# Patient Record
Sex: MALE | Race: WHITE | Employment: OTHER | URBAN - METROPOLITAN AREA
[De-identification: names, ages, dates, MRNs, and addresses within clinical notes are randomized per-mention and may not be internally consistent; named-entity substitution may affect disease eponyms.]

---

## 2017-09-05 RX ORDER — ALFUZOSIN HYDROCHLORIDE 10 MG/1
TABLET, EXTENDED RELEASE ORAL
Qty: 90 TABLET | Refills: 3 | Status: SHIPPED | OUTPATIENT
Start: 2017-09-05 | End: 2018-08-04 | Stop reason: SDUPTHER

## 2017-10-24 RX ORDER — FINASTERIDE 5 MG/1
5 TABLET, FILM COATED ORAL DAILY
Qty: 90 TABLET | Refills: 3 | Status: SHIPPED | OUTPATIENT
Start: 2017-10-24 | End: 2018-10-13 | Stop reason: SDUPTHER

## 2017-11-27 LAB — PROSTATE SPECIFIC ANTIGEN: 2.9 NG/ML

## 2017-12-01 ENCOUNTER — OFFICE VISIT (OUTPATIENT)
Dept: UROLOGY | Age: 82
End: 2017-12-01

## 2017-12-01 VITALS
HEIGHT: 68 IN | BODY MASS INDEX: 28.79 KG/M2 | WEIGHT: 190 LBS | DIASTOLIC BLOOD PRESSURE: 76 MMHG | SYSTOLIC BLOOD PRESSURE: 138 MMHG | HEART RATE: 48 BPM

## 2017-12-01 DIAGNOSIS — N13.8 BPH WITH OBSTRUCTION/LOWER URINARY TRACT SYMPTOMS: Primary | ICD-10-CM

## 2017-12-01 DIAGNOSIS — N40.1 BPH WITH OBSTRUCTION/LOWER URINARY TRACT SYMPTOMS: Primary | ICD-10-CM

## 2017-12-01 PROCEDURE — 1123F ACP DISCUSS/DSCN MKR DOCD: CPT | Performed by: UROLOGY

## 2017-12-01 PROCEDURE — G8419 CALC BMI OUT NRM PARAM NOF/U: HCPCS | Performed by: UROLOGY

## 2017-12-01 PROCEDURE — 99213 OFFICE O/P EST LOW 20 MIN: CPT | Performed by: UROLOGY

## 2017-12-01 PROCEDURE — 4004F PT TOBACCO SCREEN RCVD TLK: CPT | Performed by: UROLOGY

## 2017-12-01 PROCEDURE — G8484 FLU IMMUNIZE NO ADMIN: HCPCS | Performed by: UROLOGY

## 2017-12-01 PROCEDURE — 4040F PNEUMOC VAC/ADMIN/RCVD: CPT | Performed by: UROLOGY

## 2017-12-01 PROCEDURE — 51798 US URINE CAPACITY MEASURE: CPT | Performed by: UROLOGY

## 2017-12-01 PROCEDURE — 51741 ELECTRO-UROFLOWMETRY FIRST: CPT | Performed by: UROLOGY

## 2017-12-01 PROCEDURE — G8427 DOCREV CUR MEDS BY ELIG CLIN: HCPCS | Performed by: UROLOGY

## 2017-12-01 ASSESSMENT — ENCOUNTER SYMPTOMS: SHORTNESS OF BREATH: 0

## 2017-12-01 NOTE — PROGRESS NOTES
by mouth daily 90 tablet 3    alfuzosin (UROXATRAL) 10 MG extended release tablet TAKE 1 TABLET DAILY 90 tablet 3    hydrocortisone (ANUSOL-HC) 25 MG suppository Place 1 suppository rectally 2 times daily as needed for Hemorrhoids. 12 suppository 2    aspirin EC 81 MG EC tablet Take 81 mg by mouth daily.  magnesium oxide (MAG-OX) 400 MG tablet Take 400 mg by mouth daily.  Multiple Vitamin (MULTIVITAMIN PO) Take 1 tablet by mouth daily.  rosuvastatin (CRESTOR) 10 MG tablet Take 10 mg by mouth daily.  warfarin (COUMADIN) 5 MG tablet Take 5 mg by mouth Daily.  ramipril (ALTACE) 10 MG capsule Take 10 mg by mouth daily.  ezetimibe (ZETIA) 10 MG tablet Take 10 mg by mouth daily.  metoprolol (LOPRESSOR) 50 MG tablet Take 50 mg by mouth 2 times daily.  nitroGLYCERIN (NITROSTAT) 0.4 MG SL tablet Place 0.4 mg under the tongue every 5 minutes as needed. No current facility-administered medications for this visit. Review of patient's allergies indicates no known allergies. reviewed      Review of Systems   Constitutional: Negative for fever and unexpected weight change. Respiratory: Negative for shortness of breath. Cardiovascular: Negative for chest pain. Genitourinary: Negative for difficulty urinating, dysuria, enuresis, flank pain and hematuria. Objective:   Physical Exam   Constitutional: He appears well-developed and well-nourished. Genitourinary: Rectal exam shows external hemorrhoid. Prostate is enlarged. Prostate is not tender. Genitourinary Comments: Prostate is 2 + and without nodules   Neurological: He is alert. Psychiatric: He has a normal mood and affect. His behavior is normal.          PSA 11/27/17: 2.9 ng/ml  PSA   Date Value Ref Range Status   05/03/2016 3.8 ng/mL Final   04/02/2015 4.0 ng/mL Final       Uroflow: 13 ml/sec, vol 239cc  post void residual by U/S: 78cc    Assessment:       This is a [de-identified] yo white male with

## 2018-08-13 RX ORDER — ALFUZOSIN HYDROCHLORIDE 10 MG/1
TABLET, EXTENDED RELEASE ORAL
Qty: 90 TABLET | Refills: 3 | Status: SHIPPED | OUTPATIENT
Start: 2018-08-13 | End: 2019-12-03 | Stop reason: SDUPTHER

## 2018-10-15 RX ORDER — FINASTERIDE 5 MG/1
TABLET, FILM COATED ORAL
Qty: 90 TABLET | Refills: 3 | Status: SHIPPED | OUTPATIENT
Start: 2018-10-15 | End: 2020-10-12

## 2018-12-03 LAB — PROSTATE SPECIFIC ANTIGEN: 3.5 NG/ML (ref 0–4)

## 2018-12-04 ENCOUNTER — OFFICE VISIT (OUTPATIENT)
Dept: UROLOGY | Age: 83
End: 2018-12-04
Payer: MEDICARE

## 2018-12-04 VITALS
WEIGHT: 190 LBS | DIASTOLIC BLOOD PRESSURE: 70 MMHG | HEART RATE: 47 BPM | BODY MASS INDEX: 28.79 KG/M2 | SYSTOLIC BLOOD PRESSURE: 136 MMHG | HEIGHT: 68 IN

## 2018-12-04 DIAGNOSIS — N40.1 BPH WITH OBSTRUCTION/LOWER URINARY TRACT SYMPTOMS: Primary | ICD-10-CM

## 2018-12-04 DIAGNOSIS — N13.8 BPH WITH OBSTRUCTION/LOWER URINARY TRACT SYMPTOMS: Primary | ICD-10-CM

## 2018-12-04 PROCEDURE — 99213 OFFICE O/P EST LOW 20 MIN: CPT | Performed by: UROLOGY

## 2018-12-04 PROCEDURE — 51741 ELECTRO-UROFLOWMETRY FIRST: CPT | Performed by: UROLOGY

## 2018-12-04 PROCEDURE — 51798 US URINE CAPACITY MEASURE: CPT | Performed by: UROLOGY

## 2018-12-04 RX ORDER — INFLUENZA A VIRUS A/MICHIGAN/45/2015 X-275 (H1N1) ANTIGEN (FORMALDEHYDE INACTIVATED), INFLUENZA A VIRUS A/SINGAPORE/INFIMH-16-0019/2016 IVR-186 (H3N2) ANTIGEN (FORMALDEHYDE INACTIVATED), AND INFLUENZA B VIRUS B/MARYLAND/15/2016 BX-69A (A B/COLORADO/6/2017-LIKE VIRUS) ANTIGEN (FORMALDEHYDE INACTIVATED) 60; 60; 60 UG/.5ML; UG/.5ML; UG/.5ML
INJECTION, SUSPENSION INTRAMUSCULAR
Refills: 0 | COMMUNITY
Start: 2018-09-23

## 2018-12-04 ASSESSMENT — ENCOUNTER SYMPTOMS: SHORTNESS OF BREATH: 0

## 2018-12-04 NOTE — PROGRESS NOTES
Subjective:      Patient ID: Andriy Jaffe is a 82 y.o. male. 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 He also had a UTI that was treated with Cipro in 2016 and he re-started Proscar in 2/16. Since last seen on 12/1/17, has has no interval hematuria or UTI's. He has no dysuria, no frequency and urgency. She has no other SE from Proscar. He has no abd or flank pain. He has NF 1-2 and a good fos. He has no new medical or surgical problems. I reviewed the interval PSA.     Cysto on 2/29/16: significant tri-lobar hypertrophy of prostate with varices      Past Medical History:   Diagnosis Date    Anticoagulant long-term use     a fib    Atrial fibrillation (HCC)     CAD (coronary artery disease)     Cancer (HCC)     skin    History of elevated PSA 8.6.12    Most recent PSA 5.6, stable from last PSA of 5.1,prostate was enlarged and nonnodular upon exam.    Hyperlipidemia     Hypertension     Paroxysmal atrial fibrillation ([address]      Past Surgical History:   Procedure Laterality Date    COLONOSCOPY  05/21/14    EDUARDO FULTON MD    SKIN CANCER EXCISION      VEIN SURGERY       Social History     Social History    Marital status:       Spouse name: N/A    Number of children: N/A    Years of education: N/A     Social History Main Topics    Smoking status: Current Every Day Smoker     Packs/day: 0.50     Years: 50.00     Types: Cigarettes    Smokeless tobacco: Never Used    Alcohol use Yes      Comment: rare    Drug use: No    Sexual activity: Not Asked     Other Topics Concern    None     Social History Narrative    None     Family History   Problem Relation Age of Onset    Heart Disease Brother     Arthritis Brother     Stroke Brother      Current Outpatient Prescriptions   Medication Sig Dispense Refill    FLUZONE HIGH-DOSE 0.5 ML DEBBY injection TO BE

## 2019-04-25 LAB
INR BLD: 2.1 (ref 0.9–1.1)
PROTHROMBIN TIME: 23.9 SEC (ref 9.7–12.7)

## 2019-05-23 LAB
INR BLD: 2.2 (ref 0.9–1.1)
PROTHROMBIN TIME: 24.7 SEC (ref 9.7–12.7)

## 2019-06-20 LAB
INR BLD: 2.2 (ref 0.9–1.1)
PROTHROMBIN TIME: 25.2 SEC (ref 9.7–12.7)

## 2019-07-18 LAB
INR BLD: 2.6 (ref 0.9–1.1)
PROTHROMBIN TIME: 29.4 SEC (ref 9.7–12.7)

## 2019-07-19 RX ORDER — FINASTERIDE 5 MG/1
TABLET, FILM COATED ORAL
Qty: 90 TABLET | Refills: 3 | Status: SHIPPED | OUTPATIENT
Start: 2019-07-19 | End: 2019-12-03 | Stop reason: SDUPTHER

## 2019-08-15 LAB
INR BLD: 2.5 (ref 0.9–1.1)
PROTHROMBIN TIME: 29 SEC (ref 9.7–12.7)

## 2019-08-26 RX ORDER — ALFUZOSIN HYDROCHLORIDE 10 MG/1
10 TABLET, EXTENDED RELEASE ORAL DAILY
Qty: 90 TABLET | Refills: 3 | Status: SHIPPED | OUTPATIENT
Start: 2019-08-26 | End: 2020-08-18

## 2019-10-10 LAB
INR BLD: 3 (ref 0.9–1.1)
PROTHROMBIN TIME: 35.1 SEC (ref 9.7–12.7)

## 2019-11-07 LAB
INR BLD: 3.5 (ref 0.9–1.1)
PROTHROMBIN TIME: 40.9 SEC (ref 9.7–12.7)

## 2019-11-20 LAB
INR BLD: 2.8 (ref 0.9–1.1)
PROSTATE SPECIFIC ANTIGEN: 2.7 NG/ML (ref 0–4)
PROTHROMBIN TIME: 31.8 SEC (ref 9.7–12.7)

## 2019-12-03 ENCOUNTER — OFFICE VISIT (OUTPATIENT)
Dept: UROLOGY | Age: 84
End: 2019-12-03
Payer: MEDICARE

## 2019-12-03 VITALS
DIASTOLIC BLOOD PRESSURE: 60 MMHG | SYSTOLIC BLOOD PRESSURE: 130 MMHG | BODY MASS INDEX: 28.79 KG/M2 | WEIGHT: 190 LBS | HEIGHT: 68 IN | HEART RATE: 51 BPM

## 2019-12-03 DIAGNOSIS — N40.1 BPH WITH OBSTRUCTION/LOWER URINARY TRACT SYMPTOMS: Primary | ICD-10-CM

## 2019-12-03 DIAGNOSIS — R97.20 ELEVATED PSA: ICD-10-CM

## 2019-12-03 DIAGNOSIS — N13.8 BPH WITH OBSTRUCTION/LOWER URINARY TRACT SYMPTOMS: Primary | ICD-10-CM

## 2019-12-03 PROCEDURE — 1123F ACP DISCUSS/DSCN MKR DOCD: CPT | Performed by: UROLOGY

## 2019-12-03 PROCEDURE — 4004F PT TOBACCO SCREEN RCVD TLK: CPT | Performed by: UROLOGY

## 2019-12-03 PROCEDURE — 51798 US URINE CAPACITY MEASURE: CPT | Performed by: UROLOGY

## 2019-12-03 PROCEDURE — G8427 DOCREV CUR MEDS BY ELIG CLIN: HCPCS | Performed by: UROLOGY

## 2019-12-03 PROCEDURE — 51741 ELECTRO-UROFLOWMETRY FIRST: CPT | Performed by: UROLOGY

## 2019-12-03 PROCEDURE — G8417 CALC BMI ABV UP PARAM F/U: HCPCS | Performed by: UROLOGY

## 2019-12-03 PROCEDURE — 4040F PNEUMOC VAC/ADMIN/RCVD: CPT | Performed by: UROLOGY

## 2019-12-03 PROCEDURE — G8484 FLU IMMUNIZE NO ADMIN: HCPCS | Performed by: UROLOGY

## 2019-12-03 PROCEDURE — 99213 OFFICE O/P EST LOW 20 MIN: CPT | Performed by: UROLOGY

## 2019-12-03 ASSESSMENT — ENCOUNTER SYMPTOMS
ABDOMINAL PAIN: 0
ABDOMINAL DISTENTION: 0

## 2019-12-11 LAB
INR BLD: 2.7 (ref 0.9–1.1)
PROTHROMBIN TIME: 30.7 SEC (ref 9.7–12.7)

## 2020-01-08 LAB
INR BLD: 2.4 (ref 0.9–1.1)
PROTHROMBIN TIME: 27.6 SEC (ref 9.7–12.7)

## 2020-08-18 RX ORDER — ALFUZOSIN HYDROCHLORIDE 10 MG/1
TABLET, EXTENDED RELEASE ORAL
Qty: 90 TABLET | Refills: 3 | Status: SHIPPED | OUTPATIENT
Start: 2020-08-18 | End: 2021-06-29

## 2020-10-12 RX ORDER — FINASTERIDE 5 MG/1
TABLET, FILM COATED ORAL
Qty: 90 TABLET | Refills: 3 | Status: SHIPPED | OUTPATIENT
Start: 2020-10-12 | End: 2021-08-16

## 2020-11-16 LAB — PROSTATE SPECIFIC ANTIGEN: 2.2 NG/ML (ref 0–4)

## 2020-12-15 ENCOUNTER — OFFICE VISIT (OUTPATIENT)
Dept: UROLOGY | Age: 85
End: 2020-12-15
Payer: MEDICARE

## 2020-12-15 VITALS
HEART RATE: 54 BPM | DIASTOLIC BLOOD PRESSURE: 74 MMHG | WEIGHT: 190 LBS | BODY MASS INDEX: 28.79 KG/M2 | HEIGHT: 68 IN | OXYGEN SATURATION: 97 % | SYSTOLIC BLOOD PRESSURE: 126 MMHG

## 2020-12-15 PROCEDURE — 99213 OFFICE O/P EST LOW 20 MIN: CPT | Performed by: UROLOGY

## 2020-12-15 PROCEDURE — 51741 ELECTRO-UROFLOWMETRY FIRST: CPT | Performed by: UROLOGY

## 2020-12-15 PROCEDURE — G8417 CALC BMI ABV UP PARAM F/U: HCPCS | Performed by: UROLOGY

## 2020-12-15 PROCEDURE — G8484 FLU IMMUNIZE NO ADMIN: HCPCS | Performed by: UROLOGY

## 2020-12-15 PROCEDURE — 4004F PT TOBACCO SCREEN RCVD TLK: CPT | Performed by: UROLOGY

## 2020-12-15 PROCEDURE — 4040F PNEUMOC VAC/ADMIN/RCVD: CPT | Performed by: UROLOGY

## 2020-12-15 PROCEDURE — G8427 DOCREV CUR MEDS BY ELIG CLIN: HCPCS | Performed by: UROLOGY

## 2020-12-15 PROCEDURE — 1123F ACP DISCUSS/DSCN MKR DOCD: CPT | Performed by: UROLOGY

## 2020-12-15 PROCEDURE — 51798 US URINE CAPACITY MEASURE: CPT | Performed by: UROLOGY

## 2020-12-15 ASSESSMENT — ENCOUNTER SYMPTOMS: ABDOMINAL PAIN: 0

## 2020-12-15 NOTE — PROGRESS NOTES
Subjective:      Patient ID: Blanquita Mendez is a 82 y.o. male. 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 He also had a UTI that was treated with Cipro in 2016 and he re-started Proscar in 2/16. Since last seen on 12/3/19, has has no interval hematuria or UTI's. He has no dysuria, no frequency and no urgency/UI. He has no abd or flank pain. He has NF 2-3 and a good fos. He has no new surgical problems and was recently treated for diverticulitis. I reviewed the interval PSA. He is happy with his current voiding.     Cysto on 2/29/16: significant tri-lobar hypertrophy of prostate with varices    Past Medical History:   Diagnosis Date    Anticoagulant long-term use     a fib    Atrial fibrillation ([address]     CAD (coronary artery disease)     Cancer (HCC)     skin    History of elevated PSA 8.6.12    Most recent PSA 5.6, stable from last PSA of 5.1,prostate was enlarged and nonnodular upon exam.    Hyperlipidemia     Hypertension     Paroxysmal atrial fibrillation ([address]      Past Surgical History:   Procedure Laterality Date    COLONOSCOPY  05/21/14    EDUARDO FULTON MD    SKIN CANCER EXCISION      VEIN SURGERY       Social History     Socioeconomic History    Marital status:      Spouse name: None    Number of children: None    Years of education: None    Highest education level: None   Occupational History    None   Social Needs    Financial resource strain: None    Food insecurity     Worry: None     Inability: None    Transportation needs     Medical: None     Non-medical: None   Tobacco Use    Smoking status: Current Every Day Smoker     Packs/day: 0.50     Years: 50.00     Pack years: 25.00     Types: Cigarettes    Smokeless tobacco: Never Used   Substance and Sexual Activity    Alcohol use: Yes     Comment: rare    Drug use:  No  Sexual activity: None   Lifestyle    Physical activity     Days per week: None     Minutes per session: None    Stress: None   Relationships    Social connections     Talks on phone: None     Gets together: None     Attends Jain service: None     Active member of club or organization: None     Attends meetings of clubs or organizations: None     Relationship status: None    Intimate partner violence     Fear of current or ex partner: None     Emotionally abused: None     Physically abused: None     Forced sexual activity: None   Other Topics Concern    None   Social History Narrative    None     Family History   Problem Relation Age of Onset    Heart Disease Brother     Arthritis Brother     Stroke Brother      Current Outpatient Medications   Medication Sig Dispense Refill    finasteride (PROSCAR) 5 MG tablet TAKE 1 TABLET DAILY 90 tablet 3    alfuzosin (UROXATRAL) 10 MG extended release tablet TAKE 1 TABLET DAILY 90 tablet 3    FLUZONE HIGH-DOSE 0.5 ML DEBBY injection TO BE ADMINISTERED BY PHARMACIST FOR IMMUNIZATION  0    hydrocortisone (ANUSOL-HC) 25 MG suppository Place 1 suppository rectally 2 times daily as needed for Hemorrhoids. 12 suppository 2    aspirin EC 81 MG EC tablet Take 81 mg by mouth daily.  magnesium oxide (MAG-OX) 400 MG tablet Take 400 mg by mouth daily.  Multiple Vitamin (MULTIVITAMIN PO) Take 1 tablet by mouth daily.  rosuvastatin (CRESTOR) 10 MG tablet Take 10 mg by mouth daily.  warfarin (COUMADIN) 5 MG tablet Take 5 mg by mouth Daily.  ramipril (ALTACE) 10 MG capsule Take 10 mg by mouth daily.  ezetimibe (ZETIA) 10 MG tablet Take 10 mg by mouth daily.  metoprolol (LOPRESSOR) 50 MG tablet Take 50 mg by mouth 2 times daily.  nitroGLYCERIN (NITROSTAT) 0.4 MG SL tablet Place 0.4 mg under the tongue every 5 minutes as needed. No current facility-administered medications for this visit. Patient has no known allergies. reviewed      Review of Systems   Constitutional: Negative for unexpected weight change. Gastrointestinal: Negative for abdominal pain. Genitourinary: Negative for difficulty urinating, dysuria, flank pain and hematuria. Objective:   Physical Exam  Constitutional:       Appearance: Normal appearance. Genitourinary:     Prostate: Enlarged. Not tender and no nodules present. Rectum: External hemorrhoid present. Comments: Prostate is 3 +  Neurological:      Mental Status: He is alert. Psychiatric:         Mood and Affect: Mood normal.           PSA   Date Value Ref Range Status   11/16/2020 2.20 0.00 - 4.0 ng/mL Final   11/20/2019 2.70 0.00 - 4.0 ng/mL Final   12/03/2018 3.5 0.0 - 4.0 ng/mL Final   11/27/2017 2.9 ng/mL Final   05/03/2016 3.8 ng/mL Final     Uroflow: 11 ml/sec, vol 144 cc  post void residual by U/S: 95 cc    Assessment: This is a 81 yo white male with h/o CAD, AFIB/Coumadin/ASA, HTN, BPH w/LUTs on Alfusozin and Proscar (stable) and stable/imporved PSA that remains elevated when corrected for use of 5ARI. He again, remains not currently interested in a prostate biopsy which is reasonable given his age, co-morbidities and PSA stability. He understands the approx 25 % risk for prostate cancer based on the current PSA. Will continue with the present management for the BPH.        Plan:      1.  F/U 1 yr for PSA        Hugo Gama MD

## 2021-06-29 RX ORDER — ALFUZOSIN HYDROCHLORIDE 10 MG/1
TABLET, EXTENDED RELEASE ORAL
Qty: 90 TABLET | Refills: 3 | Status: SHIPPED | OUTPATIENT
Start: 2021-06-29 | End: 2022-05-31

## 2021-08-14 DIAGNOSIS — N40.1 BPH WITH OBSTRUCTION/LOWER URINARY TRACT SYMPTOMS: Primary | ICD-10-CM

## 2021-08-14 DIAGNOSIS — N13.8 BPH WITH OBSTRUCTION/LOWER URINARY TRACT SYMPTOMS: Primary | ICD-10-CM

## 2021-08-16 RX ORDER — FINASTERIDE 5 MG/1
TABLET, FILM COATED ORAL
Qty: 90 TABLET | Refills: 3 | Status: SHIPPED | OUTPATIENT
Start: 2021-08-16 | End: 2022-09-19

## 2021-12-08 LAB — PROSTATE SPECIFIC ANTIGEN: 2.6 NG/ML (ref 0–4)

## 2021-12-30 ENCOUNTER — OFFICE VISIT (OUTPATIENT)
Dept: UROLOGY | Age: 86
End: 2021-12-30
Payer: MEDICARE

## 2021-12-30 VITALS
BODY MASS INDEX: 27.28 KG/M2 | SYSTOLIC BLOOD PRESSURE: 138 MMHG | HEART RATE: 70 BPM | HEIGHT: 68 IN | WEIGHT: 180 LBS | DIASTOLIC BLOOD PRESSURE: 78 MMHG

## 2021-12-30 DIAGNOSIS — N13.8 BPH WITH OBSTRUCTION/LOWER URINARY TRACT SYMPTOMS: Primary | ICD-10-CM

## 2021-12-30 DIAGNOSIS — N40.1 BPH WITH OBSTRUCTION/LOWER URINARY TRACT SYMPTOMS: Primary | ICD-10-CM

## 2021-12-30 DIAGNOSIS — R97.20 ELEVATED PSA: ICD-10-CM

## 2021-12-30 PROCEDURE — 99213 OFFICE O/P EST LOW 20 MIN: CPT | Performed by: UROLOGY

## 2021-12-30 PROCEDURE — G8417 CALC BMI ABV UP PARAM F/U: HCPCS | Performed by: UROLOGY

## 2021-12-30 PROCEDURE — 4004F PT TOBACCO SCREEN RCVD TLK: CPT | Performed by: UROLOGY

## 2021-12-30 PROCEDURE — 4040F PNEUMOC VAC/ADMIN/RCVD: CPT | Performed by: UROLOGY

## 2021-12-30 PROCEDURE — 1123F ACP DISCUSS/DSCN MKR DOCD: CPT | Performed by: UROLOGY

## 2021-12-30 PROCEDURE — G8427 DOCREV CUR MEDS BY ELIG CLIN: HCPCS | Performed by: UROLOGY

## 2021-12-30 PROCEDURE — G8484 FLU IMMUNIZE NO ADMIN: HCPCS | Performed by: UROLOGY

## 2021-12-30 ASSESSMENT — ENCOUNTER SYMPTOMS
ABDOMINAL PAIN: 0
SHORTNESS OF BREATH: 0
ABDOMINAL DISTENTION: 0

## 2021-12-30 NOTE — PROGRESS NOTES
Social Determinants of Health     Financial Resource Strain:     Difficulty of Paying Living Expenses: Not on file   Food Insecurity:     Worried About Running Out of Food in the Last Year: Not on file    Jina of Food in the Last Year: Not on file   Transportation Needs:     Lack of Transportation (Medical): Not on file    Lack of Transportation (Non-Medical): Not on file   Physical Activity:     Days of Exercise per Week: Not on file    Minutes of Exercise per Session: Not on file   Stress:     Feeling of Stress : Not on file   Social Connections:     Frequency of Communication with Friends and Family: Not on file    Frequency of Social Gatherings with Friends and Family: Not on file    Attends Quaker Services: Not on file    Active Member of Clubs or Organizations: Not on file    Attends Club or Organization Meetings: Not on file    Marital Status: Not on file   Intimate Partner Violence:     Fear of Current or Ex-Partner: Not on file    Emotionally Abused: Not on file    Physically Abused: Not on file    Sexually Abused: Not on file   Housing Stability:     Unable to Pay for Housing in the Last Year: Not on file    Number of Places Lived in the Last Year: Not on file    Unstable Housing in the Last Year: Not on file     Family History   Problem Relation Age of Onset    Heart Disease Brother     Arthritis Brother     Stroke Brother      Current Outpatient Medications   Medication Sig Dispense Refill    finasteride (PROSCAR) 5 MG tablet TAKE 1 TABLET DAILY 90 tablet 3    alfuzosin (UROXATRAL) 10 MG extended release tablet TAKE 1 TABLET DAILY 90 tablet 3    FLUZONE HIGH-DOSE 0.5 ML DEBBY injection TO BE ADMINISTERED BY PHARMACIST FOR IMMUNIZATION  0    hydrocortisone (ANUSOL-HC) 25 MG suppository Place 1 suppository rectally 2 times daily as needed for Hemorrhoids. 12 suppository 2    aspirin EC 81 MG EC tablet Take 81 mg by mouth daily.         magnesium oxide (MAG-OX) 400 MG

## 2022-03-28 ENCOUNTER — TELEPHONE (OUTPATIENT)
Dept: UROLOGY | Age: 87
End: 2022-03-28

## 2022-03-28 NOTE — TELEPHONE ENCOUNTER
Should discuss with the other doctor who prescribed the cardiac med to see if there is an alternative to use of amioderone?

## 2022-05-31 RX ORDER — ALFUZOSIN HYDROCHLORIDE 10 MG/1
TABLET, EXTENDED RELEASE ORAL
Qty: 90 TABLET | Refills: 3 | Status: SHIPPED | OUTPATIENT
Start: 2022-05-31

## 2022-08-09 ENCOUNTER — HOSPITAL ENCOUNTER (OUTPATIENT)
Dept: PULMONOLOGY | Age: 87
Discharge: HOME | End: 2022-08-09
Payer: MEDICARE

## 2022-08-09 DIAGNOSIS — Z79.899 HIGH RISK MEDICATION USE: ICD-10-CM

## 2022-08-09 PROCEDURE — 94729 DIFFUSING CAPACITY: CPT

## 2022-08-09 PROCEDURE — 94060 EVALUATION OF WHEEZING: CPT

## 2022-08-09 PROCEDURE — 94726 PLETHYSMOGRAPHY LUNG VOLUMES: CPT

## 2022-08-09 PROCEDURE — 6360000002 HC RX W HCPCS: Performed by: INTERNAL MEDICINE

## 2022-08-09 RX ORDER — ALBUTEROL SULFATE 2.5 MG/3ML
2.5 SOLUTION RESPIRATORY (INHALATION) ONCE
Status: COMPLETED | OUTPATIENT
Start: 2022-08-09 | End: 2022-08-09

## 2022-08-09 RX ADMIN — ALBUTEROL SULFATE 2.5 MG: 2.5 SOLUTION RESPIRATORY (INHALATION) at 09:58

## 2022-08-10 PROCEDURE — 94729 DIFFUSING CAPACITY: CPT | Performed by: INTERNAL MEDICINE

## 2022-08-10 PROCEDURE — 94726 PLETHYSMOGRAPHY LUNG VOLUMES: CPT | Performed by: INTERNAL MEDICINE

## 2022-08-10 PROCEDURE — 94060 EVALUATION OF WHEEZING: CPT | Performed by: INTERNAL MEDICINE

## 2022-08-10 NOTE — PROCEDURES
[address]                      [address], [address] [facility]                    PULMONARY FUNCTION  Nolon Shanks Overy   82 y.o.   male  Height 68 in  Weight 180 lb      Referring provider   Darryle Goo, MD    Reading provider   Adithya Mckeon MD    Test meets ATS criteria for acceptability and reproducibility Yes    Diagnosis: ARREDONDO: Yes  Cough   Yes, wheezing No  Smoking   yes    Spirometry   FVC            2.19 L   62%  Post bronchodilator 2.24 L  63% Change 2 %  FEV1          1.86 L  75%   Post bronchodilator  1.90 L  77%   Change 1%  FEV1/FVC  84  %             Post bronchodilator  84 %  RAV88-34% 1.93 L  121%  Post bronchodilator  1.95 L  122%   Change 1%    Lung volume   SVC           2.06 L  58%   RV              2.67 L 101%   TLC            4.73  L 70%   RV/TLC      56 %    DLCO           92 %     Test interpretation     Spirometry suggest restriction, with no significant response to bronchodilator  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        Clinical correlation is recommended     Adithya Mckeon MD [facility], 8/10/2022 11:44 AM

## 2022-09-17 DIAGNOSIS — N40.1 BPH WITH OBSTRUCTION/LOWER URINARY TRACT SYMPTOMS: ICD-10-CM

## 2022-09-17 DIAGNOSIS — N13.8 BPH WITH OBSTRUCTION/LOWER URINARY TRACT SYMPTOMS: ICD-10-CM

## 2022-09-19 RX ORDER — FINASTERIDE 5 MG/1
TABLET, FILM COATED ORAL
Qty: 90 TABLET | Refills: 3 | Status: SHIPPED | OUTPATIENT
Start: 2022-09-19 | End: 2022-10-27 | Stop reason: SDUPTHER

## 2022-10-24 DIAGNOSIS — N13.8 BPH WITH OBSTRUCTION/LOWER URINARY TRACT SYMPTOMS: ICD-10-CM

## 2022-10-24 DIAGNOSIS — N40.1 BPH WITH OBSTRUCTION/LOWER URINARY TRACT SYMPTOMS: ICD-10-CM

## 2022-10-24 NOTE — TELEPHONE ENCOUNTER
[facility], [address] [place] [address]. Pt states that this can wait until Dr. Roverto Ordoñez returns next week.

## 2022-10-27 RX ORDER — FINASTERIDE 5 MG/1
TABLET, FILM COATED ORAL
Qty: 90 TABLET | Refills: 3 | Status: SHIPPED | OUTPATIENT
Start: 2022-10-27

## 2022-12-29 ENCOUNTER — OFFICE VISIT (OUTPATIENT)
Dept: UROLOGY | Age: 87
End: 2022-12-29
Payer: MEDICARE

## 2022-12-29 VITALS
DIASTOLIC BLOOD PRESSURE: 72 MMHG | WEIGHT: 190 LBS | HEIGHT: 68 IN | HEART RATE: 68 BPM | SYSTOLIC BLOOD PRESSURE: 134 MMHG | BODY MASS INDEX: 28.79 KG/M2

## 2022-12-29 DIAGNOSIS — N40.1 BPH WITH OBSTRUCTION/LOWER URINARY TRACT SYMPTOMS: Primary | ICD-10-CM

## 2022-12-29 DIAGNOSIS — N13.8 BPH WITH OBSTRUCTION/LOWER URINARY TRACT SYMPTOMS: Primary | ICD-10-CM

## 2022-12-29 PROCEDURE — 1123F ACP DISCUSS/DSCN MKR DOCD: CPT | Performed by: UROLOGY

## 2022-12-29 PROCEDURE — 99213 OFFICE O/P EST LOW 20 MIN: CPT | Performed by: UROLOGY

## 2022-12-29 PROCEDURE — G8417 CALC BMI ABV UP PARAM F/U: HCPCS | Performed by: UROLOGY

## 2022-12-29 PROCEDURE — G8484 FLU IMMUNIZE NO ADMIN: HCPCS | Performed by: UROLOGY

## 2022-12-29 PROCEDURE — G8427 DOCREV CUR MEDS BY ELIG CLIN: HCPCS | Performed by: UROLOGY

## 2022-12-29 PROCEDURE — 4004F PT TOBACCO SCREEN RCVD TLK: CPT | Performed by: UROLOGY

## 2022-12-29 RX ORDER — AMIODARONE HYDROCHLORIDE 100 MG/1
100 TABLET ORAL DAILY
COMMUNITY

## 2022-12-29 ASSESSMENT — ENCOUNTER SYMPTOMS
ABDOMINAL DISTENTION: 0
ABDOMINAL PAIN: 0

## 2022-12-29 NOTE — PROGRESS NOTES
Subjective:      Patient ID: Lili Choudhary is a 82 y.o. 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 He also had a UTI that was treated with Cipro in 2016 and he re-started Proscar in 2/16. Since last seen on 12/30/21 has has no interval hematuria or UTI's. He has no dysuria, no frequency and no UI. He has NF 2-3 and a good fos. He has no new medical or surgical problems. I reviewed the interval PSA. Cysto on 2/29/16: significant tri-lobar hypertrophy of prostate with varices    Past Medical History:   Diagnosis Date    Anticoagulant long-term use     a fib    Atrial fibrillation (HCC)     CAD (coronary artery disease)     Cancer (HCC)     skin    History of elevated PSA 8.6.12    Most recent PSA 5.6, stable from last PSA of 5.1,prostate was enlarged and nonnodular upon exam.    Hyperlipidemia     Hypertension     Paroxysmal atrial fibrillation [facility])      Past Surgical History:   Procedure Laterality Date    COLONOSCOPY  05/21/14    Negrito Reyna MD    SKIN CANCER EXCISION      VEIN SURGERY       Social History     Socioeconomic History    Marital status:       Spouse name: None    Number of children: None    Years of education: None    Highest education level: None   Tobacco Use    Smoking status: Every Day     Packs/day: 0.50     Years: 50.00     Pack years: 25.00     Types: Cigarettes    Smokeless tobacco: Never   Substance and Sexual Activity    Alcohol use: Yes     Comment: rare    Drug use: No     Family History   Problem Relation Age of Onset    Heart Disease Brother     Arthritis Brother     Stroke Brother      Current Outpatient Medications   Medication Sig Dispense Refill    amiodarone (PACERONE) 100 MG tablet Take 100 mg by mouth daily      finasteride (PROSCAR) 5 MG tablet TAKE 1 TABLET DAILY 90 tablet 3    alfuzosin (UROXATRAL) 10 MG extended release tablet TAKE 1 TABLET DAILY 90 tablet 3    FLUZONE HIGH-DOSE 0.5 ML DEBBY injection TO BE ADMINISTERED BY PHARMACIST FOR IMMUNIZATION  0    aspirin EC 81 MG EC tablet Take 81 mg by mouth daily. magnesium oxide (MAG-OX) 400 MG tablet Take 400 mg by mouth daily. Multiple Vitamin (MULTIVITAMIN PO) Take 1 tablet by mouth daily. rosuvastatin (CRESTOR) 10 MG tablet Take 10 mg by mouth daily. warfarin (COUMADIN) 5 MG tablet Take 5 mg by mouth Daily. ramipril (ALTACE) 10 MG capsule Take 10 mg by mouth daily. ezetimibe (ZETIA) 10 MG tablet Take 10 mg by mouth daily. metoprolol (LOPRESSOR) 50 MG tablet Take 50 mg by mouth 2 times daily. nitroGLYCERIN (NITROSTAT) 0.4 MG SL tablet Place 0.4 mg under the tongue every 5 minutes as needed. hydrocortisone (ANUSOL-HC) 25 MG suppository Place 1 suppository rectally 2 times daily as needed for Hemorrhoids. (Patient not taking: Reported on 12/29/2022) 12 suppository 2     No current facility-administered medications for this visit. Patient has no known allergies. reviewed      Review of Systems   Constitutional:  Negative for unexpected weight change. Gastrointestinal:  Negative for abdominal distention and abdominal pain. Genitourinary:  Negative for dysuria, flank pain and hematuria. Objective:   Physical Exam  Constitutional:       Appearance: Normal appearance. Genitourinary:     Prostate: Enlarged. Not tender and no nodules present. Rectum: External hemorrhoid present. Neurological:      Mental Status: He is alert. PSA 12/20/22: 2.5 ng/ml  PSA   Date Value Ref Range Status   12/08/2021 2.60 0.00 - 4.0 ng/mL Final   11/16/2020 2.20 0.00 - 4.0 ng/mL Final   11/20/2019 2.70 0.00 - 4.0 ng/mL Final   12/03/2018 3.5 0.0 - 4.0 ng/mL Final   11/27/2017 2.9 ng/mL Final       Assessment:       This is a 83 yo white male with h/o CAD, AFIB/Coumadin/ASA, HTN, BPH w/LUTs on Alfusozin and Proscar (stable) and relatively stable PSA that remains elevated when corrected for use of 5ARI. He again, remains not currently interested in a prostate biopsy which is reasonable given his age, co-morbidities and PSA stability. He understands the approx 25 % risk for prostate cancer based on the current PSA. Will continue with the present management for the BPH.        Plan:      F/U 1 yr for PSA        Ange Harris MD

## 2022-12-30 DIAGNOSIS — N13.8 BPH WITH OBSTRUCTION/LOWER URINARY TRACT SYMPTOMS: Primary | ICD-10-CM

## 2022-12-30 DIAGNOSIS — N40.1 BPH WITH OBSTRUCTION/LOWER URINARY TRACT SYMPTOMS: Primary | ICD-10-CM

## 2023-01-24 LAB
INR BLD: 1.9 (ref 0.9–1.1)
PROTHROMBIN TIME: 21.9 SEC (ref 9.8–13.4)

## 2023-02-21 LAB
INR IN PPP BY COAGULATION ASSAY: 1.8 (ref 0.9–1.1)
PROTHROMBIN TIME (PT) IN PPP BY COAGULATION ASSAY: 20.9 SEC (ref 9.8–13.4)

## 2023-03-14 LAB
INR IN PPP BY COAGULATION ASSAY: 2.7 (ref 0.9–1.1)
PROTHROMBIN TIME (PT) IN PPP BY COAGULATION ASSAY: 31.1 SEC (ref 9.8–13.4)

## 2023-03-28 LAB
ALANINE AMINOTRANSFERASE (SGPT) (U/L) IN SER/PLAS: 14 U/L (ref 10–52)
ALBUMIN (G/DL) IN SER/PLAS: 3.9 G/DL (ref 3.4–5)
ALBUMIN: 3.9 G/DL (ref 3.4–5)
ALKALINE PHOSPHATASE (U/L) IN SER/PLAS: 88 U/L (ref 33–136)
ALP BLD-CCNC: 88 U/L (ref 33–136)
ALT SERPL-CCNC: 14 U/L (ref 10–52)
ANION GAP IN SER/PLAS: 12 MMOL/L (ref 10–20)
ANION GAP SERPL CALCULATED.3IONS-SCNC: 12 MMOL/L (ref 10–20)
ASPARTATE AMINOTRANSFERASE (SGOT) (U/L) IN SER/PLAS: 24 U/L (ref 9–39)
AST SERPL-CCNC: 24 U/L (ref 9–39)
BICARBONATE: 31 MMOL/L (ref 21–32)
BILIRUB SERPL-MCNC: 0.9 MG/DL (ref 0–1.2)
BILIRUBIN DIRECT (MG/DL) IN SER/PLAS: 0.1 MG/DL (ref 0–0.3)
BILIRUBIN DIRECT: 0.1 MG/DL (ref 0–0.3)
BILIRUBIN TOTAL (MG/DL) IN SER/PLAS: 0.9 MG/DL (ref 0–1.2)
CALCIUM (MG/DL) IN SER/PLAS: 9.6 MG/DL (ref 8.6–10.3)
CALCIUM SERPL-MCNC: 9.6 MG/DL (ref 8.6–10.3)
CARBON DIOXIDE, TOTAL (MMOL/L) IN SER/PLAS: 31 MMOL/L (ref 21–32)
CHLORIDE (MMOL/L) IN SER/PLAS: 101 MMOL/L (ref 98–107)
CHLORIDE BLD-SCNC: 101 MMOL/L (ref 98–107)
CREAT SERPL-MCNC: 1.09 MG/DL (ref 0.5–1.3)
CREATININE (MG/DL) IN SER/PLAS: 1.09 MG/DL (ref 0.5–1.3)
EGFR MALE: 66 ML/MIN/1.73M2
ERYTHROCYTE DISTRIBUTION WIDTH (RATIO) BY AUTOMATED COUNT: 14.6 % (ref 11.5–14.5)
ERYTHROCYTE MEAN CORPUSCULAR HEMOGLOBIN CONCENTRATION (G/DL) BY AUTOMATED: 32 G/DL (ref 32–36)
ERYTHROCYTE MEAN CORPUSCULAR VOLUME (FL) BY AUTOMATED COUNT: 103 FL (ref 80–100)
ERYTHROCYTE [DISTWIDTH] IN BLOOD BY AUTOMATED COUNT: 14.6 % (ref 11.5–14)
ERYTHROCYTES (10*6/UL) IN BLOOD BY AUTOMATED COUNT: 3.99 X10E12/L (ref 4.5–5.9)
GFR MALE: 66 ML/MIN/1.73M2
GLUCOSE (MG/DL) IN SER/PLAS: 95 MG/DL (ref 74–99)
GLUCOSE: 95 MG/DL (ref 74–99)
HCT VFR BLD CALC: 41.2 % (ref 41–52)
HEMATOCRIT (%) IN BLOOD BY AUTOMATED COUNT: 41.2 % (ref 41–52)
HEMOGLOBIN (G/DL) IN BLOOD: 13.2 G/DL (ref 13.5–17.5)
HEMOGLOBIN: 13.2 G/DL (ref 13.5–17)
INR BLD: 2.8 (ref 0.9–1.1)
INR IN PPP BY COAGULATION ASSAY: 2.8 (ref 0.9–1.1)
LEUKOCYTES (10*3/UL) IN BLOOD BY AUTOMATED COUNT: 7.3 X10E9/L (ref 4.4–11.3)
MCHC RBC AUTO-ENTMCNC: 32 G/DL (ref 32–36)
MCV RBC AUTO: 103 FL (ref 80–100)
PLATELET # BLD: 261 X10E9/L (ref 150–450)
PLATELETS (10*3/UL) IN BLOOD AUTOMATED COUNT: 261 X10E9/L (ref 150–450)
POTASSIUM (MMOL/L) IN SER/PLAS: 4.3 MMOL/L (ref 3.5–5.3)
POTASSIUM SERPL-SCNC: 4.3 MMOL/L (ref 3.5–5.3)
PROTEIN TOTAL: 7.5 G/DL (ref 6.4–8.2)
PROTHROMBIN TIME (PT) IN PPP BY COAGULATION ASSAY: 33.2 SEC (ref 9.8–13.4)
PROTHROMBIN TIME: 33.2 SEC (ref 9.8–13.4)
RBC # BLD: 3.99 X10E12/L (ref 4.5–5.9)
SODIUM (MMOL/L) IN SER/PLAS: 140 MMOL/L (ref 136–145)
SODIUM BLD-SCNC: 140 MMOL/L (ref 136–145)
T4 FREE: 1.24 NG/DL (ref 0.61–1.1)
THYROTROPIN (MIU/L) IN SER/PLAS BY DETECTION LIMIT <= 0.05 MIU/L: 2.61 MIU/L (ref 0.44–3.98)
THYROXINE (T4) FREE (NG/DL) IN SER/PLAS: 1.24 NG/DL (ref 0.61–1.12)
TOTAL PROTEIN: 7.5 G/DL (ref 6.4–8.2)
TSH SERPL DL<=0.05 MIU/L-ACNC: 2.61 MIU/L (ref 0.44–3.9)
UREA NITROGEN (MG/DL) IN SER/PLAS: 13 MG/DL (ref 6–23)
UREA NITROGEN: 13 MG/DL (ref 6–23)
WBC: 7.3 X10E9/L (ref 4.4–11.3)

## 2023-04-25 LAB
INR BLD: 3 (ref 0.9–1.1)
INR IN PPP BY COAGULATION ASSAY: 3 (ref 0.9–1.1)
PROTHROMBIN TIME (PT) IN PPP BY COAGULATION ASSAY: 35.1 SEC (ref 9.8–13.4)
PROTHROMBIN TIME: 35.1 SEC (ref 9.8–13.4)

## 2023-05-01 LAB
INR BLD: 2.5 (ref 0.9–1.1)
INR IN PPP BY COAGULATION ASSAY: 2.5 (ref 0.9–1.1)
PROTHROMBIN TIME (PT) IN PPP BY COAGULATION ASSAY: 28.7 SEC (ref 9.8–13.4)
PROTHROMBIN TIME: 28.7 SEC (ref 9.8–13.4)

## 2023-05-10 LAB
THYROPEROXIDASE AB (IU/ML) IN SER/PLAS: <28 IU/ML
THYROTROPIN (MIU/L) IN SER/PLAS BY DETECTION LIMIT <= 0.05 MIU/L: 2.15 MIU/L (ref 0.44–3.98)
THYROXINE (T4) FREE (NG/DL) IN SER/PLAS: 1.47 NG/DL (ref 0.61–1.12)
TRIIODOTHYRONINE (T3) FREE (PG/ML) IN SER/PLAS: 2.8 PG/ML (ref 2.3–4.2)

## 2023-05-12 LAB — TSH RECEPTOR ANTIBODY: <0.8 IU/L

## 2023-05-17 LAB — THYROID STIMULATING IMMUNOGLOBULIN: <1 TSI INDEX

## 2023-05-18 LAB
THYROGLOBULIN AB (IU/ML) IN SER/PLAS: 54.2 IU/ML (ref 0–4)
THYROGLOBULIN LC-MS/MS: <0.5 NG/ML (ref 1.3–31.8)
THYROGLOBULIN: ABNORMAL NG/ML (ref 1.3–31.8)

## 2023-05-23 LAB
INR BLD: 2.9 (ref 0.9–1.1)
INR IN PPP BY COAGULATION ASSAY: 2.9 (ref 0.9–1.1)
PROTHROMBIN TIME (PT) IN PPP BY COAGULATION ASSAY: 34.4 SEC (ref 9.8–13.4)
PROTHROMBIN TIME: 34.4 SEC (ref 9.8–13.4)

## 2023-06-20 LAB
INR BLD: 2.7 (ref 0.9–1.1)
INR IN PPP BY COAGULATION ASSAY: 2.7 (ref 0.9–1.1)
PROTHROMBIN TIME (PT) IN PPP BY COAGULATION ASSAY: 30.7 SEC (ref 9.8–12.8)
PROTHROMBIN TIME: 30.7 SEC (ref 9.8–12.8)

## 2023-06-29 LAB
ALANINE AMINOTRANSFERASE (SGPT) (U/L) IN SER/PLAS: 13 U/L (ref 10–52)
ALBUMIN (G/DL) IN SER/PLAS: 3.8 G/DL (ref 3.4–5)
ALKALINE PHOSPHATASE (U/L) IN SER/PLAS: 86 U/L (ref 33–136)
ANION GAP IN SER/PLAS: 13 MMOL/L (ref 10–20)
ASPARTATE AMINOTRANSFERASE (SGOT) (U/L) IN SER/PLAS: 23 U/L (ref 9–39)
BILIRUBIN TOTAL (MG/DL) IN SER/PLAS: 1 MG/DL (ref 0–1.2)
CALCIUM (MG/DL) IN SER/PLAS: 9.4 MG/DL (ref 8.6–10.3)
CARBON DIOXIDE, TOTAL (MMOL/L) IN SER/PLAS: 30 MMOL/L (ref 21–32)
CHLORIDE (MMOL/L) IN SER/PLAS: 101 MMOL/L (ref 98–107)
CREATININE (MG/DL) IN SER/PLAS: 1.08 MG/DL (ref 0.5–1.3)
GFR MALE: 67 ML/MIN/1.73M2
GLUCOSE (MG/DL) IN SER/PLAS: 96 MG/DL (ref 74–99)
POTASSIUM (MMOL/L) IN SER/PLAS: 4.8 MMOL/L (ref 3.5–5.3)
PROTEIN TOTAL: 7.5 G/DL (ref 6.4–8.2)
SODIUM (MMOL/L) IN SER/PLAS: 139 MMOL/L (ref 136–145)
UREA NITROGEN (MG/DL) IN SER/PLAS: 13 MG/DL (ref 6–23)

## 2023-07-14 RX ORDER — ALFUZOSIN HYDROCHLORIDE 10 MG/1
TABLET, EXTENDED RELEASE ORAL
Qty: 90 TABLET | Refills: 3 | Status: SHIPPED | OUTPATIENT
Start: 2023-07-14

## 2023-07-18 LAB
INR BLD: 3.3 (ref 0.9–1.1)
INR IN PPP BY COAGULATION ASSAY: 3.3 (ref 0.9–1.1)
PROTHROMBIN TIME (PT) IN PPP BY COAGULATION ASSAY: 37.6 SEC (ref 9.8–12.8)
PROTHROMBIN TIME: 37.6 SEC (ref 9.8–12.8)

## 2023-08-01 LAB
INR BLD: 3.1 (ref 0.9–1.1)
INR IN PPP BY COAGULATION ASSAY: 3.1 (ref 0.9–1.1)
PROTHROMBIN TIME (PT) IN PPP BY COAGULATION ASSAY: 35.4 SEC (ref 9.8–12.8)
PROTHROMBIN TIME: 35.4 SEC (ref 9.8–12.8)

## 2023-08-03 LAB
ANION GAP IN SER/PLAS: 13 MMOL/L (ref 10–20)
ANION GAP SERPL CALCULATED.3IONS-SCNC: 13 MMOL/L (ref 10–20)
BICARBONATE: 30 MMOL/L (ref 21–32)
CALCIUM (MG/DL) IN SER/PLAS: 9.1 MG/DL (ref 8.6–10.3)
CALCIUM SERPL-MCNC: 9.1 MG/DL (ref 8.6–10.3)
CARBON DIOXIDE, TOTAL (MMOL/L) IN SER/PLAS: 30 MMOL/L (ref 21–32)
CHLORIDE (MMOL/L) IN SER/PLAS: 103 MMOL/L (ref 98–107)
CHLORIDE BLD-SCNC: 103 MMOL/L (ref 98–107)
CHOLESTEROL (MG/DL) IN SER/PLAS: 104 MG/DL (ref 0–199)
CHOLESTEROL IN HDL (MG/DL) IN SER/PLAS: 43.3 MG/DL
CHOLESTEROL/HDL RATIO: 2.4
CHOLESTEROL/HDL RATIO: 2.4
CHOLESTEROL: 104 MG/DL (ref 0–199)
CREAT SERPL-MCNC: 1.27 MG/DL (ref 0.5–1.3)
CREATININE (MG/DL) IN SER/PLAS: 1.27 MG/DL (ref 0.5–1.3)
EGFR MALE: 55 ML/MIN/1.73M2
GFR MALE: 55 ML/MIN/1.73M2
GLUCOSE (MG/DL) IN SER/PLAS: 107 MG/DL (ref 74–99)
GLUCOSE: 107 MG/DL (ref 74–99)
HDLC SERPL-MCNC: 43.3 MG/DL
LDL CHOLESTEROL: 48 MG/DL (ref 0–99)
LDL: 48 MG/DL (ref 0–99)
POTASSIUM (MMOL/L) IN SER/PLAS: 4.6 MMOL/L (ref 3.5–5.3)
POTASSIUM SERPL-SCNC: 4.6 MMOL/L (ref 3.5–5.3)
SODIUM (MMOL/L) IN SER/PLAS: 141 MMOL/L (ref 136–145)
SODIUM BLD-SCNC: 141 MMOL/L (ref 136–145)
TRIGL SERPL-MCNC: 65 MG/DL (ref 0–149)
TRIGLYCERIDE (MG/DL) IN SER/PLAS: 65 MG/DL (ref 0–149)
UREA NITROGEN (MG/DL) IN SER/PLAS: 14 MG/DL (ref 6–23)
UREA NITROGEN: 14 MG/DL (ref 6–23)
VLDL: 13 MG/DL (ref 0–40)
VLDLC SERPL CALC-MCNC: 13 MG/DL (ref 0–40)

## 2023-08-22 LAB
ALANINE AMINOTRANSFERASE (SGPT) (U/L) IN SER/PLAS: 12 U/L (ref 10–52)
ALBUMIN (G/DL) IN SER/PLAS: 3.7 G/DL (ref 3.4–5)
ALKALINE PHOSPHATASE (U/L) IN SER/PLAS: 82 U/L (ref 33–136)
ANION GAP IN SER/PLAS: 9 MMOL/L (ref 10–20)
ASPARTATE AMINOTRANSFERASE (SGOT) (U/L) IN SER/PLAS: 20 U/L (ref 9–39)
BILIRUBIN TOTAL (MG/DL) IN SER/PLAS: 0.8 MG/DL (ref 0–1.2)
CALCIUM (MG/DL) IN SER/PLAS: 9.4 MG/DL (ref 8.6–10.3)
CARBON DIOXIDE, TOTAL (MMOL/L) IN SER/PLAS: 32 MMOL/L (ref 21–32)
CHLORIDE (MMOL/L) IN SER/PLAS: 101 MMOL/L (ref 98–107)
CREATININE (MG/DL) IN SER/PLAS: 1.15 MG/DL (ref 0.5–1.3)
GFR MALE: 62 ML/MIN/1.73M2
GLUCOSE (MG/DL) IN SER/PLAS: 118 MG/DL (ref 74–99)
INR BLD: 3.2 (ref 0.9–1.1)
INR IN PPP BY COAGULATION ASSAY: 3.2 (ref 0.9–1.1)
POTASSIUM (MMOL/L) IN SER/PLAS: 4.4 MMOL/L (ref 3.5–5.3)
PROTEIN TOTAL: 7.3 G/DL (ref 6.4–8.2)
PROTHROMBIN TIME (PT) IN PPP BY COAGULATION ASSAY: 36.6 SEC (ref 9.8–12.8)
PROTHROMBIN TIME: 36.6 SEC (ref 9.8–12.8)
SODIUM (MMOL/L) IN SER/PLAS: 138 MMOL/L (ref 136–145)
THYROTROPIN (MIU/L) IN SER/PLAS BY DETECTION LIMIT <= 0.05 MIU/L: 2.6 MIU/L (ref 0.44–3.98)
THYROXINE (T4) FREE (NG/DL) IN SER/PLAS: 1.33 NG/DL (ref 0.61–1.12)
TRIIODOTHYRONINE (T3) FREE (PG/ML) IN SER/PLAS: 2.8 PG/ML (ref 2.3–4.2)
UREA NITROGEN (MG/DL) IN SER/PLAS: 14 MG/DL (ref 6–23)

## 2023-09-05 LAB
INR BLD: 2 (ref 0.9–1.1)
INR IN PPP BY COAGULATION ASSAY: 2 (ref 0.9–1.1)
PROTHROMBIN TIME (PT) IN PPP BY COAGULATION ASSAY: 22.4 SEC (ref 9.8–12.8)
PROTHROMBIN TIME: 22.4 SEC (ref 9.8–12.8)

## 2023-09-06 LAB
AFB CULTURE: NORMAL
AFB STAIN: NORMAL

## 2023-09-12 PROBLEM — F17.200 CURRENT SMOKER: Status: ACTIVE | Noted: 2023-09-12

## 2023-09-12 PROBLEM — R04.2 COUGH WITH HEMOPTYSIS: Status: ACTIVE | Noted: 2023-09-12

## 2023-09-12 PROBLEM — I10 HYPERTENSION: Status: ACTIVE | Noted: 2023-09-12

## 2023-09-12 PROBLEM — R10.2 PELVIC PAIN IN MALE: Status: ACTIVE | Noted: 2023-09-12

## 2023-09-12 PROBLEM — E78.5 HYPERLIPIDEMIA: Status: ACTIVE | Noted: 2023-09-12

## 2023-09-12 PROBLEM — I25.10 ARTERIOSCLEROSIS OF CORONARY ARTERY: Status: ACTIVE | Noted: 2023-09-12

## 2023-09-12 PROBLEM — I49.3 PVC'S (PREMATURE VENTRICULAR CONTRACTIONS): Status: ACTIVE | Noted: 2023-09-12

## 2023-09-12 PROBLEM — E66.3 OVERWEIGHT: Status: ACTIVE | Noted: 2023-09-12

## 2023-09-12 PROBLEM — N40.0 BENIGN PROSTATIC HYPERPLASIA: Status: ACTIVE | Noted: 2023-09-12

## 2023-09-12 PROBLEM — I48.0 PAROXYSMAL ATRIAL FIBRILLATION (MULTI): Status: ACTIVE | Noted: 2023-09-12

## 2023-09-12 PROBLEM — F51.01 INSOMNIA, IDIOPATHIC: Status: ACTIVE | Noted: 2023-09-12

## 2023-09-12 PROBLEM — R79.89 ELEVATED SERUM FREE T4 LEVEL: Status: ACTIVE | Noted: 2023-09-12

## 2023-09-12 PROBLEM — K42.9 UMBILICAL HERNIA: Status: ACTIVE | Noted: 2023-09-12

## 2023-09-12 PROBLEM — M54.50 LOW BACK PAIN: Status: ACTIVE | Noted: 2023-09-12

## 2023-09-12 PROBLEM — R79.89 ABNORMAL THYROID BLOOD TEST: Status: ACTIVE | Noted: 2023-09-12

## 2023-09-12 PROBLEM — K40.90 RIGHT INGUINAL HERNIA: Status: ACTIVE | Noted: 2023-09-12

## 2023-09-12 PROBLEM — Z79.899 HIGH RISK MEDICATION USE: Status: ACTIVE | Noted: 2023-09-12

## 2023-09-12 PROBLEM — M25.529 ELBOW PAIN: Status: ACTIVE | Noted: 2023-09-12

## 2023-09-12 PROBLEM — E78.1 HYPERTRIGLYCERIDEMIA: Status: ACTIVE | Noted: 2023-09-12

## 2023-09-12 PROBLEM — J44.9 COPD (CHRONIC OBSTRUCTIVE PULMONARY DISEASE) (MULTI): Status: ACTIVE | Noted: 2023-09-12

## 2023-09-12 PROBLEM — R00.1 BRADYCARDIA: Status: ACTIVE | Noted: 2023-09-12

## 2023-09-12 PROBLEM — M70.20 OLECRANON BURSITIS: Status: ACTIVE | Noted: 2023-09-12

## 2023-09-12 PROBLEM — S83.8X9A MENISCAL INJURY: Status: ACTIVE | Noted: 2023-09-12

## 2023-09-12 PROBLEM — M15.9 POLYOSTEOARTHRITIS: Status: ACTIVE | Noted: 2023-09-12

## 2023-09-12 PROBLEM — M46.1 SACROILIITIS (CMS-HCC): Status: ACTIVE | Noted: 2023-09-12

## 2023-09-12 PROBLEM — N18.31 STAGE 3A CHRONIC KIDNEY DISEASE (CKD) (MULTI): Status: ACTIVE | Noted: 2023-09-12

## 2023-09-12 PROBLEM — J43.9 EMPHYSEMA/COPD (MULTI): Status: ACTIVE | Noted: 2023-09-12

## 2023-09-12 PROBLEM — K64.4 EXTERNAL HEMORRHOID: Status: ACTIVE | Noted: 2023-09-12

## 2023-09-12 PROBLEM — K62.5 RECTAL BLEEDING: Status: ACTIVE | Noted: 2023-09-12

## 2023-09-12 RX ORDER — AMIODARONE HYDROCHLORIDE 200 MG/1
TABLET ORAL
COMMUNITY
Start: 2022-03-13 | End: 2023-10-16 | Stop reason: ALTCHOICE

## 2023-09-12 RX ORDER — FLUTICASONE PROPIONATE AND SALMETEROL 250; 50 UG/1; UG/1
1 POWDER RESPIRATORY (INHALATION)
COMMUNITY
Start: 2022-07-20 | End: 2023-10-16 | Stop reason: ALTCHOICE

## 2023-09-12 RX ORDER — METOPROLOL SUCCINATE 25 MG/1
1 TABLET, EXTENDED RELEASE ORAL DAILY
COMMUNITY
End: 2023-11-14 | Stop reason: SDUPTHER

## 2023-09-12 RX ORDER — VALSARTAN AND HYDROCHLOROTHIAZIDE 160; 12.5 MG/1; MG/1
1 TABLET, FILM COATED ORAL DAILY
COMMUNITY
End: 2023-11-14 | Stop reason: SDUPTHER

## 2023-09-12 RX ORDER — NITROGLYCERIN 0.4 MG/1
TABLET SUBLINGUAL
COMMUNITY
End: 2023-11-14 | Stop reason: SDUPTHER

## 2023-09-12 RX ORDER — ROSUVASTATIN CALCIUM 10 MG/1
1 TABLET, COATED ORAL NIGHTLY
COMMUNITY
Start: 2021-10-01 | End: 2023-11-14 | Stop reason: SDUPTHER

## 2023-09-12 RX ORDER — CHOLECALCIFEROL (VITAMIN D3) 25 MCG
1 TABLET ORAL DAILY
COMMUNITY
End: 2023-11-14

## 2023-09-12 RX ORDER — AMIODARONE HYDROCHLORIDE 100 MG/1
1 TABLET ORAL DAILY
COMMUNITY
Start: 2022-07-13 | End: 2023-11-14 | Stop reason: SDUPTHER

## 2023-09-12 RX ORDER — ASPIRIN 81 MG/1
1 TABLET ORAL DAILY
COMMUNITY

## 2023-09-12 RX ORDER — FINASTERIDE 5 MG/1
1 TABLET, FILM COATED ORAL DAILY
COMMUNITY

## 2023-09-12 RX ORDER — RAMIPRIL 10 MG/1
1 CAPSULE ORAL DAILY
COMMUNITY
Start: 2022-03-28 | End: 2023-11-14

## 2023-09-12 RX ORDER — ASPIRIN 325 MG
1 TABLET ORAL DAILY
COMMUNITY

## 2023-09-12 RX ORDER — LANOLIN ALCOHOL/MO/W.PET/CERES
1 CREAM (GRAM) TOPICAL 2 TIMES DAILY
COMMUNITY
Start: 2021-11-10 | End: 2023-11-14 | Stop reason: SDUPTHER

## 2023-09-12 RX ORDER — EZETIMIBE 10 MG/1
1 TABLET ORAL DAILY
COMMUNITY
Start: 2021-10-01 | End: 2023-11-14

## 2023-09-12 RX ORDER — ALFUZOSIN HYDROCHLORIDE 10 MG/1
1 TABLET, EXTENDED RELEASE ORAL NIGHTLY
COMMUNITY

## 2023-09-12 RX ORDER — WARFARIN SODIUM 5 MG/1
TABLET ORAL
COMMUNITY
Start: 2021-04-21 | End: 2023-11-14 | Stop reason: SDUPTHER

## 2023-09-16 DIAGNOSIS — N40.1 BPH WITH OBSTRUCTION/LOWER URINARY TRACT SYMPTOMS: ICD-10-CM

## 2023-09-16 DIAGNOSIS — N13.8 BPH WITH OBSTRUCTION/LOWER URINARY TRACT SYMPTOMS: ICD-10-CM

## 2023-09-18 RX ORDER — FINASTERIDE 5 MG/1
TABLET, FILM COATED ORAL
Qty: 90 TABLET | Refills: 3 | Status: SHIPPED | OUTPATIENT
Start: 2023-09-18

## 2023-09-19 LAB
INR BLD: 2.2 (ref 0.9–1.1)
INR IN PPP BY COAGULATION ASSAY: 2.2 (ref 0.9–1.1)
PROTHROMBIN TIME (PT) IN PPP BY COAGULATION ASSAY: 25.1 SEC (ref 9.8–12.8)
PROTHROMBIN TIME: 25.1 SEC (ref 9.8–12.8)

## 2023-10-10 ENCOUNTER — LAB (OUTPATIENT)
Dept: LAB | Facility: LAB | Age: 88
End: 2023-10-10
Payer: MEDICARE

## 2023-10-10 DIAGNOSIS — Z79.01 LONG TERM (CURRENT) USE OF ANTICOAGULANTS: ICD-10-CM

## 2023-10-10 DIAGNOSIS — Z79.01 LONG TERM (CURRENT) USE OF ANTICOAGULANTS: Primary | ICD-10-CM

## 2023-10-10 LAB
INR IN PPP BY COAGULATION ASSAY EXTERNAL: 2.4 (ref 2–3)
INR PPP: 2.4 (ref 0.9–1.1)
PROTHROMBIN TIME (PT) IN PPP BY COAGULATION ASSAY EXTERNAL: NORMAL SECONDS
PROTHROMBIN TIME: 27.6 SECONDS (ref 9.8–12.8)

## 2023-10-10 PROCEDURE — 36415 COLL VENOUS BLD VENIPUNCTURE: CPT

## 2023-10-10 PROCEDURE — 85610 PROTHROMBIN TIME: CPT

## 2023-10-12 ENCOUNTER — TELEPHONE (OUTPATIENT)
Dept: CARDIOLOGY | Facility: CLINIC | Age: 88
End: 2023-10-12
Payer: MEDICARE

## 2023-10-12 NOTE — TELEPHONE ENCOUNTER
Patient left  stating he had INR drawn 10/10/23 and is requesting results. Please advise any new orders/ directions.

## 2023-10-13 ENCOUNTER — ANTICOAGULATION - WARFARIN VISIT (OUTPATIENT)
Dept: CARDIOLOGY | Facility: CLINIC | Age: 88
End: 2023-10-13
Payer: MEDICARE

## 2023-10-13 NOTE — TELEPHONE ENCOUNTER
Detailed vmm left for patient advising ok to hold Coumadin 3 days prior as requested. Advised to call office if any further questions or concerns.

## 2023-10-13 NOTE — TELEPHONE ENCOUNTER
Pt called office stating he is going to have 2 tooth removed on 10/26/23 and asking if ok to hold Coumadin 3 days prior. Routed to Dr. Montejo.

## 2023-10-13 NOTE — TELEPHONE ENCOUNTER
Called to patient and advised to continue same and INR recheck 3 weeks (10/31/23).  Pt currently taking 5 mg 3 days a week and 2.5 mg the other 4 days.

## 2023-10-16 ENCOUNTER — OFFICE VISIT (OUTPATIENT)
Dept: PRIMARY CARE | Facility: CLINIC | Age: 88
End: 2023-10-16
Payer: MEDICARE

## 2023-10-16 VITALS
BODY MASS INDEX: 27.89 KG/M2 | HEART RATE: 50 BPM | WEIGHT: 184 LBS | HEIGHT: 68 IN | TEMPERATURE: 97.6 F | DIASTOLIC BLOOD PRESSURE: 74 MMHG | SYSTOLIC BLOOD PRESSURE: 118 MMHG

## 2023-10-16 DIAGNOSIS — Z00.00 ENCOUNTER FOR SUBSEQUENT ANNUAL WELLNESS VISIT (AWV) IN MEDICARE PATIENT: Primary | ICD-10-CM

## 2023-10-16 DIAGNOSIS — Z23 ENCOUNTER FOR IMMUNIZATION: ICD-10-CM

## 2023-10-16 PROCEDURE — G0439 PPPS, SUBSEQ VISIT: HCPCS | Performed by: INTERNAL MEDICINE

## 2023-10-16 PROCEDURE — 1170F FXNL STATUS ASSESSED: CPT | Performed by: INTERNAL MEDICINE

## 2023-10-16 PROCEDURE — 1159F MED LIST DOCD IN RCRD: CPT | Performed by: INTERNAL MEDICINE

## 2023-10-16 PROCEDURE — 3074F SYST BP LT 130 MM HG: CPT | Performed by: INTERNAL MEDICINE

## 2023-10-16 PROCEDURE — 3078F DIAST BP <80 MM HG: CPT | Performed by: INTERNAL MEDICINE

## 2023-10-16 ASSESSMENT — ACTIVITIES OF DAILY LIVING (ADL)
GROCERY_SHOPPING: INDEPENDENT
MANAGING_FINANCES: INDEPENDENT
BATHING: INDEPENDENT
DRESSING: INDEPENDENT
TAKING_MEDICATION: INDEPENDENT

## 2023-10-16 ASSESSMENT — ENCOUNTER SYMPTOMS
LOSS OF SENSATION IN FEET: 0
OCCASIONAL FEELINGS OF UNSTEADINESS: 0

## 2023-10-16 ASSESSMENT — PATIENT HEALTH QUESTIONNAIRE - PHQ9
SUM OF ALL RESPONSES TO PHQ9 QUESTIONS 1 AND 2: 0
2. FEELING DOWN, DEPRESSED OR HOPELESS: NOT AT ALL
1. LITTLE INTEREST OR PLEASURE IN DOING THINGS: NOT AT ALL

## 2023-10-19 PROBLEM — Z00.00 ENCOUNTER FOR SUBSEQUENT ANNUAL WELLNESS VISIT (AWV) IN MEDICARE PATIENT: Status: ACTIVE | Noted: 2023-10-19

## 2023-10-19 PROBLEM — Z23 ENCOUNTER FOR IMMUNIZATION: Status: ACTIVE | Noted: 2023-10-19

## 2023-10-19 NOTE — PROGRESS NOTES
Patient is otherwise doing well. Chronic medical conditions are stable with current medical regimen. Cognitive functions are intact and stable. Immunizations are age appropriately up-to-date. Today patient does not have any acute medical complaint. We reconciled home medications. . Discussed about the preventative nisha like cancer screening, routine blood work, immunizations. The healthy lifestyle has been reinforced. Encouraged continued avoidance of tobacco alcohol substances of abuse. Functional capacity has been assessed. The depression screening questionnaire has been reviewed. Discussed safety measures and advanced directives, Med refills were sent.  Vital signs reviewed.  The due lab orders, if any were provided.     All the other components of annual wellness has been further narrated below. Patient will return for follow up as per schedule.     REVIEW OF SYSTEMS:  Denies fever or chills.  No dizziness, syncope, or seizures.  No headaches. No chest pain. Denies excessive sweating. No nausea, vomiting, or diarrhea.  No abnormal bleeding. Denies muscle aches. No memory loss or sleep disturbance. No hematemesis or melena. Remainder of review of systems is as per HPI.     PHYSICAL EXAM:   Vital signs: Stable   GEN: Alert Awake and Oriented X 3.    HEENT: PERRL. TMs normal.  Moist mucous membranes. Oropharynx non erythematous.   Lungs:  Clear to auscultation without rales, rhonchi, or rub.  Heart:  RRR, Normal S1, S2 without murmur. No raised JVP  Abdomen:  Soft, non-tender, no organomegaly, Bowel sounds present. No CVA tenderness.  Extremities:  No calf swelling or tenderness.  FROM X 4   Skin:  No bruise, hematoma or purpura.       ASSESSMENT & PLAN:   1. Encounter for subsequent annual wellness visit (AWV) in Medicare patient        2. Encounter for immunization          MEDICAL DECISION MAKING:   Recent lab work and relevant imaging studies have been reviewed.  Relevant correspondence/notes from specialty  consultants were reviewed and discussed with patient.  The current active medical co morbidities have been considered.  Patient's cancer screening tests are up-to-date.  Medication have been sent for refill.  Patient will continue with current medical therapy and plan.  Immunizations are up-to-date.  I will see patient back in 3  months.        PS: This note was completed using Dragon voice recognition technology and may include unintended errors with respect to translation of words, typographical errors or grammar errors which may not have been identified while finalizing the chart.

## 2023-10-31 ENCOUNTER — ANTICOAGULATION - WARFARIN VISIT (OUTPATIENT)
Dept: CARDIOLOGY | Facility: CLINIC | Age: 88
End: 2023-10-31

## 2023-10-31 ENCOUNTER — LAB (OUTPATIENT)
Dept: LAB | Facility: LAB | Age: 88
End: 2023-10-31
Payer: MEDICARE

## 2023-10-31 DIAGNOSIS — Z79.01 LONG TERM (CURRENT) USE OF ANTICOAGULANTS: ICD-10-CM

## 2023-10-31 LAB
INR PPP: 2.1 (ref 0.9–1.1)
PROTHROMBIN TIME: 23.6 SECONDS (ref 9.8–12.8)

## 2023-10-31 PROCEDURE — 85610 PROTHROMBIN TIME: CPT

## 2023-10-31 PROCEDURE — 36415 COLL VENOUS BLD VENIPUNCTURE: CPT

## 2023-10-31 NOTE — PROGRESS NOTES
I called and spoke with Canelo regarding his INR result. He will continue current coumadin dose as noted and obtain INR in 4 weeks. Standing INR order in place.  He verbalizes understanding.

## 2023-11-07 ENCOUNTER — LAB (OUTPATIENT)
Dept: LAB | Facility: LAB | Age: 88
End: 2023-11-07
Payer: MEDICARE

## 2023-11-07 DIAGNOSIS — Z79.899 OTHER LONG TERM (CURRENT) DRUG THERAPY: Primary | ICD-10-CM

## 2023-11-07 LAB
ANION GAP SERPL CALC-SCNC: 14 MMOL/L (ref 10–20)
BUN SERPL-MCNC: 12 MG/DL (ref 6–23)
CALCIUM SERPL-MCNC: 9.1 MG/DL (ref 8.6–10.3)
CHLORIDE SERPL-SCNC: 98 MMOL/L (ref 98–107)
CO2 SERPL-SCNC: 30 MMOL/L (ref 21–32)
CREAT SERPL-MCNC: 1.13 MG/DL (ref 0.5–1.3)
GFR SERPL CREATININE-BSD FRML MDRD: 63 ML/MIN/1.73M*2
GLUCOSE SERPL-MCNC: 134 MG/DL (ref 74–99)
POTASSIUM SERPL-SCNC: 4.4 MMOL/L (ref 3.5–5.3)
SODIUM SERPL-SCNC: 138 MMOL/L (ref 136–145)

## 2023-11-07 PROCEDURE — 36415 COLL VENOUS BLD VENIPUNCTURE: CPT

## 2023-11-07 PROCEDURE — 80048 BASIC METABOLIC PNL TOTAL CA: CPT

## 2023-11-14 ENCOUNTER — OFFICE VISIT (OUTPATIENT)
Dept: CARDIOLOGY | Facility: CLINIC | Age: 88
End: 2023-11-14
Payer: MEDICARE

## 2023-11-14 VITALS
HEART RATE: 60 BPM | WEIGHT: 185 LBS | BODY MASS INDEX: 28.55 KG/M2 | DIASTOLIC BLOOD PRESSURE: 62 MMHG | SYSTOLIC BLOOD PRESSURE: 136 MMHG

## 2023-11-14 DIAGNOSIS — E78.2 MIXED HYPERLIPIDEMIA: Primary | ICD-10-CM

## 2023-11-14 DIAGNOSIS — T46.4X5A COUGH SECONDARY TO ANGIOTENSIN CONVERTING ENZYME INHIBITOR (ACE-I): ICD-10-CM

## 2023-11-14 DIAGNOSIS — R05.8 COUGH SECONDARY TO ANGIOTENSIN CONVERTING ENZYME INHIBITOR (ACE-I): ICD-10-CM

## 2023-11-14 DIAGNOSIS — I25.10 CORONARY ARTERY DISEASE INVOLVING NATIVE CORONARY ARTERY OF NATIVE HEART WITHOUT ANGINA PECTORIS: ICD-10-CM

## 2023-11-14 DIAGNOSIS — Z79.899 HIGH RISK MEDICATION USE: ICD-10-CM

## 2023-11-14 DIAGNOSIS — Z79.899 MEDICATION COURSE CHANGED: ICD-10-CM

## 2023-11-14 PROCEDURE — 3078F DIAST BP <80 MM HG: CPT | Performed by: INTERNAL MEDICINE

## 2023-11-14 PROCEDURE — 99214 OFFICE O/P EST MOD 30 MIN: CPT | Performed by: INTERNAL MEDICINE

## 2023-11-14 PROCEDURE — 3075F SYST BP GE 130 - 139MM HG: CPT | Performed by: INTERNAL MEDICINE

## 2023-11-14 PROCEDURE — 1159F MED LIST DOCD IN RCRD: CPT | Performed by: INTERNAL MEDICINE

## 2023-11-14 PROCEDURE — 1160F RVW MEDS BY RX/DR IN RCRD: CPT | Performed by: INTERNAL MEDICINE

## 2023-11-14 RX ORDER — METOPROLOL SUCCINATE 25 MG/1
25 TABLET, EXTENDED RELEASE ORAL DAILY
Qty: 90 TABLET | Refills: 3 | Status: SHIPPED | OUTPATIENT
Start: 2023-11-14 | End: 2024-04-25 | Stop reason: SDUPTHER

## 2023-11-14 RX ORDER — NITROGLYCERIN 0.4 MG/1
0.4 TABLET SUBLINGUAL EVERY 5 MIN PRN
Qty: 25 TABLET | Refills: 5 | Status: SHIPPED | OUTPATIENT
Start: 2023-11-14

## 2023-11-14 RX ORDER — AMIODARONE HYDROCHLORIDE 100 MG/1
100 TABLET ORAL DAILY
Qty: 90 TABLET | Refills: 3 | Status: SHIPPED | OUTPATIENT
Start: 2023-11-14

## 2023-11-14 RX ORDER — ROSUVASTATIN CALCIUM 10 MG/1
10 TABLET, COATED ORAL NIGHTLY
Qty: 90 TABLET | Refills: 3 | Status: SHIPPED | OUTPATIENT
Start: 2023-11-14

## 2023-11-14 RX ORDER — WARFARIN SODIUM 5 MG/1
TABLET ORAL
Qty: 90 TABLET | Refills: 3 | Status: SHIPPED | OUTPATIENT
Start: 2023-11-14

## 2023-11-14 RX ORDER — LANOLIN ALCOHOL/MO/W.PET/CERES
1 CREAM (GRAM) TOPICAL 2 TIMES DAILY
Qty: 180 TABLET | Refills: 3 | Status: SHIPPED | OUTPATIENT
Start: 2023-11-14

## 2023-11-14 RX ORDER — VALSARTAN AND HYDROCHLOROTHIAZIDE 160; 12.5 MG/1; MG/1
1 TABLET, FILM COATED ORAL DAILY
Qty: 90 TABLET | Refills: 3 | Status: SHIPPED | OUTPATIENT
Start: 2023-11-14

## 2023-11-14 NOTE — PROGRESS NOTES
Most recently seen September 2023 and presents for follow-up.  At that time because of persistent cough we had switched him from ACE inhibitor to angiotensin receptor blocker.    Subjective :   Patient's cough has resolved completely.  He is also breathing better.  Has maintained meticulous list of blood pressure readings, most of them are at target, on a couple of occasions he did drop into the 80s and 90s, and felt funny, somewhat dizzy.  Both of those occurred in the late afternoon early evening.  On 1 occasion he was doing yard work.  Both occasions he rested for a little bit and the symptoms abated.  Has not had any palpitations.          History so Far :  1. Paroxysmal atrial fibrillation-functional class I.  2. High-risk medication-warfarin, PT/INR levels therapeutic.  3. Hypertension-controlled.  4. [id number] score of 4.  5. Atherosclerotic native vessel coronary artery disease, most  recent perfusion study in September of 2016, small infarct, basal  portion in inferior wall, left ventricular ejection fraction is 51%,  normal TID.  6. Left atrial size 4.1 cm based on echo of 2015.  7. Chronic total right coronary artery occlusion, distal right  coronary artery filled by left-to-right collaterals.  8. Increased body mass index. The patient is following a very  active lifestyle.  9. Hyperlipidemia-at target  10. Nicotine dependence-not motivated to quit patient says this is the only thing he has left to enjoy, he does not smoke much a few cigarettes a day. He understands that cigarette smoking is harmful from a vascular disease and pulmonary standpoint  11. High risk medication warfarin no bleeding diathesis PT/INR reviewed therapeutic  12. 48-hour Holter December 7, 2020-basic rhythm sinus minimum heart rate 43 maximum heart rate 97 average heart rate 60 PACs comprised 1.6% of the total heartbeats  There were 41 short bursts of supraventricular rhythm longest of these was an ectopic atrial rhythm of 18  beats no documented sustained atrial fibrillation frequent ventricular premature beats comprising 4.5% of the total heartbeats 1 ventricular triplet and over 150 ventricular couplets occasional bigeminal and trigeminal episodes noted no complaints were elicited  13. Echocardiogram 12/7/2020-left atrium 4 cm LVEF 50% inferior basal moderate hypokinesis impaired relaxation pattern of LV diastolic filling mild mitral regurgitation trace to mild tricuspid regurgitation RV systolic pressure 40 mmHg no change compared to prior study of 2015  14. Recent hospitalization to [facility] with class IV left heart failure precipitated by atrial fibrillation L beat controlled ventricular rate, this has resolved with restoration of sinus rhythm  15. Remains on high risk medication amiodarone.  16. Wide-complex tachycardia, cannot exclude nonsustained ventricular tachycardia versus atrial fibrillation with aberrancy  17. Echocardiogram March 2022-LVEF 45 to 50% left atrial diameter 4.7 cm LV end-systolic dimension 3.58 cm left atrial volume index 34 mL/mÂ² RV systolic pressure 37 mmHg IVC diameter 1.85 cm, there was mild mitral and tricuspid regurgitation reported.  18. CTA negative for pulmonary embolism March 2022 CTA revealed borderline enlarged right hilar lymph node measuring up to 2.9 x 1.8 cm in size and subcentimeter left hilar lymph nodes probably nonspecific possibly reactive, defer to primary/pulmonary  19. Anemia hemoglobin and hematocrit 11.4 and 35.7 with MCV of 102 March 2022  20. Treadmill stress Myoview April 2022-3.8 METS, 90% age-predicted maximum heart rate, see AEP protocol, inferior wall hypokinesis mild, basal portion, LVEF 48% transient ischemic dilatation 0.9 which is normal, there was evidence of mild diaphragm attenuation artifact. No evidence of ischemia. Compared to 2016, LVEF and functional capacity not significantly different.  21. 48-hour Holter monitor April 2022-frequent PACs and PVCs, comprising 1.6% of  total heartbeats no atrial fibrillation average heart rate 56 bpm 8 short bursts of ectopic atrial tachycardia longest being 10 beats no evidence of high degree AV block longest pause 1.9 seconds          Objective      Wt Readings from Last 3 Encounters:   11/14/23 83.9 kg (185 lb)   10/16/23 83.5 kg (184 lb)   07/13/23 84.4 kg (186 lb)            Physical Exam:    GENERAL APPEARANCE: in no acute distress.  CHEST: Symmetric and non-tender.  INTEGUMENT: Skin warm and dry  HEENT: No gross abnormalities identified.No pallor or scleral icterus.  NECK: Supple, no JVD, no bruit.   NEURO/PSHCY: Alert and oriented x3; appropriate behavior and responses and responses  LUNGS: Clear to auscultation bilaterally; normal respiratory effort.  HEART: Rate and rhythm regular with no evident murmur; no gallop appreciated.   ABDOMEN: Soft, non tender.  MUSCULOSKELETAL: No gross deformities.  EXTREMITIES: Warm  There is no edema noted.    Meds:  Current Outpatient Medications   Medication Instructions    alfuzosin (UroxatraL) 10 mg 24 hr tablet 1 tablet, oral, Nightly    amiodarone (Pacerone) 100 mg tablet 1 tablet, oral, Daily    aspirin 81 mg EC tablet 1 tablet, oral, Daily    finasteride (Proscar) 5 mg tablet 1 tablet, oral, Daily    magnesium oxide (Mag-Ox) 400 mg (241.3 mg magnesium) tablet 1 tablet, oral, 2 times daily    metoprolol succinate XL (Toprol-XL) 25 mg 24 hr tablet 1 tablet, oral, Daily    multivitamin (One Daily Multivitamin) tablet 1 tablet, oral, Daily    nitroglycerin (Nitrostat) 0.4 mg SL tablet PLACE 1 TABLET UNDER THE TONGUE EVERY 5 MINUTES FOR UP TO 3 DOSES AS NEEDED FOR CHEST PAIN.CALL 911 IF PAIN PERSISTS.    rosuvastatin (Crestor) 10 mg tablet 1 tablet, oral, Nightly    valsartan-hydrochlorothiazide (Diovan-HCT) 160-12.5 mg tablet 1 tablet, oral, Daily, At lunch    warfarin (Coumadin) 5 mg tablet TAKE 1 TO 2 TABLETS DAILY OR AS DIRECTED BY [facility]          No Known Allergies          LABS:    Lab Results    Component Value Date    WBC 7.3 03/28/2023    HGB 13.2 (L) 03/28/2023    HCT 41.2 03/28/2023     03/28/2023    CHOL 104 08/03/2023    TRIG 65 08/03/2023    HDL 43.3 08/03/2023    LDLDIRECT 58 01/19/2023    ALT 12 08/22/2023    AST 20 08/22/2023     11/07/2023    K 4.4 11/07/2023    CL 98 11/07/2023    CREATININE 1.13 11/07/2023    BUN 12 11/07/2023    CO2 30 11/07/2023    TSH 2.60 08/22/2023    PSA 2.50 12/20/2022    INR 2.1 (H) 10/31/2023                   Problem List:    Patient Active Problem List    Diagnosis Date Noted    Encounter for immunization 10/19/2023    Abnormal thyroid blood test 09/12/2023    CAD (coronary artery disease) 09/12/2023    Benign prostatic hyperplasia 09/12/2023    Bradycardia 09/12/2023    Current smoker 09/12/2023    Elbow pain 09/12/2023    Elevated serum free T4 level 09/12/2023    COPD (chronic obstructive pulmonary disease) (CMS/[facility]) 09/12/2023    Emphysema/COPD (CMS/[facility]) 09/12/2023    External hemorrhoid 09/12/2023    Hyperlipidemia 09/12/2023    Hypertriglyceridemia 09/12/2023    Hypertension 09/12/2023    Insomnia, idiopathic 09/12/2023    Low back pain 09/12/2023    Meniscal injury 09/12/2023    Olecranon bursitis 09/12/2023    Paroxysmal atrial fibrillation (CMS/[facility]) 09/12/2023    Pelvic pain in male 09/12/2023    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    PVC's (premature ventricular contractions) 09/12/2023    Rectal bleeding 09/12/2023    Right inguinal hernia 09/12/2023    Sacroiliitis (CMS/[facility]) 09/12/2023    Umbilical hernia 09/12/2023    Cough with hemoptysis 09/12/2023    High risk medication use 09/12/2023    Overweight 09/12/2023    Stage 3a chronic kidney disease (CKD) (CMS/[facility]) 09/12/2023                 Assessment:       Patient with atherosclerotic native vessel coronary artery disease, chronic occlusion of the RCA which is collateral dependent, atrial fibrillation currently in sinus rhythm, chronic systolic left heart failure functional class II, COPD, PACs  PVCs, with elevated blood pressure, which patient reports could be whitecoat hypertension. Ramipril dose recently increased by Dr. Spencer     Blood pressure is at target.  From a coronary artery disease perspective patient is functional class I  No recent symptomatic atrial fibrillation  No clinical evidence of amiodarone toxicity     Recommendations:    1.  I reviewed all his medications and home blood pressure log, and be talked about staggering medications to avoid low blood pressures.  The valsartan hydrochlorothiazide should be taken in the morning.  Metoprolol succinate at night.  He should also stagger the finasteride and alfuzosin, as this can also lower blood pressure.    2.  Patient told me that Dr. Spencer suggested down titrating his statin therapy and lipid management, I agree.  His lipid profile is excellent and his triglycerides are around 80, we will discontinue the Zetia.  3.  No clinical evidence of amiodarone toxicity, will check comprehensive profile lipid profile chest x-ray PA lateral T4 and TSH prior to next visit  4.  Follow-up in October 2024 sooner if interval problems arise.  5.  Orthostatics were checked today, patient was not orthostatic in office.      Follow up : 1 year      Yi Montejo MD   Scribe Attestation  By signing my name below, I, Mi Clark CMA   , Scribe   attest that this documentation has been prepared under the direction and in the presence of Yi Montejo MD

## 2023-11-22 DIAGNOSIS — R94.6 ABNORMAL RESULTS OF THYROID FUNCTION STUDIES: Primary | ICD-10-CM

## 2023-11-29 ENCOUNTER — LAB (OUTPATIENT)
Dept: LAB | Facility: LAB | Age: 88
End: 2023-11-29
Payer: MEDICARE

## 2023-11-29 ENCOUNTER — TELEPHONE (OUTPATIENT)
Dept: CARDIOLOGY | Facility: CLINIC | Age: 88
End: 2023-11-29

## 2023-11-29 DIAGNOSIS — Z79.01 LONG TERM (CURRENT) USE OF ANTICOAGULANTS: ICD-10-CM

## 2023-11-29 LAB
INR PPP: 2 (ref 0.9–1.1)
PROTHROMBIN TIME: 23.1 SECONDS (ref 9.8–12.8)

## 2023-11-29 PROCEDURE — 85610 PROTHROMBIN TIME: CPT

## 2023-11-29 PROCEDURE — 36415 COLL VENOUS BLD VENIPUNCTURE: CPT

## 2023-11-29 NOTE — TELEPHONE ENCOUNTER
Called to patient with INR orders.    INR checked today and 2.0.     Reviewed by Dr. Montejo in office with written orders to continue same dose except increase to 5 mg every Friday and recheck in 4 weeks.     (Take 5 mg on Tuesday, Thursday, Friday and Sunday's and 2.5 mg on Monday, Wednesday and Saturday's and recheck 12/27/23)  (Written order scanned to chart)      Pt verbalized good understanding.

## 2023-12-04 ENCOUNTER — ANTICOAGULATION - WARFARIN VISIT (OUTPATIENT)
Dept: CARDIOLOGY | Facility: CLINIC | Age: 88
End: 2023-12-04
Payer: MEDICARE

## 2023-12-20 ENCOUNTER — LAB (OUTPATIENT)
Dept: LAB | Facility: LAB | Age: 88
End: 2023-12-20
Payer: MEDICARE

## 2023-12-20 DIAGNOSIS — N40.1 BENIGN PROSTATIC HYPERPLASIA WITH LOWER URINARY TRACT SYMPTOMS: Primary | ICD-10-CM

## 2023-12-20 DIAGNOSIS — N13.8 OTHER OBSTRUCTIVE AND REFLUX UROPATHY: ICD-10-CM

## 2023-12-20 DIAGNOSIS — R94.6 ABNORMAL RESULTS OF THYROID FUNCTION STUDIES: ICD-10-CM

## 2023-12-20 DIAGNOSIS — N40.1 BENIGN PROSTATIC HYPERPLASIA WITH LOWER URINARY TRACT SYMPTOMS: ICD-10-CM

## 2023-12-20 LAB
ALBUMIN SERPL BCP-MCNC: 3.8 G/DL (ref 3.4–5)
ALP SERPL-CCNC: 85 U/L (ref 33–136)
ALT SERPL W P-5'-P-CCNC: 13 U/L (ref 10–52)
ANION GAP SERPL CALC-SCNC: 13 MMOL/L (ref 10–20)
AST SERPL W P-5'-P-CCNC: 24 U/L (ref 9–39)
BILIRUB SERPL-MCNC: 0.8 MG/DL (ref 0–1.2)
BUN SERPL-MCNC: 17 MG/DL (ref 6–23)
CALCIUM SERPL-MCNC: 9.3 MG/DL (ref 8.6–10.3)
CHLORIDE SERPL-SCNC: 97 MMOL/L (ref 98–107)
CO2 SERPL-SCNC: 30 MMOL/L (ref 21–32)
CREAT SERPL-MCNC: 1.18 MG/DL (ref 0.5–1.3)
GFR SERPL CREATININE-BSD FRML MDRD: 60 ML/MIN/1.73M*2
GLUCOSE SERPL-MCNC: 96 MG/DL (ref 74–99)
POTASSIUM SERPL-SCNC: 4.3 MMOL/L (ref 3.5–5.3)
PROT SERPL-MCNC: 7.1 G/DL (ref 6.4–8.2)
PSA SERPL-MCNC: 3.48 NG/ML
SODIUM SERPL-SCNC: 136 MMOL/L (ref 136–145)
T3FREE SERPL-MCNC: 2.9 PG/ML (ref 2.3–4.2)
T4 FREE SERPL-MCNC: 1.34 NG/DL (ref 0.61–1.12)
TSH SERPL-ACNC: 2.93 MIU/L (ref 0.44–3.98)

## 2023-12-20 PROCEDURE — 84443 ASSAY THYROID STIM HORMONE: CPT

## 2023-12-20 PROCEDURE — 84445 ASSAY OF TSI GLOBULIN: CPT

## 2023-12-20 PROCEDURE — 84481 FREE ASSAY (FT-3): CPT

## 2023-12-20 PROCEDURE — 84439 ASSAY OF FREE THYROXINE: CPT

## 2023-12-20 PROCEDURE — 84153 ASSAY OF PSA TOTAL: CPT

## 2023-12-20 PROCEDURE — 36415 COLL VENOUS BLD VENIPUNCTURE: CPT

## 2023-12-20 PROCEDURE — 80053 COMPREHEN METABOLIC PANEL: CPT

## 2023-12-26 LAB — TSI SER-ACNC: <1 TSI INDEX

## 2023-12-27 ENCOUNTER — LAB (OUTPATIENT)
Dept: LAB | Facility: LAB | Age: 88
End: 2023-12-27
Payer: MEDICARE

## 2023-12-27 ENCOUNTER — ANTICOAGULATION - WARFARIN VISIT (OUTPATIENT)
Dept: CARDIOLOGY | Facility: CLINIC | Age: 88
End: 2023-12-27

## 2023-12-27 DIAGNOSIS — Z79.01 LONG TERM (CURRENT) USE OF ANTICOAGULANTS: ICD-10-CM

## 2023-12-27 LAB
INR PPP: 1.9 (ref 0.9–1.1)
PROTHROMBIN TIME: 21.7 SECONDS (ref 9.8–12.8)

## 2023-12-27 PROCEDURE — 85610 PROTHROMBIN TIME: CPT

## 2023-12-27 PROCEDURE — 36415 COLL VENOUS BLD VENIPUNCTURE: CPT

## 2023-12-27 NOTE — PROGRESS NOTES
Called and spoke with Mr. Moore regarding INR 1.9.   Coumadin dosing as follows: Coumadin 5 mg tablet today then resume previous coumadin dosing of Coumadin 5 mg Sun-Tue-Thur and coumadin 2.5 mg all other days.    INR in weeks approximately 1/10/2024.    Mr. Moore verbalizes understanding.

## 2023-12-28 DIAGNOSIS — I10 ESSENTIAL (PRIMARY) HYPERTENSION: ICD-10-CM

## 2023-12-28 DIAGNOSIS — I48.91 UNSPECIFIED ATRIAL FIBRILLATION (MULTI): ICD-10-CM

## 2023-12-28 DIAGNOSIS — E78.2 MIXED HYPERLIPIDEMIA: ICD-10-CM

## 2023-12-28 DIAGNOSIS — E05.90 THYROTOXICOSIS, UNSPECIFIED WITHOUT THYROTOXIC CRISIS OR STORM: Primary | ICD-10-CM

## 2024-01-10 ENCOUNTER — ANTICOAGULATION - WARFARIN VISIT (OUTPATIENT)
Dept: CARDIOLOGY | Facility: CLINIC | Age: 89
End: 2024-01-10

## 2024-01-10 ENCOUNTER — LAB (OUTPATIENT)
Dept: LAB | Facility: LAB | Age: 89
End: 2024-01-10
Payer: MEDICARE

## 2024-01-10 DIAGNOSIS — Z79.01 LONG TERM (CURRENT) USE OF ANTICOAGULANTS: ICD-10-CM

## 2024-01-10 LAB
INR PPP: 2.3 (ref 0.9–1.1)
PROTHROMBIN TIME: 26 SECONDS (ref 9.8–12.8)

## 2024-01-10 PROCEDURE — 36415 COLL VENOUS BLD VENIPUNCTURE: CPT

## 2024-01-10 PROCEDURE — 85610 PROTHROMBIN TIME: CPT

## 2024-01-10 NOTE — PROGRESS NOTES
Called and spoke with Mr. Moore regarding INR 2.3.  Will continue current coumadin dosing with INR in 4 weeks approximately 2/7/2024.     Oscar verbalizes understanding.

## 2024-02-05 ENCOUNTER — OFFICE VISIT (OUTPATIENT)
Dept: UROLOGY | Age: 89
End: 2024-02-05
Payer: MEDICARE

## 2024-02-05 VITALS
BODY MASS INDEX: 29.03 KG/M2 | SYSTOLIC BLOOD PRESSURE: 132 MMHG | WEIGHT: 185 LBS | HEIGHT: 67 IN | DIASTOLIC BLOOD PRESSURE: 76 MMHG | HEART RATE: 73 BPM

## 2024-02-05 DIAGNOSIS — R97.20 ELEVATED PSA: Primary | ICD-10-CM

## 2024-02-05 DIAGNOSIS — N13.8 BPH WITH OBSTRUCTION/LOWER URINARY TRACT SYMPTOMS: ICD-10-CM

## 2024-02-05 DIAGNOSIS — N40.1 BPH WITH OBSTRUCTION/LOWER URINARY TRACT SYMPTOMS: ICD-10-CM

## 2024-02-05 PROCEDURE — G8484 FLU IMMUNIZE NO ADMIN: HCPCS | Performed by: UROLOGY

## 2024-02-05 PROCEDURE — G8427 DOCREV CUR MEDS BY ELIG CLIN: HCPCS | Performed by: UROLOGY

## 2024-02-05 PROCEDURE — G8419 CALC BMI OUT NRM PARAM NOF/U: HCPCS | Performed by: UROLOGY

## 2024-02-05 PROCEDURE — 1123F ACP DISCUSS/DSCN MKR DOCD: CPT | Performed by: UROLOGY

## 2024-02-05 PROCEDURE — 99214 OFFICE O/P EST MOD 30 MIN: CPT | Performed by: UROLOGY

## 2024-02-05 PROCEDURE — 4004F PT TOBACCO SCREEN RCVD TLK: CPT | Performed by: UROLOGY

## 2024-02-05 ASSESSMENT — ENCOUNTER SYMPTOMS
SHORTNESS OF BREATH: 0
ABDOMINAL PAIN: 0
ABDOMINAL DISTENTION: 0

## 2024-02-05 NOTE — PROGRESS NOTES
Chaperone for Intimate Exam    1. Was chaperone offered as part of the rooming process? offered, declined   2. If Chaperone is declined by patient, NA: chaperone was available and exam completed  3. Chaperone is N/A    
AFIB/Coumadin/ASA, HTN, BPH w/LUTs on Alfusozin and Proscar (stable) and elevated PSA that remains elevated when corrected for use of 5ARI. He again, remains not currently interested in a prostate biopsy which is reasonable given his age, co-morbidities and relative PSA stability. He understands the approx 25 % risk for prostate cancer based on the current PSA. Will continue with the present management for the BPH.        Plan:      F/U 1 yr for PSA        Monster Cantu MD

## 2024-02-07 ENCOUNTER — ANTICOAGULATION - WARFARIN VISIT (OUTPATIENT)
Dept: CARDIOLOGY | Facility: CLINIC | Age: 89
End: 2024-02-07

## 2024-02-07 ENCOUNTER — LAB (OUTPATIENT)
Dept: LAB | Facility: LAB | Age: 89
End: 2024-02-07
Payer: MEDICARE

## 2024-02-07 DIAGNOSIS — Z79.01 LONG TERM (CURRENT) USE OF ANTICOAGULANTS: ICD-10-CM

## 2024-02-07 LAB
INR PPP: 1.8 (ref 0.9–1.1)
PROTHROMBIN TIME: 20.1 SECONDS (ref 9.8–12.8)

## 2024-02-07 PROCEDURE — 85610 PROTHROMBIN TIME: CPT

## 2024-02-07 PROCEDURE — 36415 COLL VENOUS BLD VENIPUNCTURE: CPT

## 2024-02-07 NOTE — PROGRESS NOTES
Called and spoke with andree Shanehilary regarding INR 1.8.  He states that he has not missed any doses and has not changed his diet.      For today 2/7/24 only take Coumadin 5 mg then resume current Coumadin dosing.  INR 3 weeks, approximately 2/28/24.    Mr. Moore verbalizes understanding.

## 2024-02-14 ENCOUNTER — APPOINTMENT (OUTPATIENT)
Dept: PRIMARY CARE | Facility: CLINIC | Age: 89
End: 2024-02-14
Payer: MEDICARE

## 2024-02-28 ENCOUNTER — LAB (OUTPATIENT)
Dept: LAB | Facility: LAB | Age: 89
End: 2024-02-28
Payer: MEDICARE

## 2024-02-28 DIAGNOSIS — Z79.01 LONG TERM (CURRENT) USE OF ANTICOAGULANTS: ICD-10-CM

## 2024-02-28 LAB
INR PPP: 2 (ref 0.9–1.1)
PROTHROMBIN TIME: 22.7 SECONDS (ref 9.8–12.8)

## 2024-02-28 PROCEDURE — 36415 COLL VENOUS BLD VENIPUNCTURE: CPT

## 2024-02-28 PROCEDURE — 85610 PROTHROMBIN TIME: CPT

## 2024-02-29 ENCOUNTER — ANTICOAGULATION - WARFARIN VISIT (OUTPATIENT)
Dept: CARDIOLOGY | Facility: CLINIC | Age: 89
End: 2024-02-29
Payer: MEDICARE

## 2024-02-29 NOTE — PROGRESS NOTES
Called to inform Canelo Moore regarding INR 2.0.   Will increase Coumadin dose to; coumadin 5 mg Sun, Tue, Thur and Fri,  Coumadin 2.5 mg all other days.      No answer, left message to call office for coumadin directions.     INR in 3 weeks approximately 03/21/2024.

## 2024-03-13 ENCOUNTER — OFFICE VISIT (OUTPATIENT)
Dept: PRIMARY CARE | Facility: CLINIC | Age: 89
End: 2024-03-13
Payer: MEDICARE

## 2024-03-13 VITALS
WEIGHT: 190 LBS | BODY MASS INDEX: 28.79 KG/M2 | HEIGHT: 68 IN | HEART RATE: 62 BPM | DIASTOLIC BLOOD PRESSURE: 60 MMHG | TEMPERATURE: 97.6 F | SYSTOLIC BLOOD PRESSURE: 124 MMHG

## 2024-03-13 DIAGNOSIS — E78.2 MIXED DYSLIPIDEMIA: ICD-10-CM

## 2024-03-13 DIAGNOSIS — Z00.00 ENCOUNTER FOR WELLNESS EXAMINATION: ICD-10-CM

## 2024-03-13 DIAGNOSIS — R39.9 URINARY TRACT INFECTION SYMPTOMS: ICD-10-CM

## 2024-03-13 DIAGNOSIS — I10 ESSENTIAL (PRIMARY) HYPERTENSION: ICD-10-CM

## 2024-03-13 DIAGNOSIS — N18.31 STAGE 3A CHRONIC KIDNEY DISEASE (CKD) (MULTI): ICD-10-CM

## 2024-03-13 DIAGNOSIS — I48.0 PAROXYSMAL ATRIAL FIBRILLATION (MULTI): Primary | ICD-10-CM

## 2024-03-13 DIAGNOSIS — I11.0 HYPERTENSIVE HEART DISEASE WITH HEART FAILURE (MULTI): ICD-10-CM

## 2024-03-13 DIAGNOSIS — J43.9 PULMONARY EMPHYSEMA, UNSPECIFIED EMPHYSEMA TYPE (MULTI): ICD-10-CM

## 2024-03-13 DIAGNOSIS — I48.91 HYPERCOAGULABLE STATE DUE TO ATRIAL FIBRILLATION, UNSPECIFIED TYPE (MULTI): ICD-10-CM

## 2024-03-13 DIAGNOSIS — D68.69 HYPERCOAGULABLE STATE DUE TO ATRIAL FIBRILLATION, UNSPECIFIED TYPE (MULTI): ICD-10-CM

## 2024-03-13 DIAGNOSIS — E55.9 VITAMIN D INSUFFICIENCY: ICD-10-CM

## 2024-03-13 DIAGNOSIS — M46.1 SACROILIITIS (CMS-HCC): ICD-10-CM

## 2024-03-13 PROCEDURE — 99213 OFFICE O/P EST LOW 20 MIN: CPT | Performed by: INTERNAL MEDICINE

## 2024-03-13 PROCEDURE — 3074F SYST BP LT 130 MM HG: CPT | Performed by: INTERNAL MEDICINE

## 2024-03-13 PROCEDURE — 3078F DIAST BP <80 MM HG: CPT | Performed by: INTERNAL MEDICINE

## 2024-03-13 PROCEDURE — 1157F ADVNC CARE PLAN IN RCRD: CPT | Performed by: INTERNAL MEDICINE

## 2024-03-13 PROCEDURE — 1159F MED LIST DOCD IN RCRD: CPT | Performed by: INTERNAL MEDICINE

## 2024-03-13 ASSESSMENT — ENCOUNTER SYMPTOMS: DEPRESSION: 0

## 2024-03-14 ASSESSMENT — ENCOUNTER SYMPTOMS
MYALGIAS: 0
SORE THROAT: 0
ABDOMINAL PAIN: 0
ACTIVITY CHANGE: 0
LIGHT-HEADEDNESS: 0
COUGH: 0
DYSURIA: 0

## 2024-03-14 NOTE — PROGRESS NOTES
"CHIEF COMPLAINT:    Canelo Moore is a 88 y.o. male who presents for Follow-up (4 month).    HISTORY OF PRESENT ILLNESS:  Canelo Moore  is a pleasant 88-year-old gentleman comes here for routine health checkup.  He has multiple comorbidities including atrial fibrillation, hypertension hyperlipidemia.  Fortunately he is doing very well and patient himself is very happy that he does not have any acute medical complaint today.  He does not need any medications refilled.  He would need to annual physical exam and blood work and next visit.  Overall his medical conditions are stable.  Patient does smoke.  We discussed about smoking cessation.  However he is not interested at this time.          Review of Systems   Constitutional:  Negative for activity change.   HENT:  Negative for congestion and sore throat.    Respiratory:  Negative for cough.    Cardiovascular:  Negative for chest pain.   Gastrointestinal:  Negative for abdominal pain.   Endocrine: Negative for polyuria.   Genitourinary:  Negative for dysuria.   Musculoskeletal:  Negative for myalgias.   Skin:  Negative for rash.   Neurological:  Negative for light-headedness.   Psychiatric/Behavioral:  Negative for behavioral problems.      Visit Vitals  /60 (BP Location: Left arm, Patient Position: Sitting, BP Cuff Size: Small adult)   Pulse 62   Temp 36.4 °C (97.6 °F) (Temporal)   Ht 1.727 m (5' 8\")   Wt 86.2 kg (190 lb)   BMI 28.89 kg/m²   Smoking Status Every Day   BSA 2.03 m²         Wt Readings from Last 10 Encounters:   03/13/24 86.2 kg (190 lb)   11/14/23 83.9 kg (185 lb)   10/16/23 83.5 kg (184 lb)   08/09/23 84 kg (185 lb 4 oz)   07/13/23 84.4 kg (186 lb)   06/29/23 84.6 kg (186 lb 8 oz)   05/31/23 83.9 kg (185 lb)   05/17/23 85.3 kg (188 lb)   05/03/23 84.8 kg (187 lb)   03/22/23 85.3 kg (188 lb)       Physical Exam  Vitals and nursing note reviewed.   Constitutional:       Appearance: Normal appearance.   HENT:      Head: Normocephalic.      " Right Ear: Tympanic membrane normal.      Left Ear: Tympanic membrane normal.      Nose: Nose normal.      Mouth/Throat:      Mouth: Mucous membranes are moist.   Cardiovascular:      Rate and Rhythm: Normal rate and regular rhythm.      Pulses: Normal pulses.      Heart sounds: No murmur heard.  Pulmonary:      Effort: No respiratory distress.      Breath sounds: Normal breath sounds.   Abdominal:      Palpations: Abdomen is soft.   Musculoskeletal:      Cervical back: Neck supple.      Right lower leg: No edema.      Left lower leg: No edema.   Skin:     General: Skin is warm.      Findings: No rash.   Neurological:      General: No focal deficit present.      Mental Status: He is alert and oriented to person, place, and time.   Psychiatric:         Mood and Affect: Mood normal.        RECENT LABS:  Lab Results   Component Value Date    WBC 7.3 03/28/2023    HGB 13.2 (L) 03/28/2023    HCT 41.2 03/28/2023     03/28/2023    CHOL 104 08/03/2023    TRIG 65 08/03/2023    HDL 43.3 08/03/2023    ALT 13 12/20/2023    AST 24 12/20/2023     12/20/2023    K 4.3 12/20/2023    CL 97 (L) 12/20/2023    CREATININE 1.18 12/20/2023    BUN 17 12/20/2023    CO2 30 12/20/2023    TSH 2.93 12/20/2023    PSA 3.48 12/20/2023    INR 2.0 (H) 02/28/2024     IMAGING:  === 03/28/23 ===    CHEST 2 VIEW    - Impression -  1. Small bilateral pleural effusions, similar to the previous  radiograph from 07/06/2022   === 07/05/23 ===    CT CHEST W IV CONTRAST    - Impression -  1. Small right pleural effusion with atelectasis, interlobular septal  thickening and few scattered ground-glass opacities, concerning for  pulmonary edema.  2. Stable 12 x 14 mm left lower lobe subpleural nodule since 2022  likely secondary to round atelectasis.  3. Upper lobe predominant centrilobular and paraseptal emphysema.  4. Interval decrease in size of multiple mediastinal and hilar nodes,    MEDICATIONS:   Current Outpatient Medications   Medication  Instructions    alfuzosin (UroxatraL) 10 mg 24 hr tablet 1 tablet, oral, Nightly    amiodarone (PACERONE) 100 mg, oral, Daily    aspirin 81 mg EC tablet 1 tablet, oral, Daily    finasteride (Proscar) 5 mg tablet 1 tablet, oral, Daily    magnesium oxide (MAG-OX) 400 mg, oral, 2 times daily    metoprolol succinate XL (TOPROL-XL) 25 mg, oral, Daily    multivitamin (One Daily Multivitamin) tablet 1 tablet, oral, Daily    nitroglycerin (NITROSTAT) 0.4 mg, sublingual, Every 5 min PRN, place 1 tablet under tongue every 5 minutes for up to 3 doses as needed for chest pain. Call 911 if pain persists    rosuvastatin (CRESTOR) 10 mg, oral, Nightly    valsartan-hydrochlorothiazide (Diovan-HCT) 160-12.5 mg tablet 1 tablet, oral, Daily, At lunch    warfarin (Coumadin) 5 mg tablet As directed to maintain therapeutic INR, number of tablets reflect a 90 day supply      TODAY'S VISIT  DX:   1. Paroxysmal atrial fibrillation (CMS/HCC)        2. Hypertensive heart disease with heart failure (CMS/HCC)  CBC and Auto Differential      3. Hypercoagulable state due to atrial fibrillation, unspecified type (CMS/HCC)        4. Sacroiliitis (CMS/HCC)        5. Pulmonary emphysema, unspecified emphysema type (CMS/HCC)        6. Stage 3a chronic kidney disease (CKD) (CMS/HCC)        7. Essential (primary) hypertension  CBC and Auto Differential      8. Encounter for wellness examination  Comprehensive Metabolic Panel    Lipid Panel    TSH with reflex to Free T4 if abnormal    Prostate Specific Antigen    Urinalysis with Reflex Microscopic      9. Mixed dyslipidemia  Lipid Panel      10. Vitamin D insufficiency  Vitamin D 25-Hydroxy,Total (for eval of Vitamin D levels)      11. Urinary tract infection symptoms  Urinalysis with Reflex Microscopic         MEDICAL DECISION MAKING:  - Recent lab work and relevant imaging studies have been reviewed.    - The current active medical co morbidities have been considered.   - Relevant correspondence/notes  from specialty consultants were reviewed and discussed with patient.    - Patient was given treatment as per above plan.   - Patient will continue with current medical therapy and plan.   - Medication have been sent for refill.    - Next Follow up in August 2024.

## 2024-03-20 ENCOUNTER — LAB (OUTPATIENT)
Dept: LAB | Facility: LAB | Age: 89
End: 2024-03-20
Payer: MEDICARE

## 2024-03-20 ENCOUNTER — ANTICOAGULATION - WARFARIN VISIT (OUTPATIENT)
Dept: CARDIOLOGY | Facility: CLINIC | Age: 89
End: 2024-03-20

## 2024-03-20 DIAGNOSIS — Z79.01 LONG TERM (CURRENT) USE OF ANTICOAGULANTS: ICD-10-CM

## 2024-03-20 LAB
INR PPP: 2.2 (ref 0.9–1.1)
PROTHROMBIN TIME: 25.5 SECONDS (ref 9.8–12.8)

## 2024-03-20 PROCEDURE — 85610 PROTHROMBIN TIME: CPT

## 2024-03-20 PROCEDURE — 36415 COLL VENOUS BLD VENIPUNCTURE: CPT

## 2024-03-20 NOTE — PROGRESS NOTES
Called and spoke with Canelo regarding INR 2.2 with goal 2-3.  Will continue current coumadin dosing with repeat INR 4 weeks a[proximately 4/17/2024.  Canelo verbalizes understanding.

## 2024-04-03 ENCOUNTER — LAB (OUTPATIENT)
Dept: LAB | Facility: LAB | Age: 89
End: 2024-04-03
Payer: MEDICARE

## 2024-04-03 DIAGNOSIS — E78.2 MIXED HYPERLIPIDEMIA: ICD-10-CM

## 2024-04-03 DIAGNOSIS — E05.90 THYROTOXICOSIS, UNSPECIFIED WITHOUT THYROTOXIC CRISIS OR STORM: Primary | ICD-10-CM

## 2024-04-03 DIAGNOSIS — I10 ESSENTIAL (PRIMARY) HYPERTENSION: ICD-10-CM

## 2024-04-03 DIAGNOSIS — I48.91 UNSPECIFIED ATRIAL FIBRILLATION (MULTI): ICD-10-CM

## 2024-04-03 LAB
ALBUMIN SERPL BCP-MCNC: 3.7 G/DL (ref 3.4–5)
ALP SERPL-CCNC: 83 U/L (ref 33–136)
ALT SERPL W P-5'-P-CCNC: 11 U/L (ref 10–52)
ANION GAP SERPL CALC-SCNC: 9 MMOL/L (ref 10–20)
AST SERPL W P-5'-P-CCNC: 19 U/L (ref 9–39)
BILIRUB SERPL-MCNC: 0.9 MG/DL (ref 0–1.2)
BUN SERPL-MCNC: 15 MG/DL (ref 6–23)
CALCIUM SERPL-MCNC: 9.2 MG/DL (ref 8.6–10.3)
CHLORIDE SERPL-SCNC: 101 MMOL/L (ref 98–107)
CHOLEST SERPL-MCNC: 135 MG/DL (ref 0–199)
CHOLESTEROL/HDL RATIO: 3
CO2 SERPL-SCNC: 32 MMOL/L (ref 21–32)
CREAT SERPL-MCNC: 1.26 MG/DL (ref 0.5–1.3)
EGFRCR SERPLBLD CKD-EPI 2021: 56 ML/MIN/1.73M*2
GLUCOSE SERPL-MCNC: 102 MG/DL (ref 74–99)
HDLC SERPL-MCNC: 45.1 MG/DL
LDLC SERPL CALC-MCNC: 67 MG/DL
NON HDL CHOLESTEROL: 90 MG/DL (ref 0–149)
POTASSIUM SERPL-SCNC: 4.4 MMOL/L (ref 3.5–5.3)
PROT SERPL-MCNC: 7 G/DL (ref 6.4–8.2)
SODIUM SERPL-SCNC: 138 MMOL/L (ref 136–145)
T3FREE SERPL-MCNC: 2.9 PG/ML (ref 2.3–4.2)
T4 FREE SERPL-MCNC: 1.26 NG/DL (ref 0.61–1.12)
TRIGL SERPL-MCNC: 117 MG/DL (ref 0–149)
TSH SERPL-ACNC: 3.74 MIU/L (ref 0.44–3.98)
VLDL: 23 MG/DL (ref 0–40)

## 2024-04-03 PROCEDURE — 36415 COLL VENOUS BLD VENIPUNCTURE: CPT

## 2024-04-03 PROCEDURE — 86800 THYROGLOBULIN ANTIBODY: CPT

## 2024-04-03 PROCEDURE — 83519 RIA NONANTIBODY: CPT

## 2024-04-03 PROCEDURE — 84439 ASSAY OF FREE THYROXINE: CPT

## 2024-04-03 PROCEDURE — 80053 COMPREHEN METABOLIC PANEL: CPT

## 2024-04-03 PROCEDURE — 80061 LIPID PANEL: CPT

## 2024-04-03 PROCEDURE — 84481 FREE ASSAY (FT-3): CPT

## 2024-04-03 PROCEDURE — 84443 ASSAY THYROID STIM HORMONE: CPT

## 2024-04-03 PROCEDURE — 84432 ASSAY OF THYROGLOBULIN: CPT

## 2024-04-05 LAB — TSH RECEP AB SER-ACNC: <1.1 IU/L

## 2024-04-10 LAB
BILL ONLY-THYROGLOBULIN LC-MS/MS: NORMAL
THYROGLOB AB SERPL-ACNC: 58.2 IU/ML (ref 0–4)
THYROGLOB SERPL-MCNC: 1.1 NG/ML (ref 1.3–31.8)
THYROGLOB SERPL-MCNC: ABNORMAL NG/ML (ref 1.3–31.8)

## 2024-04-15 ENCOUNTER — TELEPHONE (OUTPATIENT)
Dept: CARDIOLOGY | Facility: CLINIC | Age: 89
End: 2024-04-15
Payer: MEDICARE

## 2024-04-15 NOTE — TELEPHONE ENCOUNTER
Patient called the office stating when he is at rest, his BP is normally 130/60. Patient states when he is doing physical activities his BP runs 98/55. Patient states he does get SOB & dizzy. Patient denies CP. Please advise.   Dilia Holloway MA

## 2024-04-17 ENCOUNTER — LAB (OUTPATIENT)
Dept: LAB | Facility: LAB | Age: 89
End: 2024-04-17
Payer: MEDICARE

## 2024-04-17 ENCOUNTER — ANTICOAGULATION - WARFARIN VISIT (OUTPATIENT)
Dept: CARDIOLOGY | Facility: CLINIC | Age: 89
End: 2024-04-17

## 2024-04-17 DIAGNOSIS — Z79.01 LONG TERM (CURRENT) USE OF ANTICOAGULANTS: ICD-10-CM

## 2024-04-17 LAB
INR PPP: 1.9 (ref 0.9–1.1)
PROTHROMBIN TIME: 21.8 SECONDS (ref 9.8–12.8)

## 2024-04-17 PROCEDURE — 85610 PROTHROMBIN TIME: CPT

## 2024-04-17 PROCEDURE — 36415 COLL VENOUS BLD VENIPUNCTURE: CPT

## 2024-04-17 NOTE — TELEPHONE ENCOUNTER
Advised patient of message and verbalized understanding. Patient transferred to  sec for appointment.   Dilia Holloway MA

## 2024-04-17 NOTE — PROGRESS NOTES
Called and spoke with Canelo Moore regarding INR 1.9 with goal 2-3. He has been taking his coumadin different that our records indicate, he has been taking 5 mg 4 days per week and 2.5 mg 3 days per week rather than Coumadin 5 mg Sun, Tue, Thur and Fri along with coumadin 2.5 mg Mon and Wed. He has been advised to change his coumadin dosing as noted above and will repeat INR in 2 weeks approximally 5/1/2024.  Canelo verbalizes understanding.

## 2024-04-25 ENCOUNTER — TELEPHONE (OUTPATIENT)
Dept: CARDIOLOGY | Facility: CLINIC | Age: 89
End: 2024-04-25

## 2024-04-25 ENCOUNTER — OFFICE VISIT (OUTPATIENT)
Dept: CARDIOLOGY | Facility: CLINIC | Age: 89
End: 2024-04-25
Payer: MEDICARE

## 2024-04-25 VITALS
SYSTOLIC BLOOD PRESSURE: 142 MMHG | HEIGHT: 68 IN | HEART RATE: 68 BPM | DIASTOLIC BLOOD PRESSURE: 56 MMHG | WEIGHT: 189.4 LBS | BODY MASS INDEX: 28.7 KG/M2

## 2024-04-25 DIAGNOSIS — R06.02 SHORTNESS OF BREATH: ICD-10-CM

## 2024-04-25 DIAGNOSIS — R93.89 ABNORMAL CT OF THE CHEST: ICD-10-CM

## 2024-04-25 DIAGNOSIS — F17.200 CURRENT SMOKER: ICD-10-CM

## 2024-04-25 DIAGNOSIS — E78.2 MIXED HYPERLIPIDEMIA: ICD-10-CM

## 2024-04-25 DIAGNOSIS — Z79.899 MEDICATION COURSE CHANGED: ICD-10-CM

## 2024-04-25 DIAGNOSIS — Z79.899 HIGH RISK MEDICATION USE: ICD-10-CM

## 2024-04-25 DIAGNOSIS — I25.10 CORONARY ARTERY DISEASE INVOLVING NATIVE CORONARY ARTERY OF NATIVE HEART WITHOUT ANGINA PECTORIS: ICD-10-CM

## 2024-04-25 PROCEDURE — 3077F SYST BP >= 140 MM HG: CPT | Performed by: INTERNAL MEDICINE

## 2024-04-25 PROCEDURE — 1160F RVW MEDS BY RX/DR IN RCRD: CPT | Performed by: INTERNAL MEDICINE

## 2024-04-25 PROCEDURE — 93000 ELECTROCARDIOGRAM COMPLETE: CPT | Performed by: INTERNAL MEDICINE

## 2024-04-25 PROCEDURE — 99214 OFFICE O/P EST MOD 30 MIN: CPT | Performed by: INTERNAL MEDICINE

## 2024-04-25 PROCEDURE — 1159F MED LIST DOCD IN RCRD: CPT | Performed by: INTERNAL MEDICINE

## 2024-04-25 PROCEDURE — 3078F DIAST BP <80 MM HG: CPT | Performed by: INTERNAL MEDICINE

## 2024-04-25 PROCEDURE — 1157F ADVNC CARE PLAN IN RCRD: CPT | Performed by: INTERNAL MEDICINE

## 2024-04-25 RX ORDER — METOPROLOL SUCCINATE 25 MG/1
12.5 TABLET, EXTENDED RELEASE ORAL DAILY
Qty: 45 TABLET | Refills: 1 | Status: SHIPPED | OUTPATIENT
Start: 2024-04-25 | End: 2024-10-22

## 2024-04-25 NOTE — PROGRESS NOTES
Patient presents for follow-up most recently seen November 2023, past medical history is as noted below.  At last office visit we discontinued Zetia.  Dr. Spencer was down titrated  statins.    Subjective :     Over the past 2 months has noticed exertional shortness of breath.  Denies chest pressure tightness or heaviness denies falls or bleeding diathesis unfortunately continues to smoke close to half pack per day.  Compliant with medications.  Remains on high risk medication amiodarone.  EKG was reviewed.  Has not had palpitations.  Previous complaint of cough has resolved.    History so Far :  1. Paroxysmal atrial fibrillation-functional class I.  2. High-risk medication-warfarin, PT/INR levels therapeutic.  3. Hypertension-controlled.  4. MTO4YS0-MIVz score of 4.  5. Atherosclerotic native vessel coronary artery disease, most  recent perfusion study in September of 2016, small infarct, basal  portion in inferior wall, left ventricular ejection fraction is 51%,  normal TID.  6. Left atrial size 4.1 cm based on echo of 2015.  7. Chronic total right coronary artery occlusion, distal right  coronary artery filled by left-to-right collaterals.  8. Increased body mass index. The patient is following a very  active lifestyle.  9. Hyperlipidemia-at target  10. Nicotine dependence-not motivated to quit patient says this is the only thing he has left to enjoy, he does not smoke much a few cigarettes a day. He understands that cigarette smoking is harmful from a vascular disease and pulmonary standpoint  11. High risk medication warfarin no bleeding diathesis PT/INR reviewed therapeutic  12. 48-hour Holter December 7, 2020-basic rhythm sinus minimum heart rate 43 maximum heart rate 97 average heart rate 60 PACs comprised 1.6% of the total heartbeats  There were 41 short bursts of supraventricular rhythm longest of these was an ectopic atrial rhythm of 18 beats no documented sustained atrial fibrillation frequent  ventricular premature beats comprising 4.5% of the total heartbeats 1 ventricular triplet and over 150 ventricular couplets occasional bigeminal and trigeminal episodes noted no complaints were elicited  13. Echocardiogram 12/7/2020-left atrium 4 cm LVEF 50% inferior basal moderate hypokinesis impaired relaxation pattern of LV diastolic filling mild mitral regurgitation trace to mild tricuspid regurgitation RV systolic pressure 40 mmHg no change compared to prior study of 2015  14. Recent hospitalization to [facility] with class IV left heart failure precipitated by atrial fibrillation L beat controlled ventricular rate, this has resolved with restoration of sinus rhythm  15. Remains on high risk medication amiodarone.  16. Wide-complex tachycardia, cannot exclude nonsustained ventricular tachycardia versus atrial fibrillation with aberrancy  17. Echocardiogram March 2022-LVEF 45 to 50% left atrial diameter 4.7 cm LV end-systolic dimension 3.58 cm left atrial volume index 34 mL/mÂ² RV systolic pressure 37 mmHg IVC diameter 1.85 cm, there was mild mitral and tricuspid regurgitation reported.  18. CTA negative for pulmonary embolism March 2022 CTA revealed borderline enlarged right hilar lymph node measuring up to 2.9 x 1.8 cm in size and subcentimeter left hilar lymph nodes probably nonspecific possibly reactive, defer to primary/pulmonary  19. Anemia hemoglobin and hematocrit 11.4 and 35.7 with MCV of 102 March 2022  20. Treadmill stress Myoview April 2022-3.8 METS, 90% age-predicted maximum heart rate, see AEP protocol, inferior wall hypokinesis mild, basal portion, LVEF 48% transient ischemic dilatation 0.9 which is normal, there was evidence of mild diaphragm attenuation artifact. No evidence of ischemia. Compared to 2016, LVEF and functional capacity not significantly different.  21. 48-hour Holter monitor April 2022-frequent PACs and PVCs, comprising 1.6% of total heartbeats no atrial fibrillation average heart rate  "56 bpm 8 short bursts of ectopic atrial tachycardia longest being 10 beats no evidence of high degree AV block longest pause 1.9 seconds    Objective   Wt Readings from Last 3 Encounters:   04/25/24 85.9 kg (189 lb 6.4 oz)   03/13/24 86.2 kg (190 lb)   11/14/23 83.9 kg (185 lb)            Vitals:    04/25/24 1332   BP: 142/56   BP Location: Left arm   Patient Position: Sitting   Pulse: 68   Weight: 85.9 kg (189 lb 6.4 oz)   Height: 1.727 m (5' 8\")            Physical Exam:    GENERAL APPEARANCE: in no acute distress.  CHEST: Symmetric and non-tender.  INTEGUMENT: Skin warm and dry  HEENT: No gross abnormalities identified.No pallor or scleral icterus.  NECK: Supple, no JVD, no bruit.   NEURO/PSHCY: Alert and oriented x3; appropriate behavior and responses and responses  LUNGS:   HEART: Rate and rhythm regular with no evident murmur; no gallop appreciated.   ABDOMEN: Soft, non tender.  MUSCULOSKELETAL: No gross deformities.  EXTREMITIES: Warm  There is no edema noted.    Meds:  Current Outpatient Medications   Medication Instructions    alfuzosin (UroxatraL) 10 mg 24 hr tablet 1 tablet, oral, Nightly    amiodarone (PACERONE) 100 mg, oral, Daily    aspirin 81 mg EC tablet 1 tablet, oral, Daily    finasteride (Proscar) 5 mg tablet 1 tablet, oral, Daily    magnesium oxide (MAG-OX) 400 mg, oral, 2 times daily    metoprolol succinate XL (TOPROL-XL) 12.5 mg, oral, Daily    multivitamin (One Daily Multivitamin) tablet 1 tablet, oral, Daily    nitroglycerin (NITROSTAT) 0.4 mg, sublingual, Every 5 min PRN, place 1 tablet under tongue every 5 minutes for up to 3 doses as needed for chest pain. Call 911 if pain persists    rosuvastatin (CRESTOR) 10 mg, oral, Nightly    valsartan-hydrochlorothiazide (Diovan-HCT) 160-12.5 mg tablet 1 tablet, oral, Daily, At lunch    warfarin (Coumadin) 5 mg tablet As directed to maintain therapeutic INR, number of tablets reflect a 90 day supply          Allergies   Allergen Reactions    Ace " Inhibitors Cough       Reviewed laboratory data from April 2024 to include free T4 TSH lipid profile basic metabolic profile.  Sodium was 138 potassium 4.4 creatinine 1.26 GFR 56, down from low 60s previously.  Total cholesterol 130 HDL 45, LDL 67 triglycerides 170      LABS:    Lab Results   Component Value Date    WBC 7.3 03/28/2023    HGB 13.2 (L) 03/28/2023    HCT 41.2 03/28/2023     03/28/2023    CHOL 135 04/03/2024    TRIG 117 04/03/2024    HDL 45.1 04/03/2024    LDLDIRECT 58 01/19/2023    ALT 11 04/03/2024    AST 19 04/03/2024     04/03/2024    K 4.4 04/03/2024     04/03/2024    CREATININE 1.26 04/03/2024    BUN 15 04/03/2024    CO2 32 04/03/2024    TSH 3.74 04/03/2024    PSA 3.48 12/20/2023    INR 2.7 (H) 05/01/2024                       Patient Active Problem List    Diagnosis Date Noted    Shortness of breath 04/25/2024    Abnormal CT of the chest 04/25/2024    Hypertensive heart disease with heart failure (Multi) 03/13/2024    Hypercoagulable state due to atrial fibrillation, unspecified type (Multi) 03/13/2024    Medication course changed 11/14/2023    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) 11/14/2023    Encounter for immunization 10/19/2023    Abnormal thyroid blood test 09/12/2023    CAD (coronary artery disease) 09/12/2023    Benign prostatic hyperplasia 09/12/2023    Bradycardia 09/12/2023    Current smoker 09/12/2023    Elbow pain 09/12/2023    Elevated serum free T4 level 09/12/2023    COPD (chronic obstructive pulmonary disease) (Multi) 09/12/2023    Emphysema/COPD (Multi) 09/12/2023    External hemorrhoid 09/12/2023    Hyperlipidemia 09/12/2023    Hypertriglyceridemia 09/12/2023    Hypertension 09/12/2023    Insomnia, idiopathic 09/12/2023    Low back pain 09/12/2023    Meniscal injury 09/12/2023    Olecranon bursitis 09/12/2023    Paroxysmal atrial fibrillation (Multi) 09/12/2023    Pelvic pain in male 09/12/2023    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     PVC's (premature ventricular contractions) 09/12/2023    Rectal bleeding 09/12/2023    Right inguinal hernia 09/12/2023    Sacroiliitis (CMS-HCC) 09/12/2023    Umbilical hernia 09/12/2023    Cough with hemoptysis 09/12/2023    High risk medication use 09/12/2023    Overweight 09/12/2023    Stage 3a chronic kidney disease (CKD) (Multi) 09/12/2023                 Assessment:    1. Medication course changed  Follow Up In Cardiology    metoprolol succinate XL (Toprol-XL) 25 mg 24 hr tablet    Stress Test    Transthoracic Echo (TTE) Complete      2. High risk medication use  Follow Up In Cardiology    metoprolol succinate XL (Toprol-XL) 25 mg 24 hr tablet    Stress Test    Referral to Pulmonology    Transthoracic Echo (TTE) Complete    ECG 12 lead (Clinic Performed)      3. Mixed hyperlipidemia  Follow Up In Cardiology    metoprolol succinate XL (Toprol-XL) 25 mg 24 hr tablet    Stress Test    Transthoracic Echo (TTE) Complete      4. Coronary artery disease involving native coronary artery of native heart without angina pectoris  Follow Up In Cardiology    metoprolol succinate XL (Toprol-XL) 25 mg 24 hr tablet    Stress Test    Referral to Pulmonology    Transthoracic Echo (TTE) Complete      5. Shortness of breath  Follow Up In Cardiology    Stress Test    Referral to Pulmonology    Transthoracic Echo (TTE) Complete      6. Abnormal CT of the chest  Follow Up In Cardiology    Stress Test    Referral to Pulmonology    Transthoracic Echo (TTE) Complete      7. Current smoker  Stress Test    Transthoracic Echo (TTE) Complete      Clinical decision making:  Patient's shortness of breath differential diagnosis includes interstitial lung disease, chronotropic blunting, amiodarone toxicity, worsening LV function, and lastly anticoagulant.  Patient has been complaining of fatigue occasional dizzy spells, we will down titrate metoprolol and wean him off.  Patient's chest CT was reported to show bronchial wall thickening in the  lower lung zones, centrilobular emphysema and biapical scarring, few scattered groundglass opacities of unclear etiology, small hiatal hernia, small right pleural effusion with atelectasis, and decrease in size of lymph nodes that were noted in the mediastinum previously.  Pulmonary consultation is recommended.  Amiodarone dose is quite low, unlikely to be amiodarone toxicity.  We are using it for PVC suppression.  EKG was reviewed, treadmill stress test without perfusion imaging was ordered.  Follow-up after testing sooner if interval problems arise  Nicotine abstinence is encouraged at every visit.  Of note, stress Myoview in April 2022 did not show chronotropic blunting, workload attained was quite low at 3.8 METS, and it was terminated due to fatigue.  LVEF 48% inferior basal infarct, overall findings unchanged from a prior study from 2016    Follow up : after testing        Provider Attestation - Scribe documentation    All medical record entries made by the Scribe were at my direction and personally dictated by me. I have reviewed the chart and agree that the record accurately reflects my personal performance of the history, physical exam, discussion and plan.

## 2024-04-25 NOTE — PATIENT INSTRUCTIONS
DECREASE METOPROLOL TO 12.5MG TAKE ONE TAB DAILY. PT TO USE 25MG AND CUT IN HALF TO EQUAL 12.5MG DOSE.

## 2024-05-01 ENCOUNTER — LAB (OUTPATIENT)
Dept: LAB | Facility: LAB | Age: 89
End: 2024-05-01
Payer: MEDICARE

## 2024-05-01 ENCOUNTER — ANTICOAGULATION - WARFARIN VISIT (OUTPATIENT)
Dept: CARDIOLOGY | Facility: CLINIC | Age: 89
End: 2024-05-01

## 2024-05-01 DIAGNOSIS — Z79.01 LONG TERM (CURRENT) USE OF ANTICOAGULANTS: ICD-10-CM

## 2024-05-01 LAB
INR PPP: 2.7 (ref 0.9–1.1)
PROTHROMBIN TIME: 30.4 SECONDS (ref 9.8–12.8)

## 2024-05-01 PROCEDURE — 36415 COLL VENOUS BLD VENIPUNCTURE: CPT

## 2024-05-01 PROCEDURE — 85610 PROTHROMBIN TIME: CPT

## 2024-05-01 NOTE — PROGRESS NOTES
Called Canelo Moore regarding INR 2.7 with goal 2-3.  Will continue current coumadin dosing and repeat INR 2 weeks.    No answer, I left a message to call our office for recommendations.

## 2024-05-03 NOTE — PROGRESS NOTES
Pt returned call and advised of Coumadin orders.   Pt verbalized good understanding and will re-check INR 5/15/24

## 2024-05-15 ENCOUNTER — LAB (OUTPATIENT)
Dept: LAB | Facility: LAB | Age: 89
End: 2024-05-15
Payer: MEDICARE

## 2024-05-15 ENCOUNTER — ANTICOAGULATION - WARFARIN VISIT (OUTPATIENT)
Dept: CARDIOLOGY | Facility: CLINIC | Age: 89
End: 2024-05-15

## 2024-05-15 DIAGNOSIS — Z79.01 LONG TERM (CURRENT) USE OF ANTICOAGULANTS: ICD-10-CM

## 2024-05-15 LAB
INR PPP: 2.3 (ref 0.9–1.1)
PROTHROMBIN TIME: 26.3 SECONDS (ref 9.8–12.8)

## 2024-05-15 PROCEDURE — 36415 COLL VENOUS BLD VENIPUNCTURE: CPT

## 2024-05-15 PROCEDURE — 85610 PROTHROMBIN TIME: CPT

## 2024-05-15 NOTE — PROGRESS NOTES
Called Canelo Moore regarding INR 2.3 with goal 2-3.  Will continue current coumadin dosing with repeat INR in 4 weeks approximately 6/12/2024.  No answer, left message to call office for instructions.

## 2024-05-16 NOTE — PROGRESS NOTES
Patient called office back in regards to message. Patient was given instructions. Patient verbalized understanding. Sridevi NORTH

## 2024-05-24 NOTE — PROGRESS NOTES
Patient: Canelo Moore    [id number]  : 1935 -- AGE 88 y.o.    Provider: Dasia BALDWIN- [facility]     Location [facility]   Service Date: 24              [facility]  New Visit Note        HISTORY OF PRESENT ILLNESS     The patient's referring provider is: Yi Montejo MD    HISTORY OF PRESENT ILLNESS   Canelo Moore is a 88 y.o. male with a history of *** who is a never/current/ former smoker (***pack years), who presents to a [facility] for a/an *** initial/follow up evaluation for No chief complaint on file..     I have independently interviewed and examined the patient in the office and reviewed available records.    Current History    On today's visit, the patient reports ***    HPI:  On amiodarone 100 mg daily  OLDCART  cough  wheeze  Fevers/chills  BERKOWITZ  SOB at rest  chest pain  allergies  GERD  ED visits  Up to date on vaccines  Night time    Previous pulmonary history: He has no history of recurrent infections, or lung disease as a child.  He had no previous lung hx, never on oxygen or inhaler therapy.   He was previously told he has . He currently is on no supplemental oxygen.  He has never been to pulmonary rehab. Does not recall having AECOPD requiring antibiotics or prednisone.    Inhalers/nebulized medications:     Hospitalization History: He has not been hospitalized over the last year for breathing related problem.    Sleep history:  Denies snoring, apnea, feeling tired during the day or taking naps during the day.   Complains of snoring, apnea, feeling tired during the day, and taking naps during the day.       ALLERGIES AND MEDICATIONS     ALLERGIES  Allergies   Allergen Reactions    Ace Inhibitors Cough       MEDICATIONS  Current Outpatient Medications   Medication Sig Dispense Refill    alfuzosin (UroxatraL) 10 mg 24 hr tablet Take 1 tablet (10 mg) by mouth once daily at bedtime.      amiodarone  (Pacerone) 100 mg tablet Take 1 tablet (100 mg) by mouth once daily. 90 tablet 3    aspirin 81 mg EC tablet Take 1 tablet (81 mg) by mouth once daily.      finasteride (Proscar) 5 mg tablet Take 1 tablet (5 mg) by mouth once daily.      magnesium oxide (Mag-Ox) 400 mg (241.3 mg magnesium) tablet Take 1 tablet (400 mg) by mouth 2 times a day. 180 tablet 3    metoprolol succinate XL (Toprol-XL) 25 mg 24 hr tablet Take 0.5 tablets (12.5 mg) by mouth once daily. 45 tablet 1    multivitamin (One Daily Multivitamin) tablet Take 1 tablet by mouth once daily.      nitroglycerin (Nitrostat) 0.4 mg SL tablet Place 1 tablet (0.4 mg) under the tongue every 5 minutes if needed for chest pain. place 1 tablet under tongue every 5 minutes for up to 3 doses as needed for chest pain. Call 911 if pain persists 25 tablet 5    rosuvastatin (Crestor) 10 mg tablet Take 1 tablet (10 mg) by mouth once daily at bedtime. 90 tablet 3    valsartan-hydrochlorothiazide (Diovan-HCT) 160-12.5 mg tablet Take 1 tablet by mouth once daily. At lunch 90 tablet 3    warfarin (Coumadin) 5 mg tablet As directed to maintain therapeutic INR, number of tablets reflect a 90 day supply 90 tablet 3     No current facility-administered medications for this visit.         PAST HISTORY     PAST MEDICAL HISTORY  Hypertension  A-fib  CAD  BPH  CKD stage III  COPD/emphysema  Nicotine dependence    PAST SURGICAL HISTORY  Past Surgical History:   Procedure Laterality Date    OTHER SURGICAL HISTORY  11/04/2020    Varicose vein ligation    OTHER SURGICAL HISTORY  11/04/2020    Surprise tooth extraction    OTHER SURGICAL HISTORY  11/04/2020    Tonsillectomy    OTHER SURGICAL HISTORY  11/04/2020    Colonoscopy complete for polypectomy    OTHER SURGICAL HISTORY  11/06/2021    Varicose vein sclerotherapy    OTHER SURGICAL HISTORY  11/06/2021    Pilonidal cyst removal    OTHER SURGICAL HISTORY  11/06/2021    Hernia repair    OTHER SURGICAL HISTORY  12/14/2021    Umbilical hernia  repair    OTHER SURGICAL HISTORY  12/14/2021    Inguinal hernia repair    US ASPIRATION INJECTION INTERMEDIATE JOINT  2/8/2021    US ASPIRATION INJECTION INTERMEDIATE JOINT 2/8/2021 [facility]       IMMUNIZATION HISTORY  Immunization History   Administered Date(s) Administered    AS03 Adjuvant 10/03/2017, 10/08/2019    Flu vaccine (IIV4), preservative free *Check age/dose* 10/08/2019, 09/27/2021    Flu vaccine, quadrivalent, high-dose, preservative free, age 65y+ (FLUZONE) 09/26/2020, 09/28/2021, 09/19/2022    Influenza Nasal, Unspecified 10/22/2011, 10/01/2013, 10/01/2014, 10/01/2018    Influenza, High Dose Seasonal, Preservative Free 09/03/2015, 10/17/2016, 09/23/2018    Influenza, Seasonal, Quadrivalent, Adjuvanted 10/20/2023    Influenza, [place] 09/30/2014    Influenza, Unspecified 10/22/2011, 10/01/2013, 10/01/2014, 09/03/2015, 09/03/2015, 10/01/2018, 10/01/2020    Influenza, seasonal, injectable 10/01/2020    Influenza, trivalent, adjuvanted 10/03/2017, 10/08/2019    Pfizer COVID-19 vaccine, Fall 2023, 12 years and older, (30mcg/0.3mL) 10/20/2023    Pfizer COVID-19 vaccine, bivalent, age 12 years and older (30 mcg/0.3 mL) 09/19/2022    Pfizer Gray Cap SARS-CoV-2 05/20/2022    Pfizer Purple Cap SARS-CoV-2 01/23/2021, 02/13/2021, 09/28/2021    Pneumococcal conjugate vaccine, 13-valent (PREVNAR 13) 10/17/2016, 10/08/2019    Tdap vaccine, age 7 year and older (BOOSTRIX, ADACEL) 08/29/2022    Zoster vaccine, recombinant, adult (SHINGRIX) 09/26/2020, 12/09/2020    Zoster, live 01/01/2015, 05/29/2015       SOCIAL HISTORY  Tobacco Smoking:   Illicit drugs:  Alcohol consumption:   Pets:     OCCUPATIONAL/ENVIRONMENTAL HISTORY  Occupation: ***  *** known exposure to asbestos, silica, beryllium or inhaled metals.  *** exposure to birds or exotic animals.    FAMILY HISTORY  Family History   Problem Relation Name Age of Onset    Breast cancer Mother      Atrial fibrillation Mother      Colon cancer  Brother      Prostate cancer Brother      Breast cancer Daughter      Prostate cancer Son      Colon cancer Son       *** family history of pulmonary disease.  *** family history of cancer.  *** family history of autoimmune disorders.    REVIEW OF SYSTEMS     REVIEW OF SYSTEMS  Review of Systems    Constitutional: No fever, no chills, no night sweats.    Eyes: No double vision, no floaters, no dry eyes.   ENT: See HPI.   Neck: No neck stiffness.  Cardiovascular: No sharp chest pain, no heart racing, no leg swelling.  Respiratory: as noted in HPI.   Gastrointestinal: No nausea, no vomiting, no diarrhea.   Musculoskeletal: No joint pain, no back pain.   Integumentary: No rashes or sores.  Neurological: No dizziness, no headaches. Sleeping well.  Psychiatric: No mood changes.   Endocrine: No hot flashes, no cold intolerance, weight is stable.  Hematologic: No easy bruising or bleeding.    PHYSICAL EXAM     VITAL SIGNS: There were no vitals taken for this visit.     CURRENT WEIGHT: There is no height or weight on file to calculate BMI.  PREVIOUS WEIGHTS:  Wt Readings from Last 3 Encounters:   04/25/24 85.9 kg (189 lb 6.4 oz)   03/13/24 86.2 kg (190 lb)   11/14/23 83.9 kg (185 lb)       Physical Exam    Constitutional: General appearance: Alert and oriented.  No acute distress. Well developed, well nourished.  Head and face: Symmetric  ENT: external inspection of ear and nose normal. No intranasal polyps. No oropharyngeal exudates.    Oropharynx: normal   Neck: supple, no lymphadenopathy  Pulmonary: Chest is normal. No increased work of breathing or signs of respiratory distress. Clear to auscultation bilaterally - no crackles, wheezing, or rhonchi.   Cardiovascular: Heart rate and rhythm normal. Normal S1, S2 - no murmurs, gallops, or pericardial rub.   Abdomen: Soft, non tender, +BS  Extremities: No edema. No clubbing or cyanosis of the fingernails.    Neurologic: Moves all four extremities   MSK: Normal movements of  extremities. Gait normal   Psychiatric: Intact judgement and insight.    RESULTS/DATA     Pulmonary Function Test Results     No testing done     Chest Radiograph       TH CHEST 2 VIEW PA AND LAT;  3/29/2023     COMPARISON:  07/06/2022.     ACCESSION NUMBER(S):  [id number]     ORDERING CLINICIAN:  LEBRON AGRRISON     FINDINGS:  The cardiac silhouette size is within normal limits.  There is no focal consolidation, edema or pneumothorax.  Small bilateral pleural effusions with blunting of the costophrenic  angles noted.      Impression   1. Small bilateral pleural effusions, similar to the previous  radiograph from 07/06/2022           Chest CT Scan       CT CHEST W CONTRAST;  7/5/2023       COMPARISON:  CT abdomen and pelvis 03/09/2022     ACCESSION NUMBER(S):  [id number]     ORDERING CLINICIAN:  POLO HANDY     TECHNIQUE:  Helical data acquisition of the chest was obtained with IV contrast  material. 75 cc IV Omnipaque 350 was administered. Images were  reformatted in axial, coronal, and sagittal planes.     FINDINGS:  LUNGS AND AIRWAYS:  Central airways are patent without endobronchial lesions bilateral  bronchial wall thickening, predominantly in the lower lung zones.  Upper lobe predominant centrilobular emphysema. Biapical scarring.     Lower lobe predominant interlobular septal thickening. Small right  pleural effusion with atelectasis. Stable 12 x 14 mm left lower lobe  subpleural nodule (series 203, image 203), not significantly changed  since size since 2022. Few scattered ground-glass opacities likely  infectious/inflammatory in etiology. No suspicious pulmonary nodule.  No focal consolidation. No pneumothorax stable loculated fluid within  the left main fissure..     MEDIASTINUM AND STEPHANIE, LOWER NECK AND AXILLA:  The visualized thyroid gland is within normal limits.     Compared with the CT from 03/09/2022, there has been interval  decrease in the size of multiple hilar and mediastinal nodes for  example a 19  "x 11 mm right hilar node (series 202, image 131),  previously 29 x 18 mm; a 9 mm left para-aortic node, previously 12  mm. Multiple other prominent but nonenlarged by size criteria  mediastinal nodes are also noted and also decreased in size.     Small hiatal hernia.     HEART AND VESSELS:  The thoracic aorta is of normal course and caliber. Mild  atherosclerotic calcification thoracic aorta.     Main pulmonary artery and its branches are normal in caliber.     Severe coronary artery calcifications are seen. The study is not  optimized for evaluation of coronary arteries.     Mild cardiomegaly.     No evidence of pericardial effusion.     UPPER ABDOMEN:  Multiple bilateral simple renal cysts.     CHEST WALL AND OSSEOUS STRUCTURES:  There are no suspicious osseous lesions. Degenerative changes of the  thoracic spine. No acute osseous abnormality.      Impression   1. Small right pleural effusion with atelectasis, interlobular septal  thickening and few scattered ground-glass opacities, concerning for  pulmonary edema.  2. Stable 12 x 14 mm left lower lobe subpleural nodule since 2022  likely secondary to round atelectasis.  3. Upper lobe predominant centrilobular and paraseptal emphysema.  4. Interval decrease in size of multiple mediastinal and hilar nodes,  since 2022.         Echocardiogram     Scheduled 5/28/2024 at [place]        Labwork   Complete Blood Count  Lab Results   Component Value Date    WBC 7.3 03/28/2023    HGB 13.2 (L) 03/28/2023    HCT 41.2 03/28/2023     (H) 03/28/2023     03/28/2023       Peripheral Eosinophil Count/Percentage:   Eosinophils Absolute (x10E9/L)   Date Value   03/09/2022 0.03     Eosinophils % (%)   Date Value   03/09/2022 0.4       Serum Immunoglobulin E:    No results found for: \"IGE\"     ASSESSMENT/PLAN   Mr. Moore is a 88 y.o. male,     Problem List and Orders  {Assess/PlanSmartLinks:31534}    Assessment and Plan / Recommendations:  Problem List Items Addressed " This Visit    None     Emphysema on CT scan     Follow up in *** or sooner if needed.    If you have any questions please call the office [phone]    Thank you for visiting the Pulmonary clinic today!   Dasia Bustillo CNP  [phone]

## 2024-05-28 ENCOUNTER — CLINICAL SUPPORT (OUTPATIENT)
Dept: CARDIOLOGY | Facility: HOSPITAL | Age: 89
End: 2024-05-28
Payer: MEDICARE

## 2024-05-28 ENCOUNTER — ANCILLARY PROCEDURE (OUTPATIENT)
Dept: CARDIOLOGY | Facility: HOSPITAL | Age: 89
End: 2024-05-28
Payer: MEDICARE

## 2024-05-28 DIAGNOSIS — I25.10 CORONARY ARTERY DISEASE INVOLVING NATIVE CORONARY ARTERY OF NATIVE HEART WITHOUT ANGINA PECTORIS: ICD-10-CM

## 2024-05-28 DIAGNOSIS — Z79.899 MEDICATION COURSE CHANGED: ICD-10-CM

## 2024-05-28 DIAGNOSIS — R06.02 SHORTNESS OF BREATH: ICD-10-CM

## 2024-05-28 DIAGNOSIS — F17.200 CURRENT SMOKER: ICD-10-CM

## 2024-05-28 DIAGNOSIS — E78.2 MIXED HYPERLIPIDEMIA: ICD-10-CM

## 2024-05-28 DIAGNOSIS — R93.89 ABNORMAL CT OF THE CHEST: ICD-10-CM

## 2024-05-28 DIAGNOSIS — Z79.899 HIGH RISK MEDICATION USE: ICD-10-CM

## 2024-05-28 DIAGNOSIS — E78.5 HYPERLIPIDEMIA, UNSPECIFIED: ICD-10-CM

## 2024-05-28 LAB
AORTIC VALVE MEAN GRADIENT: 3 MMHG
AORTIC VALVE PEAK VELOCITY: 1.18 M/S
AV PEAK GRADIENT: 5.6 MMHG
AVA (PEAK VEL): 3.18 CM2
AVA (VTI): 3.27 CM2
EJECTION FRACTION APICAL 4 CHAMBER: 59
LEFT VENTRICLE INTERNAL DIMENSION DIASTOLE: 4.61 CM (ref 3.5–6)
LEFT VENTRICULAR OUTFLOW TRACT DIAMETER: 2.3 CM
LV EJECTION FRACTION BIPLANE: 59 %
MITRAL VALVE E/A RATIO: 0.61
MITRAL VALVE E/E' RATIO: 10.62
RIGHT VENTRICLE PEAK SYSTOLIC PRESSURE: 31.2 MMHG

## 2024-05-28 PROCEDURE — 93016 CV STRESS TEST SUPVJ ONLY: CPT | Performed by: INTERNAL MEDICINE

## 2024-05-28 PROCEDURE — 93018 CV STRESS TEST I&R ONLY: CPT | Performed by: INTERNAL MEDICINE

## 2024-05-28 PROCEDURE — 93306 TTE W/DOPPLER COMPLETE: CPT | Performed by: INTERNAL MEDICINE

## 2024-05-28 PROCEDURE — 93306 TTE W/DOPPLER COMPLETE: CPT

## 2024-05-28 PROCEDURE — 93017 CV STRESS TEST TRACING ONLY: CPT

## 2024-05-30 ENCOUNTER — APPOINTMENT (OUTPATIENT)
Dept: PULMONOLOGY | Facility: CLINIC | Age: 89
End: 2024-05-30
Payer: MEDICARE

## 2024-06-03 RX ORDER — ALFUZOSIN HYDROCHLORIDE 10 MG/1
TABLET, EXTENDED RELEASE ORAL
Qty: 90 TABLET | Refills: 3 | Status: SHIPPED | OUTPATIENT
Start: 2024-06-03

## 2024-06-06 ENCOUNTER — APPOINTMENT (OUTPATIENT)
Dept: CARDIOLOGY | Facility: CLINIC | Age: 89
End: 2024-06-06
Payer: MEDICARE

## 2024-06-13 ENCOUNTER — LAB (OUTPATIENT)
Dept: LAB | Facility: LAB | Age: 89
End: 2024-06-13
Payer: MEDICARE

## 2024-06-13 DIAGNOSIS — Z79.01 LONG TERM (CURRENT) USE OF ANTICOAGULANTS: ICD-10-CM

## 2024-06-13 LAB
INR PPP: 2.6 (ref 0.9–1.1)
PROTHROMBIN TIME: 30 SECONDS (ref 9.8–12.8)

## 2024-06-13 PROCEDURE — 36415 COLL VENOUS BLD VENIPUNCTURE: CPT

## 2024-06-13 PROCEDURE — 85610 PROTHROMBIN TIME: CPT

## 2024-06-17 ENCOUNTER — ANTICOAGULATION - WARFARIN VISIT (OUTPATIENT)
Dept: CARDIOLOGY | Facility: CLINIC | Age: 89
End: 2024-06-17
Payer: MEDICARE

## 2024-06-17 NOTE — PROGRESS NOTES
Called Mr Moore regarding INR 2.6 with goal 2-3.  No answer left message to call office in regarding to following recommendations.    Continue current coumadin dosing.  Recheck INR in 4 weeks approximately 7/15/2024.

## 2024-06-18 ENCOUNTER — TELEPHONE (OUTPATIENT)
Dept: CARDIOLOGY | Facility: CLINIC | Age: 89
End: 2024-06-18
Payer: MEDICARE

## 2024-06-18 NOTE — TELEPHONE ENCOUNTER
Patient returned call back to office regarding INR. Pt notified INR of 2.6 with goal 2-3. Pt advised to continue current coumadin dosing with a recheck INR in 4 weeks approximately 7/15/2024.

## 2024-07-03 NOTE — PROGRESS NOTES
Patient: Canelo Moore    [id number]  : 1935 -- AGE 88 y.o.    Provider: Dasia BALDWIN- [facility]     Location [facility]   Service Date: 7/10/24              [facility]  New Visit Note        HISTORY OF PRESENT ILLNESS     The patient's referring provider is: Yi Montejo MD    HISTORY OF PRESENT ILLNESS   Canelo Moore is a 88 y.o. male with a history of Emphysema, HTN, CAD, and hyperlipidemia,  who is a current smoker (34 pack years), who presents to a [facility] for an initial evaluation for shortness of breath.     I have independently interviewed and examined the patient in the office and reviewed available records.    Current History    On today's visit, the patient reports having dyspnea on exertion has been going on for the past 2 to 3 months. States while he is out doing activities his blood pressure drops.  Has occasional cough with clear mucus mainly in the morning.  He is on amiodarone 100 mg daily.  He denies wheezing, shortness of breath at rest, chest tightness, GERD,and ER visits for breathing issues.    Previous pulmonary history: Emphysema, COPD?    Inhalers/nebulized medications: none    Hospitalization History: He has not been hospitalized over the last year for breathing related problem.    Sleep history: Denies snoring, apnea, feeling tired during the day or taking naps during the day.     ALLERGIES AND MEDICATIONS     ALLERGIES  Allergies   Allergen Reactions    Ace Inhibitors Cough       MEDICATIONS  Current Outpatient Medications   Medication Sig Dispense Refill    alfuzosin (UroxatraL) 10 mg 24 hr tablet Take 1 tablet (10 mg) by mouth once daily at bedtime.      amiodarone (Pacerone) 100 mg tablet Take 1 tablet (100 mg) by mouth once daily. 90 tablet 3    aspirin 81 mg EC tablet Take 1 tablet (81 mg) by mouth once daily.      finasteride (Proscar) 5 mg tablet Take 1 tablet (5 mg) by mouth once  daily.      magnesium oxide (Mag-Ox) 400 mg (241.3 mg magnesium) tablet Take 1 tablet (400 mg) by mouth 2 times a day. 180 tablet 3    metoprolol succinate XL (Toprol-XL) 25 mg 24 hr tablet Take 0.5 tablets (12.5 mg) by mouth once daily. 45 tablet 1    multivitamin (One Daily Multivitamin) tablet Take 1 tablet by mouth once daily.      nitroglycerin (Nitrostat) 0.4 mg SL tablet Place 1 tablet (0.4 mg) under the tongue every 5 minutes if needed for chest pain. place 1 tablet under tongue every 5 minutes for up to 3 doses as needed for chest pain. Call 911 if pain persists 25 tablet 5    rosuvastatin (Crestor) 10 mg tablet Take 1 tablet (10 mg) by mouth once daily at bedtime. 90 tablet 3    valsartan-hydrochlorothiazide (Diovan-HCT) 160-12.5 mg tablet Take 1 tablet by mouth once daily. At lunch 90 tablet 3    warfarin (Coumadin) 5 mg tablet As directed to maintain therapeutic INR, number of tablets reflect a 90 day supply 90 tablet 3     No current facility-administered medications for this visit.         PAST HISTORY     PAST MEDICAL HISTORY  CAD  COPD  Emphysema  HTN  Hyperlipidemia    PAST SURGICAL HISTORY  Past Surgical History:   Procedure Laterality Date    OTHER SURGICAL HISTORY  11/04/2020    Varicose vein ligation    OTHER SURGICAL HISTORY  11/04/2020    [place] tooth extraction    OTHER SURGICAL HISTORY  11/04/2020    Tonsillectomy    OTHER SURGICAL HISTORY  11/04/2020    Colonoscopy complete for polypectomy    OTHER SURGICAL HISTORY  11/06/2021    Varicose vein sclerotherapy    OTHER SURGICAL HISTORY  11/06/2021    Pilonidal cyst removal    OTHER SURGICAL HISTORY  11/06/2021    Hernia repair    OTHER SURGICAL HISTORY  12/14/2021    Umbilical hernia repair    OTHER SURGICAL HISTORY  12/14/2021    Inguinal hernia repair    US ASPIRATION INJECTION INTERMEDIATE JOINT  2/8/2021    US ASPIRATION INJECTION INTERMEDIATE JOINT 2/8/2021 [facility]       IMMUNIZATION HISTORY  Immunization History    Administered Date(s) Administered    AS03 Adjuvant 10/03/2017, 10/08/2019    Flu vaccine (IIV4), preservative free *Check age/dose* 10/08/2019, 2021    Flu vaccine, quadrivalent, high-dose, preservative free, age 65y+ (FLUZONE) 2020, 2021, 2022    Influenza Nasal, Unspecified 10/22/2011, 10/01/2013, 10/01/2014, 10/01/2018    Influenza, High Dose Seasonal, Preservative Free 2015, 10/17/2016, 2018    Influenza, Seasonal, Quadrivalent, Adjuvanted 10/20/2023    Influenza, [place] 2014    Influenza, Unspecified 10/22/2011, 10/01/2013, 10/01/2014, 2015, 2015, 10/01/2018, 10/01/2020    Influenza, seasonal, injectable 10/01/2020    Influenza, trivalent, adjuvanted 10/03/2017, 10/08/2019    Pfizer COVID-19 vaccine, Fall , 12 years and older, (30mcg/0.3mL) 10/20/2023    Pfizer COVID-19 vaccine, bivalent, age 12 years and older (30 mcg/0.3 mL) 2022    Pfizer Gray Cap SARS-CoV-2 2022    Pfizer Purple Cap SARS-CoV-2 2021, 2021, 2021    Pneumococcal conjugate vaccine, 13-valent (PREVNAR 13) 10/17/2016, 10/08/2019    Tdap vaccine, age 7 year and older (BOOSTRIX, ADACEL) 2022    Zoster vaccine, recombinant, adult (SHINGRIX) 2020, 2020    Zoster, live 2015, 2015       SOCIAL HISTORY  Tobacco Smokin- current - 1/2 ppd -34 pack years  Illicit drugs: none  Alcohol consumption: none  Pets: no    OCCUPATIONAL/ENVIRONMENTAL HISTORY  Occupation: Retired /   No known exposure to asbestos, silica, beryllium or inhaled metals.  No exposure to birds or exotic animals.    FAMILY HISTORY  Family History   Problem Relation Name Age of Onset    Breast cancer Mother      Atrial fibrillation Mother      Colon cancer Brother      Prostate cancer Brother      Breast cancer Daughter      Prostate cancer Son      Colon cancer Son       No family history of pulmonary disease.  No family history of  autoimmune disorders.    REVIEW OF SYSTEMS     REVIEW OF SYSTEMS  Review of Systems    Constitutional: No fever, no chills, no night sweats.    Eyes: No double vision, no floaters, no dry eyes.   ENT: See HPI.   Neck: No neck stiffness.  Cardiovascular: No sharp chest pain, no heart racing, no leg swelling.  Respiratory: as noted in HPI.   Gastrointestinal: No nausea, no vomiting, no diarrhea.   Musculoskeletal: No joint pain, no back pain.   Integumentary: No rashes or sores.  Neurological: No dizziness, no headaches. Sleeping well.  Psychiatric: No mood changes.   Endocrine: No hot flashes, no cold intolerance, weight is stable.  Hematologic: No easy bruising or bleeding.    PHYSICAL EXAM     VITAL SIGNS:   Vitals:    07/10/24 1526   BP: 153/73   Pulse: 61   Temp: 36.8 °C (98.2 °F)   SpO2: 98%          CURRENT WEIGHT: Body mass index is 27.98 kg/m².    PREVIOUS WEIGHTS:  Wt Readings from Last 3 Encounters:   04/25/24 85.9 kg (189 lb 6.4 oz)   03/13/24 86.2 kg (190 lb)   11/14/23 83.9 kg (185 lb)       Physical Exam    Constitutional: General appearance: Alert and oriented.  No acute distress. Well developed, well nourished.  Head and face: Symmetric  ENT: external inspection of ear and nose normal. No intranasal polyps. No oropharyngeal exudates.    Oropharynx: normal   Neck: supple, no lymphadenopathy  Pulmonary: Chest is normal. No increased work of breathing or signs of respiratory distress. Clear to auscultation bilaterally - no crackles, wheezing, or rhonchi.   Cardiovascular: Heart rate and rhythm normal. Normal S1, S2 - no murmurs, gallops, or pericardial rub.   Abdomen: Soft, non tender, +BS  Extremities: No edema. No clubbing or cyanosis of the fingernails.    Neurologic: Moves all four extremities   MSK: Normal movements of extremities. Gait normal   Psychiatric: Intact judgement and insight.    RESULTS/DATA     Pulmonary Function Test Results         Chest Radiograph     XR chest 2 view      Narrative  Interpreted By:  ALYSSA ALEXANDER MD  MRN: [id number]  Patient Name: SRINIVASAN CLEMENTE    STUDY:  TH CHEST 2 VIEW PA AND LAT;    INDICATION:  N/A  Z79.01: Anticoagulant long-term use Z79.899: High risk  medication use.    COMPARISON:  07/06/2022.    ACCESSION NUMBER(S):  [id number]    ORDERING CLINICIAN:  LEBRON GARRISON    FINDINGS:  The cardiac silhouette size is within normal limits.  There is no focal consolidation, edema or pneumothorax.  Small bilateral pleural effusions with blunting of the costophrenic  angles noted.    Impression  1. Small bilateral pleural effusions, similar to the previous  radiograph from 07/06/2022      Chest CT Scan     STUDY:  CT CHEST W CONTRAST;  7/5/2023 2:07 pm     INDICATION:  cough, hemoptysis  Z79.899: High risk medication use F17.200: Current  smoker R04.2: Cough with hemoptysis.     COMPARISON:  CT abdomen and pelvis 03/09/2022     ACCESSION NUMBER(S):  [id number]     ORDERING CLINICIAN:  POLO HANDY     TECHNIQUE:  Helical data acquisition of the chest was obtained with IV contrast  material. 75 cc IV Omnipaque 350 was administered. Images were  reformatted in axial, coronal, and sagittal planes.     FINDINGS:  LUNGS AND AIRWAYS:  Central airways are patent without endobronchial lesions bilateral  bronchial wall thickening, predominantly in the lower lung zones.  Upper lobe predominant centrilobular emphysema. Biapical scarring.     Lower lobe predominant interlobular septal thickening. Small right  pleural effusion with atelectasis. Stable 12 x 14 mm left lower lobe  subpleural nodule (series 203, image 203), not significantly changed  since size since 2022. Few scattered ground-glass opacities likely  infectious/inflammatory in etiology. No suspicious pulmonary nodule.  No focal consolidation. No pneumothorax stable loculated fluid within  the left main fissure..     MEDIASTINUM AND STEPHANIE, LOWER NECK AND AXILLA:  The visualized thyroid gland is within normal  limits.     Compared with the CT from 03/09/2022, there has been interval  decrease in the size of multiple hilar and mediastinal nodes for  example a 19 x 11 mm right hilar node (series 202, image 131),  previously 29 x 18 mm; a 9 mm left para-aortic node, previously 12  mm. Multiple other prominent but nonenlarged by size criteria  mediastinal nodes are also noted and also decreased in size.     Small hiatal hernia.     HEART AND VESSELS:  The thoracic aorta is of normal course and caliber. Mild  atherosclerotic calcification thoracic aorta.     Main pulmonary artery and its branches are normal in caliber.     Severe coronary artery calcifications are seen. The study is not  optimized for evaluation of coronary arteries.     Mild cardiomegaly.     No evidence of pericardial effusion.     UPPER ABDOMEN:  Multiple bilateral simple renal cysts.     CHEST WALL AND OSSEOUS STRUCTURES:  There are no suspicious osseous lesions. Degenerative changes of the  thoracic spine. No acute osseous abnormality.      Impression   1. Small right pleural effusion with atelectasis, interlobular septal  thickening and few scattered ground-glass opacities, concerning for  pulmonary edema.  2. Stable 12 x 14 mm left lower lobe subpleural nodule since 2022  likely secondary to round atelectasis.  3. Upper lobe predominant centrilobular and paraseptal emphysema.  4. Interval decrease in size of multiple mediastinal and hilar nodes,  since 2022.         Echocardiogram     Study Date:        5/28/2024            Ordering Provider:    [id number] LEBRON GARRISON  MRN/PID:           [id number]             Fellow:  Accession#:        [id number]         Nurse:  Date of Birth/Age: 1935 / 88 years Sonographer:          Iris Mcneil RDMS,                                                                RDMU, RVT  Gender:            M                    Additional Staff:  Height:            172.72 cm            Admit Date:  Weight:            85.73 kg              Admission Status:     Outpatient  BSA / BMI:         2.00 m2 / 28.74      Department Location:  [facility]                     kg/m2                                      [place]  Blood Pressure: 136 /62 mmHg     Study Type:    TRANSTHORACIC ECHO (TTE) COMPLETE  Diagnosis/ICD: Atherosclerotic heart disease of native coronary artery without                 angina pectoris-I25.10; Shortness of breath-R06.02; Abnormal                 findings on diagnostic imaging of other specified body                 structures-R93.89  Indication:    Afib, Abn CT Chest, CAD, CHF, Dyspnea, HTN, HLD and Smoker  CPT Codes:     Echo Complete w Full Doppler-[id number]   Study Detail: The following Echo studies were performed: 2D, M-Mode, Doppler and                color flow.        PHYSICIAN INTERPRETATION:  Left Ventricle: Left ventricular systolic function is normal, with an estimated ejection fraction of 60%. There are no regional wall motion abnormalities. The left ventricular cavity size is normal. Spectral Doppler shows an abnormal pattern of left ventricular diastolic filling. Borderline LVH at 11-12mm with very mild DUST but no LVOT obstruction.  Left Atrium: The left atrium is mildly dilated. Mild LAE.  Right Ventricle: The right ventricle is normal in size. There is normal right ventricular global systolic function. Normal RV size and function  Borderline RVSP elevation at 31 mm HG.  Right Atrium: The right atrium is normal in size.  Aortic Valve: The aortic valve appears structurally normal. There is no evidence of aortic valve regurgitation. The peak instantaneous gradient of the aortic valve is 5.6 mmHg. The mean gradient of the aortic valve is 3.0 mmHg.  Mitral Valve: The mitral valve is normal in structure. There is trace mitral valve regurgitation.  Tricuspid Valve: The tricuspid valve is structurally normal. There is trace to mild tricuspid regurgitation.  Pulmonic Valve: The pulmonic valve is structurally normal.  There is trace pulmonic valve regurgitation.  Pericardium: There is no pericardial effusion noted.  Aorta: The aortic root is normal.        CONCLUSIONS:   1. Left ventricular systolic function is normal with a 60% estimated ejection fraction.   2. Borderline LVH at 11-12mm with very mild DUST but no LVOT obstruction.   3. Spectral Doppler shows an abnormal pattern of left ventricular diastolic filling.   4. Normal RV size and function      Borderline RVSP elevation at 31 mm HG.   5. Mild LAE.   6. Normal valve structure and function.     QUANTITATIVE DATA SUMMARY:  2D MEASUREMENTS:                           Normal Ranges:  Ao Root d:     3.30 cm   (2.0-3.7cm)  LAs:           4.10 cm   (2.7-4.0cm)  RVIDd:         3.33 cm   (0.9-3.6cm)  IVSd:          1.21 cm   (0.6-1.1cm)  LVPWd:         0.96 cm   (0.6-1.1cm)  LVIDd:         4.61 cm   (3.9-5.9cm)  LVIDs:         3.04 cm  LV Mass Index: 89.2 g/m2  LV % FS        34.1 %     LA VOLUME:                              Normal Ranges:  LA Volume Index: 30.7 ml/m2     AORTA MEASUREMENTS:                     Normal Ranges:  Asc Ao, d: 2.90 cm (2.1-3.4cm)     LV SYSTOLIC FUNCTION BY 2D PLANIMETRY (MOD):                      Normal Ranges:  EF-A4C View: 59.0 % (>=55%)  EF-A2C View: 59.6 %  EF-Biplane:  59.1 %     LV DIASTOLIC FUNCTION:                         Normal Ranges:  MV Peak E:    0.50 m/s (0.7-1.2 m/s)  MV Peak A:    0.81 m/s (0.42-0.7 m/s)  E/A Ratio:    0.61     (1.0-2.2)  MV e'         0.05 m/s (>8.0)  MV lateral e' 0.07 m/s  MV medial e'  0.05 m/s  E/e' Ratio:   10.62    (<8.0)     MITRAL VALVE:                       Normal Ranges:  MV Vmax:    0.86 m/s (<=1.3m/s)  MV peak P.9 mmHg (<5mmHg)  MV mean P.0 mmHg (<48mmHg)  MV DT:      317 msec (150-240msec)  MV PHT:     92 msec  (30-60msec)  MVA by PHT: 2.39 cm2 (4-6cm2)     MITRAL INSUFFICIENCY:                       Normal Ranges:  MR Vmax: 418.00 cm/s     AORTIC VALVE:                                     "Normal Ranges:  AoV Vmax:                1.18 m/s (<=1.7m/s)  AoV Peak P.6 mmHg (<20mmHg)  AoV Mean PG:             3.0 mmHg (1.7-11.5mmHg)  LVOT Max Amadou:            0.90 m/s (<=1.1m/s)  AoV VTI:                 29.50 cm (18-25cm)  LVOT VTI:                23.20 cm  LVOT Diameter:           2.30 cm  (1.8-2.4cm)  AoV Area, VTI:           3.27 cm2 (2.5-5.5cm2)  AoV Area,Vmax:           3.18 cm2 (2.5-4.5cm2)  AoV Dimensionless Index: 0.79     TRICUSPID VALVE/RVSP:                              Normal Ranges:  Peak TR Velocity: 2.56 m/s  Est. RA Pressure: 5 mmHg  RV Syst Pressure: 31.2 mmHg (< 30mmHg)     PULMONIC VALVE:                       Normal Ranges:  PV Max Amadou: 0.7 m/s  (0.6-0.9m/s)  PV Max P.8 mmHg        [id number] Roberto Fisher MD, [facility]  Electronically signed on 2024 at 10:37:59 PM       Labwork   Complete Blood Count  Lab Results   Component Value Date    WBC 7.3 2023    HGB 13.2 (L) 2023    HCT 41.2 2023     (H) 2023     2023       Peripheral Eosinophil Count/Percentage:   Eosinophils Absolute (x10E9/L)   Date Value   2022 0.03     Eosinophils % (%)   Date Value   2022 0.4       Serum Immunoglobulin E:    No results found for: \"IGE\"     ASSESSMENT/PLAN   Mr. Moore is a 88 y.o. male, with a history of Emphysema, HTN, CAD, and hyperlipidemia,  who is a current smoker (34 pack years), who presents to a [facility] for an initial evaluation for shortness of breath.     CT Chest: Small right pleural effusion with atelectasis, interlobular septal   thickening and few scattered ground-glass opacities, concerning for   pulmonary edema.   2. Stable 12 x 14 mm left lower lobe subpleural nodule since    likely secondary to round atelectasis.   3. Upper lobe predominant centrilobular and paraseptal emphysema.   4. Interval decrease in size of multiple mediastinal and hilar nodes,   since .     Problem " List and Orders  Problem List Items Addressed This Visit       CAD (coronary artery disease)    COPD (chronic obstructive pulmonary disease) (Multi) - Primary    Relevant Medications    albuterol (Ventolin HFA) 90 mcg/actuation inhaler    Other Relevant Orders    Complete Pulmonary Function Test Pre/Post Bronchodialator (Spirometry Pre/Post/DLCO/Lung Volumes)    Oximetry Desat/O2 Titration (Exercise Desat)    High risk medication use    Shortness of breath    Relevant Medications    albuterol (Ventolin HFA) 90 mcg/actuation inhaler    Other Relevant Orders    Complete Pulmonary Function Test Pre/Post Bronchodialator (Spirometry Pre/Post/DLCO/Lung Volumes)    Oximetry Desat/O2 Titration (Exercise Desat)    Abnormal CT of the chest     Other Visit Diagnoses       Lung nodule        Relevant Orders    CT chest wo IV contrast            Assessment and Plan / Recommendations:  Problem List Items Addressed This Visit    None     Emphysema on CT scan  - will get new PFT/O2 desat/titration  - START albuterol HFA every 4-6 hours as needed for shortness of breath  - START Spiriva 2.5 mcg - SAMPLES given    Lung nodule; 12 x 14 mm left lower lobe subpleural nodule  - Will get CT Chest    Follow up in 4-6 weeks or sooner if needed.    If you have any questions please call the office [phone]    Thank you for visiting the Pulmonary clinic today!   Dasia Bustillo CNP  [phone]

## 2024-07-10 ENCOUNTER — APPOINTMENT (OUTPATIENT)
Dept: PULMONOLOGY | Facility: CLINIC | Age: 89
End: 2024-07-10
Payer: MEDICARE

## 2024-07-10 VITALS
WEIGHT: 184 LBS | BODY MASS INDEX: 27.89 KG/M2 | TEMPERATURE: 98.2 F | OXYGEN SATURATION: 98 % | HEART RATE: 61 BPM | SYSTOLIC BLOOD PRESSURE: 153 MMHG | HEIGHT: 68 IN | DIASTOLIC BLOOD PRESSURE: 73 MMHG

## 2024-07-10 DIAGNOSIS — Z79.899 HIGH RISK MEDICATION USE: ICD-10-CM

## 2024-07-10 DIAGNOSIS — R06.02 SHORTNESS OF BREATH: ICD-10-CM

## 2024-07-10 DIAGNOSIS — J43.2 CENTRILOBULAR EMPHYSEMA (MULTI): Primary | ICD-10-CM

## 2024-07-10 DIAGNOSIS — R91.1 LUNG NODULE: ICD-10-CM

## 2024-07-10 DIAGNOSIS — R93.89 ABNORMAL CT OF THE CHEST: ICD-10-CM

## 2024-07-10 DIAGNOSIS — I25.10 CORONARY ARTERY DISEASE INVOLVING NATIVE CORONARY ARTERY OF NATIVE HEART WITHOUT ANGINA PECTORIS: ICD-10-CM

## 2024-07-10 PROCEDURE — 3077F SYST BP >= 140 MM HG: CPT

## 2024-07-10 PROCEDURE — 1159F MED LIST DOCD IN RCRD: CPT

## 2024-07-10 PROCEDURE — 1157F ADVNC CARE PLAN IN RCRD: CPT

## 2024-07-10 PROCEDURE — 99204 OFFICE O/P NEW MOD 45 MIN: CPT

## 2024-07-10 PROCEDURE — 1126F AMNT PAIN NOTED NONE PRSNT: CPT

## 2024-07-10 PROCEDURE — 3078F DIAST BP <80 MM HG: CPT

## 2024-07-10 RX ORDER — ALBUTEROL SULFATE 90 UG/1
2 AEROSOL, METERED RESPIRATORY (INHALATION) EVERY 6 HOURS PRN
Qty: 18 G | Refills: 3 | Status: SHIPPED | OUTPATIENT
Start: 2024-07-10

## 2024-07-10 ASSESSMENT — ENCOUNTER SYMPTOMS
DEPRESSION: 0
OCCASIONAL FEELINGS OF UNSTEADINESS: 0
LOSS OF SENSATION IN FEET: 0

## 2024-07-10 ASSESSMENT — PAIN SCALES - GENERAL: PAINLEVEL: 0-NO PAIN

## 2024-07-11 ENCOUNTER — PATIENT MESSAGE (OUTPATIENT)
Dept: CARDIOLOGY | Facility: CLINIC | Age: 89
End: 2024-07-11
Payer: MEDICARE

## 2024-07-11 ENCOUNTER — TELEPHONE (OUTPATIENT)
Dept: CARDIOLOGY | Facility: CLINIC | Age: 89
End: 2024-07-11
Payer: MEDICARE

## 2024-07-11 DIAGNOSIS — Z79.01 CURRENT USE OF LONG TERM ANTICOAGULATION: Primary | ICD-10-CM

## 2024-07-11 NOTE — TELEPHONE ENCOUNTER
Patient called the office stating he went to the clinic to have INR drawn and he was told he needs a new standing order placed. Sent to NICOLASA Lozano CNP  in the absence of Dr. Yi Montejo MD, FACC. Please advise.   Dilia Holloway MA

## 2024-07-11 NOTE — TELEPHONE ENCOUNTER
Vmm left for patient advising that new standing order for INR was placed. Also sent [employer] message.

## 2024-07-12 ENCOUNTER — LAB (OUTPATIENT)
Dept: LAB | Facility: LAB | Age: 89
End: 2024-07-12
Payer: MEDICARE

## 2024-07-12 ENCOUNTER — ANTICOAGULATION - WARFARIN VISIT (OUTPATIENT)
Dept: CARDIOLOGY | Facility: CLINIC | Age: 89
End: 2024-07-12

## 2024-07-12 DIAGNOSIS — Z79.01 CURRENT USE OF LONG TERM ANTICOAGULATION: ICD-10-CM

## 2024-07-12 LAB
INR PPP: 2.2 (ref 0.9–1.1)
PROTHROMBIN TIME: 25 SECONDS (ref 9.8–12.8)

## 2024-07-12 PROCEDURE — 36415 COLL VENOUS BLD VENIPUNCTURE: CPT

## 2024-07-12 PROCEDURE — 85610 PROTHROMBIN TIME: CPT

## 2024-07-12 NOTE — PROGRESS NOTES
Called Mr. Canelo Moore regarding INR of 2.2 with goal of 2-3.  Will continue current medication and repeat INR in 4 weeks approximately 8/7/2024.    There was no answer I left a message for Mr. Moore to call our office for instructions.    7/12/2024 1506  Mr. Moore returned call, updated regarding above message, verbalizes understanding.

## 2024-07-17 ENCOUNTER — APPOINTMENT (OUTPATIENT)
Dept: CARDIOLOGY | Facility: CLINIC | Age: 89
End: 2024-07-17
Payer: MEDICARE

## 2024-07-22 ENCOUNTER — HOSPITAL ENCOUNTER (OUTPATIENT)
Dept: RADIOLOGY | Facility: HOSPITAL | Age: 89
Discharge: HOME | End: 2024-07-22
Payer: MEDICARE

## 2024-07-22 DIAGNOSIS — R91.1 LUNG NODULE: ICD-10-CM

## 2024-07-22 PROCEDURE — 71250 CT THORAX DX C-: CPT | Performed by: RADIOLOGY

## 2024-07-22 PROCEDURE — 71250 CT THORAX DX C-: CPT

## 2024-07-24 DIAGNOSIS — C34.31 MALIGNANT NEOPLASM OF LOWER LOBE, RIGHT BRONCHUS OR LUNG (MULTI): ICD-10-CM

## 2024-07-24 DIAGNOSIS — R91.1 LUNG NODULE: Primary | ICD-10-CM

## 2024-07-24 NOTE — PROGRESS NOTES
Reviewed CT Chest results and discussed that there is interval increase in size of a right lower lobe solid pulmonary  nodule measuring up to 1.7 cm which has become more solid when compared prior studies of 2022 and 2023. Recommending to get a PET/CT.

## 2024-07-25 ENCOUNTER — HOSPITAL ENCOUNTER (OUTPATIENT)
Dept: RESPIRATORY THERAPY | Facility: HOSPITAL | Age: 89
Discharge: HOME | End: 2024-07-25
Payer: MEDICARE

## 2024-07-25 DIAGNOSIS — R06.02 SHORTNESS OF BREATH: ICD-10-CM

## 2024-07-25 DIAGNOSIS — J43.2 CENTRILOBULAR EMPHYSEMA (MULTI): ICD-10-CM

## 2024-07-25 LAB
MGC ASCENT PFT - FEV1 - POST: 1.81
MGC ASCENT PFT - FEV1 - PRE: 1.86
MGC ASCENT PFT - FEV1 - PREDICTED: 2.45
MGC ASCENT PFT - FVC - POST: 2.28
MGC ASCENT PFT - FVC - PRE: 2.35
MGC ASCENT PFT - FVC - PREDICTED: 3.37

## 2024-07-25 PROCEDURE — 94726 PLETHYSMOGRAPHY LUNG VOLUMES: CPT

## 2024-07-25 PROCEDURE — 94618 PULMONARY STRESS TESTING: CPT

## 2024-07-26 ENCOUNTER — TELEPHONE (OUTPATIENT)
Dept: CARDIOLOGY | Facility: CLINIC | Age: 89
End: 2024-07-26
Payer: MEDICARE

## 2024-07-26 NOTE — TELEPHONE ENCOUNTER
----- Message from Yi Montejo sent at 7/25/2024  5:24 PM EDT -----  Reviewed results of the CAT scan that was done recently, I would defer recommendations to pulmonary and/or hematology oncology .

## 2024-07-26 NOTE — TELEPHONE ENCOUNTER
Spoke with patient, gave him results he verbalized yesterday. Pt states he has appointment with pulmonologist next month and will discuss.

## 2024-07-30 ENCOUNTER — APPOINTMENT (OUTPATIENT)
Dept: LAB | Facility: LAB | Age: 89
End: 2024-07-30
Payer: MEDICARE

## 2024-07-30 DIAGNOSIS — E05.90 THYROTOXICOSIS, UNSPECIFIED WITHOUT THYROTOXIC CRISIS OR STORM: Primary | ICD-10-CM

## 2024-07-30 DIAGNOSIS — E78.2 MIXED HYPERLIPIDEMIA: ICD-10-CM

## 2024-07-31 ENCOUNTER — LAB (OUTPATIENT)
Dept: LAB | Facility: LAB | Age: 89
End: 2024-07-31
Payer: MEDICARE

## 2024-07-31 DIAGNOSIS — E78.2 MIXED DYSLIPIDEMIA: ICD-10-CM

## 2024-07-31 DIAGNOSIS — R39.9 URINARY TRACT INFECTION SYMPTOMS: ICD-10-CM

## 2024-07-31 DIAGNOSIS — I10 ESSENTIAL (PRIMARY) HYPERTENSION: ICD-10-CM

## 2024-07-31 DIAGNOSIS — I11.0 HYPERTENSIVE HEART DISEASE WITH HEART FAILURE (MULTI): ICD-10-CM

## 2024-07-31 DIAGNOSIS — I25.10 CORONARY ARTERY DISEASE INVOLVING NATIVE CORONARY ARTERY OF NATIVE HEART WITHOUT ANGINA PECTORIS: ICD-10-CM

## 2024-07-31 DIAGNOSIS — E05.90 THYROTOXICOSIS, UNSPECIFIED WITHOUT THYROTOXIC CRISIS OR STORM: ICD-10-CM

## 2024-07-31 DIAGNOSIS — Z00.00 ENCOUNTER FOR WELLNESS EXAMINATION: ICD-10-CM

## 2024-07-31 DIAGNOSIS — Z79.899 HIGH RISK MEDICATION USE: ICD-10-CM

## 2024-07-31 DIAGNOSIS — Z79.899 MEDICATION COURSE CHANGED: ICD-10-CM

## 2024-07-31 DIAGNOSIS — E55.9 VITAMIN D INSUFFICIENCY: ICD-10-CM

## 2024-07-31 DIAGNOSIS — E78.2 MIXED HYPERLIPIDEMIA: ICD-10-CM

## 2024-07-31 LAB
25(OH)D3 SERPL-MCNC: 45 NG/ML (ref 30–100)
ALBUMIN SERPL BCP-MCNC: 3.7 G/DL (ref 3.4–5)
ALP SERPL-CCNC: 87 U/L (ref 33–136)
ALT SERPL W P-5'-P-CCNC: 11 U/L (ref 10–52)
ANION GAP SERPL CALC-SCNC: 13 MMOL/L (ref 10–20)
APPEARANCE UR: CLEAR
AST SERPL W P-5'-P-CCNC: 18 U/L (ref 9–39)
BASOPHILS # BLD AUTO: 0.04 X10*3/UL (ref 0–0.1)
BASOPHILS NFR BLD AUTO: 0.7 %
BILIRUB SERPL-MCNC: 0.9 MG/DL (ref 0–1.2)
BILIRUB UR STRIP.AUTO-MCNC: NEGATIVE MG/DL
BUN SERPL-MCNC: 15 MG/DL (ref 6–23)
CALCIUM SERPL-MCNC: 9 MG/DL (ref 8.6–10.3)
CHLORIDE SERPL-SCNC: 99 MMOL/L (ref 98–107)
CHOLEST SERPL-MCNC: 131 MG/DL (ref 0–199)
CHOLESTEROL/HDL RATIO: 3.1
CO2 SERPL-SCNC: 30 MMOL/L (ref 21–32)
COLOR UR: NORMAL
CREAT SERPL-MCNC: 1.17 MG/DL (ref 0.5–1.3)
EGFRCR SERPLBLD CKD-EPI 2021: 61 ML/MIN/1.73M*2
EOSINOPHIL # BLD AUTO: 0.12 X10*3/UL (ref 0–0.4)
EOSINOPHIL NFR BLD AUTO: 2 %
ERYTHROCYTE [DISTWIDTH] IN BLOOD BY AUTOMATED COUNT: 14.2 % (ref 11.5–14.5)
GLUCOSE SERPL-MCNC: 100 MG/DL (ref 74–99)
GLUCOSE UR STRIP.AUTO-MCNC: NORMAL MG/DL
HCT VFR BLD AUTO: 38.4 % (ref 41–52)
HDLC SERPL-MCNC: 41.6 MG/DL
HGB BLD-MCNC: 12.6 G/DL (ref 13.5–17.5)
IMM GRANULOCYTES # BLD AUTO: 0.03 X10*3/UL (ref 0–0.5)
IMM GRANULOCYTES NFR BLD AUTO: 0.5 % (ref 0–0.9)
KETONES UR STRIP.AUTO-MCNC: NEGATIVE MG/DL
LDLC SERPL CALC-MCNC: 65 MG/DL
LDLC SERPL DIRECT ASSAY-MCNC: 70 MG/DL (ref 0–129)
LEUKOCYTE ESTERASE UR QL STRIP.AUTO: NEGATIVE
LYMPHOCYTES # BLD AUTO: 0.94 X10*3/UL (ref 0.8–3)
LYMPHOCYTES NFR BLD AUTO: 15.5 %
MCH RBC QN AUTO: 33.5 PG (ref 26–34)
MCHC RBC AUTO-ENTMCNC: 32.8 G/DL (ref 32–36)
MCV RBC AUTO: 102 FL (ref 80–100)
MONOCYTES # BLD AUTO: 0.64 X10*3/UL (ref 0.05–0.8)
MONOCYTES NFR BLD AUTO: 10.5 %
NEUTROPHILS # BLD AUTO: 4.3 X10*3/UL (ref 1.6–5.5)
NEUTROPHILS NFR BLD AUTO: 70.8 %
NITRITE UR QL STRIP.AUTO: NEGATIVE
NON HDL CHOLESTEROL: 89 MG/DL (ref 0–149)
NRBC BLD-RTO: 0 /100 WBCS (ref 0–0)
PH UR STRIP.AUTO: 7.5 [PH]
PLATELET # BLD AUTO: 289 X10*3/UL (ref 150–450)
POTASSIUM SERPL-SCNC: 4.5 MMOL/L (ref 3.5–5.3)
PROT SERPL-MCNC: 6.9 G/DL (ref 6.4–8.2)
PROT UR STRIP.AUTO-MCNC: NEGATIVE MG/DL
PSA SERPL-MCNC: 2.86 NG/ML
RBC # BLD AUTO: 3.76 X10*6/UL (ref 4.5–5.9)
RBC # UR STRIP.AUTO: NEGATIVE /UL
SODIUM SERPL-SCNC: 137 MMOL/L (ref 136–145)
SP GR UR STRIP.AUTO: 1.01
T3FREE SERPL-MCNC: 2.9 PG/ML (ref 2.3–4.2)
T4 FREE SERPL-MCNC: 1.53 NG/DL (ref 0.61–1.12)
TRIGL SERPL-MCNC: 120 MG/DL (ref 0–149)
TSH SERPL-ACNC: 1.4 MIU/L (ref 0.44–3.98)
UROBILINOGEN UR STRIP.AUTO-MCNC: NORMAL MG/DL
VLDL: 24 MG/DL (ref 0–40)
WBC # BLD AUTO: 6.1 X10*3/UL (ref 4.4–11.3)

## 2024-07-31 PROCEDURE — 85025 COMPLETE CBC W/AUTO DIFF WBC: CPT

## 2024-07-31 PROCEDURE — 82306 VITAMIN D 25 HYDROXY: CPT

## 2024-07-31 PROCEDURE — 84432 ASSAY OF THYROGLOBULIN: CPT

## 2024-07-31 PROCEDURE — 83721 ASSAY OF BLOOD LIPOPROTEIN: CPT

## 2024-07-31 PROCEDURE — 84481 FREE ASSAY (FT-3): CPT

## 2024-07-31 PROCEDURE — 81003 URINALYSIS AUTO W/O SCOPE: CPT

## 2024-08-01 ENCOUNTER — APPOINTMENT (OUTPATIENT)
Dept: CARDIOLOGY | Facility: CLINIC | Age: 89
End: 2024-08-01
Payer: MEDICARE

## 2024-08-01 VITALS
SYSTOLIC BLOOD PRESSURE: 130 MMHG | BODY MASS INDEX: 27.68 KG/M2 | WEIGHT: 182.6 LBS | HEIGHT: 68 IN | HEART RATE: 65 BPM | DIASTOLIC BLOOD PRESSURE: 58 MMHG

## 2024-08-01 DIAGNOSIS — I10 WHITE COAT SYNDROME WITH HYPERTENSION: ICD-10-CM

## 2024-08-01 DIAGNOSIS — Z79.899 HIGH RISK MEDICATION USE: ICD-10-CM

## 2024-08-01 DIAGNOSIS — E78.2 MIXED HYPERLIPIDEMIA: ICD-10-CM

## 2024-08-01 DIAGNOSIS — R93.89 ABNORMAL CT OF THE CHEST: ICD-10-CM

## 2024-08-01 DIAGNOSIS — I25.10 CORONARY ARTERY DISEASE INVOLVING NATIVE CORONARY ARTERY OF NATIVE HEART WITHOUT ANGINA PECTORIS: ICD-10-CM

## 2024-08-01 DIAGNOSIS — R06.02 SHORTNESS OF BREATH: ICD-10-CM

## 2024-08-01 PROCEDURE — 99214 OFFICE O/P EST MOD 30 MIN: CPT | Performed by: INTERNAL MEDICINE

## 2024-08-01 PROCEDURE — 1157F ADVNC CARE PLAN IN RCRD: CPT | Performed by: INTERNAL MEDICINE

## 2024-08-01 PROCEDURE — 3075F SYST BP GE 130 - 139MM HG: CPT | Performed by: INTERNAL MEDICINE

## 2024-08-01 PROCEDURE — 1159F MED LIST DOCD IN RCRD: CPT | Performed by: INTERNAL MEDICINE

## 2024-08-01 PROCEDURE — 3078F DIAST BP <80 MM HG: CPT | Performed by: INTERNAL MEDICINE

## 2024-08-01 PROCEDURE — 1160F RVW MEDS BY RX/DR IN RCRD: CPT | Performed by: INTERNAL MEDICINE

## 2024-08-01 RX ORDER — EZETIMIBE 10 MG/1
10 TABLET ORAL
Qty: 90 TABLET | Refills: 3 | Status: SHIPPED | OUTPATIENT
Start: 2024-08-01 | End: 2025-08-01

## 2024-08-01 RX ORDER — TIOTROPIUM BROMIDE INHALATION SPRAY 3.12 UG/1
2 SPRAY, METERED RESPIRATORY (INHALATION) DAILY
COMMUNITY

## 2024-08-01 RX ORDER — LANOLIN ALCOHOL/MO/W.PET/CERES
1 CREAM (GRAM) TOPICAL 2 TIMES DAILY
Qty: 180 TABLET | Refills: 3 | Status: SHIPPED | OUTPATIENT
Start: 2024-08-01 | End: 2025-08-01

## 2024-08-01 RX ORDER — METOPROLOL SUCCINATE 25 MG/1
12.5 TABLET, EXTENDED RELEASE ORAL DAILY
Qty: 45 TABLET | Refills: 1 | Status: SHIPPED | OUTPATIENT
Start: 2024-08-01 | End: 2025-01-28

## 2024-08-01 RX ORDER — AMIODARONE HYDROCHLORIDE 100 MG/1
100 TABLET ORAL DAILY
Qty: 90 TABLET | Refills: 3 | Status: SHIPPED | OUTPATIENT
Start: 2024-08-01

## 2024-08-01 RX ORDER — VALSARTAN AND HYDROCHLOROTHIAZIDE 160; 12.5 MG/1; MG/1
1 TABLET, FILM COATED ORAL DAILY
Qty: 90 TABLET | Refills: 3 | Status: SHIPPED | OUTPATIENT
Start: 2024-08-01

## 2024-08-01 RX ORDER — EZETIMIBE 10 MG/1
1 TABLET ORAL
COMMUNITY
Start: 2024-06-01 | End: 2024-08-01 | Stop reason: SDUPTHER

## 2024-08-01 RX ORDER — ROSUVASTATIN CALCIUM 10 MG/1
10 TABLET, COATED ORAL NIGHTLY
Qty: 90 TABLET | Refills: 3 | Status: SHIPPED | OUTPATIENT
Start: 2024-08-01

## 2024-08-01 NOTE — PATIENT INSTRUCTIONS
Continue same medications and treatments.     Please bring any lab results from other providers / physicians to your next appointment.     Please bring all medicines, vitamins, and herbal supplements with you when you come to the office.     Prescriptions will not be filled unless you are compliant with your follow up appointments or have a follow up appointment scheduled as per instruction of your physician. Refills should be requested at the time of your visit.    Scribe Attestation  By signing my name below, Mariel MONROE Scribe   attest that this documentation has been prepared under the direction and in the presence of Yi Montejo MD.

## 2024-08-01 NOTE — PROGRESS NOTES
Last OV 4/2024 .Saw Siri Young NP    Subjective :     From a cardiac perspective reports feeling well.  Interval review of systems is negative for chest discomfort pressure tightness heaviness palpitations lightheadedness orthopnea paroxysmal nocturnal dyspnea dependent edema or claudication TIA or CVA type symptoms or bleeding diathesis    Reviewed recent pulmonary workup.  Patient is scheduled for a PET scan  History so Far :    1. Paroxysmal atrial fibrillation-functional class I.  2. High-risk medication-warfarin, PT/INR levels therapeutic.  3. Hypertension-controlled.  4. [id number] score of 4.  5. Atherosclerotic native vessel coronary artery disease, most  recent perfusion study in September of 2016, small infarct, basal  portion in inferior wall, left ventricular ejection fraction is 51%,  normal TID.  6. Left atrial size 4.1 cm based on echo of 2015.  7. Chronic total right coronary artery occlusion, distal right  coronary artery filled by left-to-right collaterals.  8. Increased body mass index. The patient is following a very  active lifestyle.  9. Hyperlipidemia-at target  10. Nicotine dependence-not motivated to quit patient says this is the only thing he has left to enjoy, he does not smoke much a few cigarettes a day. He understands that cigarette smoking is harmful from a vascular disease and pulmonary standpoint  11. High risk medication warfarin no bleeding diathesis PT/INR reviewed therapeutic  12. 48-hour Holter December 7, 2020-basic rhythm sinus minimum heart rate 43 maximum heart rate 97 average heart rate 60 PACs comprised 1.6% of the total heartbeats  There were 41 short bursts of supraventricular rhythm longest of these was an ectopic atrial rhythm of 18 beats no documented sustained atrial fibrillation frequent ventricular premature beats comprising 4.5% of the total heartbeats 1 ventricular triplet and over 150 ventricular couplets occasional bigeminal and trigeminal episodes noted no  complaints were elicited  13. Echocardiogram 12/7/2020-left atrium 4 cm LVEF 50% inferior basal moderate hypokinesis impaired relaxation pattern of LV diastolic filling mild mitral regurgitation trace to mild tricuspid regurgitation RV systolic pressure 40 mmHg no change compared to prior study of 2015  14. Recent hospitalization to [facility] with class IV left heart failure precipitated by atrial fibrillation L beat controlled ventricular rate, this has resolved with restoration of sinus rhythm  15. Remains on high risk medication amiodarone.  16. Wide-complex tachycardia, cannot exclude nonsustained ventricular tachycardia versus atrial fibrillation with aberrancy  17. Echocardiogram March 2022-LVEF 45 to 50% left atrial diameter 4.7 cm LV end-systolic dimension 3.58 cm left atrial volume index 34 mL/mÂ² RV systolic pressure 37 mmHg IVC diameter 1.85 cm, there was mild mitral and tricuspid regurgitation reported.  18. CTA negative for pulmonary embolism March 2022 CTA revealed borderline enlarged right hilar lymph node measuring up to 2.9 x 1.8 cm in size and subcentimeter left hilar lymph nodes probably nonspecific possibly reactive, defer to primary/pulmonary  19. Anemia hemoglobin and hematocrit 11.4 and 35.7 with MCV of 102 March 2022  20. Treadmill stress Myoview April 2022-3.8 METS, 90% age-predicted maximum heart rate, see AEP protocol, inferior wall hypokinesis mild, basal portion, LVEF 48% transient ischemic dilatation 0.9 which is normal, there was evidence of mild diaphragm attenuation artifact. No evidence of ischemia. Compared to 2016, LVEF and functional capacity not significantly different.  21. 48-hour Holter monitor April 2022-frequent PACs and PVCs, comprising 1.6% of total heartbeats no atrial fibrillation average heart rate 56 bpm 8 short bursts of ectopic atrial tachycardia longest being 10 beats no evidence of high degree AV block longest pause 1.9 seconds  22.  Varicose veins both lower  "extremities, history of bilateral varicose vein stripping 1984  Objective   Wt Readings from Last 3 Encounters:   08/01/24 82.8 kg (182 lb 9.6 oz)   07/10/24 83.5 kg (184 lb)   04/25/24 85.9 kg (189 lb 6.4 oz)            Vitals:    08/01/24 1515 08/01/24 1517   BP: 157/82 130/58   BP Location: Left arm Left arm   Patient Position: Sitting Sitting   Pulse: 65    Weight: 82.8 kg (182 lb 9.6 oz)    Height: 1.727 m (5' 8\")                 Physical Exam:  Home blood pressure 100s over 60s.  Patient's blood pressure cuff today 157/82, however measurement of his device 130/58  GENERAL APPEARANCE: in no acute distress.  CHEST: Symmetric and non-tender.  INTEGUMENT: Skin warm and dry  HEENT: No gross abnormalities identified.No pallor or scleral icterus.  NECK: Supple, no JVD, no bruit.   NEURO/PSHCY: Alert and oriented x3; appropriate behavior and responses and responses  LUNGS: Clear to auscultation bilaterally; normal respiratory effort.  Breath sounds are very distant and may be diminished right posterior lower lung field  HEART: Rate and rhythm regular with no evident murmur; no gallop appreciated.   ABDOMEN: Soft, non tender.  MUSCULOSKELETAL: No gross deformities.  EXTREMITIES: Warm  There is no edema noted.  Varicose veins are prominent in both lower extremities    Meds:  Current Outpatient Medications   Medication Instructions    albuterol (Ventolin HFA) 90 mcg/actuation inhaler 2 puffs, inhalation, Every 6 hours PRN    alfuzosin (UroxatraL) 10 mg 24 hr tablet 1 tablet, oral, Nightly    amiodarone (PACERONE) 100 mg, oral, Daily    aspirin 81 mg EC tablet 1 tablet, oral, Daily    ezetimibe (Zetia) 10 mg tablet 1 tablet, oral, Daily (0630)    finasteride (Proscar) 5 mg tablet 1 tablet, oral, Daily    magnesium oxide (MAG-OX) 400 mg, oral, 2 times daily    metoprolol succinate XL (TOPROL-XL) 12.5 mg, oral, Daily    multivitamin (One Daily Multivitamin) tablet 1 tablet, oral, Daily    nitroglycerin (NITROSTAT) 0.4 mg, " sublingual, Every 5 min PRN, place 1 tablet under tongue every 5 minutes for up to 3 doses as needed for chest pain. Call 911 if pain persists    rosuvastatin (CRESTOR) 10 mg, oral, Nightly    tiotropium (Spiriva Respimat) 2.5 mcg/actuation inhaler 2 puffs, inhalation, Daily    valsartan-hydrochlorothiazide (Diovan-HCT) 160-12.5 mg tablet 1 tablet, oral, Daily, At lunch    warfarin (Coumadin) 5 mg tablet As directed to maintain therapeutic INR, number of tablets reflect a 90 day supply          Allergies   Allergen Reactions    Ace Inhibitors Cough             LABS:    Lab Results   Component Value Date    WBC 6.1 07/31/2024    HGB 12.6 (L) 07/31/2024    HCT 38.4 (L) 07/31/2024     07/31/2024    CHOL 131 07/31/2024    TRIG 120 07/31/2024    HDL 41.6 07/31/2024    LDLDIRECT 70 07/31/2024    ALT 11 07/31/2024    AST 18 07/31/2024     07/31/2024    K 4.5 07/31/2024    CL 99 07/31/2024    CREATININE 1.17 07/31/2024    BUN 15 07/31/2024    CO2 30 07/31/2024    TSH 1.40 07/31/2024    PSA 2.86 07/31/2024    INR 2.2 (H) 07/12/2024                       Patient Active Problem List    Diagnosis Date Noted    Shortness of breath 04/25/2024    Abnormal CT of the chest 04/25/2024    Hypertensive heart disease with heart failure (Multi) 03/13/2024    Hypercoagulable state due to atrial fibrillation, unspecified type (Multi) 03/13/2024    Medication course changed 11/14/2023    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) 11/14/2023    Encounter for immunization 10/19/2023    Abnormal thyroid blood test 09/12/2023    CAD (coronary artery disease) 09/12/2023    Benign prostatic hyperplasia 09/12/2023    Bradycardia 09/12/2023    Current smoker 09/12/2023    Elbow pain 09/12/2023    Elevated serum free T4 level 09/12/2023    COPD (chronic obstructive pulmonary disease) (Multi) 09/12/2023    Emphysema/COPD (Multi) 09/12/2023    External hemorrhoid 09/12/2023    Hyperlipidemia 09/12/2023     Hypertriglyceridemia 09/12/2023    Hypertension 09/12/2023    Insomnia, idiopathic 09/12/2023    Low back pain 09/12/2023    Meniscal injury 09/12/2023    Olecranon bursitis 09/12/2023    Paroxysmal atrial fibrillation (Multi) 09/12/2023    Pelvic pain in male 09/12/2023    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 Medication course changed  Follow Up In Cardiology      2. High risk medication use  Follow Up In Cardiology      3. Mixed hyperlipidemia  Follow Up In Cardiology      4. Coronary artery disease involving native coronary artery of native heart without angina pectoris  Follow Up In Cardiology      5. Shortness of breath  Follow Up In Cardiology      6. Abnormal CT of the chest  Follow Up In Cardiology           Follow up : 6 months        Provider Attestation - Scribe documentation    All medical record entries made by the Scribe were at my direction and personally dictated by me. I have reviewed the chart and agree that the record accurately reflects my personal performance of the history, physical exam, discussion and plan.

## 2024-08-02 NOTE — PROGRESS NOTES
" Patient: Canelo Moore    [id number]  : 1935 -- AGE 89 y.o.    Provider: Dasia FLANAGAN [facility]     Location [facility]   Service Date: 24              [facility]  Follow Up Visit Note        HISTORY OF PRESENT ILLNESS     The patient's referring provider is: No ref. provider found    HISTORY OF PRESENT ILLNESS   Canelo Moore is a 89 y.o. male with a history of Emphysema, HTN, CAD, and hyperlipidemia,  who is a current smoker (34 pack years), who presents to a [facility] for an initial evaluation for shortness of breath.     I have independently interviewed and examined the patient in the office and reviewed available records.    Current History    Since last visit he used Spiriva samples, felt a little improvement with BERKOWITZ. He he tried using albuterol before activities but it made him dizzy and his head felt \"foggy and blurry\".  He denies wheezing, shortness of breath at rest, chest tightness and ER visits for breathing issue    7/10/24: On today's visit, the patient reports having dyspnea on exertion has been going on for the past 2 to 3 months. States while he is out doing activities his blood pressure drops.  Has occasional cough with clear mucus mainly in the morning.  He is on amiodarone 100 mg daily.  He denies wheezing, shortness of breath at rest, chest tightness, GERD,and ER visits for breathing issues.    Previous pulmonary history: Emphysema    Inhalers/nebulized medications: none    Hospitalization History: He has not been hospitalized over the last year for breathing related problem.    Sleep history: Denies snoring, apnea, feeling tired during the day or taking naps during the day.     ALLERGIES AND MEDICATIONS     ALLERGIES  Allergies   Allergen Reactions    Ace Inhibitors Cough       MEDICATIONS  Current Outpatient Medications   Medication Sig Dispense Refill    albuterol (Ventolin HFA) 90 mcg/actuation " inhaler Inhale 2 puffs every 6 hours if needed for shortness of breath. 18 g 3    amiodarone (Pacerone) 100 mg tablet Take 1 tablet (100 mg) by mouth once daily. 90 tablet 3    aspirin 81 mg EC tablet Take 1 tablet (81 mg) by mouth once daily.      ezetimibe (Zetia) 10 mg tablet Take 1 tablet (10 mg) by mouth early in the morning.. 90 tablet 3    finasteride (Proscar) 5 mg tablet Take 1 tablet (5 mg) by mouth once daily.      magnesium oxide (Mag-Ox) 400 mg (241.3 mg magnesium) tablet Take 1 tablet (400 mg) by mouth 2 times a day. 180 tablet 3    metoprolol succinate XL (Toprol-XL) 25 mg 24 hr tablet Take 0.5 tablets (12.5 mg) by mouth once daily. 45 tablet 1    multivitamin (One Daily Multivitamin) tablet Take 1 tablet by mouth once daily.      nitroglycerin (Nitrostat) 0.4 mg SL tablet Place 1 tablet (0.4 mg) under the tongue every 5 minutes if needed for chest pain. place 1 tablet under tongue every 5 minutes for up to 3 doses as needed for chest pain. Call 911 if pain persists 25 tablet 5    rosuvastatin (Crestor) 10 mg tablet Take 1 tablet (10 mg) by mouth once daily at bedtime. 90 tablet 3    tiotropium (Spiriva Respimat) 2.5 mcg/actuation inhaler Inhale 2 puffs once daily.      valsartan-hydrochlorothiazide (Diovan-HCT) 160-12.5 mg tablet Take 1 tablet by mouth once daily. 90 tablet 3    warfarin (Coumadin) 5 mg tablet As directed to maintain therapeutic INR, number of tablets reflect a 90 day supply 90 tablet 3     No current facility-administered medications for this visit.         PAST HISTORY     PAST MEDICAL HISTORY  CAD  COPD  Emphysema  HTN  Hyperlipidemia    PAST SURGICAL HISTORY  Past Surgical History:   Procedure Laterality Date    OTHER SURGICAL HISTORY  11/04/2020    Varicose vein ligation    OTHER SURGICAL HISTORY  11/04/2020    [place] tooth extraction    OTHER SURGICAL HISTORY  11/04/2020    Tonsillectomy    OTHER SURGICAL HISTORY  11/04/2020    Colonoscopy complete for polypectomy    OTHER  SURGICAL HISTORY  2021    Varicose vein sclerotherapy    OTHER SURGICAL HISTORY  2021    Pilonidal cyst removal    OTHER SURGICAL HISTORY  2021    Hernia repair    OTHER SURGICAL HISTORY  2021    Umbilical hernia repair    OTHER SURGICAL HISTORY  2021    Inguinal hernia repair    US ASPIRATION INJECTION INTERMEDIATE JOINT  2021    US ASPIRATION INJECTION INTERMEDIATE JOINT 2021 [facility] [employer]       IMMUNIZATION HISTORY  Immunization History   Administered Date(s) Administered    AS03 Adjuvant 10/03/2017, 10/08/2019    Flu vaccine (IIV4), preservative free *Check age/dose* 10/08/2019, 2021    Flu vaccine, quadrivalent, high-dose, preservative free, age 65y+ (FLUZONE) 2020, 2021, 2022    Flu vaccine, trivalent, preservative free, HIGH-DOSE, age 65y+ (Fluzone) 2015, 10/17/2016, 2018    Influenza Nasal, Unspecified 10/22/2011, 10/01/2013, 10/01/2014, 10/01/2018    Influenza, Seasonal, Quadrivalent, Adjuvanted 10/20/2023    Influenza, [place] 2014    Influenza, Unspecified 10/22/2011, 10/01/2013, 10/01/2014, 2015, 2015, 10/01/2018, 10/01/2020    Influenza, seasonal, injectable 10/01/2020    Influenza, trivalent, adjuvanted 10/03/2017, 10/08/2019    Pfizer COVID-19 vaccine, Fall , 12 years and older, (30mcg/0.3mL) 10/20/2023    Pfizer COVID-19 vaccine, bivalent, age 12 years and older (30 mcg/0.3 mL) 2022    Pfizer Gray Cap SARS-CoV-2 2022    Pfizer Purple Cap SARS-CoV-2 2021, 2021, 2021    Pneumococcal conjugate vaccine, 13-valent (PREVNAR 13) 10/17/2016, 10/08/2019    Tdap vaccine, age 7 year and older (BOOSTRIX, ADACEL) 2022    Zoster vaccine, recombinant, adult (SHINGRIX) 2020, 2020    Zoster, live 2015, 2015       SOCIAL HISTORY  Tobacco Smokin- current - 1/2 ppd -34 pack years  Illicit drugs: none  Alcohol consumption: none  Pets:  no    OCCUPATIONAL/ENVIRONMENTAL HISTORY  Occupation: Retired /   No known exposure to asbestos, silica, beryllium or inhaled metals.  No exposure to birds or exotic animals.    FAMILY HISTORY  Family History   Problem Relation Name Age of Onset    Breast cancer Mother      Atrial fibrillation Mother      Colon cancer Brother      Prostate cancer Brother      Breast cancer Daughter      Prostate cancer Son      Colon cancer Son       No family history of pulmonary disease.  No family history of autoimmune disorders.    REVIEW OF SYSTEMS     REVIEW OF SYSTEMS  Review of Systems    Constitutional: No fever, no chills, no night sweats.    Eyes: No double vision, no floaters, no dry eyes.   ENT: See HPI.   Neck: No neck stiffness.  Cardiovascular: No sharp chest pain, no heart racing, no leg swelling.  Respiratory: as noted in HPI.   Gastrointestinal: No nausea, no vomiting, no diarrhea.   Musculoskeletal: No joint pain, no back pain.   Integumentary: No rashes or sores.  Neurological: No dizziness, no headaches. Sleeping well.  Psychiatric: No mood changes.   Endocrine: No hot flashes, no cold intolerance, weight is stable.  Hematologic: No easy bruising or bleeding.    PHYSICAL EXAM     VITAL SIGNS:   Vitals:    08/27/24 1404   BP: 139/76   Pulse: 82   Temp: 36.9 °C (98.4 °F)   SpO2: 98%              CURRENT WEIGHT: Body mass index is 27.52 kg/m².      PREVIOUS WEIGHTS:  Wt Readings from Last 3 Encounters:   08/21/24 82.1 kg (181 lb)   08/01/24 82.8 kg (182 lb 9.6 oz)   07/10/24 83.5 kg (184 lb)       Physical Exam    Constitutional: General appearance: Alert and oriented.  No acute distress. Well developed, well nourished.  Head and face: Symmetric  ENT: external inspection of ear and nose normal. No intranasal polyps. No oropharyngeal exudates.    Oropharynx: normal   Neck: supple, no lymphadenopathy  Pulmonary: Chest is normal. No increased work of breathing or signs of respiratory distress.  Clear to auscultation bilaterally - no crackles, wheezing, or rhonchi.   Cardiovascular: Heart rate and rhythm normal. Normal S1, S2 - no murmurs, gallops, or pericardial rub.   Abdomen: Soft, non tender, +BS  Extremities: No edema. No clubbing or cyanosis of the fingernails.    Neurologic: Moves all four extremities   MSK: Normal movements of extremities. Gait normal   Psychiatric: Intact judgement and insight.    RESULTS/DATA     Pulmonary Function Test Results                           Chest Radiograph     XR chest 2 view     Narrative  Interpreted By:  ALYSSA ALEXANDER MD  MRN: [id number]  Patient Name: SRINIVASAN CLEMENTE    STUDY:  TH CHEST 2 VIEW PA AND LAT;    INDICATION:  N/A  Z79.01: Anticoagulant long-term use Z79.899: High risk  medication use.    COMPARISON:  07/06/2022.    ACCESSION NUMBER(S):  [id number]    ORDERING CLINICIAN:  LEBRON GARRISON    FINDINGS:  The cardiac silhouette size is within normal limits.  There is no focal consolidation, edema or pneumothorax.  Small bilateral pleural effusions with blunting of the costophrenic  angles noted.    Impression  1. Small bilateral pleural effusions, similar to the previous  radiograph from 07/06/2022      Chest CT Scan     STUDY:  CT CHEST W CONTRAST;  7/5/2023 2:07 pm     INDICATION:  cough, hemoptysis  Z79.899: High risk medication use F17.200: Current  smoker R04.2: Cough with hemoptysis.     COMPARISON:  CT abdomen and pelvis 03/09/2022     ACCESSION NUMBER(S):  [id number]     ORDERING CLINICIAN:  POLO HANDY     TECHNIQUE:  Helical data acquisition of the chest was obtained with IV contrast  material. 75 cc IV Omnipaque 350 was administered. Images were  reformatted in axial, coronal, and sagittal planes.     FINDINGS:  LUNGS AND AIRWAYS:  Central airways are patent without endobronchial lesions bilateral  bronchial wall thickening, predominantly in the lower lung zones.  Upper lobe predominant centrilobular emphysema. Biapical scarring.     Lower lobe  predominant interlobular septal thickening. Small right  pleural effusion with atelectasis. Stable 12 x 14 mm left lower lobe  subpleural nodule (series 203, image 203), not significantly changed  since size since 2022. Few scattered ground-glass opacities likely  infectious/inflammatory in etiology. No suspicious pulmonary nodule.  No focal consolidation. No pneumothorax stable loculated fluid within  the left main fissure..     MEDIASTINUM AND STEPHANIE, LOWER NECK AND AXILLA:  The visualized thyroid gland is within normal limits.     Compared with the CT from 03/09/2022, there has been interval  decrease in the size of multiple hilar and mediastinal nodes for  example a 19 x 11 mm right hilar node (series 202, image 131),  previously 29 x 18 mm; a 9 mm left para-aortic node, previously 12  mm. Multiple other prominent but nonenlarged by size criteria  mediastinal nodes are also noted and also decreased in size.     Small hiatal hernia.     HEART AND VESSELS:  The thoracic aorta is of normal course and caliber. Mild  atherosclerotic calcification thoracic aorta.     Main pulmonary artery and its branches are normal in caliber.     Severe coronary artery calcifications are seen. The study is not  optimized for evaluation of coronary arteries.     Mild cardiomegaly.     No evidence of pericardial effusion.     UPPER ABDOMEN:  Multiple bilateral simple renal cysts.     CHEST WALL AND OSSEOUS STRUCTURES:  There are no suspicious osseous lesions. Degenerative changes of the  thoracic spine. No acute osseous abnormality.      Impression   1. Small right pleural effusion with atelectasis, interlobular septal  thickening and few scattered ground-glass opacities, concerning for  pulmonary edema.  2. Stable 12 x 14 mm left lower lobe subpleural nodule since 2022  likely secondary to round atelectasis.  3. Upper lobe predominant centrilobular and paraseptal emphysema.  4. Interval decrease in size of multiple mediastinal and  hilar nodes,  since 2022.         Echocardiogram     Study Date:        5/28/2024            Ordering Provider:    [id number] LEBRON GARRISON  MRN/PID:           [id number]             Fellow:  Accession#:        [id number]         Nurse:  Date of Birth/Age: 1935 / 88 years Sonographer:          Iris Mcneil RDMS,                                                                RDCS, RVT  Gender:            M                    Additional Staff:  Height:            172.72 cm            Admit Date:  Weight:            85.73 kg             Admission Status:     Outpatient  BSA / BMI:         2.00 m2 / 28.74      Department Location:  [facility]                     kg/m2                                      [place]  Blood Pressure: 136 /62 mmHg     Study Type:    TRANSTHORACIC ECHO (TTE) COMPLETE  Diagnosis/ICD: Atherosclerotic heart disease of native coronary artery without                 angina pectoris-I25.10; Shortness of breath-R06.02; Abnormal                 findings on diagnostic imaging of other specified body                 structures-R93.89  Indication:    Afib, Abn CT Chest, CAD, CHF, Dyspnea, HTN, HLD and Smoker  CPT Codes:     Echo Complete w Full [id number]   Study Detail: The following Echo studies were performed: 2D, M-Mode, Doppler and                color flow.        PHYSICIAN INTERPRETATION:  Left Ventricle: Left ventricular systolic function is normal, with an estimated ejection fraction of 60%. There are no regional wall motion abnormalities. The left ventricular cavity size is normal. Spectral Doppler shows an abnormal pattern of left ventricular diastolic filling. Borderline LVH at 11-12mm with very mild DUST but no LVOT obstruction.  Left Atrium: The left atrium is mildly dilated. Mild LAE.  Right Ventricle: The right ventricle is normal in size. There is normal right ventricular global systolic function. Normal RV size and function  Borderline RVSP elevation at 31 mm HG.  Right Atrium:  The right atrium is normal in size.  Aortic Valve: The aortic valve appears structurally normal. There is no evidence of aortic valve regurgitation. The peak instantaneous gradient of the aortic valve is 5.6 mmHg. The mean gradient of the aortic valve is 3.0 mmHg.  Mitral Valve: The mitral valve is normal in structure. There is trace mitral valve regurgitation.  Tricuspid Valve: The tricuspid valve is structurally normal. There is trace to mild tricuspid regurgitation.  Pulmonic Valve: The pulmonic valve is structurally normal. There is trace pulmonic valve regurgitation.  Pericardium: There is no pericardial effusion noted.  Aorta: The aortic root is normal.        CONCLUSIONS:   1. Left ventricular systolic function is normal with a 60% estimated ejection fraction.   2. Borderline LVH at 11-12mm with very mild DUST but no LVOT obstruction.   3. Spectral Doppler shows an abnormal pattern of left ventricular diastolic filling.   4. Normal RV size and function      Borderline RVSP elevation at 31 mm HG.   5. Mild LAE.   6. Normal valve structure and function.     QUANTITATIVE DATA SUMMARY:  2D MEASUREMENTS:                           Normal Ranges:  Ao Root d:     3.30 cm   (2.0-3.7cm)  LAs:           4.10 cm   (2.7-4.0cm)  RVIDd:         3.33 cm   (0.9-3.6cm)  IVSd:          1.21 cm   (0.6-1.1cm)  LVPWd:         0.96 cm   (0.6-1.1cm)  LVIDd:         4.61 cm   (3.9-5.9cm)  LVIDs:         3.04 cm  LV Mass Index: 89.2 g/m2  LV % FS        34.1 %     LA VOLUME:                              Normal Ranges:  LA Volume Index: 30.7 ml/m2     AORTA MEASUREMENTS:                     Normal Ranges:  Asc Ao, d: 2.90 cm (2.1-3.4cm)     LV SYSTOLIC FUNCTION BY 2D PLANIMETRY (MOD):                      Normal Ranges:  EF-A4C View: 59.0 % (>=55%)  EF-A2C View: 59.6 %  EF-Biplane:  59.1 %     LV DIASTOLIC FUNCTION:                         Normal Ranges:  MV Peak E:    0.50 m/s (0.7-1.2 m/s)  MV Peak A:    0.81 m/s (0.42-0.7 m/s)  E/A  "Ratio:    0.61     (1.0-2.2)  MV e'         0.05 m/s (>8.0)  MV lateral e' 0.07 m/s  MV medial e'  0.05 m/s  E/e' Ratio:   10.62    (<8.0)     MITRAL VALVE:                       Normal Ranges:  MV Vmax:    0.86 m/s (<=1.3m/s)  MV peak P.9 mmHg (<5mmHg)  MV mean P.0 mmHg (<48mmHg)  MV DT:      317 msec (150-240msec)  MV PHT:     92 msec  (30-60msec)  MVA by PHT: 2.39 cm2 (4-6cm2)     MITRAL INSUFFICIENCY:                       Normal Ranges:  MR Vmax: 418.00 cm/s     AORTIC VALVE:                                    Normal Ranges:  AoV Vmax:                1.18 m/s (<=1.7m/s)  AoV Peak P.6 mmHg (<20mmHg)  AoV Mean PG:             3.0 mmHg (1.7-11.5mmHg)  LVOT Max Amadou:            0.90 m/s (<=1.1m/s)  AoV VTI:                 29.50 cm (18-25cm)  LVOT VTI:                23.20 cm  LVOT Diameter:           2.30 cm  (1.8-2.4cm)  AoV Area, VTI:           3.27 cm2 (2.5-5.5cm2)  AoV Area,Vmax:           3.18 cm2 (2.5-4.5cm2)  AoV Dimensionless Index: 0.79     TRICUSPID VALVE/RVSP:                              Normal Ranges:  Peak TR Velocity: 2.56 m/s  Est. RA Pressure: 5 mmHg  RV Syst Pressure: 31.2 mmHg (< 30mmHg)     PULMONIC VALVE:                       Normal Ranges:  PV Max Amadou: 0.7 m/s  (0.6-0.9m/s)  PV Max P.8 mmHg        [id number] Roberto Fisher MD, FACC  Electronically signed on 2024 at 10:37:59 PM       Labwork   Complete Blood Count  Lab Results   Component Value Date    WBC 6.1 2024    HGB 12.6 (L) 2024    HCT 38.4 (L) 2024     (H) 2024     2024       Peripheral Eosinophil Count/Percentage:   Eosinophils Absolute (x10*3/uL)   Date Value   2024 0.12     Eosinophils % (%)   Date Value   2024 2.0       Serum Immunoglobulin E:    No results found for: \"IGE\"     ASSESSMENT/PLAN   Mr. Moore is a 89 y.o. male, with a history of Emphysema, HTN, CAD, and hyperlipidemia,  who is a current smoker (34 pack years), [facility] for an initial evaluation for shortness of breath.     CT Chest: Small right pleural effusion with atelectasis, interlobular septal   thickening and few scattered ground-glass opacities, concerning for   pulmonary edema.   2. Stable 12 x 14 mm left lower lobe subpleural nodule since 2022   likely secondary to round atelectasis.   3. Upper lobe predominant centrilobular and paraseptal emphysema.   4. Interval decrease in size of multiple mediastinal and hilar nodes,   since 2022.     Problem List and Orders  Problem List Items Addressed This Visit    None        Assessment and Plan / Recommendations:  Problem List Items Addressed This Visit    None     Emphysema on CT scan  - STOP albuterol HFA every 4-6 hours as needed for shortness of breath  - Continue Spiriva 2.5 mcg 2 puffs once daily    Lung nodule; 12 x 14 mm left lower lobe subpleural nodule  - PET is showing hypermetabolic activity- sent Tonny Ortega an email  - Will get set up for Bronchoscopy (right nodule) and plan for CT guided (Left nodule) Biopsy after    Follow up in 3 months or sooner if needed.    If you have any questions please call the office [phone]    Thank you for visiting the Pulmonary clinic today!   Dasia Bustillo CNP  [phone]

## 2024-08-04 LAB
BILL ONLY-THYROGLOBULIN LC-MS/MS: NORMAL
THYROGLOB AB SERPL-ACNC: 137.9 IU/ML (ref 0–4)
THYROGLOB SERPL-MCNC: 7.8 NG/ML (ref 1.3–31.8)
THYROGLOB SERPL-MCNC: ABNORMAL NG/ML (ref 1.3–31.8)

## 2024-08-06 ENCOUNTER — LAB (OUTPATIENT)
Dept: LAB | Facility: LAB | Age: 89
End: 2024-08-06
Payer: MEDICARE

## 2024-08-06 DIAGNOSIS — Z79.01 CURRENT USE OF LONG TERM ANTICOAGULATION: ICD-10-CM

## 2024-08-06 LAB
INR PPP: 2.8 (ref 0.9–1.1)
PROTHROMBIN TIME: 31.5 SECONDS (ref 9.8–12.8)

## 2024-08-06 PROCEDURE — 36415 COLL VENOUS BLD VENIPUNCTURE: CPT

## 2024-08-06 PROCEDURE — 85610 PROTHROMBIN TIME: CPT

## 2024-08-07 ENCOUNTER — ANTICOAGULATION - WARFARIN VISIT (OUTPATIENT)
Dept: CARDIOLOGY | Facility: CLINIC | Age: 89
End: 2024-08-07
Payer: MEDICARE

## 2024-08-07 NOTE — PROGRESS NOTES
Call Mr. Moore regarding INR of 2.8 with a goal of 2-3.  Will continue current Coumadin dosing and repeat INR in 4 weeks approximately 9//2024.  There was no answer left message for Mr. Moore to call the office for above recommendation.    Mr. Moore returned call and is aware of above noted recommendations and verbalizes understanding.

## 2024-08-12 DIAGNOSIS — N13.8 BPH WITH OBSTRUCTION/LOWER URINARY TRACT SYMPTOMS: ICD-10-CM

## 2024-08-12 DIAGNOSIS — N40.1 BPH WITH OBSTRUCTION/LOWER URINARY TRACT SYMPTOMS: ICD-10-CM

## 2024-08-12 RX ORDER — FINASTERIDE 5 MG/1
TABLET, FILM COATED ORAL
Qty: 90 TABLET | Refills: 3 | Status: SHIPPED | OUTPATIENT
Start: 2024-08-12

## 2024-08-14 ENCOUNTER — APPOINTMENT (OUTPATIENT)
Dept: PRIMARY CARE | Facility: CLINIC | Age: 89
End: 2024-08-14
Payer: MEDICARE

## 2024-08-14 ENCOUNTER — HOSPITAL ENCOUNTER (OUTPATIENT)
Dept: RADIOLOGY | Facility: CLINIC | Age: 89
Discharge: HOME | End: 2024-08-14
Payer: MEDICARE

## 2024-08-14 DIAGNOSIS — C34.31 MALIGNANT NEOPLASM OF LOWER LOBE, RIGHT BRONCHUS OR LUNG (MULTI): ICD-10-CM

## 2024-08-14 DIAGNOSIS — R91.1 LUNG NODULE: ICD-10-CM

## 2024-08-14 PROCEDURE — 78815 PET IMAGE W/CT SKULL-THIGH: CPT | Mod: PI

## 2024-08-14 PROCEDURE — A9552 F18 FDG: HCPCS | Performed by: INTERNAL MEDICINE

## 2024-08-14 PROCEDURE — 78815 PET IMAGE W/CT SKULL-THIGH: CPT | Mod: PET TUMOR INIT TX STRAT | Performed by: NUCLEAR MEDICINE

## 2024-08-14 PROCEDURE — 3430000001 HC RX 343 DIAGNOSTIC RADIOPHARMACEUTICALS: Performed by: INTERNAL MEDICINE

## 2024-08-14 RX ORDER — FLUDEOXYGLUCOSE F 18 200 MCI/ML
12.2 INJECTION, SOLUTION INTRAVENOUS
Status: COMPLETED | OUTPATIENT
Start: 2024-08-14 | End: 2024-08-14

## 2024-08-21 ENCOUNTER — APPOINTMENT (OUTPATIENT)
Dept: PRIMARY CARE | Facility: CLINIC | Age: 89
End: 2024-08-21
Payer: MEDICARE

## 2024-08-21 VITALS
TEMPERATURE: 97.4 F | DIASTOLIC BLOOD PRESSURE: 60 MMHG | WEIGHT: 181 LBS | BODY MASS INDEX: 27.52 KG/M2 | HEART RATE: 64 BPM | SYSTOLIC BLOOD PRESSURE: 142 MMHG

## 2024-08-21 DIAGNOSIS — R06.02 SHORTNESS OF BREATH: ICD-10-CM

## 2024-08-21 DIAGNOSIS — R93.89 ABNORMAL CT OF THE CHEST: Primary | ICD-10-CM

## 2024-08-21 DIAGNOSIS — F17.200 CURRENT SMOKER: ICD-10-CM

## 2024-08-21 DIAGNOSIS — Z79.899 HIGH RISK MEDICATION USE: ICD-10-CM

## 2024-08-21 DIAGNOSIS — J43.2 CENTRILOBULAR EMPHYSEMA (MULTI): ICD-10-CM

## 2024-08-21 PROBLEM — J44.9 COPD (CHRONIC OBSTRUCTIVE PULMONARY DISEASE) (MULTI): Status: RESOLVED | Noted: 2023-09-12 | Resolved: 2024-08-21

## 2024-08-21 PROBLEM — Z23 ENCOUNTER FOR IMMUNIZATION: Status: RESOLVED | Noted: 2023-10-19 | Resolved: 2024-08-21

## 2024-08-21 PROBLEM — K62.5 RECTAL BLEEDING: Status: RESOLVED | Noted: 2023-09-12 | Resolved: 2024-08-21

## 2024-08-21 PROBLEM — M25.529 ELBOW PAIN: Status: RESOLVED | Noted: 2023-09-12 | Resolved: 2024-08-21

## 2024-08-21 PROBLEM — S83.8X9A MENISCAL INJURY: Status: RESOLVED | Noted: 2023-09-12 | Resolved: 2024-08-21

## 2024-08-21 PROBLEM — R04.2 COUGH WITH HEMOPTYSIS: Status: RESOLVED | Noted: 2023-09-12 | Resolved: 2024-08-21

## 2024-08-21 PROBLEM — R79.89 ABNORMAL THYROID BLOOD TEST: Status: RESOLVED | Noted: 2023-09-12 | Resolved: 2024-08-21

## 2024-08-21 PROBLEM — R10.2 PELVIC PAIN IN MALE: Status: RESOLVED | Noted: 2023-09-12 | Resolved: 2024-08-21

## 2024-08-21 PROBLEM — M15.9 POLYOSTEOARTHRITIS: Status: RESOLVED | Noted: 2023-09-12 | Resolved: 2024-08-21

## 2024-08-21 PROCEDURE — 4004F PT TOBACCO SCREEN RCVD TLK: CPT | Performed by: INTERNAL MEDICINE

## 2024-08-21 PROCEDURE — 3077F SYST BP >= 140 MM HG: CPT | Performed by: INTERNAL MEDICINE

## 2024-08-21 PROCEDURE — 3078F DIAST BP <80 MM HG: CPT | Performed by: INTERNAL MEDICINE

## 2024-08-21 PROCEDURE — 1158F ADVNC CARE PLAN TLK DOCD: CPT | Performed by: INTERNAL MEDICINE

## 2024-08-21 PROCEDURE — 99214 OFFICE O/P EST MOD 30 MIN: CPT | Performed by: INTERNAL MEDICINE

## 2024-08-21 PROCEDURE — 1157F ADVNC CARE PLAN IN RCRD: CPT | Performed by: INTERNAL MEDICINE

## 2024-08-21 PROCEDURE — 1123F ACP DISCUSS/DSCN MKR DOCD: CPT | Performed by: INTERNAL MEDICINE

## 2024-08-21 PROCEDURE — 1159F MED LIST DOCD IN RCRD: CPT | Performed by: INTERNAL MEDICINE

## 2024-08-21 ASSESSMENT — ENCOUNTER SYMPTOMS
LIGHT-HEADEDNESS: 0
DEPRESSION: 0
MYALGIAS: 0
ABDOMINAL PAIN: 0
COUGH: 0
DYSURIA: 0
ACTIVITY CHANGE: 0
SORE THROAT: 0

## 2024-08-21 ASSESSMENT — PATIENT HEALTH QUESTIONNAIRE - PHQ9
1. LITTLE INTEREST OR PLEASURE IN DOING THINGS: NOT AT ALL
SUM OF ALL RESPONSES TO PHQ9 QUESTIONS 1 AND 2: 0
2. FEELING DOWN, DEPRESSED OR HOPELESS: NOT AT ALL

## 2024-08-21 NOTE — PROGRESS NOTES
Canelo Moore, [facility] 89 y.o. male was seen today:   Chief Complaint   Patient presents with    Follow-up     5 months       VISIT BECKY:  Canelo is here for 6-month follow-up.  He uses high risk medications like Coumadin and amiodarone for atrial fibrillation.  He has heavy smoking history.  He does have emphysema.  He complained about cough and also he needed to have pulmonary function test for amiodarone.  His cardiology ordered a CAT scan of the lung.  Which showed a nodule which has been there since 2022.  Thereafter he was seen by a pulmonologist, nurse practitioner who ordered a CAT scan which showed the similar nodule including few more other.  At that point PET scan was done and it says a hyper metabolic activity of those nodules.  Patient will have a follow-up appointment with the pulmonologist.  Possible biopsy will be warranted to know the type of cancer.  He is somewhat little worried about the diagnosis but he is taking it supportingly.  Today he does not have any other acute medical complaint.  He did not have any weight loss.  He did not complain any hemoptysis for me.      TODAY'S VISIT  DX:     1. Abnormal CT of the chest  Lung nodules which are hypermetabolic by PET scan.  Possible biopsy of the lung nodules.      2. Centrilobular emphysema (Multi)  Stable at this time.  He is using Spiriva and rescue inhaler      3. Shortness of breath  SpO2 today in the office is 93%.      4. Current smoker  We talked about smoking cessation.  He has been smoking for long period of time.      5. High risk medication use  Currently on amiodarone and Coumadin.  Before his lung biopsy he needs to be bridged with heparin.           MEDICAL DECISION MAKING:  - The current and active medical co morbidities have been considered.   - Recent lab work and relevant imaging studies have been reviewed.    - Relevant correspondence/notes from other specialty consultants were reviewed.    - Medication have been sent for  refill.    - Therapy and treatment as per above plan   - Next Follow up in 6-month      Review of Systems   Constitutional:  Negative for activity change.   HENT:  Negative for congestion and sore throat.    Respiratory:  Negative for cough.    Cardiovascular:  Negative for chest pain.   Gastrointestinal:  Negative for abdominal pain.   Endocrine: Negative for polyuria.   Genitourinary:  Negative for dysuria.   Musculoskeletal:  Negative for myalgias.   Skin:  Negative for rash.   Neurological:  Negative for light-headedness.   Psychiatric/Behavioral:  Negative for behavioral problems.      Visit Vitals  /60 (BP Location: Left arm, Patient Position: Sitting, BP Cuff Size: Large adult)   Pulse 64   Temp 36.3 °C (97.4 °F) (Tympanic)   Wt 82.1 kg (181 lb)   BMI 27.52 kg/m²   Smoking Status Every Day   BSA 1.98 m²         Wt Readings from Last 10 Encounters:   08/21/24 82.1 kg (181 lb)   08/01/24 82.8 kg (182 lb 9.6 oz)   07/10/24 83.5 kg (184 lb)   04/25/24 85.9 kg (189 lb 6.4 oz)   03/13/24 86.2 kg (190 lb)   11/14/23 83.9 kg (185 lb)   10/16/23 83.5 kg (184 lb)   08/09/23 84 kg (185 lb 4 oz)   07/13/23 84.4 kg (186 lb)   06/29/23 84.6 kg (186 lb 8 oz)     Physical Exam  Vitals and nursing note reviewed.   Constitutional:       Appearance: Normal appearance.   HENT:      Head: Normocephalic.      Right Ear: Tympanic membrane normal.      Left Ear: Tympanic membrane normal.      Nose: Nose normal.      Mouth/Throat:      Mouth: Mucous membranes are moist.   Cardiovascular:      Rate and Rhythm: Normal rate and regular rhythm.      Pulses: Normal pulses.      Heart sounds: No murmur heard.  Pulmonary:      Effort: No respiratory distress.      Breath sounds: Normal breath sounds.   Abdominal:      Palpations: Abdomen is soft.   Musculoskeletal:      Cervical back: Neck supple.      Right lower leg: No edema.      Left lower leg: No edema.   Skin:     General: Skin is warm.      Findings: No rash.   Neurological:  "     General: No focal deficit present.      Mental Status: He is alert and oriented to person, place, and time.   Psychiatric:         Mood and Affect: Mood normal.          RECENT LABS:  Lab Results   Component Value Date    WBC 6.1 07/31/2024    HGB 12.6 (L) 07/31/2024    HCT 38.4 (L) 07/31/2024     07/31/2024    CHOL 131 07/31/2024    TRIG 120 07/31/2024    HDL 41.6 07/31/2024    ALT 11 07/31/2024    AST 18 07/31/2024     07/31/2024    K 4.5 07/31/2024    CL 99 07/31/2024    CREATININE 1.17 07/31/2024    BUN 15 07/31/2024    CO2 30 07/31/2024    TSH 1.40 07/31/2024    PSA 2.86 07/31/2024    INR 2.8 (H) 08/06/2024     Lab Results   Component Value Date    GLUCOSE 100 (H) 07/31/2024    CALCIUM 9.0 07/31/2024     07/31/2024    K 4.5 07/31/2024    CO2 30 07/31/2024    CL 99 07/31/2024    BUN 15 07/31/2024    CREATININE 1.17 07/31/2024      Lab Results   Component Value Date    LDLCALC 65 07/31/2024     No results found for: \"HGBA1C\"      IMAGING:  === 03/28/23 ===    CHEST 2 VIEW  1. Small bilateral pleural effusions, similar to the previous  radiograph from 07/06/2022     === 07/22/24 ===  CT CHEST WO IV CONTRAST  1. When compared to the prior examination dated 07/05/2023, there has  been interval increase in size of a right lower lobe solid pulmonary  nodule measuring up to 1.7 cm which has become more solid when  compared prior studies of 2022 and 2023, further evaluation with a  PET-CT is recommended to rule out slow growing primary lung neoplasm.    2. Persistent small right pleural effusion as well as round  atelectasis of the left lower lobe.    3. Severe coronary artery calcifications.       MEDICATIONS:   Current Outpatient Medications   Medication Instructions    albuterol (Ventolin HFA) 90 mcg/actuation inhaler 2 puffs, inhalation, Every 6 hours PRN    amiodarone (PACERONE) 100 mg, oral, Daily    aspirin 81 mg EC tablet 1 tablet, oral, Daily    ezetimibe (ZETIA) 10 mg, oral, Daily " (0630)    finasteride (Proscar) 5 mg tablet 1 tablet, oral, Daily    magnesium oxide (MAG-OX) 400 mg, oral, 2 times daily    metoprolol succinate XL (TOPROL-XL) 12.5 mg, oral, Daily    multivitamin (One Daily Multivitamin) tablet 1 tablet, oral, Daily    nitroglycerin (NITROSTAT) 0.4 mg, sublingual, Every 5 min PRN, place 1 tablet under tongue every 5 minutes for up to 3 doses as needed for chest pain. Call 911 if pain persists    rosuvastatin (CRESTOR) 10 mg, oral, Nightly    tiotropium (Spiriva Respimat) 2.5 mcg/actuation inhaler 2 puffs, inhalation, Daily    valsartan-hydrochlorothiazide (Diovan-HCT) 160-12.5 mg tablet 1 tablet, oral, Daily    warfarin (Coumadin) 5 mg tablet As directed to maintain therapeutic INR, number of tablets reflect a 90 day supply

## 2024-08-27 ENCOUNTER — APPOINTMENT (OUTPATIENT)
Dept: PULMONOLOGY | Facility: CLINIC | Age: 89
End: 2024-08-27
Payer: MEDICARE

## 2024-08-27 VITALS
SYSTOLIC BLOOD PRESSURE: 139 MMHG | WEIGHT: 181 LBS | HEART RATE: 82 BPM | HEIGHT: 68 IN | TEMPERATURE: 98.4 F | BODY MASS INDEX: 27.43 KG/M2 | OXYGEN SATURATION: 98 % | DIASTOLIC BLOOD PRESSURE: 76 MMHG

## 2024-08-27 DIAGNOSIS — J43.2 CENTRILOBULAR EMPHYSEMA (MULTI): ICD-10-CM

## 2024-08-27 DIAGNOSIS — R91.8 LUNG NODULES: Primary | ICD-10-CM

## 2024-08-27 DIAGNOSIS — Z01.818 PRE-OP TESTING: ICD-10-CM

## 2024-08-27 DIAGNOSIS — R91.1 LUNG NODULE: Primary | ICD-10-CM

## 2024-08-27 PROCEDURE — 3075F SYST BP GE 130 - 139MM HG: CPT

## 2024-08-27 PROCEDURE — 1123F ACP DISCUSS/DSCN MKR DOCD: CPT

## 2024-08-27 PROCEDURE — 1159F MED LIST DOCD IN RCRD: CPT

## 2024-08-27 PROCEDURE — 3078F DIAST BP <80 MM HG: CPT

## 2024-08-27 PROCEDURE — 1126F AMNT PAIN NOTED NONE PRSNT: CPT

## 2024-08-27 PROCEDURE — 4004F PT TOBACCO SCREEN RCVD TLK: CPT

## 2024-08-27 PROCEDURE — 1157F ADVNC CARE PLAN IN RCRD: CPT

## 2024-08-27 PROCEDURE — 99214 OFFICE O/P EST MOD 30 MIN: CPT

## 2024-08-27 ASSESSMENT — ENCOUNTER SYMPTOMS
DEPRESSION: 0
LOSS OF SENSATION IN FEET: 0
OCCASIONAL FEELINGS OF UNSTEADINESS: 0

## 2024-08-27 ASSESSMENT — PAIN SCALES - GENERAL: PAINLEVEL: 0-NO PAIN

## 2024-08-27 NOTE — PROGRESS NOTES
Bronchoscopy Scheduling Request    Pre-bronchoscopy visit: Follow-up visit with Karen Bustillo CNP  Please schedule procedure: Next available    Cytology on-site:  Yes  Location:  [facility]  Performing physician:  Advanced diagnostic bronchoscopist  Referring physician:  Dasia Bustillo CNP, Gordy Cueva MD  Indication:  RLL nodule  Sedation / Anesthesia:  GA  Procedure:  Navigational bronchoscopy, Staging EBUS (include left hilum)  Time:  Tier 3  Fluorscopy:   Yes  Imaging needed:  CT Thee - same day as procedure  Labs:  CBC, BMP  Meds:  Warfarin  Special Considerations:  None  Reviewed by:  Dane Ortega MD

## 2024-08-28 ENCOUNTER — TELEPHONE (OUTPATIENT)
Dept: PULMONOLOGY | Facility: HOSPITAL | Age: 89
End: 2024-08-28
Payer: MEDICARE

## 2024-08-28 ENCOUNTER — TELEPHONE (OUTPATIENT)
Dept: CARDIOLOGY | Facility: CLINIC | Age: 89
End: 2024-08-28
Payer: MEDICARE

## 2024-08-28 NOTE — TELEPHONE ENCOUNTER
We reached out to schedule Mr. Moore for his bronchoscopy and he declined to schedule. He stated he is 89 and after thinking it over he does not want to proceed at his age.

## 2024-08-28 NOTE — TELEPHONE ENCOUNTER
Rec'd fax from  Pulmonary, Critical Care and Sleep Medicine.  Pt is scheduled for Bronchoscopy in 1-2 weeks and will need to hold Warfarin x 5 days.    Form placed on Dr. Gustabo solis.    Last seen 8/1/24  Pending appt 10/16/24

## 2024-09-03 ENCOUNTER — TELEPHONE (OUTPATIENT)
Dept: PULMONOLOGY | Facility: CLINIC | Age: 89
End: 2024-09-03
Payer: MEDICARE

## 2024-09-03 NOTE — TELEPHONE ENCOUNTER
Rx for inhaler from August 27th not in [facility] [employer], needs to know when this will be available. Current inhaler is close to empty.

## 2024-09-04 ENCOUNTER — ANTICOAGULATION - WARFARIN VISIT (OUTPATIENT)
Dept: CARDIOLOGY | Facility: CLINIC | Age: 89
End: 2024-09-04

## 2024-09-04 ENCOUNTER — LAB (OUTPATIENT)
Dept: LAB | Facility: LAB | Age: 89
End: 2024-09-04
Payer: MEDICARE

## 2024-09-04 DIAGNOSIS — J43.2 CENTRILOBULAR EMPHYSEMA (MULTI): Primary | ICD-10-CM

## 2024-09-04 DIAGNOSIS — Z79.01 CURRENT USE OF LONG TERM ANTICOAGULATION: ICD-10-CM

## 2024-09-04 LAB
INR PPP: 4.9 (ref 0.9–1.1)
PROTHROMBIN TIME: 56.5 SECONDS (ref 9.8–12.8)

## 2024-09-04 PROCEDURE — 36415 COLL VENOUS BLD VENIPUNCTURE: CPT

## 2024-09-04 PROCEDURE — 85610 PROTHROMBIN TIME: CPT

## 2024-09-04 RX ORDER — TIOTROPIUM BROMIDE 18 UG/1
1 CAPSULE ORAL; RESPIRATORY (INHALATION)
Qty: 30 CAPSULE | Refills: 3 | Status: SHIPPED | OUTPATIENT
Start: 2024-09-04

## 2024-09-11 ENCOUNTER — LAB (OUTPATIENT)
Dept: LAB | Facility: LAB | Age: 89
End: 2024-09-11
Payer: MEDICARE

## 2024-09-11 ENCOUNTER — ANTICOAGULATION - WARFARIN VISIT (OUTPATIENT)
Dept: CARDIOLOGY | Facility: CLINIC | Age: 89
End: 2024-09-11

## 2024-09-11 DIAGNOSIS — Z79.01 CURRENT USE OF LONG TERM ANTICOAGULATION: ICD-10-CM

## 2024-09-11 LAB
INR PPP: 4.2 (ref 0.9–1.1)
PROTHROMBIN TIME: 48.2 SECONDS (ref 9.8–12.8)

## 2024-09-11 PROCEDURE — 85610 PROTHROMBIN TIME: CPT

## 2024-09-11 PROCEDURE — 36415 COLL VENOUS BLD VENIPUNCTURE: CPT

## 2024-09-11 NOTE — PROGRESS NOTES
Called and spoke with Canelo Moore regarding INR 4.2 with goal 2-3.  Denies any changes to medications or diet.      Hold coumadin today 9/11/2024 and tomorrow 9/12/2024. Change current Coumadin 5 mg Sun, and Thur only along with coumadin 2.5 mg all other days    INR in 1 week  approximately 9/18/2024.    MR. Moore verbalizes understanding.

## 2024-09-18 ENCOUNTER — LAB (OUTPATIENT)
Dept: LAB | Facility: LAB | Age: 89
End: 2024-09-18
Payer: MEDICARE

## 2024-09-18 ENCOUNTER — ANTICOAGULATION - WARFARIN VISIT (OUTPATIENT)
Dept: CARDIOLOGY | Facility: CLINIC | Age: 89
End: 2024-09-18

## 2024-09-18 DIAGNOSIS — Z79.01 CURRENT USE OF LONG TERM ANTICOAGULATION: ICD-10-CM

## 2024-09-18 LAB
INR PPP: 2.6 (ref 0.9–1.1)
PROTHROMBIN TIME: 29.1 SECONDS (ref 9.8–12.8)

## 2024-09-18 PROCEDURE — 36415 COLL VENOUS BLD VENIPUNCTURE: CPT

## 2024-09-18 PROCEDURE — 85610 PROTHROMBIN TIME: CPT

## 2024-09-18 NOTE — PROGRESS NOTES
Called and spoke with Canelo Moore regarding INR 2.6 with goal 2-3.  Will continue with current Coumadin dose 5 mg Sun, and Thur only along with coumadin 2.5 mg all other days    INR in 4 weeks  approximately 10/9/2024.    Mr. Moore verbalizes understanding.

## 2024-09-20 ENCOUNTER — TELEPHONE (OUTPATIENT)
Dept: PULMONOLOGY | Facility: CLINIC | Age: 89
End: 2024-09-20

## 2024-09-20 DIAGNOSIS — J43.2 CENTRILOBULAR EMPHYSEMA (MULTI): Primary | ICD-10-CM

## 2024-09-20 RX ORDER — TIOTROPIUM BROMIDE AND OLODATEROL 3.124; 2.736 UG/1; UG/1
2 SPRAY, METERED RESPIRATORY (INHALATION) DAILY
Qty: 4 G | Refills: 3 | Status: SHIPPED | OUTPATIENT
Start: 2024-09-20

## 2024-09-20 NOTE — TELEPHONE ENCOUNTER
Patient called and said Tiotropium (powder inhaler) irritates and makes throat dry. Wants to know if there is a less irritating option. Please advise.

## 2024-10-08 ENCOUNTER — APPOINTMENT (OUTPATIENT)
Dept: RADIOLOGY | Facility: HOSPITAL | Age: 89
End: 2024-10-08
Payer: MEDICARE

## 2024-10-08 ENCOUNTER — TELEPHONE (OUTPATIENT)
Dept: CARDIOLOGY | Facility: CLINIC | Age: 89
End: 2024-10-08
Payer: MEDICARE

## 2024-10-08 ENCOUNTER — HOSPITAL ENCOUNTER (EMERGENCY)
Facility: HOSPITAL | Age: 89
Discharge: HOME | End: 2024-10-08
Attending: EMERGENCY MEDICINE
Payer: MEDICARE

## 2024-10-08 ENCOUNTER — APPOINTMENT (OUTPATIENT)
Dept: CARDIOLOGY | Facility: HOSPITAL | Age: 89
End: 2024-10-08
Payer: MEDICARE

## 2024-10-08 ENCOUNTER — ANTICOAGULATION - WARFARIN VISIT (OUTPATIENT)
Dept: CARDIOLOGY | Facility: CLINIC | Age: 89
End: 2024-10-08

## 2024-10-08 ENCOUNTER — LAB (OUTPATIENT)
Dept: LAB | Facility: LAB | Age: 89
End: 2024-10-08
Payer: MEDICARE

## 2024-10-08 VITALS
SYSTOLIC BLOOD PRESSURE: 144 MMHG | RESPIRATION RATE: 16 BRPM | HEIGHT: 68 IN | TEMPERATURE: 97.9 F | WEIGHT: 175 LBS | BODY MASS INDEX: 26.52 KG/M2 | OXYGEN SATURATION: 97 % | HEART RATE: 90 BPM | DIASTOLIC BLOOD PRESSURE: 68 MMHG

## 2024-10-08 DIAGNOSIS — I48.91 ATRIAL FIBRILLATION WITH RAPID VENTRICULAR RESPONSE (MULTI): Primary | ICD-10-CM

## 2024-10-08 DIAGNOSIS — R79.1 ELEVATED INR: ICD-10-CM

## 2024-10-08 DIAGNOSIS — Z79.01 CURRENT USE OF LONG TERM ANTICOAGULATION: ICD-10-CM

## 2024-10-08 LAB
ALBUMIN SERPL BCP-MCNC: 3.3 G/DL (ref 3.4–5)
ALP SERPL-CCNC: 80 U/L (ref 33–136)
ALT SERPL W P-5'-P-CCNC: 17 U/L (ref 10–52)
ANION GAP SERPL CALC-SCNC: 11 MMOL/L (ref 10–20)
AST SERPL W P-5'-P-CCNC: 19 U/L (ref 9–39)
BASOPHILS # BLD AUTO: 0.01 X10*3/UL (ref 0–0.1)
BASOPHILS NFR BLD AUTO: 0.1 %
BILIRUB SERPL-MCNC: 0.7 MG/DL (ref 0–1.2)
BUN SERPL-MCNC: 20 MG/DL (ref 6–23)
CALCIUM SERPL-MCNC: 9.1 MG/DL (ref 8.6–10.3)
CARDIAC TROPONIN I PNL SERPL HS: 13 NG/L (ref 0–20)
CARDIAC TROPONIN I PNL SERPL HS: 13 NG/L (ref 0–20)
CHLORIDE SERPL-SCNC: 98 MMOL/L (ref 98–107)
CO2 SERPL-SCNC: 28 MMOL/L (ref 21–32)
CREAT SERPL-MCNC: 1.1 MG/DL (ref 0.5–1.3)
EGFRCR SERPLBLD CKD-EPI 2021: 65 ML/MIN/1.73M*2
EOSINOPHIL # BLD AUTO: 0.02 X10*3/UL (ref 0–0.4)
EOSINOPHIL NFR BLD AUTO: 0.3 %
ERYTHROCYTE [DISTWIDTH] IN BLOOD BY AUTOMATED COUNT: 13.1 % (ref 11.5–14.5)
GLUCOSE SERPL-MCNC: 106 MG/DL (ref 74–99)
HCT VFR BLD AUTO: 31.4 % (ref 41–52)
HGB BLD-MCNC: 10.3 G/DL (ref 13.5–17.5)
IMM GRANULOCYTES # BLD AUTO: 0.02 X10*3/UL (ref 0–0.5)
IMM GRANULOCYTES NFR BLD AUTO: 0.3 % (ref 0–0.9)
INR PPP: 6.9 (ref 0.9–1.1)
INR PPP: 7.1 (ref 0.9–1.1)
LYMPHOCYTES # BLD AUTO: 0.61 X10*3/UL (ref 0.8–3)
LYMPHOCYTES NFR BLD AUTO: 8.2 %
MAGNESIUM SERPL-MCNC: 1.69 MG/DL (ref 1.6–2.4)
MCH RBC QN AUTO: 31.9 PG (ref 26–34)
MCHC RBC AUTO-ENTMCNC: 32.8 G/DL (ref 32–36)
MCV RBC AUTO: 97 FL (ref 80–100)
MONOCYTES # BLD AUTO: 0.63 X10*3/UL (ref 0.05–0.8)
MONOCYTES NFR BLD AUTO: 8.4 %
NEUTROPHILS # BLD AUTO: 6.19 X10*3/UL (ref 1.6–5.5)
NEUTROPHILS NFR BLD AUTO: 82.7 %
NRBC BLD-RTO: 0 /100 WBCS (ref 0–0)
PLATELET # BLD AUTO: 305 X10*3/UL (ref 150–450)
POTASSIUM SERPL-SCNC: 4.1 MMOL/L (ref 3.5–5.3)
PROT SERPL-MCNC: 6.7 G/DL (ref 6.4–8.2)
PROTHROMBIN TIME: 79 SECONDS (ref 9.8–12.8)
PROTHROMBIN TIME: 81.6 SECONDS (ref 9.8–12.8)
RBC # BLD AUTO: 3.23 X10*6/UL (ref 4.5–5.9)
SODIUM SERPL-SCNC: 133 MMOL/L (ref 136–145)
WBC # BLD AUTO: 7.5 X10*3/UL (ref 4.4–11.3)

## 2024-10-08 PROCEDURE — 2500000002 HC RX 250 W HCPCS SELF ADMINISTERED DRUGS (ALT 637 FOR MEDICARE OP, ALT 636 FOR OP/ED): Performed by: EMERGENCY MEDICINE

## 2024-10-08 PROCEDURE — 2500000005 HC RX 250 GENERAL PHARMACY W/O HCPCS: Performed by: EMERGENCY MEDICINE

## 2024-10-08 PROCEDURE — 96374 THER/PROPH/DIAG INJ IV PUSH: CPT | Performed by: EMERGENCY MEDICINE

## 2024-10-08 PROCEDURE — 85610 PROTHROMBIN TIME: CPT | Performed by: EMERGENCY MEDICINE

## 2024-10-08 PROCEDURE — 84484 ASSAY OF TROPONIN QUANT: CPT | Performed by: EMERGENCY MEDICINE

## 2024-10-08 PROCEDURE — 85025 COMPLETE CBC W/AUTO DIFF WBC: CPT | Performed by: EMERGENCY MEDICINE

## 2024-10-08 PROCEDURE — 84075 ASSAY ALKALINE PHOSPHATASE: CPT | Performed by: EMERGENCY MEDICINE

## 2024-10-08 PROCEDURE — 36415 COLL VENOUS BLD VENIPUNCTURE: CPT | Performed by: EMERGENCY MEDICINE

## 2024-10-08 PROCEDURE — 71045 X-RAY EXAM CHEST 1 VIEW: CPT | Performed by: RADIOLOGY

## 2024-10-08 PROCEDURE — 93005 ELECTROCARDIOGRAM TRACING: CPT

## 2024-10-08 PROCEDURE — 85610 PROTHROMBIN TIME: CPT

## 2024-10-08 PROCEDURE — 99284 EMERGENCY DEPT VISIT MOD MDM: CPT | Mod: 25 | Performed by: EMERGENCY MEDICINE

## 2024-10-08 PROCEDURE — 36415 COLL VENOUS BLD VENIPUNCTURE: CPT

## 2024-10-08 PROCEDURE — 83735 ASSAY OF MAGNESIUM: CPT | Performed by: EMERGENCY MEDICINE

## 2024-10-08 PROCEDURE — 71045 X-RAY EXAM CHEST 1 VIEW: CPT

## 2024-10-08 RX ORDER — PHYTONADIONE 5 MG/1
2.5 TABLET ORAL ONCE
Status: COMPLETED | OUTPATIENT
Start: 2024-10-08 | End: 2024-10-08

## 2024-10-08 RX ORDER — METOPROLOL TARTRATE 1 MG/ML
5 INJECTION, SOLUTION INTRAVENOUS ONCE
Status: COMPLETED | OUTPATIENT
Start: 2024-10-08 | End: 2024-10-08

## 2024-10-08 ASSESSMENT — COLUMBIA-SUICIDE SEVERITY RATING SCALE - C-SSRS
1. IN THE PAST MONTH, HAVE YOU WISHED YOU WERE DEAD OR WISHED YOU COULD GO TO SLEEP AND NOT WAKE UP?: NO
2. HAVE YOU ACTUALLY HAD ANY THOUGHTS OF KILLING YOURSELF?: NO
6. HAVE YOU EVER DONE ANYTHING, STARTED TO DO ANYTHING, OR PREPARED TO DO ANYTHING TO END YOUR LIFE?: NO

## 2024-10-08 ASSESSMENT — LIFESTYLE VARIABLES
EVER HAD A DRINK FIRST THING IN THE MORNING TO STEADY YOUR NERVES TO GET RID OF A HANGOVER: NO
HAVE PEOPLE ANNOYED YOU BY CRITICIZING YOUR DRINKING: NO
EVER FELT BAD OR GUILTY ABOUT YOUR DRINKING: NO
HAVE YOU EVER FELT YOU SHOULD CUT DOWN ON YOUR DRINKING: NO
TOTAL SCORE: 0

## 2024-10-08 ASSESSMENT — PAIN - FUNCTIONAL ASSESSMENT: PAIN_FUNCTIONAL_ASSESSMENT: 0-10

## 2024-10-08 ASSESSMENT — PAIN SCALES - GENERAL
PAINLEVEL_OUTOF10: 0 - NO PAIN
PAINLEVEL_OUTOF10: 0 - NO PAIN

## 2024-10-08 NOTE — DISCHARGE INSTRUCTIONS
Take your amiodarone tablets 200 mg 2 times a day for 7 days and then decrease it down to 200 mg once a day  Do not take any more of your warfarin until Friday and get your blood work done on Friday following which contact your doctors office to see what dose you can start taking

## 2024-10-08 NOTE — TELEPHONE ENCOUNTER
Dominga from the lab called stating the patient has Critical PT/INR.  PT- 79  INR- 6.9  Please advise.   Dilia Holloway MA

## 2024-10-08 NOTE — ED PROVIDER NOTES
HPI   Chief Complaint   Patient presents with    Rapid Heart Rate       Patient is a 89-year-old male with history of COPD hypertension hyperlipidemia and atrial fibrillation on Coumadin comes in with complaining of irregular heartbeat off and on for a week, no chest pain no dizziness no nausea no vomiting.  Also had blood work done today which showed his INR was very high              Patient History   Past Medical History:   Diagnosis Date    BPH (benign prostatic hyperplasia)     CAD (coronary artery disease)     Controlled atrial fibrillation (Multi)     COPD (chronic obstructive pulmonary disease) (Multi)     Hyperlipidemia     Hypertension     Stage 3a chronic kidney disease (CKD) (Multi)      Past Surgical History:   Procedure Laterality Date    OTHER SURGICAL HISTORY  11/04/2020    Varicose vein ligation    OTHER SURGICAL HISTORY  11/04/2020    [place] tooth extraction    OTHER SURGICAL HISTORY  11/04/2020    Tonsillectomy    OTHER SURGICAL HISTORY  11/04/2020    Colonoscopy complete for polypectomy    OTHER SURGICAL HISTORY  11/06/2021    Varicose vein sclerotherapy    OTHER SURGICAL HISTORY  11/06/2021    Pilonidal cyst removal    OTHER SURGICAL HISTORY  11/06/2021    Hernia repair    OTHER SURGICAL HISTORY  12/14/2021    Umbilical hernia repair    OTHER SURGICAL HISTORY  12/14/2021    Inguinal hernia repair    US ASPIRATION INJECTION INTERMEDIATE JOINT  2/8/2021    US ASPIRATION INJECTION INTERMEDIATE JOINT 2/8/2021 [facility]     Family History   Problem Relation Name Age of Onset    Breast cancer Mother      Atrial fibrillation Mother      Colon cancer Brother      Prostate cancer Brother      Breast cancer Daughter      Prostate cancer Son      Colon cancer Son       Social History     Tobacco Use    Smoking status: Every Day     Current packs/day: 0.50     Average packs/day: 0.5 packs/day for 60.0 years (30.0 ttl pk-yrs)     Types: Cigarettes    Smokeless tobacco: Current   Substance Use  Topics    Alcohol use: Not Currently    Drug use: Not Currently       Physical Exam   ED Triage Vitals [10/08/24 1522]   Temperature Heart Rate Respirations BP   36.6 °C (97.9 °F) (!) 122 18 122/57      Pulse Ox Temp Source Heart Rate Source Patient Position   96 % Temporal Monitor Sitting      BP Location FiO2 (%)     Left arm --       Physical Exam  Vitals and nursing note reviewed.   Constitutional:       Appearance: Normal appearance.   HENT:      Mouth/Throat:      Mouth: Mucous membranes are moist.   Cardiovascular:      Rate and Rhythm: Tachycardia present. Rhythm irregular.   Pulmonary:      Effort: Pulmonary effort is normal.      Breath sounds: Normal breath sounds.   Musculoskeletal:         General: Normal range of motion.   Skin:     General: Skin is warm.   Neurological:      General: No focal deficit present.      Mental Status: He is alert and oriented to person, place, and time.           ED Course & MDM   Diagnoses as of 10/08/24 1714   Atrial fibrillation with rapid ventricular response (Multi)   Elevated INR                 No data recorded                                 Medical Decision Making  EKG interpreted by me shows atrial fibrillation with rapid ventricular rate of 106, normal QRS nonspecific ST changes  I ordered CBC chemistries cardiac labs PT/INR and order IV Lopressor 5 mg for the patient will reevaluate once everything is obtained  Patient's heart rate came down to 80 and still in atrial fibrillation at this time I reached out to Dr. Morales recommended increasing the patient's amiodarone to 200 mg twice a day for 1 week and then decrease it down to 200 mg once a day, also hold the Coumadin until Friday and get the repeat blood work done.  Comfortable in no acute distress and plan is to discharge the patient based upon Dr. Morales's recommendations  Labs Reviewed  CBC WITH AUTO DIFFERENTIAL - Abnormal     WBC                           7.5                    nRBC                           0.0                    RBC                           3.23 (*)               Hemoglobin                    10.3 (*)               Hematocrit                    31.4 (*)               MCV                           97                     MCH                           31.9                   MCHC                          32.8                   RDW                           13.1                   Platelets                     305                    Neutrophils %                 82.7                   Immature Granulocytes %, Automated   0.3                    Lymphocytes %                 8.2                    Monocytes %                   8.4                    Eosinophils %                 0.3                    Basophils %                   0.1                    Neutrophils Absolute          6.19 (*)               Immature Granulocytes Absolute, Au*   0.02                   Lymphocytes Absolute          0.61 (*)               Monocytes Absolute            0.63                   Eosinophils Absolute          0.02                   Basophils Absolute            0.01                COMPREHENSIVE METABOLIC PANEL - Abnormal     Glucose                       106 (*)                Sodium                        133 (*)                Potassium                     4.1                    Chloride                      98                     Bicarbonate                   28                     Anion Gap                     11                     Urea Nitrogen                 20                     Creatinine                    1.10                   eGFR                          65                     Calcium                       9.1                    Albumin                       3.3 (*)                Alkaline Phosphatase          80                     Total Protein                 6.7                    AST                           19                     Bilirubin, Total              0.7                    ALT                            17                  PROTIME-INR - Abnormal     Protime                       81.6 (*)               INR                           7.1 (*)             MAGNESIUM - Normal     Magnesium                     1.69                SERIAL TROPONIN-INITIAL - Normal     Troponin I, High Sensitivity   13                       Narrative: Less than 99th percentile of normal range cutoff-                Female and children under 18 years old <14 ng/L; Male <21 ng/L: Negative                Repeat testing should be performed if clinically indicated.                                 Female and children under 18 years old 14-50 ng/L; Male 21-50 ng/L:                Consistent with possible cardiac damage and possible increased clinical                 risk. Serial measurements may help to assess extent of myocardial damage.                                 >50 ng/L: Consistent with cardiac damage, increased clinical risk and                myocardial infarction. Serial measurements may help assess extent of                 myocardial damage.                                  NOTE: Children less than 1 year old may have higher baseline troponin                 levels and results should be interpreted in conjunction with the overall                 clinical context.                                 NOTE: Troponin I testing is performed using a different                 testing methodology at [facility] than at other                 [facility]. Direct result comparisons should only                 be made within the same method.  SERIAL TROPONIN, 1 HOUR - Normal     Troponin I, High Sensitivity   13                       Narrative: Less than 99th percentile of normal range cutoff-                Female and children under 18 years old <14 ng/L; Male <21 ng/L: Negative                Repeat testing should be performed if clinically indicated.                                 Female and children under 18  years old 14-50 ng/L; Male 21-50 ng/L:                Consistent with possible cardiac damage and possible increased clinical                 risk. Serial measurements may help to assess extent of myocardial damage.                                 >50 ng/L: Consistent with cardiac damage, increased clinical risk and                myocardial infarction. Serial measurements may help assess extent of                 myocardial damage.                                  NOTE: Children less than 1 year old may have higher baseline troponin                 levels and results should be interpreted in conjunction with the overall                 clinical context.                                 NOTE: Troponin I testing is performed using a different                 testing methodology at [facility] than at other                 [facility]. Direct result comparisons should only                 be made within the same method.  TROPONIN SERIES- (INITIAL, 1 HR)     XR chest 1 view   Final Result    Small bilateral pleural effusions. Interstitial opacities bilateral    mid and lower lung zones may be due to edema, atelectasis, or    atypical infection.          Signed by: Luna Ramirez 10/8/2024 4:11 PM    Dictation workstation:   [id number]              Procedure  Procedures     Valerie Hines MD  10/08/24 3584

## 2024-10-08 NOTE — PROGRESS NOTES
Called and spoke with Canelo Shanehilary regarding INR 6.9 with goal of 2-3.  States no change in medications or diet.  He states that he is currently in atrial fib and is going to have his son take him to the ER today.  I have advised him not to take any Coumadin today, INR can be rechecked/adjusted in ER.  Further Coumadin adjustments to be made upon discharge from ER.    Mr. Moore verbalizes understanding.

## 2024-10-09 LAB
ATRIAL RATE: 129 BPM
Q ONSET: 219 MS
QRS COUNT: 18 BEATS
QRS DURATION: 96 MS
QT INTERVAL: 340 MS
QTC CALCULATION(BAZETT): 451 MS
QTC FREDERICIA: 410 MS
R AXIS: 48 DEGREES
T AXIS: 7 DEGREES
T OFFSET: 389 MS
VENTRICULAR RATE: 106 BPM

## 2024-10-10 ENCOUNTER — APPOINTMENT (OUTPATIENT)
Dept: RADIOLOGY | Facility: HOSPITAL | Age: 89
DRG: 291 | End: 2024-10-10
Payer: MEDICARE

## 2024-10-10 ENCOUNTER — APPOINTMENT (OUTPATIENT)
Dept: CARDIOLOGY | Facility: HOSPITAL | Age: 89
DRG: 291 | End: 2024-10-10
Payer: MEDICARE

## 2024-10-10 ENCOUNTER — APPOINTMENT (OUTPATIENT)
Dept: CARDIOLOGY | Facility: CLINIC | Age: 89
End: 2024-10-10
Payer: MEDICARE

## 2024-10-10 ENCOUNTER — HOSPITAL ENCOUNTER (INPATIENT)
Facility: HOSPITAL | Age: 89
LOS: 3 days | Discharge: HOME | End: 2024-10-13
Attending: EMERGENCY MEDICINE | Admitting: INTERNAL MEDICINE
Payer: MEDICARE

## 2024-10-10 DIAGNOSIS — Z79.899 HIGH RISK MEDICATION USE: ICD-10-CM

## 2024-10-10 DIAGNOSIS — D64.9 ANEMIA, UNSPECIFIED TYPE: ICD-10-CM

## 2024-10-10 DIAGNOSIS — I48.91 ATRIAL FIBRILLATION, UNSPECIFIED TYPE (MULTI): ICD-10-CM

## 2024-10-10 DIAGNOSIS — I48.0 PAROXYSMAL ATRIAL FIBRILLATION (MULTI): ICD-10-CM

## 2024-10-10 DIAGNOSIS — I50.9 ACUTE CONGESTIVE HEART FAILURE, UNSPECIFIED HEART FAILURE TYPE: Primary | ICD-10-CM

## 2024-10-10 DIAGNOSIS — Z79.899 MEDICATION COURSE CHANGED: ICD-10-CM

## 2024-10-10 DIAGNOSIS — R06.02 SHORTNESS OF BREATH: ICD-10-CM

## 2024-10-10 DIAGNOSIS — E05.90 HYPERTHYROIDISM: ICD-10-CM

## 2024-10-10 DIAGNOSIS — E78.2 MIXED HYPERLIPIDEMIA: ICD-10-CM

## 2024-10-10 DIAGNOSIS — I11.0 HYPERTENSIVE HEART DISEASE WITH HEART FAILURE: ICD-10-CM

## 2024-10-10 DIAGNOSIS — I25.10 CORONARY ARTERY DISEASE INVOLVING NATIVE CORONARY ARTERY OF NATIVE HEART WITHOUT ANGINA PECTORIS: ICD-10-CM

## 2024-10-10 LAB
ALBUMIN SERPL BCP-MCNC: 3.3 G/DL (ref 3.4–5)
ALP SERPL-CCNC: 78 U/L (ref 33–136)
ALT SERPL W P-5'-P-CCNC: 16 U/L (ref 10–52)
ANION GAP SERPL CALC-SCNC: 11 MMOL/L (ref 10–20)
APTT PPP: 48 SECONDS (ref 27–38)
AST SERPL W P-5'-P-CCNC: 19 U/L (ref 9–39)
BASOPHILS # BLD AUTO: 0.02 X10*3/UL (ref 0–0.1)
BASOPHILS NFR BLD AUTO: 0.3 %
BILIRUB SERPL-MCNC: 0.8 MG/DL (ref 0–1.2)
BNP SERPL-MCNC: 355 PG/ML (ref 0–99)
BUN SERPL-MCNC: 16 MG/DL (ref 6–23)
CALCIUM SERPL-MCNC: 9.1 MG/DL (ref 8.6–10.3)
CARDIAC TROPONIN I PNL SERPL HS: 10 NG/L (ref 0–20)
CARDIAC TROPONIN I PNL SERPL HS: 11 NG/L (ref 0–20)
CHLORIDE SERPL-SCNC: 99 MMOL/L (ref 98–107)
CO2 SERPL-SCNC: 29 MMOL/L (ref 21–32)
CREAT SERPL-MCNC: 1 MG/DL (ref 0.5–1.3)
EGFRCR SERPLBLD CKD-EPI 2021: 73 ML/MIN/1.73M*2
EOSINOPHIL # BLD AUTO: 0.01 X10*3/UL (ref 0–0.4)
EOSINOPHIL NFR BLD AUTO: 0.1 %
ERYTHROCYTE [DISTWIDTH] IN BLOOD BY AUTOMATED COUNT: 13.1 % (ref 11.5–14.5)
GLUCOSE SERPL-MCNC: 99 MG/DL (ref 74–99)
HCT VFR BLD AUTO: 28 % (ref 41–52)
HGB BLD-MCNC: 9.3 G/DL (ref 13.5–17.5)
IMM GRANULOCYTES # BLD AUTO: 0.02 X10*3/UL (ref 0–0.5)
IMM GRANULOCYTES NFR BLD AUTO: 0.3 % (ref 0–0.9)
INR PPP: 3.9 (ref 0.9–1.1)
LYMPHOCYTES # BLD AUTO: 0.71 X10*3/UL (ref 0.8–3)
LYMPHOCYTES NFR BLD AUTO: 9.5 %
MAGNESIUM SERPL-MCNC: 1.72 MG/DL (ref 1.6–2.4)
MCH RBC QN AUTO: 32.2 PG (ref 26–34)
MCHC RBC AUTO-ENTMCNC: 33.2 G/DL (ref 32–36)
MCV RBC AUTO: 97 FL (ref 80–100)
MONOCYTES # BLD AUTO: 0.63 X10*3/UL (ref 0.05–0.8)
MONOCYTES NFR BLD AUTO: 8.4 %
NEUTROPHILS # BLD AUTO: 6.1 X10*3/UL (ref 1.6–5.5)
NEUTROPHILS NFR BLD AUTO: 81.4 %
NRBC BLD-RTO: 0 /100 WBCS (ref 0–0)
PLATELET # BLD AUTO: 293 X10*3/UL (ref 150–450)
POTASSIUM SERPL-SCNC: 3.9 MMOL/L (ref 3.5–5.3)
PROT SERPL-MCNC: 6.4 G/DL (ref 6.4–8.2)
PROTHROMBIN TIME: 45.1 SECONDS (ref 9.8–12.8)
RBC # BLD AUTO: 2.89 X10*6/UL (ref 4.5–5.9)
SODIUM SERPL-SCNC: 135 MMOL/L (ref 136–145)
WBC # BLD AUTO: 7.5 X10*3/UL (ref 4.4–11.3)

## 2024-10-10 PROCEDURE — 99223 1ST HOSP IP/OBS HIGH 75: CPT | Performed by: INTERNAL MEDICINE

## 2024-10-10 PROCEDURE — 83735 ASSAY OF MAGNESIUM: CPT | Performed by: EMERGENCY MEDICINE

## 2024-10-10 PROCEDURE — 94640 AIRWAY INHALATION TREATMENT: CPT

## 2024-10-10 PROCEDURE — 2500000004 HC RX 250 GENERAL PHARMACY W/ HCPCS (ALT 636 FOR OP/ED): Performed by: EMERGENCY MEDICINE

## 2024-10-10 PROCEDURE — 84484 ASSAY OF TROPONIN QUANT: CPT | Performed by: EMERGENCY MEDICINE

## 2024-10-10 PROCEDURE — 83880 ASSAY OF NATRIURETIC PEPTIDE: CPT | Performed by: EMERGENCY MEDICINE

## 2024-10-10 PROCEDURE — 93005 ELECTROCARDIOGRAM TRACING: CPT

## 2024-10-10 PROCEDURE — 71045 X-RAY EXAM CHEST 1 VIEW: CPT | Performed by: RADIOLOGY

## 2024-10-10 PROCEDURE — 1200000002 HC GENERAL ROOM WITH TELEMETRY DAILY

## 2024-10-10 PROCEDURE — 84075 ASSAY ALKALINE PHOSPHATASE: CPT | Performed by: EMERGENCY MEDICINE

## 2024-10-10 PROCEDURE — 2500000001 HC RX 250 WO HCPCS SELF ADMINISTERED DRUGS (ALT 637 FOR MEDICARE OP): Performed by: EMERGENCY MEDICINE

## 2024-10-10 PROCEDURE — 85730 THROMBOPLASTIN TIME PARTIAL: CPT | Performed by: EMERGENCY MEDICINE

## 2024-10-10 PROCEDURE — 2500000002 HC RX 250 W HCPCS SELF ADMINISTERED DRUGS (ALT 637 FOR MEDICARE OP, ALT 636 FOR OP/ED): Performed by: INTERNAL MEDICINE

## 2024-10-10 PROCEDURE — 2500000001 HC RX 250 WO HCPCS SELF ADMINISTERED DRUGS (ALT 637 FOR MEDICARE OP): Performed by: INTERNAL MEDICINE

## 2024-10-10 PROCEDURE — 85025 COMPLETE CBC W/AUTO DIFF WBC: CPT | Performed by: EMERGENCY MEDICINE

## 2024-10-10 PROCEDURE — 85610 PROTHROMBIN TIME: CPT | Performed by: EMERGENCY MEDICINE

## 2024-10-10 PROCEDURE — 36415 COLL VENOUS BLD VENIPUNCTURE: CPT | Performed by: EMERGENCY MEDICINE

## 2024-10-10 PROCEDURE — 99285 EMERGENCY DEPT VISIT HI MDM: CPT

## 2024-10-10 PROCEDURE — 71045 X-RAY EXAM CHEST 1 VIEW: CPT

## 2024-10-10 PROCEDURE — 2500000004 HC RX 250 GENERAL PHARMACY W/ HCPCS (ALT 636 FOR OP/ED): Performed by: INTERNAL MEDICINE

## 2024-10-10 RX ORDER — LANOLIN ALCOHOL/MO/W.PET/CERES
400 CREAM (GRAM) TOPICAL 2 TIMES DAILY
Status: DISCONTINUED | OUTPATIENT
Start: 2024-10-10 | End: 2024-10-13 | Stop reason: HOSPADM

## 2024-10-10 RX ORDER — PANTOPRAZOLE SODIUM 40 MG/1
40 TABLET, DELAYED RELEASE ORAL
Status: DISCONTINUED | OUTPATIENT
Start: 2024-10-11 | End: 2024-10-13 | Stop reason: HOSPADM

## 2024-10-10 RX ORDER — ACETAMINOPHEN 650 MG/1
650 SUPPOSITORY RECTAL EVERY 4 HOURS PRN
Status: DISCONTINUED | OUTPATIENT
Start: 2024-10-10 | End: 2024-10-13 | Stop reason: HOSPADM

## 2024-10-10 RX ORDER — HYDRALAZINE HYDROCHLORIDE 20 MG/ML
10 INJECTION INTRAMUSCULAR; INTRAVENOUS EVERY 6 HOURS PRN
Status: DISCONTINUED | OUTPATIENT
Start: 2024-10-10 | End: 2024-10-13 | Stop reason: HOSPADM

## 2024-10-10 RX ORDER — IPRATROPIUM BROMIDE AND ALBUTEROL SULFATE 2.5; .5 MG/3ML; MG/3ML
3 SOLUTION RESPIRATORY (INHALATION)
Status: DISCONTINUED | OUTPATIENT
Start: 2024-10-10 | End: 2024-10-12

## 2024-10-10 RX ORDER — AMOXICILLIN 250 MG
2 CAPSULE ORAL 2 TIMES DAILY
Status: DISCONTINUED | OUTPATIENT
Start: 2024-10-10 | End: 2024-10-12

## 2024-10-10 RX ORDER — LABETALOL HYDROCHLORIDE 5 MG/ML
10 INJECTION, SOLUTION INTRAVENOUS EVERY 6 HOURS PRN
Status: DISCONTINUED | OUTPATIENT
Start: 2024-10-10 | End: 2024-10-13 | Stop reason: HOSPADM

## 2024-10-10 RX ORDER — FORMOTEROL FUMARATE DIHYDRATE 20 UG/2ML
20 SOLUTION RESPIRATORY (INHALATION)
Status: DISCONTINUED | OUTPATIENT
Start: 2024-10-10 | End: 2024-10-13 | Stop reason: HOSPADM

## 2024-10-10 RX ORDER — GUAIFENESIN/DEXTROMETHORPHAN 100-10MG/5
5 SYRUP ORAL EVERY 4 HOURS PRN
Status: DISCONTINUED | OUTPATIENT
Start: 2024-10-10 | End: 2024-10-13 | Stop reason: HOSPADM

## 2024-10-10 RX ORDER — EZETIMIBE 10 MG/1
10 TABLET ORAL
Status: DISCONTINUED | OUTPATIENT
Start: 2024-10-10 | End: 2024-10-13 | Stop reason: HOSPADM

## 2024-10-10 RX ORDER — AMIODARONE HYDROCHLORIDE 200 MG/1
100 TABLET ORAL DAILY
Status: DISCONTINUED | OUTPATIENT
Start: 2024-10-10 | End: 2024-10-11

## 2024-10-10 RX ORDER — FUROSEMIDE 10 MG/ML
40 INJECTION INTRAMUSCULAR; INTRAVENOUS ONCE
Status: DISCONTINUED | OUTPATIENT
Start: 2024-10-10 | End: 2024-10-13 | Stop reason: HOSPADM

## 2024-10-10 RX ORDER — PANTOPRAZOLE SODIUM 40 MG/10ML
40 INJECTION, POWDER, LYOPHILIZED, FOR SOLUTION INTRAVENOUS
Status: DISCONTINUED | OUTPATIENT
Start: 2024-10-11 | End: 2024-10-11

## 2024-10-10 RX ORDER — ACETAMINOPHEN 160 MG/5ML
650 SOLUTION ORAL EVERY 4 HOURS PRN
Status: DISCONTINUED | OUTPATIENT
Start: 2024-10-10 | End: 2024-10-13 | Stop reason: HOSPADM

## 2024-10-10 RX ORDER — FINASTERIDE 5 MG/1
5 TABLET, FILM COATED ORAL DAILY
Status: DISCONTINUED | OUTPATIENT
Start: 2024-10-10 | End: 2024-10-13 | Stop reason: HOSPADM

## 2024-10-10 RX ORDER — METOPROLOL SUCCINATE 25 MG/1
12.5 TABLET, EXTENDED RELEASE ORAL DAILY
Status: DISCONTINUED | OUTPATIENT
Start: 2024-10-10 | End: 2024-10-11

## 2024-10-10 RX ORDER — ASPIRIN 81 MG/1
81 TABLET ORAL DAILY
Status: DISCONTINUED | OUTPATIENT
Start: 2024-10-10 | End: 2024-10-13 | Stop reason: HOSPADM

## 2024-10-10 RX ORDER — NITROGLYCERIN 0.4 MG/1
0.4 TABLET SUBLINGUAL EVERY 5 MIN PRN
Status: DISCONTINUED | OUTPATIENT
Start: 2024-10-10 | End: 2024-10-13 | Stop reason: HOSPADM

## 2024-10-10 RX ORDER — FUROSEMIDE 10 MG/ML
40 INJECTION INTRAMUSCULAR; INTRAVENOUS ONCE
Status: COMPLETED | OUTPATIENT
Start: 2024-10-10 | End: 2024-10-10

## 2024-10-10 RX ORDER — ONDANSETRON 4 MG/1
4 TABLET, FILM COATED ORAL EVERY 8 HOURS PRN
Status: DISCONTINUED | OUTPATIENT
Start: 2024-10-10 | End: 2024-10-13 | Stop reason: HOSPADM

## 2024-10-10 RX ORDER — ASPIRIN 325 MG
325 TABLET ORAL ONCE
Status: COMPLETED | OUTPATIENT
Start: 2024-10-10 | End: 2024-10-10

## 2024-10-10 RX ORDER — WARFARIN 2 MG/1
4 TABLET ORAL DAILY
Status: DISCONTINUED | OUTPATIENT
Start: 2024-10-10 | End: 2024-10-13 | Stop reason: HOSPADM

## 2024-10-10 RX ORDER — ONDANSETRON HYDROCHLORIDE 2 MG/ML
4 INJECTION, SOLUTION INTRAVENOUS EVERY 8 HOURS PRN
Status: DISCONTINUED | OUTPATIENT
Start: 2024-10-10 | End: 2024-10-13 | Stop reason: HOSPADM

## 2024-10-10 RX ORDER — ACETAMINOPHEN 325 MG/1
650 TABLET ORAL EVERY 4 HOURS PRN
Status: DISCONTINUED | OUTPATIENT
Start: 2024-10-10 | End: 2024-10-13 | Stop reason: HOSPADM

## 2024-10-10 RX ORDER — ALBUTEROL SULFATE 90 UG/1
2 INHALANT RESPIRATORY (INHALATION) EVERY 6 HOURS PRN
Status: DISCONTINUED | OUTPATIENT
Start: 2024-10-10 | End: 2024-10-13 | Stop reason: HOSPADM

## 2024-10-10 RX ORDER — ROSUVASTATIN CALCIUM 10 MG/1
10 TABLET, COATED ORAL NIGHTLY
Status: DISCONTINUED | OUTPATIENT
Start: 2024-10-10 | End: 2024-10-13 | Stop reason: HOSPADM

## 2024-10-10 SDOH — SOCIAL STABILITY: SOCIAL INSECURITY: ARE THERE ANY APPARENT SIGNS OF INJURIES/BEHAVIORS THAT COULD BE RELATED TO ABUSE/NEGLECT?: NO

## 2024-10-10 SDOH — ECONOMIC STABILITY: FOOD INSECURITY: WITHIN THE PAST 12 MONTHS, THE FOOD YOU BOUGHT JUST DIDN'T LAST AND YOU DIDN'T HAVE MONEY TO GET MORE.: NEVER TRUE

## 2024-10-10 SDOH — ECONOMIC STABILITY: HOUSING INSECURITY: IN THE LAST 12 MONTHS, WAS THERE A TIME WHEN YOU WERE NOT ABLE TO PAY THE MORTGAGE OR RENT ON TIME?: NO

## 2024-10-10 SDOH — ECONOMIC STABILITY: INCOME INSECURITY: IN THE LAST 12 MONTHS, WAS THERE A TIME WHEN YOU WERE NOT ABLE TO PAY THE MORTGAGE OR RENT ON TIME?: NO

## 2024-10-10 SDOH — ECONOMIC STABILITY: INCOME INSECURITY: IN THE PAST 12 MONTHS, HAS THE ELECTRIC, GAS, OIL, OR WATER COMPANY THREATENED TO SHUT OFF SERVICE IN YOUR HOME?: NO

## 2024-10-10 SDOH — SOCIAL STABILITY: SOCIAL INSECURITY: ABUSE: ADULT

## 2024-10-10 SDOH — SOCIAL STABILITY: SOCIAL INSECURITY: DO YOU FEEL UNSAFE GOING BACK TO THE PLACE WHERE YOU ARE LIVING?: NO

## 2024-10-10 SDOH — ECONOMIC STABILITY: TRANSPORTATION INSECURITY
IN THE PAST 12 MONTHS, HAS THE LACK OF TRANSPORTATION KEPT YOU FROM MEDICAL APPOINTMENTS OR FROM GETTING MEDICATIONS?: NO

## 2024-10-10 SDOH — SOCIAL STABILITY: SOCIAL INSECURITY: HAS ANYONE EVER THREATENED TO HURT YOUR FAMILY OR YOUR PETS?: NO

## 2024-10-10 SDOH — ECONOMIC STABILITY: FOOD INSECURITY: WITHIN THE PAST 12 MONTHS, YOU WORRIED THAT YOUR FOOD WOULD RUN OUT BEFORE YOU GOT MONEY TO BUY MORE.: NEVER TRUE

## 2024-10-10 SDOH — ECONOMIC STABILITY: HOUSING INSECURITY: AT ANY TIME IN THE PAST 12 MONTHS, WERE YOU HOMELESS OR LIVING IN A SHELTER (INCLUDING NOW)?: NO

## 2024-10-10 SDOH — ECONOMIC STABILITY: FOOD INSECURITY: HOW HARD IS IT FOR YOU TO PAY FOR THE VERY BASICS LIKE FOOD, HOUSING, MEDICAL CARE, AND HEATING?: NOT HARD AT ALL

## 2024-10-10 SDOH — ECONOMIC STABILITY: TRANSPORTATION INSECURITY: IN THE PAST 12 MONTHS, HAS LACK OF TRANSPORTATION KEPT YOU FROM MEDICAL APPOINTMENTS OR FROM GETTING MEDICATIONS?: NO

## 2024-10-10 SDOH — SOCIAL STABILITY: SOCIAL INSECURITY
WITHIN THE LAST YEAR, HAVE YOU BEEN KICKED, HIT, SLAPPED, OR OTHERWISE PHYSICALLY HURT BY YOUR PARTNER OR EX-PARTNER?: NO

## 2024-10-10 SDOH — SOCIAL STABILITY: SOCIAL INSECURITY
WITHIN THE LAST YEAR, HAVE TO BEEN RAPED OR FORCED TO HAVE ANY KIND OF SEXUAL ACTIVITY BY YOUR PARTNER OR EX-PARTNER?: NO

## 2024-10-10 SDOH — ECONOMIC STABILITY: FOOD INSECURITY: WITHIN THE PAST 12 MONTHS, YOU WORRIED THAT YOUR FOOD WOULD RUN OUT BEFORE YOU GOT THE MONEY TO BUY MORE.: NEVER TRUE

## 2024-10-10 SDOH — SOCIAL STABILITY: SOCIAL INSECURITY: WITHIN THE LAST YEAR, HAVE YOU BEEN AFRAID OF YOUR PARTNER OR EX-PARTNER?: NO

## 2024-10-10 SDOH — SOCIAL STABILITY: SOCIAL INSECURITY: WITHIN THE LAST YEAR, HAVE YOU BEEN HUMILIATED OR EMOTIONALLY ABUSED IN OTHER WAYS BY YOUR PARTNER OR EX-PARTNER?: NO

## 2024-10-10 SDOH — SOCIAL STABILITY: SOCIAL INSECURITY: DOES ANYONE TRY TO KEEP YOU FROM HAVING/CONTACTING OTHER FRIENDS OR DOING THINGS OUTSIDE YOUR HOME?: NO

## 2024-10-10 SDOH — ECONOMIC STABILITY: INCOME INSECURITY: HOW HARD IS IT FOR YOU TO PAY FOR THE VERY BASICS LIKE FOOD, HOUSING, MEDICAL CARE, AND HEATING?: NOT HARD AT ALL

## 2024-10-10 SDOH — SOCIAL STABILITY: SOCIAL INSECURITY: ARE YOU OR HAVE YOU BEEN THREATENED OR ABUSED PHYSICALLY, EMOTIONALLY, OR SEXUALLY BY ANYONE?: NO

## 2024-10-10 SDOH — ECONOMIC STABILITY: INCOME INSECURITY: IN THE PAST 12 MONTHS HAS THE ELECTRIC, GAS, OIL, OR WATER COMPANY THREATENED TO SHUT OFF SERVICES IN YOUR HOME?: NO

## 2024-10-10 SDOH — ECONOMIC STABILITY: HOUSING INSECURITY: IN THE PAST 12 MONTHS, HOW MANY TIMES HAVE YOU MOVED WHERE YOU WERE LIVING?: 1

## 2024-10-10 SDOH — SOCIAL STABILITY: SOCIAL INSECURITY: HAVE YOU HAD THOUGHTS OF HARMING ANYONE ELSE?: NO

## 2024-10-10 SDOH — ECONOMIC STABILITY: TRANSPORTATION INSECURITY
IN THE PAST 12 MONTHS, HAS LACK OF TRANSPORTATION KEPT YOU FROM MEETINGS, WORK, OR FROM GETTING THINGS NEEDED FOR DAILY LIVING?: NO

## 2024-10-10 SDOH — SOCIAL STABILITY: SOCIAL INSECURITY
WITHIN THE LAST YEAR, HAVE YOU BEEN RAPED OR FORCED TO HAVE ANY KIND OF SEXUAL ACTIVITY BY YOUR PARTNER OR EX-PARTNER?: NO

## 2024-10-10 SDOH — SOCIAL STABILITY: SOCIAL INSECURITY: HAVE YOU HAD ANY THOUGHTS OF HARMING ANYONE ELSE?: NO

## 2024-10-10 SDOH — SOCIAL STABILITY: SOCIAL INSECURITY: DO YOU FEEL ANYONE HAS EXPLOITED OR TAKEN ADVANTAGE OF YOU FINANCIALLY OR OF YOUR PERSONAL PROPERTY?: NO

## 2024-10-10 ASSESSMENT — COLUMBIA-SUICIDE SEVERITY RATING SCALE - C-SSRS
6. HAVE YOU EVER DONE ANYTHING, STARTED TO DO ANYTHING, OR PREPARED TO DO ANYTHING TO END YOUR LIFE?: NO
1. IN THE PAST MONTH, HAVE YOU WISHED YOU WERE DEAD OR WISHED YOU COULD GO TO SLEEP AND NOT WAKE UP?: NO
2. HAVE YOU ACTUALLY HAD ANY THOUGHTS OF KILLING YOURSELF?: NO
6. HAVE YOU EVER DONE ANYTHING, STARTED TO DO ANYTHING, OR PREPARED TO DO ANYTHING TO END YOUR LIFE?: NO
1. IN THE PAST MONTH, HAVE YOU WISHED YOU WERE DEAD OR WISHED YOU COULD GO TO SLEEP AND NOT WAKE UP?: NO
2. HAVE YOU ACTUALLY HAD ANY THOUGHTS OF KILLING YOURSELF?: NO

## 2024-10-10 ASSESSMENT — PATIENT HEALTH QUESTIONNAIRE - PHQ9
2. FEELING DOWN, DEPRESSED OR HOPELESS: NOT AT ALL
SUM OF ALL RESPONSES TO PHQ9 QUESTIONS 1 & 2: 0
1. LITTLE INTEREST OR PLEASURE IN DOING THINGS: NOT AT ALL

## 2024-10-10 ASSESSMENT — ACTIVITIES OF DAILY LIVING (ADL)
LACK_OF_TRANSPORTATION: NO
HEARING - LEFT EAR: HEARING AID
TOILETING: INDEPENDENT
ASSISTIVE_DEVICE: EYEGLASSES;DENTURES UPPER
BATHING: INDEPENDENT
HEARING - RIGHT EAR: HEARING AID
WALKS IN HOME: INDEPENDENT
LACK_OF_TRANSPORTATION: NO
GROOMING: INDEPENDENT
PATIENT'S MEMORY ADEQUATE TO SAFELY COMPLETE DAILY ACTIVITIES?: YES
JUDGMENT_ADEQUATE_SAFELY_COMPLETE_DAILY_ACTIVITIES: YES
ADEQUATE_TO_COMPLETE_ADL: YES
DRESSING YOURSELF: INDEPENDENT
FEEDING YOURSELF: INDEPENDENT

## 2024-10-10 ASSESSMENT — COGNITIVE AND FUNCTIONAL STATUS - GENERAL
DAILY ACTIVITIY SCORE: 24
PATIENT BASELINE BEDBOUND: NO
MOBILITY SCORE: 24

## 2024-10-10 ASSESSMENT — LIFESTYLE VARIABLES
SKIP TO QUESTIONS 9-10: 1
EVER HAD A DRINK FIRST THING IN THE MORNING TO STEADY YOUR NERVES TO GET RID OF A HANGOVER: NO
HOW MANY STANDARD DRINKS CONTAINING ALCOHOL DO YOU HAVE ON A TYPICAL DAY: PATIENT DOES NOT DRINK
HAVE YOU EVER FELT YOU SHOULD CUT DOWN ON YOUR DRINKING: NO
EVER FELT BAD OR GUILTY ABOUT YOUR DRINKING: NO
HOW OFTEN DO YOU HAVE A DRINK CONTAINING ALCOHOL: NEVER
HAVE PEOPLE ANNOYED YOU BY CRITICIZING YOUR DRINKING: NO
HOW OFTEN DO YOU HAVE 6 OR MORE DRINKS ON ONE OCCASION: NEVER
TOTAL SCORE: 0
AUDIT-C TOTAL SCORE: 0
AUDIT-C TOTAL SCORE: 0

## 2024-10-10 ASSESSMENT — PAIN - FUNCTIONAL ASSESSMENT: PAIN_FUNCTIONAL_ASSESSMENT: 0-10

## 2024-10-10 ASSESSMENT — PAIN SCALES - GENERAL: PAINLEVEL_OUTOF10: 0 - NO PAIN

## 2024-10-10 NOTE — ED PROVIDER NOTES
HPI   Chief Complaint   Patient presents with    Shortness of Breath     Patient states he has a fib and his heart is beating irradic making him short of breath he was just here for same thing tues    Atrial Fibrillation         History provided by:  Patient and relative  History of Present Illness:  89-year-old male presents with 1 to 2-week history of not feeling well.  He shares he has been in and out of atrial fibrillation.  He was seen in the emergency department on Tuesday.  No chest pain.  Does have some mild shortness of breath.  No back or flank pain.  No nausea or vomiting.  No diarrhea.  No hematemesis, hematochezia or melena.  No sick contacts.  No trauma or travel.      PMFSH:   As per HPI, otherwise nurses notes reviewed in EMR.    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    External hemorrhoid 09/12/2023    Hyperlipidemia 09/12/2023    Hypertriglyceridemia 09/12/2023    White coat syndrome with hypertension 09/12/2023    Insomnia, idiopathic 09/12/2023    Low back pain 09/12/2023    Olecranon bursitis 09/12/2023    Paroxysmal atrial fibrillation (Multi) 09/12/2023    PVC's (premature ventricular contractions) 09/12/2023    Right inguinal hernia 09/12/2023    Sacroiliitis (CMS-HCC) 09/12/2023    Umbilical hernia 09/12/2023    High risk medication use 09/12/2023    Overweight 09/12/2023    Stage 3a chronic kidney disease (CKD) (Multi) 09/12/2023    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) 11/14/2023    Hypertensive heart disease with heart failure 03/13/2024    Hypercoagulable state due to atrial fibrillation, unspecified type (Multi) 03/13/2024    Shortness of breath 04/25/2024    Abnormal CT of the chest 04/25/2024      Resolved Ambulatory Problems     Diagnosis Date Noted    Abnormal thyroid blood test 09/12/2023    Elbow pain 09/12/2023    COPD (chronic obstructive pulmonary disease) (Multi) 09/12/2023    Meniscal injury 09/12/2023    Pelvic pain in male 09/12/2023    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    Rectal bleeding 09/12/2023    Cough with hemoptysis 09/12/2023    Encounter for immunization 10/19/2023    Medication course changed 11/14/2023     Past Medical History:   Diagnosis Date    Atrial fibrillation (Multi)     BPH (benign prostatic hyperplasia)     Controlled atrial fibrillation (Multi)     Hypertension       Past Surgical History:   Past Surgical History:   Procedure Laterality Date    OTHER SURGICAL HISTORY  11/04/2020    Varicose vein ligation    OTHER SURGICAL HISTORY  11/04/2020    [place] tooth extraction    OTHER SURGICAL HISTORY  11/04/2020    Tonsillectomy    OTHER SURGICAL HISTORY  11/04/2020    Colonoscopy complete for polypectomy    OTHER SURGICAL HISTORY  11/06/2021    Varicose vein sclerotherapy    OTHER SURGICAL HISTORY  11/06/2021    Pilonidal cyst removal    OTHER SURGICAL HISTORY  11/06/2021    Hernia repair    OTHER SURGICAL HISTORY  12/14/2021    Umbilical hernia repair    OTHER SURGICAL HISTORY  12/14/2021    Inguinal hernia repair    US ASPIRATION INJECTION INTERMEDIATE JOINT  2/8/2021    US ASPIRATION INJECTION INTERMEDIATE JOINT 2/8/2021 [facility]      Family History:   Family History   Problem Relation Name Age of Onset    Breast cancer Mother      Atrial fibrillation Mother      Colon cancer Brother      Prostate cancer Brother      Breast cancer Daughter      Prostate cancer Son      Colon cancer Son        Social History:    Social History     Socioeconomic History    Marital status:      Spouse name: Not on file    Number of children: Not on file    Years of education: Not on file    Highest education level: Not on file   Occupational History    Not on file   Tobacco Use     Smoking status: Every Day     Current packs/day: 0.50     Average packs/day: 0.5 packs/day for 60.0 years (30.0 ttl pk-yrs)     Types: Cigarettes    Smokeless tobacco: Current   Substance and Sexual Activity    Alcohol use: Not Currently    Drug use: Not Currently    Sexual activity: Not on file   Other Topics Concern    Not on file   Social History Narrative    Not on file     Social Determinants of Health     Financial Resource Strain: Not on file   Food Insecurity: Not on file   Transportation Needs: Not on file   Physical Activity: Not on file   Stress: Not on file   Social Connections: Not on file   Intimate Partner Violence: Not on file   Housing Stability: Not on file              Patient History   Past Medical History:   Diagnosis Date    Atrial fibrillation (Multi)     BPH (benign prostatic hyperplasia)     CAD (coronary artery disease)     Controlled atrial fibrillation (Multi)     COPD (chronic obstructive pulmonary disease) (Multi)     Hyperlipidemia     Hypertension     Stage 3a chronic kidney disease (CKD) (Multi)      Past Surgical History:   Procedure Laterality Date    OTHER SURGICAL HISTORY  11/04/2020    Varicose vein ligation    OTHER SURGICAL HISTORY  11/04/2020    [place] tooth extraction    OTHER SURGICAL HISTORY  11/04/2020    Tonsillectomy    OTHER SURGICAL HISTORY  11/04/2020    Colonoscopy complete for polypectomy    OTHER SURGICAL HISTORY  11/06/2021    Varicose vein sclerotherapy    OTHER SURGICAL HISTORY  11/06/2021    Pilonidal cyst removal    OTHER SURGICAL HISTORY  11/06/2021    Hernia repair    OTHER SURGICAL HISTORY  12/14/2021    Umbilical hernia repair    OTHER SURGICAL HISTORY  12/14/2021    Inguinal hernia repair    US ASPIRATION INJECTION INTERMEDIATE JOINT  2/8/2021    US ASPIRATION INJECTION INTERMEDIATE JOINT 2/8/2021 [facility]     Family History   Problem Relation Name Age of Onset    Breast cancer Mother      Atrial fibrillation Mother      Colon cancer  Brother      Prostate cancer Brother      Breast cancer Daughter      Prostate cancer Son      Colon cancer Son       Social History     Tobacco Use    Smoking status: Every Day     Current packs/day: 0.50     Average packs/day: 0.5 packs/day for 60.0 years (30.0 ttl pk-yrs)     Types: Cigarettes    Smokeless tobacco: Current   Substance Use Topics    Alcohol use: Not Currently    Drug use: Not Currently       Physical Exam   ED Triage Vitals [10/10/24 1233]   Temperature Heart Rate Respirations BP   36.1 °C (97 °F) 100 18 138/63      Pulse Ox Temp Source Heart Rate Source Patient Position   98 % Temporal Monitor --      BP Location FiO2 (%)     -- --       Physical Exam  Physical Exam:    ED Triage Vitals [10/10/24 1233]   Temperature Heart Rate Respirations BP   36.1 °C (97 °F) 100 18 138/63      Pulse Ox Temp Source Heart Rate Source Patient Position   98 % Temporal Monitor --      BP Location FiO2 (%)     -- --         Constitutional: Vital signs per nursing notes.  Well developed, well nourished.  No acute distress.    Psychiatric: alert and oriented to person, place, and time; no abnormalities of mood or affect; memory intact  Eyes: PERRL; conjunctivae and lids normal; EOMI  ENT: otoscopic exam of external canal and TM´s normal; nasal mucosa, turbinates, and septum normal; mouth, tongue, and pharynx normal; pharynx without edema, exudate, or injection  Respiratory: normal respiratory effort and excursion; no rales, rhonchi, or wheezes; equal air entry  Cardiovascular: irregular rate and rhythm; no murmurs, rubs or gallops; symmetric pulses; no edema; normal capillary refill; distal pulses present  Neurological: normal speech; CN II-XII grossly intact; normal motor and sensory function; no nystagmus; no pronator drift; normal finger to nose; NIH stroke scale 0  GI: no masses, tenderness, rebound or guarding; no palpable, pulsatile mass; no organomegaly; no hernia; normal bowel sounds; (-) Vasquez´s sign; (-)  McBurney´s sign; (-) CVA tenderness  Lymphatic: no adenopathy of neck  Musculoskeletal: normal gait and station; normal digits and nails; no gross tendon or ligament injury; normal to palpation; normal strength/tone; neurovascular status intact; (-) Diego´s sign  Skin: normal to inspection; normal to palpation; no rash  GCS: 15      ED Course & MDM   Diagnoses as of 10/10/24 1550   Acute congestive heart failure, unspecified heart failure type   Atrial fibrillation, unspecified type (Multi)   Anemia, unspecified type                 No data recorded                                 Medical Decision Making  Medical Decision Making:    Differential Diagnoses Considered: ACS, arrhythmia, electrolyte disorder, blood loss anemia     EKG: My interpretation of EKG is atrial fibrillation at 98 bpm with nonspecific ST-T changes            My interpretation of second EKG is atrial fibrillation at 92 bpm with nonspecific ST-T changes    Labs Reviewed   CBC WITH AUTO DIFFERENTIAL - Abnormal       Result Value    WBC 7.5      nRBC 0.0      RBC 2.89 (*)     Hemoglobin 9.3 (*)     Hematocrit 28.0 (*)     MCV 97      MCH 32.2      MCHC 33.2      RDW 13.1      Platelets 293      Neutrophils % 81.4      Immature Granulocytes %, Automated 0.3      Lymphocytes % 9.5      Monocytes % 8.4      Eosinophils % 0.1      Basophils % 0.3      Neutrophils Absolute 6.10 (*)     Immature Granulocytes Absolute, Automated 0.02      Lymphocytes Absolute 0.71 (*)     Monocytes Absolute 0.63      Eosinophils Absolute 0.01      Basophils Absolute 0.02     COMPREHENSIVE METABOLIC PANEL - Abnormal    Glucose 99      Sodium 135 (*)     Potassium 3.9      Chloride 99      Bicarbonate 29      Anion Gap 11      Urea Nitrogen 16      Creatinine 1.00      eGFR 73      Calcium 9.1      Albumin 3.3 (*)     Alkaline Phosphatase 78      Total Protein 6.4      AST 19      Bilirubin, Total 0.8      ALT 16     B-TYPE NATRIURETIC PEPTIDE - Abnormal     (*)      Narrative:        <100 pg/mL - Heart failure unlikely  100-299 pg/mL - Intermediate probability of acute heart                  failure exacerbation. Correlate with clinical                  context and patient history.    >=300 pg/mL - Heart Failure likely. Correlate with clinical                  context and patient history.    BNP testing is performed using different testing methodology at [facility] than at other [facility]. Direct result comparisons should only be made within the same method.      PROTIME-INR - Abnormal    Protime 45.1 (*)     INR 3.9 (*)    APTT - Abnormal    aPTT 48 (*)     Narrative:     The APTT is no longer used for monitoring Unfractionated Heparin Therapy. For monitoring Heparin Therapy, use the Heparin Assay.   MAGNESIUM - Normal    Magnesium 1.72     SERIAL TROPONIN-INITIAL - Normal    Troponin I, High Sensitivity 11      Narrative:     Less than 99th percentile of normal range cutoff-  Female and children under 18 years old <14 ng/L; Male <21 ng/L: Negative  Repeat testing should be performed if clinically indicated.     Female and children under 18 years old 14-50 ng/L; Male 21-50 ng/L:  Consistent with possible cardiac damage and possible increased clinical   risk. Serial measurements may help to assess extent of myocardial damage.     >50 ng/L: Consistent with cardiac damage, increased clinical risk and  myocardial infarction. Serial measurements may help assess extent of   myocardial damage.      NOTE: Children less than 1 year old may have higher baseline troponin   levels and results should be interpreted in conjunction with the overall   clinical context.     NOTE: Troponin I testing is performed using a different   testing methodology at [facility] than at other   [facility]. Direct result comparisons should only   be made within the same method.   SERIAL TROPONIN, 1 HOUR - Normal    Troponin I, High Sensitivity 10      Narrative:      Less than 99th percentile of normal range cutoff-  Female and children under 18 years old <14 ng/L; Male <21 ng/L: Negative  Repeat testing should be performed if clinically indicated.     Female and children under 18 years old 14-50 ng/L; Male 21-50 ng/L:  Consistent with possible cardiac damage and possible increased clinical   risk. Serial measurements may help to assess extent of myocardial damage.     >50 ng/L: Consistent with cardiac damage, increased clinical risk and  myocardial infarction. Serial measurements may help assess extent of   myocardial damage.      NOTE: Children less than 1 year old may have higher baseline troponin   levels and results should be interpreted in conjunction with the overall   clinical context.     NOTE: Troponin I testing is performed using a different   testing methodology at [facility] than at other   [facility]. Direct result comparisons should only   be made within the same method.   TROPONIN SERIES- (INITIAL, 1 HR)    Narrative:     The following orders were created for panel order Troponin I Series, High Sensitivity (0, 1 HR).  Procedure                               Abnormality         Status                     ---------                               -----------         ------                     Troponin I, High Sensiti...[[id number]]  Normal              Final result               Troponin, High Sensitivi...[[id number]]  Normal              Final result                 Please view results for these tests on the individual orders.       XR chest 1 view   Final Result   Diffuse interstitial infiltrates and bibasilar airspace   consolidations, as above. Clinical correlation and continued   follow-up until clearing is recommended.        MACRO:   None.        Signed by: Ronak Urbina 10/10/2024 1:57 PM   Dictation workstation:   [id number]          Diagnostic testing considered:         Review of recent and relevant records:    ED Medication  Administration:     Prescription Medication Consideration/Given:     Social Determinants of Health Significantly Affecting Care:      Summary:    BP      Temp      Pulse     Resp      SpO2        Abnormal Labs Reviewed   CBC WITH AUTO DIFFERENTIAL - Abnormal; Notable for the following components:       Result Value    RBC 2.89 (*)     Hemoglobin 9.3 (*)     Hematocrit 28.0 (*)     Neutrophils Absolute 6.10 (*)     Lymphocytes Absolute 0.71 (*)     All other components within normal limits   COMPREHENSIVE METABOLIC PANEL - Abnormal; Notable for the following components:    Sodium 135 (*)     Albumin 3.3 (*)     All other components within normal limits   B-TYPE NATRIURETIC PEPTIDE - Abnormal; Notable for the following components:     (*)     All other components within normal limits    Narrative:        <100 pg/mL - Heart failure unlikely  100-299 pg/mL - Intermediate probability of acute heart                  failure exacerbation. Correlate with clinical                  context and patient history.    >=300 pg/mL - Heart Failure likely. Correlate with clinical                  context and patient history.    BNP testing is performed using different testing methodology at [facility] than at other [facility]. Direct result comparisons should only be made within the same method.      PROTIME-INR - Abnormal; Notable for the following components:    Protime 45.1 (*)     INR 3.9 (*)     All other components within normal limits   APTT - Abnormal; Notable for the following components:    aPTT 48 (*)     All other components within normal limits    Narrative:     The APTT is no longer used for monitoring Unfractionated Heparin Therapy. For monitoring Heparin Therapy, use the Heparin Assay.     Diagnostic evaluation was completed.  2 sets of high-sensitivity troponin were unremarkable.  BNP was elevated at 355.  Metabolic panel shows normal glucose.  Sodium is slightly low at 135.  Potassium is  in the normal range.  Renal and liver function is in the normal range.  INR is supratherapeutic at 3.9.  CBC shows a normal white blood cell count.  There is mild anemia with a hemoglobin of 9.3.  Platelets are in the normal range.  Chest x-ray shows diffuse interstitial infiltrates and bibasilar airspace consolidations.    I suspect the patient likely has some CHF.  He has been treated with aspirin, nitroglycerin and Lasix.  He also may be symptomatic due to his atrial fibrillation.  It is rate controlled at this time but he may benefit from cardioversion.    Patient will be hospitalized for further workup and evaluation.    I discussed the results and plan for hospitalization with the patient and/or family/friend if present.  Questions were addressed.  Patient and/or family/friend expressed understanding.    The patient's condition requires ongoing treatment and evaluation necessitating hospital admission.  I have reviewed the patient's history, physical exam, and test information with the admitting physician,    Dr. Cueva               , who agrees to hospitalize the patient.       Diagnoses as of 10/10/24 1550   Acute congestive heart failure, unspecified heart failure type   Atrial fibrillation, unspecified type (Multi)   Anemia, unspecified type         Procedure  Procedures     Linwood HELLER MD  10/10/24 1550

## 2024-10-11 ENCOUNTER — APPOINTMENT (OUTPATIENT)
Dept: CARDIOLOGY | Facility: HOSPITAL | Age: 89
DRG: 291 | End: 2024-10-11
Payer: MEDICARE

## 2024-10-11 LAB
ALBUMIN SERPL BCP-MCNC: 3.2 G/DL (ref 3.4–5)
ALP SERPL-CCNC: 71 U/L (ref 33–136)
ALT SERPL W P-5'-P-CCNC: 15 U/L (ref 10–52)
ANION GAP SERPL CALC-SCNC: 13 MMOL/L (ref 10–20)
AORTIC VALVE MEAN GRADIENT: 8 MMHG
AORTIC VALVE PEAK VELOCITY: 1.9 M/S
AST SERPL W P-5'-P-CCNC: 16 U/L (ref 9–39)
ATRIAL RATE: 92 BPM
AV PEAK GRADIENT: 14.4 MMHG
AVA (PEAK VEL): 2.55 CM2
AVA (VTI): 2.45 CM2
BASOPHILS # BLD AUTO: 0 X10*3/UL (ref 0–0.1)
BASOPHILS NFR BLD AUTO: 0 %
BILIRUB SERPL-MCNC: 0.7 MG/DL (ref 0–1.2)
BUN SERPL-MCNC: 21 MG/DL (ref 6–23)
CALCIUM SERPL-MCNC: 9 MG/DL (ref 8.6–10.3)
CHLORIDE SERPL-SCNC: 99 MMOL/L (ref 98–107)
CHOLEST SERPL-MCNC: 71 MG/DL (ref 0–199)
CHOLESTEROL/HDL RATIO: 2.2
CO2 SERPL-SCNC: 27 MMOL/L (ref 21–32)
CREAT SERPL-MCNC: 1.05 MG/DL (ref 0.5–1.3)
EGFRCR SERPLBLD CKD-EPI 2021: 69 ML/MIN/1.73M*2
EJECTION FRACTION APICAL 4 CHAMBER: 62.3
EJECTION FRACTION: 60 %
EOSINOPHIL # BLD AUTO: 0 X10*3/UL (ref 0–0.4)
EOSINOPHIL NFR BLD AUTO: 0 %
ERYTHROCYTE [DISTWIDTH] IN BLOOD BY AUTOMATED COUNT: 13.1 % (ref 11.5–14.5)
GLUCOSE SERPL-MCNC: 170 MG/DL (ref 74–99)
HCT VFR BLD AUTO: 28.6 % (ref 41–52)
HDLC SERPL-MCNC: 32.5 MG/DL
HGB BLD-MCNC: 9.4 G/DL (ref 13.5–17.5)
HOLD SPECIMEN: NORMAL
IMM GRANULOCYTES # BLD AUTO: 0.02 X10*3/UL (ref 0–0.5)
IMM GRANULOCYTES NFR BLD AUTO: 0.4 % (ref 0–0.9)
INR PPP: 5.2 (ref 0.9–1.1)
LDLC SERPL CALC-MCNC: 30 MG/DL
LEFT ATRIUM VOLUME AREA LENGTH INDEX BSA: 43.3 ML/M2
LEFT VENTRICLE INTERNAL DIMENSION DIASTOLE: 4.5 CM (ref 3.5–6)
LEFT VENTRICULAR OUTFLOW TRACT DIAMETER: 2.1 CM
LV EJECTION FRACTION BIPLANE: 59 %
LYMPHOCYTES # BLD AUTO: 0.24 X10*3/UL (ref 0.8–3)
LYMPHOCYTES NFR BLD AUTO: 4.9 %
MAGNESIUM SERPL-MCNC: 1.7 MG/DL (ref 1.6–2.4)
MCH RBC QN AUTO: 31.4 PG (ref 26–34)
MCHC RBC AUTO-ENTMCNC: 32.9 G/DL (ref 32–36)
MCV RBC AUTO: 96 FL (ref 80–100)
MONOCYTES # BLD AUTO: 0.03 X10*3/UL (ref 0.05–0.8)
MONOCYTES NFR BLD AUTO: 0.6 %
NEUTROPHILS # BLD AUTO: 4.65 X10*3/UL (ref 1.6–5.5)
NEUTROPHILS NFR BLD AUTO: 94.1 %
NON HDL CHOLESTEROL: 39 MG/DL (ref 0–149)
NRBC BLD-RTO: 0 /100 WBCS (ref 0–0)
PLATELET # BLD AUTO: 326 X10*3/UL (ref 150–450)
POTASSIUM SERPL-SCNC: 4.1 MMOL/L (ref 3.5–5.3)
PROT SERPL-MCNC: 6.5 G/DL (ref 6.4–8.2)
PROTHROMBIN TIME: 59.9 SECONDS (ref 9.8–12.8)
Q ONSET: 217 MS
QRS COUNT: 16 BEATS
QRS DURATION: 108 MS
QT INTERVAL: 362 MS
QTC CALCULATION(BAZETT): 457 MS
QTC FREDERICIA: 423 MS
R AXIS: 29 DEGREES
RBC # BLD AUTO: 2.99 X10*6/UL (ref 4.5–5.9)
RIGHT VENTRICLE FREE WALL PEAK S': 20.7 CM/S
RIGHT VENTRICLE PEAK SYSTOLIC PRESSURE: 36.9 MMHG
SODIUM SERPL-SCNC: 135 MMOL/L (ref 136–145)
T AXIS: -22 DEGREES
T OFFSET: 398 MS
T3FREE SERPL-MCNC: 5.8 PG/ML (ref 2.3–4.2)
T4 FREE SERPL-MCNC: 4.5 NG/DL (ref 0.61–1.12)
TRICUSPID ANNULAR PLANE SYSTOLIC EXCURSION: 2.2 CM
TRIGL SERPL-MCNC: 41 MG/DL (ref 0–149)
TSH SERPL-ACNC: <0.01 MIU/L (ref 0.44–3.98)
VENTRICULAR RATE: 96 BPM
VLDL: 8 MG/DL (ref 0–40)
WBC # BLD AUTO: 4.9 X10*3/UL (ref 4.4–11.3)

## 2024-10-11 PROCEDURE — 80053 COMPREHEN METABOLIC PANEL: CPT | Performed by: INTERNAL MEDICINE

## 2024-10-11 PROCEDURE — 2500000004 HC RX 250 GENERAL PHARMACY W/ HCPCS (ALT 636 FOR OP/ED): Performed by: INTERNAL MEDICINE

## 2024-10-11 PROCEDURE — 93306 TTE W/DOPPLER COMPLETE: CPT | Performed by: INTERNAL MEDICINE

## 2024-10-11 PROCEDURE — 83735 ASSAY OF MAGNESIUM: CPT | Performed by: INTERNAL MEDICINE

## 2024-10-11 PROCEDURE — 93010 ELECTROCARDIOGRAM REPORT: CPT | Performed by: INTERNAL MEDICINE

## 2024-10-11 PROCEDURE — 93306 TTE W/DOPPLER COMPLETE: CPT

## 2024-10-11 PROCEDURE — 2500000004 HC RX 250 GENERAL PHARMACY W/ HCPCS (ALT 636 FOR OP/ED): Performed by: NURSE PRACTITIONER

## 2024-10-11 PROCEDURE — 36415 COLL VENOUS BLD VENIPUNCTURE: CPT | Performed by: INTERNAL MEDICINE

## 2024-10-11 PROCEDURE — 80061 LIPID PANEL: CPT | Performed by: INTERNAL MEDICINE

## 2024-10-11 PROCEDURE — 85025 COMPLETE CBC W/AUTO DIFF WBC: CPT | Performed by: INTERNAL MEDICINE

## 2024-10-11 PROCEDURE — 99233 SBSQ HOSP IP/OBS HIGH 50: CPT | Performed by: INTERNAL MEDICINE

## 2024-10-11 PROCEDURE — 94640 AIRWAY INHALATION TREATMENT: CPT

## 2024-10-11 PROCEDURE — 99223 1ST HOSP IP/OBS HIGH 75: CPT | Performed by: INTERNAL MEDICINE

## 2024-10-11 PROCEDURE — 93005 ELECTROCARDIOGRAM TRACING: CPT

## 2024-10-11 PROCEDURE — 84481 FREE ASSAY (FT-3): CPT | Mod: ELYLAB | Performed by: INTERNAL MEDICINE

## 2024-10-11 PROCEDURE — 2500000002 HC RX 250 W HCPCS SELF ADMINISTERED DRUGS (ALT 637 FOR MEDICARE OP, ALT 636 FOR OP/ED): Performed by: INTERNAL MEDICINE

## 2024-10-11 PROCEDURE — 85610 PROTHROMBIN TIME: CPT | Performed by: INTERNAL MEDICINE

## 2024-10-11 PROCEDURE — 97165 OT EVAL LOW COMPLEX 30 MIN: CPT | Mod: GO

## 2024-10-11 PROCEDURE — 2500000001 HC RX 250 WO HCPCS SELF ADMINISTERED DRUGS (ALT 637 FOR MEDICARE OP): Performed by: INTERNAL MEDICINE

## 2024-10-11 PROCEDURE — 84439 ASSAY OF FREE THYROXINE: CPT | Performed by: INTERNAL MEDICINE

## 2024-10-11 PROCEDURE — 97161 PT EVAL LOW COMPLEX 20 MIN: CPT | Mod: GP

## 2024-10-11 PROCEDURE — 1200000002 HC GENERAL ROOM WITH TELEMETRY DAILY

## 2024-10-11 PROCEDURE — 2500000002 HC RX 250 W HCPCS SELF ADMINISTERED DRUGS (ALT 637 FOR MEDICARE OP, ALT 636 FOR OP/ED): Performed by: NURSE PRACTITIONER

## 2024-10-11 PROCEDURE — 84443 ASSAY THYROID STIM HORMONE: CPT | Performed by: INTERNAL MEDICINE

## 2024-10-11 RX ORDER — MAGNESIUM SULFATE HEPTAHYDRATE 40 MG/ML
2 INJECTION, SOLUTION INTRAVENOUS ONCE
Status: COMPLETED | OUTPATIENT
Start: 2024-10-11 | End: 2024-10-11

## 2024-10-11 RX ORDER — FUROSEMIDE 10 MG/ML
20 INJECTION INTRAMUSCULAR; INTRAVENOUS EVERY 12 HOURS
Status: DISCONTINUED | OUTPATIENT
Start: 2024-10-11 | End: 2024-10-12

## 2024-10-11 RX ORDER — METOPROLOL SUCCINATE 50 MG/1
50 TABLET, EXTENDED RELEASE ORAL DAILY
Status: DISCONTINUED | OUTPATIENT
Start: 2024-10-12 | End: 2024-10-12

## 2024-10-11 RX ORDER — SPIRONOLACTONE 25 MG/1
12.5 TABLET ORAL DAILY
Status: DISCONTINUED | OUTPATIENT
Start: 2024-10-11 | End: 2024-10-13 | Stop reason: HOSPADM

## 2024-10-11 RX ORDER — METOPROLOL SUCCINATE 25 MG/1
25 TABLET, EXTENDED RELEASE ORAL DAILY
Status: DISCONTINUED | OUTPATIENT
Start: 2024-10-12 | End: 2024-10-11

## 2024-10-11 RX ORDER — VALSARTAN 160 MG/1
160 TABLET ORAL DAILY
Status: DISCONTINUED | OUTPATIENT
Start: 2024-10-12 | End: 2024-10-13 | Stop reason: HOSPADM

## 2024-10-11 ASSESSMENT — COGNITIVE AND FUNCTIONAL STATUS - GENERAL
DAILY ACTIVITIY SCORE: 24
DAILY ACTIVITIY SCORE: 24
MOBILITY SCORE: 23
MOBILITY SCORE: 24
DAILY ACTIVITIY SCORE: 24
MOBILITY SCORE: 24
CLIMB 3 TO 5 STEPS WITH RAILING: A LITTLE
MOBILITY SCORE: 23
CLIMB 3 TO 5 STEPS WITH RAILING: A LITTLE
DAILY ACTIVITIY SCORE: 24

## 2024-10-11 ASSESSMENT — PAIN - FUNCTIONAL ASSESSMENT
PAIN_FUNCTIONAL_ASSESSMENT: 0-10
PAIN_FUNCTIONAL_ASSESSMENT: 0-10

## 2024-10-11 ASSESSMENT — PAIN SCALES - GENERAL
PAINLEVEL_OUTOF10: 0 - NO PAIN

## 2024-10-11 ASSESSMENT — ACTIVITIES OF DAILY LIVING (ADL): BATHING_ASSISTANCE: INDEPENDENT

## 2024-10-11 NOTE — CONSULTS
Cardiology Consult Note      Date:   10/11/2024  Patient name:  Canelo Moore  Date of admission:  10/10/2024 12:54 PM  MRN:   [id number]  YOB: 1935  Time of Consult:  10:59 AM    Consulting Cardiologist: Dr. Roberto Vaca, APRN, CNP  Primary Cardiologist:  Dr. Yi Montejo    Referring Provider: Dr Cueva      Admission Diagnosis:     Acute congestive heart failure, unspecified heart failure type      History of Present Illness:      Canelo Moore is a 89 y.o.  male patient who is being at the request of Dr. Cueva for inpatient consultation of CHF. He was admitted on 10/10/2024.  Previous [facility] and [facility] records have been reviewed in detail.    Patient with a history of diastolic heart failure, persistent A-fib, hypertension, dyslipidemia  Patient states the last 2 weeks she has had increased shortness of breath having more difficulty lying flat whenever he exerts himself he felt like he was more short of breath.  He comes in with an elevated BNP and small bilateral pleural effusions.  Couple months ago he did have a PET scan and on the left and right side he did have nodules that he is seeing cardiothoracic surgery for.  He does have a history of PAF he is currently on Coumadin he sees Dr. Montejo in the past and he is taking amiodarone.  No fever, chills, orthopnea, claudications.  Positive for shortness of breath, fatigue, PND at times    EKGs A-fib QTc is 482  Magnesium 1.70    Troponin 11, 10    Cardiac history  4/25/2024 echo  CONCLUSIONS:   1. Left ventricular systolic function is normal with a 60% estimated ejection fraction.   2. Borderline LVH at 11-12mm with very mild DUST but no LVOT obstruction.   3. Spectral Doppler shows an abnormal pattern of left ventricular diastolic filling.   4. Normal RV size and function      Borderline RVSP elevation at 31 mm HG.   5. Mild LAE.   6. Normal valve structure and function.    4/24 stress test  Summary:   1. Low  functional capacity at 3.1 MET, limited by dyspnea, no chest pain.   2. No evidence ischemia by ecg criteria.   3. Markedly hypertensive BP response to exercise.   4. Frequent pvc/pac during recovery.   5. Achieved <85% THR at 82% but excellent HR/BP product, with no ecg changes likelihood of critical CAD low but cannot exclude the presence of CAD.   6. Inadequate level of stress achieved.         Allergies:     Allergies   Allergen Reactions    Ace Inhibitors Cough         Past Medical History:     Past Medical History:   Diagnosis Date    Atrial fibrillation (Multi)     BPH (benign prostatic hyperplasia)     CAD (coronary artery disease)     Controlled atrial fibrillation (Multi)     COPD (chronic obstructive pulmonary disease) (Multi)     Hyperlipidemia     Hypertension     Stage 3a chronic kidney disease (CKD) (Multi)        Past Surgical History:     Past Surgical History:   Procedure Laterality Date    OTHER SURGICAL HISTORY  11/04/2020    Varicose vein ligation    OTHER SURGICAL HISTORY  11/04/2020    [place] tooth extraction    OTHER SURGICAL HISTORY  11/04/2020    Tonsillectomy    OTHER SURGICAL HISTORY  11/04/2020    Colonoscopy complete for polypectomy    OTHER SURGICAL HISTORY  11/06/2021    Varicose vein sclerotherapy    OTHER SURGICAL HISTORY  11/06/2021    Pilonidal cyst removal    OTHER SURGICAL HISTORY  11/06/2021    Hernia repair    OTHER SURGICAL HISTORY  12/14/2021    Umbilical hernia repair    OTHER SURGICAL HISTORY  12/14/2021    Inguinal hernia repair    US ASPIRATION INJECTION INTERMEDIATE JOINT  2/8/2021    US ASPIRATION INJECTION INTERMEDIATE JOINT 2/8/2021 [facility]       Family History:     Family History   Problem Relation Name Age of Onset    Breast cancer Mother      Atrial fibrillation Mother      Colon cancer Brother      Prostate cancer Brother      Breast cancer Daughter      Prostate cancer Son      Colon cancer Son         Social History:     Social History     Tobacco  Use    Smoking status: Every Day     Current packs/day: 0.50     Average packs/day: 0.5 packs/day for 60.0 years (30.0 ttl pk-yrs)     Types: Cigarettes    Smokeless tobacco: Current   Substance Use Topics    Alcohol use: Not Currently    Drug use: Not Currently       CURRENT INPATIENT MEDICATIONS    amiodarone, 100 mg, oral, Daily  aspirin, 81 mg, oral, Daily  ezetimibe, 10 mg, oral, Daily  finasteride, 5 mg, oral, Daily  formoterol, 20 mcg, nebulization, BID  furosemide, 20 mg, intravenous, q12h  furosemide, 40 mg, intravenous, Once  ipratropium-albuteroL, 3 mL, nebulization, q6h  magnesium oxide, 400 mg, oral, BID  methylPREDNISolone sodium succinate (PF), 40 mg, intravenous, 4x daily  [START ON 10/12/2024] metoprolol succinate XL, 25 mg, oral, Daily  pantoprazole, 40 mg, oral, Daily before breakfast  rosuvastatin, 10 mg, oral, Nightly  sennosides-docusate sodium, 2 tablet, oral, BID  spironolactone, 12.5 mg, oral, Daily  tiotropium, 2 puff, inhalation, Daily  [START ON 10/12/2024] valsartan, 160 mg, oral, Daily  [Held by provider] warfarin, 4 mg, oral, Daily         Current Outpatient Medications   Medication Instructions    albuterol (Ventolin HFA) 90 mcg/actuation inhaler 2 puffs, inhalation, Every 6 hours PRN    amiodarone (PACERONE) 100 mg, oral, Daily    aspirin 81 mg EC tablet 1 tablet, oral, Daily    ezetimibe (ZETIA) 10 mg, oral, Daily (0630)    finasteride (Proscar) 5 mg tablet 1 tablet, oral, Daily    magnesium oxide (MAG-OX) 400 mg, oral, 2 times daily    metoprolol succinate XL (TOPROL-XL) 12.5 mg, oral, Daily    multivitamin (One Daily Multivitamin) tablet 1 tablet, oral, Daily    nitroglycerin (NITROSTAT) 0.4 mg, sublingual, Every 5 min PRN, place 1 tablet under tongue every 5 minutes for up to 3 doses as needed for chest pain. Call 911 if pain persists    rosuvastatin (CRESTOR) 10 mg, oral, Nightly    tiotropium (Spiriva Respimat) 2.5 mcg/actuation inhaler 2 puffs, inhalation, Daily    tiotropium  "(SPIRIVA WITH HANDIHALER) 18 mcg, inhalation, Daily RT    tiotropium-olodateroL (Stiolto Respimat) 2.5-2.5 mcg/actuation mist inhaler 2 Inhalations, inhalation, Daily    valsartan-hydrochlorothiazide (Diovan-HCT) 160-12.5 mg tablet 1 tablet, oral, Daily    warfarin (Coumadin) 5 mg tablet As directed to maintain therapeutic INR, number of tablets reflect a 90 day supply        Review of Systems:      12 point review of systems was obtained in detail and is negative other than that detailed above.    Vital Signs:     Vitals:    10/10/24 1735 10/10/24 1934 10/11/24 0448 10/11/24 0745   BP: 130/62 153/69 118/58 142/64   BP Location: Left arm Left arm     Patient Position: Sitting Lying     Pulse: 97 93 99 89   Resp: 18 18  17   Temp: 35.6 °C (96.1 °F) 36.5 °C (97.7 °F) 36.5 °C (97.7 °F) 36.4 °C (97.5 °F)   TempSrc: Temporal Temporal     SpO2: 96% 97% 96% 96%   Weight: 79.4 kg (175 lb 0.7 oz)      Height: 1.727 m (5' 7.99\")        No intake or output data in the 24 hours ending 10/11/24 1059    Wt Readings from Last 4 Encounters:   10/10/24 79.4 kg (175 lb 0.7 oz)   10/08/24 79.4 kg (175 lb)   08/27/24 82.1 kg (181 lb)   08/21/24 82.1 kg (181 lb)       Physical Examination:     GENERAL APPEARANCE: Well developed, well nourished, in no acute distress.  CHEST: Symmetric and non-tender.  INTEGUMENT: Skin warm and dry, without gross excoriationis or lesions.  HEENT: No gross abnormalities of conjunctiva, teeth, gums, oral mucosa  NECK: Supple, no JVD, no bruit. Thyroid not palpable. Carotid upstrokes normal.  NEURO/PSHCY: Alert and oriented x3; appropriate behavior and responses and responses, grossly normal cerebellar function with normal balance and coordination  LUNGS: Bilateral scattered Rales  HEART: S1, S2 irregular with 1 out of 6 systolic murmur  ABDOMEN: Soft, nontender, no palpable hepatosplenomegaly, no mases, no bruits. Abdominal aorta not noted to be enlarged.  MUSCULOSKELETAL: Ambulatory with normal tandem " gait.  EXTREMITIES: Warm with good color, no clubbing or cyanois. Trace edema  PERIPHERAL VASCULAR: Pulses present and equally palpable; 1+ throughout. No femoral bruits.      Lab:     CBC:   Results from last 7 days   Lab Units 10/11/24  0623 10/10/24  1328 10/08/24  1539   WBC AUTO x10*3/uL 4.9 7.5 7.5   RBC AUTO x10*6/uL 2.99* 2.89* 3.23*   HEMOGLOBIN g/dL 9.4* 9.3* 10.3*   HEMATOCRIT % 28.6* 28.0* 31.4*   MCV fL 96 97 97   MCH pg 31.4 32.2 31.9   MCHC g/dL 32.9 33.2 32.8   RDW % 13.1 13.1 13.1   PLATELETS AUTO x10*3/uL 326 293 305     CMP:    Results from last 7 days   Lab Units 10/11/24  0623 10/10/24  1328 10/08/24  1539   SODIUM mmol/L 135* 135* 133*   POTASSIUM mmol/L 4.1 3.9 4.1   CHLORIDE mmol/L 99 99 98   CO2 mmol/L 27 29 28   BUN mg/dL 21 16 20   CREATININE mg/dL 1.05 1.00 1.10   GLUCOSE mg/dL 170* 99 106*   PROTEIN TOTAL g/dL 6.5 6.4 6.7   CALCIUM mg/dL 9.0 9.1 9.1   BILIRUBIN TOTAL mg/dL 0.7 0.8 0.7   ALK PHOS U/L 71 78 80   AST U/L 16 19 19   ALT U/L 15 16 17     BMP:    Results from last 7 days   Lab Units 10/11/24  0623 10/10/24  1328 10/08/24  1539   SODIUM mmol/L 135* 135* 133*   POTASSIUM mmol/L 4.1 3.9 4.1   CHLORIDE mmol/L 99 99 98   CO2 mmol/L 27 29 28   BUN mg/dL 21 16 20   CREATININE mg/dL 1.05 1.00 1.10   CALCIUM mg/dL 9.0 9.1 9.1   GLUCOSE mg/dL 170* 99 106*     Magnesium:  Results from last 7 days   Lab Units 10/11/24  0623 10/10/24  1328 10/08/24  1539   MAGNESIUM mg/dL 1.70 1.72 1.69     Troponin:    Results from last 7 days   Lab Units 10/10/24  1439 10/10/24  1328 10/08/24  1624   TROPHS ng/L 10 11 13     BNP:   Results from last 7 days   Lab Units 10/10/24  1328   BNP pg/mL 355*     Lipid Panel:  Results from last 7 days   Lab Units 10/11/24  0623   HDL mg/dL 32.5   CHOLESTEROL/HDL RATIO  2.2   VLDL mg/dL 8   TRIGLYCERIDES mg/dL 41   NON HDL CHOL. mg/dL 39        Diagnostic Studies:   @No results found for this or any previous visit.    ECG 12 lead    Result Date: 10/10/2024  Atrial  fibrillation Abnormal QRS-T angle, consider primary T wave abnormality Prolonged QT Abnormal ECG When compared with ECG of 10-OCT-2024 13:03, (unconfirmed) No significant change was found See ED provider note for full interpretation and clinical correlation    XR chest 1 view    Result Date: 10/10/2024  Interpreted By:  Ronak Urbina, STUDY: XR CHEST 1 VIEW  10/10/2024 1:41 pm   INDICATION: Signs/Symptoms:Chest Pain   COMPARISON: 10/08/2024   ACCESSION NUMBER(S): [id number]   ORDERING CLINICIAN: ALEXUS GEE   TECHNIQUE: A single AP portable radiograph of the chest was obtained.   FINDINGS: Mild-to-moderate diffuse interstitial infiltrates are seen throughout the lungs bilaterally, and may represent fibrosis, edema and/or pneumonia. Bibasilar airspace consolidations are seen and may represent small pleural effusions, atelectasis and/or pneumonia. No pneumothorax is identified. The cardiac silhouette is within normal limits for size.       Diffuse interstitial infiltrates and bibasilar airspace consolidations, as above. Clinical correlation and continued follow-up until clearing is recommended.   MACRO: None.   Signed by: Ronak Urbina 10/10/2024 1:57 PM Dictation workstation:   [id number]    ECG 12 lead    Result Date: 10/10/2024  Atrial fibrillation Abnormal QRS-T angle, consider primary T wave abnormality Prolonged QT Abnormal ECG When compared with ECG of 08-OCT-2024 15:21, No significant change was found      No echocardiogram results found for the past 14 days    Radiology:     XR chest 1 view   Final Result   Diffuse interstitial infiltrates and bibasilar airspace   consolidations, as above. Clinical correlation and continued   follow-up until clearing is recommended.        MACRO:   None.        Signed by: Ronak rUbina 10/10/2024 1:57 PM   Dictation workstation:   [id number]      Transthoracic Echo (TTE) Complete    (Results Pending)       Problem List:     Patient Active Problem List   Diagnosis     CAD (coronary artery disease)    Benign prostatic hyperplasia    Bradycardia    Current smoker    Elevated serum free T4 level    Emphysema/COPD (Multi)    External hemorrhoid    Hyperlipidemia    Hypertriglyceridemia    White coat syndrome with hypertension    Insomnia, idiopathic    Low back pain    Olecranon bursitis    Paroxysmal atrial fibrillation (Multi)    PVC's (premature ventricular contractions)    Right inguinal hernia    Sacroiliitis (CMS-HCC)    Umbilical hernia    High risk medication use    Overweight    Stage 3a chronic kidney disease (CKD) (Multi)    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)    Hypertensive heart disease with heart failure    Hypercoagulable state due to atrial fibrillation, unspecified type (Multi)    Shortness of breath    Abnormal CT of the chest    Acute congestive heart failure, unspecified heart failure type       Assessment:   Acute on chronic diastolic heart failure NYHA class II  History of CAD 2016 heart catheterization 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 Natalia Vaca CNP  [facility]    CARDIOLOGIST NOTE  I have personally seen and examined patient as well as personally formulated treatment plan.  I have reviewed this note as created by NICOLASA Ibarra, CNP and agree with his findings and physical exam.    89-year-old with history of coronary disease remote angiography showing chronic RCA occlusion GXT in April of this year without inducible ischemia.  He had  frequent PVCs and paroxysmal atrial fibrillation in the past hence low-dose amiodarone.  As of April he was in sinus rhythm.  He said over the last probably 3 to 4 weeks he has had increasing shortness of breath and was aware he was back in atrial fibrillation.  Heart rates were somewhat fast on occasion and irregular and he just did not feel quite right.  No chest tightness pressure heaviness.  No dizziness, lightheadedness or syncope.  No lower extremity edema.  He does tire much easier and has very little energy.    Exam: Generally quite comfortable appearing slightly younger than his stated age.  Skin is warm and well-perfused lungs are clear cardiac exam shows an irregular irregular rhythm without murmur appreciated abdomen is soft and nontender extremities show no cyanosis clubbing or edema pulses are 2+    ECG with atrial fibrillation no acute ST change  Echo pending at time of this dictation  Troponins negative    89-year-old male with the following past cardiac history    Paroxysmal atrial fibrillation very low burden as of April was in sinus rhythm.  Hypertension  Coronary disease with known chronic RCA occlusion  Hyperlipidemia  Varicose vein  April 2024 GXT 3.1 Met no ischemia by ECG.  Markedly hypertensive blood pressure response.  Sinus rhythm throughout  Echo April 2024 EF 60%, LVH, normal valves RVSP 31  Chronic tobacco abuse  Amiodarone 100 mg daily had been used for PVC suppression well as A-fib but up until recently very low burden    ECG 10/8/2024 atrial fibrillation rate 106 no acute ST change  ECG October 9, 10 and 11 all consistent with A-fib rates in the 90s no acute ST change QTc 450    Impression  Recent onset atrial fibrillation while on chronic amiodarone.  In light of significant hyperthyroidism related to low-dose amiodarone would be reluctant to increase amiodarone further.  Therefore we will discontinue and rate control for now and consider sotalol at a later date as renal function is  normal QTc is been normal even with Amio.  Continue anticoagulation  Acute on chronic diastolic CHF to obtain echo to assure he has not developed systolic dysfunction gentle diuresis  Coronary artery disease: May need assessment for coronary disease although despite mild CHF troponins are negative there is no evidence to suggest an acute cardiac plaque rupture event.  Drug-induced hyperthyroidism: Would consider endocrine consult will eventually resolve when amiodarone discontinued.  Depending on his response to rate control strategy may be able to simply pursue rate control.  Roberto Fisher MD      Lab review with normal troponins  elevated free T4 and marked decreased TSH cholesterol 71 HDL 33 LDL 30      Of note, this documentation is completed using the Dragon Dictation system (voice recognition software). There may be spelling and/or grammatical errors that were not corrected prior to final submission.      Electronically signed by ARLIN Huff, on 10/11/2024 at 10:59 AM

## 2024-10-11 NOTE — PROGRESS NOTES
10/11/24 1646   Discharge Planning   Living Arrangements Children  (Lives in law suite on his son & DIL property)   Support Systems Spouse/significant other;Children;Family members   Type of Residence Private residence   Who is requesting discharge planning? Provider   Expected Discharge Disposition Home   Does the patient need discharge transport arranged? No     Met with pt who denies dc needs. No DME used. Has friend that cooks for him or goes out to eat. Drives. [facility] PT [facility]. Pref is for dc home. Cardiology on consult for CHF.

## 2024-10-11 NOTE — CONSULTS
"Nutrition Initial Assessment:   Nutrition Assessment         Patient is a 89 y.o. male presenting with acute congestive heart failure      Nutrition History:  Energy Intake: Fair 50-75 %  Food and Nutrient History: RD consulted for MST = 2, \"recent weight loss\". Pt reports for the past 3 weeks has had reduced appetite/intakes 2/2 feeling unwell, attributes this to afib. Still able to eat at least 2, if not 3 meals per day though portion sizes are smaller than at baseline. Has not tried having snacks between meals or using oral nutritional supplements at home. Is agreeable to continue cardiac diet though denies need for oral nutritional supplements. Is agreeable to try small, frequent meals.  Vitamin/Herbal Supplement Use: Mag-Ox, multivitamin  Food Allergies/Intolerances:  None  GI Symptoms: None  Oral Problems: None       Anthropometrics:  Height: 172.7 cm (5' 7.99\")   Weight: 79.4 kg (175 lb 0.7 oz)   BMI (Calculated): 26.62             Weight History:     Wt Readings from Last 10 Encounters:   10/10/24 79.4 kg (175 lb 0.7 oz)   10/08/24 79.4 kg (175 lb)   08/27/24 82.1 kg (181 lb)   08/21/24 82.1 kg (181 lb)   08/01/24 82.8 kg (182 lb 9.6 oz)   07/10/24 83.5 kg (184 lb)   04/25/24 85.9 kg (189 lb 6.4 oz)   03/13/24 86.2 kg (190 lb)   11/14/23 83.9 kg (185 lb)   10/16/23 83.5 kg (184 lb)         Weight Change %:  Weight History / % Weight Change: No significant wt loss noted based on current wt of 175 lbs. Pt thinks current wt is stated weight, though weight in EPIC appears actual. Requested RN re-weigh pt when able.  Significant Weight Loss: No (based on current recorded wt)    Nutrition Focused Physical Exam Findings:    Subcutaneous Fat Loss:   Orbital Fat Pads: Well nourished (slightly bulging fat pads)  Buccal Fat Pads: Well nourished (full, rounded cheeks)  Triceps: Well nourished (ample fat tissue)  Ribs: Defer  Muscle Wasting:  Temporalis: Severe (hollowed scooping depression)  Pectoralis (Clavicular " Region): Well nourished (clavicle not visible)  Deltoid/Trapezius: Well nourished (rounded appearance at arm, shoulder, neck)  Interosseous: Well nourished (muscle bulges)  Trapezius/Infraspinatus/Supraspinatus (Scapular Region): Defer  Quadriceps: Defer  Gastrocnemius: Defer  Edema:  Edema Location: non-pitting BLE per nursing assessment  Physical Findings:  Skin: Negative (for pressure injuries)    Nutrition Significant Labs:  BMP Trend:   Results from last 7 days   Lab Units 10/11/24  0623 10/10/24  1328 10/08/24  1539   GLUCOSE mg/dL 170* 99 106*   CALCIUM mg/dL 9.0 9.1 9.1   SODIUM mmol/L 135* 135* 133*   POTASSIUM mmol/L 4.1 3.9 4.1   CO2 mmol/L 27 29 28   CHLORIDE mmol/L 99 99 98   BUN mg/dL 21 16 20   CREATININE mg/dL 1.05 1.00 1.10        Nutrition Specific Medications:  Reviewed    I/O:    ;      Dietary Orders (From admission, onward)       Start     Ordered    10/10/24 1727  Adult diet Regular, Cardiac, 2-3 grams sodium; 70 gm fat; 2 - 3 grams Sodium  Diet effective now        Question Answer Comment   Diet type Regular    Diet type Cardiac    Diet type 2-3 grams sodium    Fat restriction: 70 gm fat    Sodium restriction: 2 - 3 grams Sodium        10/10/24 1729                     Estimated Needs:   Total Energy Estimated Needs (kCal): 1985 kCal  Method for Estimating Needs: 25 kcal/kg  Total Protein Estimated Needs (g): 95 g  Method for Estimating Needs: 1.2 g/kg  Total Fluid Estimated Needs (mL): 1985 mL  Method for Estimating Needs: 1 ml/kcal or per MD        Nutrition Diagnosis   Malnutrition Diagnosis  Patient has Malnutrition Diagnosis: No    Nutrition Diagnosis  Patient has Nutrition Diagnosis: Yes  Diagnosis Status (1): New  Nutrition Diagnosis 1: Decreased nutrient needs  Related to (1): cardiac dysfunction  As Evidenced by (1): need for therapeutic diet       Nutrition Interventions/Recommendations         Nutrition Prescription:  Individualized Nutrition Prescription Provided for : Cardiac  diet        Nutrition Interventions:   Interventions: Meals and snacks  Goal: Consumes 3 meals per day    Collaboration and Referral of Nutrition Care: Collaboration by nutrition professional with other providers  Goal: RN; nursing student    Nutrition Education:      Denies need for diet education    Nutrition Monitoring and Evaluation   Food/Nutrient Related History Monitoring  Monitoring and Evaluation Plan: Energy intake, Amount of food, Fluid intake  Energy Intake: Estimated energy intake  Criteria: Pt meets >75% of estimated energy needs  Fluid Intake: Estimated fluid intake  Criteria: Meets >75% of estimated fluid needs  Amount of Food: Estimated amout of food  Criteria: Pt consumes >75% of meals    Body Composition/Growth/Weight History  Monitoring and Evaluation Plan: Weight  Weight: Measured weight  Criteria: Maintains stable weight    Biochemical Data, Medical Tests and Procedures  Monitoring and Evaluation Plan: Glucose/endocrine profile, Electrolyte/renal panel  Electrolyte and Renal Panel: Sodium, Potassium, Phosphorus  Criteria: Electrolytes WNL  Glucose/Endocrine Profile: Glucose, casual  Criteria: BG within desirable range              Time Spent (min): 45 minutes

## 2024-10-11 NOTE — CARE PLAN
The patient's goals for the shift include      The clinical goals for the shift include not bleed      Problem: Skin  Goal: Decreased wound size/increased tissue granulation at next dressing change  Outcome: Progressing  Goal: Participates in plan/prevention/treatment measures  Outcome: Progressing  Goal: Prevent/manage excess moisture  Outcome: Progressing  Goal: Prevent/minimize sheer/friction injuries  Outcome: Progressing  Goal: Promote/optimize nutrition  Outcome: Progressing  Goal: Promote skin healing  Outcome: Progressing     Problem: Pain - Adult  Goal: Verbalizes/displays adequate comfort level or baseline comfort level  Outcome: Progressing     Problem: Safety - Adult  Goal: Free from fall injury  Outcome: Progressing     Problem: Discharge Planning  Goal: Discharge to home or other facility with appropriate resources  Outcome: Progressing     Problem: Chronic Conditions and Co-morbidities  Goal: Patient's chronic conditions and co-morbidity symptoms are monitored and maintained or improved  Outcome: Progressing     Problem: Pain  Goal: Takes deep breaths with improved pain control throughout the shift  Outcome: Progressing  Goal: Turns in bed with improved pain control throughout the shift  Outcome: Progressing  Goal: Walks with improved pain control throughout the shift  Outcome: Progressing  Goal: Performs ADL's with improved pain control throughout shift  Outcome: Progressing  Goal: Participates in PT with improved pain control throughout the shift  Outcome: Progressing  Goal: Free from opioid side effects throughout the shift  Outcome: Progressing  Goal: Free from acute confusion related to pain meds throughout the shift  Outcome: Progressing

## 2024-10-11 NOTE — CARE PLAN
The patient's goals for the shift include      The clinical goals for the shift include not bleed    Over the shift, the patient did not make progress toward the following goals. Barriers to progression include . Recommendations to address these barriers include .

## 2024-10-11 NOTE — PROGRESS NOTES
Physical Therapy    Physical Therapy Evaluation    Patient Name: Canelo Moore  MRN: [id number]  Today's Date: 10/11/2024      1115/1115-A    Assessment/Plan   PT Assessment  End of Session Communication: Bedside nurse  Assessment Comment: PT eval only pt IND / at his baseline no PT needs.  End of Session Patient Position: Up in chair, Alarm off, not on at start of session  IP OR SWING BED PT PLAN  Inpatient or Swing Bed: Inpatient  PT Plan  PT Plan: PT Eval only  PT Frequency: PT eval only  PT Recommended Transfer Status: Independent  PT - OK to Discharge: Yes    Subjective     Current Problem:  1. Acute congestive heart failure, unspecified heart failure type        2. Atrial fibrillation, unspecified type (Multi)        3. Anemia, unspecified type        4. Shortness of breath  Transthoracic Echo (TTE) Complete    Transthoracic Echo (TTE) Complete      5. Hypertensive heart disease with heart failure  Transthoracic Echo (TTE) Complete    Transthoracic Echo (TTE) Complete            General Visit Information:  General  Reason for Referral: impaired mobility  Referred By: OT/PT Anay 10/10  Past Medical History Relevant to Rehab: COPD,HLD,HTN,CAD,CHF,Afib,BPH,CKD,Emphysema,  Co-Treatment: OT  Co-Treatment Reason: to maximize pt safety  Prior to Session Communication: Bedside nurse (cleared to see for therapy eval.)  Patient Position Received: Up in chair, Alarm off, not on at start of session (pt  has tele)  General Comment: pt is an 89 yo male came to the hospital on 10/10 with reports of not feeling well and being in and out of A-fib.  test/ labs : INR :5.2, HGB: 9.4, , CXR: diffuse interstitual infiltrates bibasilar airspace consolidation.  Home Living:  Home Living  Home Living Comments: pt lives alone in an apartment connected to his sons house.  all one level has 3 steps with rails to enter.  pt has a  walk in shower with grab bars.    Prior Level of Function:  Prior Function Per Pt/Caregiver  Report  Prior Function Comments: per pt IND with mobility and adls.  family and his friend help with iadls.  no falls still drives .    Precautions:  Precautions  Medical Precautions: No known precautions/limitation  Objective     Pain:  Pain Assessment  Pain Assessment: 0-10  0-10 (Numeric) Pain Score: 0 - No pain  Cognition:  Cognition  Overall Cognitive Status: Within Functional Limits  General Assessments:    Activity Tolerance  Endurance: Endurance does not limit participation in activity  Sensation  Sensation Comment: intact BLEs  Strength  Strength Comments: BLE 5/5     Coordination  Movements are Fluid and Coordinated: Yes    Static Sitting Balance  Static Sitting-Comment/Number of Minutes: good  Dynamic Sitting Balance  Dynamic Sitting-Comments: good  Static Standing Balance  Static Standing-Comment/Number of Minutes: good  Dynamic Standing Balance  Dynamic Standing-Comments: good  Functional Assessments:     Bed Mobility  Bed Mobility: No  Transfers  Transfer: Yes  Transfer 1  Technique 1: Sit to stand, Stand to sit  Transfer Device 1:  (no A/D)  Transfer Level of Assistance 1: Independent  Trials/Comments 1: IND No A/D  Ambulation/Gait Training  Ambulation/Gait Training Performed: Yes  Ambulation/Gait Training 1  Surface 1: Level tile  Device 1: Rolling walker  Assistance 1: Independent  Quality of Gait 1:  (normal gait speed and stride)  Comments/Distance (ft) 1: 30 ft x 2 with no A/D IND    Extremity/Trunk Assessments:  RLE   RLE : Within Functional Limits  LLE   LLE : Within Functional Limits  Outcome Measures:     [facility] Basic Mobility  Turning from your back to your side while in a flat bed without using bedrails: None  Moving from lying on your back to sitting on the side of a flat bed without using bedrails: None  Moving to and from bed to chair (including a wheelchair): None  Standing up from a chair using your arms (e.g. wheelchair or bedside chair): None  To walk in hospital room: None  Climbing  3-5 steps with railing: None  Basic Mobility - Total Score: 24

## 2024-10-11 NOTE — PROGRESS NOTES
Occupational Therapy    Evaluation    Patient Name: Canelo Moore  MRN: [id number]  Department: [facility]  Room: [address] [place] [address]  Today's Date: 10/11/2024  Time Calculation  Start Time: 1134  Stop Time: 1142  Time Calculation (min): 8 min      Assessment:  OT Assessment: Independent. No acute OT needs  Prognosis: Good  Evaluation/Treatment Tolerance: Patient tolerated treatment well  End of Session Communication: Bedside nurse  End of Session Patient Position: Alarm off, not on at start of session, Up in chair  Prognosis: Good  Evaluation/Treatment Tolerance: Patient tolerated treatment well  Plan:  No Skilled OT: No acute OT goals identified  OT Frequency: OT eval only  OT Discharge Recommendations: No further acute OT  OT - OK to Discharge: Yes (when medically stable/cleared)     Subjective   Current Problem:  1. Acute congestive heart failure, unspecified heart failure type        2. Atrial fibrillation, unspecified type (Multi)        3. Anemia, unspecified type        4. Shortness of breath  Transthoracic Echo (TTE) Complete    Transthoracic Echo (TTE) Complete      5. Hypertensive heart disease with heart failure  Transthoracic Echo (TTE) Complete    Transthoracic Echo (TTE) Complete        General:  General  Reason for Referral: ADL impairment  Referred By: Anay 10/10, OT/PT  Past Medical History Relevant to Rehab: insomnia, smoker, A-fib, CKD, HTN, HLD, COPD, CAD, A-fib  Family/Caregiver Present: No  Co-Treatment: PT  Co-Treatment Reason: to maximize pt. safety  Prior to Session Communication: Bedside nurse  Patient Position Received: Up in chair, Alarm off, not on at start of session  General Comment: Pt.is 88 y/o male to ED with SOB and in and out of A-fib. dx: A-fib, anemia, CHF CXR: interstitial infiltrates and bibasilar airspace, INR 5.2, ,Na 135  Precautions:  Precautions Comment: INR elevated. RN states okay to see pt. as pt. moves well. Increased precaution when seeing pt. due to bleedig  risk    Pain:  Pain Assessment  Pain Assessment: 0-10  0-10 (Numeric) Pain Score: 0 - No pain    Objective   Cognition:  Overall Cognitive Status: Within Functional Limits    Home Living:  Home Living Comments: Pt. lives in father in law suite in sons house. 3 ASIM with HRs. single story set up  with walk in shower and has grab bars.  Prior Function:  Prior Function Comments: Pt. is independent with ADLs/IADLs. No AD use. has friend assist with meals or he goes out to eat. No falls. Drives    ADL:  Eating Assistance: Independent  Grooming Assistance: Independent  Bathing Assistance: Independent  UE Dressing Assistance: Independent  LE Dressing Assistance: Independent  Toileting Assistance with Device: Independent  Activity Tolerance:  Endurance: Endurance does not limit participation in activity  Activity Tolerance Comments: WNL  Bed Mobility/Transfers: Bed Mobility  Bed Mobility: No    Transfers  Transfer: Yes  Transfer 1  Technique 1: Sit to stand, Stand to sit  Transfer Level of Assistance 1: Independent  Trials/Comments 1: No AD use.    Functional Mobility:  Functional Mobility  Functional Mobility Performed:  (~30 feet. No AD. Independent)    Standing Balance:  Dynamic Standing Balance  Dynamic Standing-Comments: Good dyn standing balance     Strength:  Strength Comments: Syed 5/5 MMT    Coordination:  Movements are Fluid and Coordinated: Yes  Coordination Comment: WNl   Hand Function:  Gross Grasp: Functional  Extremities: RUE   RUE : Within Functional Limits and LUE   LUE: Within Functional Limits    Outcome Measures:[facility] Daily Activity  Putting on and taking off regular lower body clothing: None  Bathing (including washing, rinsing, drying): None  Putting on and taking off regular upper body clothing: None  Toileting, which includes using toilet, bedpan or urinal: None  Taking care of personal grooming such as brushing teeth: None  Eating Meals: None  Daily Activity - Total Score: 24    Education  Documentation  ADL Training, taught by Candelaria Bess, OT at 10/11/2024 12:36 PM.  Learner: Patient  Readiness: Acceptance  Method: Explanation  Response: Verbalizes Understanding

## 2024-10-12 ENCOUNTER — APPOINTMENT (OUTPATIENT)
Dept: CARDIOLOGY | Facility: HOSPITAL | Age: 89
DRG: 291 | End: 2024-10-12
Payer: MEDICARE

## 2024-10-12 LAB
ALBUMIN SERPL BCP-MCNC: 3.1 G/DL (ref 3.4–5)
ALP SERPL-CCNC: 64 U/L (ref 33–136)
ALT SERPL W P-5'-P-CCNC: 15 U/L (ref 10–52)
ANION GAP SERPL CALC-SCNC: 13 MMOL/L (ref 10–20)
AST SERPL W P-5'-P-CCNC: 16 U/L (ref 9–39)
ATRIAL RATE: 202 BPM
ATRIAL RATE: 214 BPM
BASOPHILS # BLD AUTO: 0.01 X10*3/UL (ref 0–0.1)
BASOPHILS NFR BLD AUTO: 0.1 %
BILIRUB SERPL-MCNC: 0.6 MG/DL (ref 0–1.2)
BUN SERPL-MCNC: 31 MG/DL (ref 6–23)
CALCIUM SERPL-MCNC: 8.8 MG/DL (ref 8.6–10.3)
CHLORIDE SERPL-SCNC: 96 MMOL/L (ref 98–107)
CO2 SERPL-SCNC: 29 MMOL/L (ref 21–32)
CREAT SERPL-MCNC: 1.11 MG/DL (ref 0.5–1.3)
EGFRCR SERPLBLD CKD-EPI 2021: 64 ML/MIN/1.73M*2
EOSINOPHIL # BLD AUTO: 0 X10*3/UL (ref 0–0.4)
EOSINOPHIL NFR BLD AUTO: 0 %
ERYTHROCYTE [DISTWIDTH] IN BLOOD BY AUTOMATED COUNT: 13.4 % (ref 11.5–14.5)
GLUCOSE SERPL-MCNC: 158 MG/DL (ref 74–99)
HCT VFR BLD AUTO: 28 % (ref 41–52)
HGB BLD-MCNC: 9.3 G/DL (ref 13.5–17.5)
HOLD SPECIMEN: NORMAL
IMM GRANULOCYTES # BLD AUTO: 0.08 X10*3/UL (ref 0–0.5)
IMM GRANULOCYTES NFR BLD AUTO: 0.5 % (ref 0–0.9)
INR PPP: 7.4 (ref 0.9–1.1)
LYMPHOCYTES # BLD AUTO: 0.34 X10*3/UL (ref 0.8–3)
LYMPHOCYTES NFR BLD AUTO: 2.2 %
MAGNESIUM SERPL-MCNC: 2.17 MG/DL (ref 1.6–2.4)
MCH RBC QN AUTO: 31.7 PG (ref 26–34)
MCHC RBC AUTO-ENTMCNC: 33.2 G/DL (ref 32–36)
MCV RBC AUTO: 96 FL (ref 80–100)
MONOCYTES # BLD AUTO: 0.96 X10*3/UL (ref 0.05–0.8)
MONOCYTES NFR BLD AUTO: 6.1 %
NEUTROPHILS # BLD AUTO: 14.42 X10*3/UL (ref 1.6–5.5)
NEUTROPHILS NFR BLD AUTO: 91.1 %
NRBC BLD-RTO: 0 /100 WBCS (ref 0–0)
PLATELET # BLD AUTO: 314 X10*3/UL (ref 150–450)
POTASSIUM SERPL-SCNC: 3.8 MMOL/L (ref 3.5–5.3)
PROT SERPL-MCNC: 6.2 G/DL (ref 6.4–8.2)
PROTHROMBIN TIME: 85.7 SECONDS (ref 9.8–12.8)
Q ONSET: 216 MS
Q ONSET: 216 MS
QRS COUNT: 15 BEATS
QRS COUNT: 16 BEATS
QRS DURATION: 100 MS
QRS DURATION: 110 MS
QT INTERVAL: 378 MS
QT INTERVAL: 392 MS
QTC CALCULATION(BAZETT): 482 MS
QTC CALCULATION(BAZETT): 484 MS
QTC FREDERICIA: 445 MS
QTC FREDERICIA: 452 MS
R AXIS: 61 DEGREES
R AXIS: 67 DEGREES
RBC # BLD AUTO: 2.93 X10*6/UL (ref 4.5–5.9)
SODIUM SERPL-SCNC: 134 MMOL/L (ref 136–145)
T AXIS: -5 DEGREES
T AXIS: -9 DEGREES
T OFFSET: 405 MS
T OFFSET: 412 MS
VENTRICULAR RATE: 92 BPM
VENTRICULAR RATE: 98 BPM
WBC # BLD AUTO: 15.8 X10*3/UL (ref 4.4–11.3)

## 2024-10-12 PROCEDURE — 1200000002 HC GENERAL ROOM WITH TELEMETRY DAILY

## 2024-10-12 PROCEDURE — 2500000001 HC RX 250 WO HCPCS SELF ADMINISTERED DRUGS (ALT 637 FOR MEDICARE OP): Performed by: INTERNAL MEDICINE

## 2024-10-12 PROCEDURE — 93010 ELECTROCARDIOGRAM REPORT: CPT | Performed by: INTERNAL MEDICINE

## 2024-10-12 PROCEDURE — 2500000002 HC RX 250 W HCPCS SELF ADMINISTERED DRUGS (ALT 637 FOR MEDICARE OP, ALT 636 FOR OP/ED): Performed by: NURSE PRACTITIONER

## 2024-10-12 PROCEDURE — 36415 COLL VENOUS BLD VENIPUNCTURE: CPT | Performed by: INTERNAL MEDICINE

## 2024-10-12 PROCEDURE — 80053 COMPREHEN METABOLIC PANEL: CPT | Performed by: INTERNAL MEDICINE

## 2024-10-12 PROCEDURE — 2500000002 HC RX 250 W HCPCS SELF ADMINISTERED DRUGS (ALT 637 FOR MEDICARE OP, ALT 636 FOR OP/ED): Performed by: INTERNAL MEDICINE

## 2024-10-12 PROCEDURE — 2500000004 HC RX 250 GENERAL PHARMACY W/ HCPCS (ALT 636 FOR OP/ED): Performed by: NURSE PRACTITIONER

## 2024-10-12 PROCEDURE — 94640 AIRWAY INHALATION TREATMENT: CPT

## 2024-10-12 PROCEDURE — 99233 SBSQ HOSP IP/OBS HIGH 50: CPT | Performed by: INTERNAL MEDICINE

## 2024-10-12 PROCEDURE — 83735 ASSAY OF MAGNESIUM: CPT | Performed by: INTERNAL MEDICINE

## 2024-10-12 PROCEDURE — 2500000004 HC RX 250 GENERAL PHARMACY W/ HCPCS (ALT 636 FOR OP/ED): Performed by: INTERNAL MEDICINE

## 2024-10-12 PROCEDURE — 85610 PROTHROMBIN TIME: CPT | Performed by: INTERNAL MEDICINE

## 2024-10-12 PROCEDURE — 93005 ELECTROCARDIOGRAM TRACING: CPT

## 2024-10-12 PROCEDURE — 85025 COMPLETE CBC W/AUTO DIFF WBC: CPT | Performed by: INTERNAL MEDICINE

## 2024-10-12 RX ORDER — METOPROLOL SUCCINATE 50 MG/1
100 TABLET, EXTENDED RELEASE ORAL DAILY
Status: DISCONTINUED | OUTPATIENT
Start: 2024-10-13 | End: 2024-10-13 | Stop reason: HOSPADM

## 2024-10-12 RX ORDER — IPRATROPIUM BROMIDE AND ALBUTEROL SULFATE 2.5; .5 MG/3ML; MG/3ML
3 SOLUTION RESPIRATORY (INHALATION) 3 TIMES DAILY
Status: DISCONTINUED | OUTPATIENT
Start: 2024-10-13 | End: 2024-10-13 | Stop reason: HOSPADM

## 2024-10-12 RX ORDER — IPRATROPIUM BROMIDE AND ALBUTEROL SULFATE 2.5; .5 MG/3ML; MG/3ML
3 SOLUTION RESPIRATORY (INHALATION) EVERY 6 HOURS PRN
Status: DISCONTINUED | OUTPATIENT
Start: 2024-10-12 | End: 2024-10-13 | Stop reason: HOSPADM

## 2024-10-12 RX ORDER — AMOXICILLIN 250 MG
2 CAPSULE ORAL 2 TIMES DAILY PRN
Status: DISCONTINUED | OUTPATIENT
Start: 2024-10-12 | End: 2024-10-13 | Stop reason: HOSPADM

## 2024-10-12 RX ORDER — METOPROLOL SUCCINATE 50 MG/1
50 TABLET, EXTENDED RELEASE ORAL ONCE
Status: COMPLETED | OUTPATIENT
Start: 2024-10-12 | End: 2024-10-12

## 2024-10-12 RX ORDER — METHIMAZOLE 5 MG/1
20 TABLET ORAL EVERY 12 HOURS SCHEDULED
Status: DISCONTINUED | OUTPATIENT
Start: 2024-10-12 | End: 2024-10-13 | Stop reason: HOSPADM

## 2024-10-12 ASSESSMENT — COGNITIVE AND FUNCTIONAL STATUS - GENERAL
DAILY ACTIVITIY SCORE: 24
MOBILITY SCORE: 24
DAILY ACTIVITIY SCORE: 24
MOBILITY SCORE: 23
CLIMB 3 TO 5 STEPS WITH RAILING: A LITTLE

## 2024-10-12 ASSESSMENT — PAIN SCALES - GENERAL
PAINLEVEL_OUTOF10: 0 - NO PAIN
PAINLEVEL_OUTOF10: 0 - NO PAIN

## 2024-10-12 NOTE — H&P
ADMISSION DATE: 10/10/2024     CHIEF COMPLAINT:  Increased tiredness and shortness of breath    HISTORY OF PRESENT ILLNESS.:   Canelo Moore is a 89 y.o. male with history of hypertension, hyperlipidemia chronic heavy smoking, atrial fibrillation currently on amiodarone and Coumadin came to the emergency department for second time with a similar complaint of lack appetite, lack of energy and also shortness of breath.  Patient has noticed multiple times that he is in A-fib and was having tachycardia.  He is also getting increasingly shortness of breath especially when he lies down.  He denies any cough, congestion.  No fever.  He does not have lower extremity edema.  In the emergency department his BNP was slightly elevated at 355.  His cardiac enzymes were negative.  He does have some chronic changes in the lung tissue.  Patient was sent to the floor for regularization of the atrial fibrillation.  Although his rate is under control but possibly going in and out of atrial fibrillation making him uncomfortable.  ER physician thought that cardioversion may be helpful to bring his rhythm back to normal sinus.  Patient was given IV Lasix, NTG was sent to the floor for further management    PCP: Gordy Cueva MD    REVIEW OF SYSTEMS:  Denies fever or chills.  No dizziness, syncope, or seizures.  No headaches. No chest pain, chest tightness. No shortness of breath.  No nausea, vomiting, or diarrhea.  No rash or abnormal bleeding.  Denies ankle swelling, muscle aches.  No depression or anxiety symptoms.  Remainder of review of systems is negative and also as per HPI.       Past Medical History:   Diagnosis Date    Atrial fibrillation (Multi)     BPH (benign prostatic hyperplasia)     CAD (coronary artery disease)     Controlled atrial fibrillation (Multi)     COPD (chronic obstructive pulmonary disease) (Multi)     Hyperlipidemia     Hypertension     Stage 3a chronic kidney disease (CKD) (Multi)       Past Surgical  "History:   Procedure Laterality Date    OTHER SURGICAL HISTORY  11/04/2020    Varicose vein ligation    OTHER SURGICAL HISTORY  11/04/2020    [place] tooth extraction    OTHER SURGICAL HISTORY  11/04/2020    Tonsillectomy    OTHER SURGICAL HISTORY  11/04/2020    Colonoscopy complete for polypectomy    OTHER SURGICAL HISTORY  11/06/2021    Varicose vein sclerotherapy    OTHER SURGICAL HISTORY  11/06/2021    Pilonidal cyst removal    OTHER SURGICAL HISTORY  11/06/2021    Hernia repair    OTHER SURGICAL HISTORY  12/14/2021    Umbilical hernia repair    OTHER SURGICAL HISTORY  12/14/2021    Inguinal hernia repair    US ASPIRATION INJECTION INTERMEDIATE JOINT  2/8/2021    US ASPIRATION INJECTION INTERMEDIATE JOINT 2/8/2021 [facility]      Social History     Tobacco Use    Smoking status: Every Day     Current packs/day: 0.50     Average packs/day: 0.5 packs/day for 60.0 years (30.0 ttl pk-yrs)     Types: Cigarettes    Smokeless tobacco: Current   Substance Use Topics    Alcohol use: Not Currently    Drug use: Not Currently        CURRENT MEDICATIONS:  aspirin, 81 mg, oral, Daily  ezetimibe, 10 mg, oral, Daily  finasteride, 5 mg, oral, Daily  formoterol, 20 mcg, nebulization, BID  furosemide, 20 mg, intravenous, q12h  furosemide, 40 mg, intravenous, Once  ipratropium-albuteroL, 3 mL, nebulization, q6h  magnesium oxide, 400 mg, oral, BID  methylPREDNISolone sodium succinate (PF), 40 mg, intravenous, 4x daily  metoprolol succinate XL, 50 mg, oral, Daily  pantoprazole, 40 mg, oral, Daily before breakfast  rosuvastatin, 10 mg, oral, Nightly  sennosides-docusate sodium, 2 tablet, oral, BID  spironolactone, 12.5 mg, oral, Daily  tiotropium, 2 puff, inhalation, Daily  valsartan, 160 mg, oral, Daily  [Held by provider] warfarin, 4 mg, oral, Daily       VITALS:  Visit Vitals  /63 (BP Location: Left arm, Patient Position: Lying)   Pulse 109   Temp 37.3 °C (99.1 °F) (Temporal)   Resp 18   Ht 1.727 m (5' 7.99\")   Wt " 79.4 kg (175 lb 0.7 oz)   SpO2 94%   BMI 26.62 kg/m²   Smoking Status Every Day   BSA 1.95 m²        PHYSICAL EXAM:  HEENT:  Atraumatic, PERRL.  Conjunctivae clear.   Moist nasal mucous membranes. oropharynx non erythematous,   Neck:  Supple without thyromegaly or lymphadenopathy.  Lungs:  Clear to auscultation without rales, rhonchi, or rub.  Heart:  RRR, S1, S2, without M.  Abdomen:  Soft, non tender, no organ enlargement, bruit. Bowel sounds present . No CVA tenderness.  Extremities:  No edema. No calf swelling or tenderness.    Skin:  No rash, ecchymosis or erythema.    LAB RESULTS:  CBC:   Results from last 7 days   Lab Units 10/11/24  0623 10/10/24  1328 10/08/24  1539   WBC AUTO x10*3/uL 4.9 7.5 7.5   RBC AUTO x10*6/uL 2.99* 2.89* 3.23*   HEMOGLOBIN g/dL 9.4* 9.3* 10.3*   HEMATOCRIT % 28.6* 28.0* 31.4*   MCV fL 96 97 97   MCH pg 31.4 32.2 31.9   MCHC g/dL 32.9 33.2 32.8   RDW % 13.1 13.1 13.1   PLATELETS AUTO x10*3/uL 326 293 305     CMP:    Results from last 7 days   Lab Units 10/11/24  0623 10/10/24  1328 10/08/24  1539   SODIUM mmol/L 135* 135* 133*   POTASSIUM mmol/L 4.1 3.9 4.1   CHLORIDE mmol/L 99 99 98   CO2 mmol/L 27 29 28   BUN mg/dL 21 16 20   CREATININE mg/dL 1.05 1.00 1.10   GLUCOSE mg/dL 170* 99 106*   PROTEIN TOTAL g/dL 6.5 6.4 6.7   CALCIUM mg/dL 9.0 9.1 9.1   BILIRUBIN TOTAL mg/dL 0.7 0.8 0.7   ALK PHOS U/L 71 78 80   AST U/L 16 19 19   ALT U/L 15 16 17     BMP:    Results from last 7 days   Lab Units 10/11/24  0623 10/10/24  1328 10/08/24  1539   SODIUM mmol/L 135* 135* 133*   POTASSIUM mmol/L 4.1 3.9 4.1   CHLORIDE mmol/L 99 99 98   CO2 mmol/L 27 29 28   BUN mg/dL 21 16 20   CREATININE mg/dL 1.05 1.00 1.10   CALCIUM mg/dL 9.0 9.1 9.1   GLUCOSE mg/dL 170* 99 106*     Magnesium:  Results from last 7 days   Lab Units 10/11/24  0623 10/10/24  1328 10/08/24  1539   MAGNESIUM mg/dL 1.70 1.72 1.69     Troponin:    Results from last 7 days   Lab Units 10/10/24  1439 10/10/24  1328 10/08/24  1624    TROPHS ng/L 10 11 13     BNP:   Results from last 7 days   Lab Units 10/10/24  1328   BNP pg/mL 355*       Lab Results   Component Value Date    LDLCALC 30 10/11/2024        IMAGING:   XR chest 1 view  Result Date: 10/10/2024  Diffuse interstitial infiltrates and bibasilar airspace consolidations.  Clinical correlation and continued follow-up until clearing is recommended.       ECG 12 lead  Result Date: 10/10/2024  Atrial fibrillation Abnormal QRS-T angle, consider primary T wave abnormality Prolonged QT     === 07/22/24 ===  CT CHEST WO IV CONTRAST  1. When compared to the prior examination dated 07/05/2023, there has  been interval increase in size of a right lower lobe solid pulmonary  nodule measuring up to 1.7 cm which has become more solid when  compared prior studies of 2022 and 2023, further evaluation with a  PET-CT is recommended to rule out slow growing primary lung neoplasm.  2. Persistent small right pleural effusion as well as round  atelectasis of the left lower lobe.  3. Severe coronary artery calcifications.  4. Additional findings as described above.    Transthoracic Echo (TTE) Complete : 10/10/2024   1. The left ventricular systolic function is normal, with a visually estimated ejection fraction of 60%.  2. Spectral Doppler shows an abnormal pattern of left ventricular diastolic filling.  3. There is normal right ventricular global systolic function.  4. RVSP 37 mm Hg.  5. The left atrium is moderate to severely dilated.  6. Mildly elevated right ventricular systolic pressure.  7. Normal valve structure and function.    PROBLEM LIST ON ADMISSION:  Anemia, unspecified type [D64.9]  Atrial fibrillation, unspecified type (Multi) [I48.91]  Acute congestive heart failure, unspecified heart failure type [I50.9]     CHRONIC MEDICAL CONDITIONS:     CAD (coronary artery disease)    Benign prostatic hyperplasia    Current smoker    Hyperlipidemia    Paroxysmal atrial fibrillation (Multi)    High risk  medication use    Hypertensive heart disease with heart failure    Shortness of breath     PLAN ON ADMISSION:  Patient be admitted to medical telemetry for close monitoring of cardiorespiratory and neurovascular status.  Will drop daily PT/INR.  T3-T4 TSH will be drawn.  Patient be given IV Lasix.  I would like to get cardiology on board possibly for uptitrating the medications.  If they think they can bring an electrophysiologist for rate and rhythm control of atrial fibrillation.  Patient will be also sent for echocardiogram to see if he has any drop in ejection fraction.  Home medications will be resumed.  Patient was advised about smoking cessation.  He was offered nicotine patch.  DVT prophylaxis be done with SCD.  Patient is on Coumadin.  GI prophylaxis be done with PPI.  Total time spent on this admission encounter was 73 minutes.  CODE STATUS remains full.  Patient's son was informed and agreed with the plan of care.        *Some of this note was completed using Dragon voice recognition technology and may include unintended errors with respect to translation of words, typographical errors or grammar errors which may not have been identified prior to finalization of the chart note.

## 2024-10-12 NOTE — CARE PLAN
The patient's goals for the shift include      The clinical goals for the shift include Safety    Over the shift, the patient did make progress toward the following goals.. Patient remained safe and used walker when ambulation in room.

## 2024-10-12 NOTE — PROGRESS NOTES
ADMISSION DATE: 10/10/2024  HOSPITAL DAY: 1    SUBJECTIVE:  Patient was seen at bedside.  Uneventful night.    OBJECTIVE:  Vitals:    10/11/24 1504 10/11/24 1630 10/11/24 1906 10/11/24 1930   BP: (!) 193/77 (!) 191/85 121/62 134/63   BP Location:    Left arm   Patient Position:    Lying   Pulse: 84 82 86 109   Resp:    18   Temp: 36.6 °C (97.9 °F) 36.7 °C (98.1 °F) 36.3 °C (97.3 °F) 37.3 °C (99.1 °F)   TempSrc:    Temporal   SpO2: 98% 98% 96% 94%   Weight:       Height:            Intake/Output Summary (Last 24 hours) at 10/12/2024 0020  Last data filed at 10/11/2024 1400  Gross per 24 hour   Intake 39.58 ml   Output --   Net 39.58 ml      Wt Readings from Last 10 Encounters:   10/10/24 79.4 kg (175 lb 0.7 oz)   10/08/24 79.4 kg (175 lb)   08/27/24 82.1 kg (181 lb)   08/21/24 82.1 kg (181 lb)   08/01/24 82.8 kg (182 lb 9.6 oz)   07/10/24 83.5 kg (184 lb)   04/25/24 85.9 kg (189 lb 6.4 oz)   03/13/24 86.2 kg (190 lb)   11/14/23 83.9 kg (185 lb)   10/16/23 83.5 kg (184 lb)       PHYSICAL EXAM:  Gen: Alert, awake, Oriented X 3. Not in any acute distress   HEENT:  Atraumatic, PERRL.  Conjunctivae clear.   Moist nasal mucous membranes. oropharynx non erythematous,   Neck:  Supple without thyromegaly or lymphadenopathy.  Lungs:  Clear to auscultation without rales, rhonchi, or rub.  Heart:  RRR, S1, S2, without M.  Abdomen:  Soft, non tender, no organ enlargement, bruit. Bowel sounds present . No CVA tenderness.  Extremities:  No edema. No calf swelling or tenderness.    Skin:  No rash, ecchymosis or erythema.    CURRENT ACTIVE MEDS:  aspirin, 81 mg, oral, Daily  ezetimibe, 10 mg, oral, Daily  finasteride, 5 mg, oral, Daily  formoterol, 20 mcg, nebulization, BID  furosemide, 20 mg, intravenous, q12h  furosemide, 40 mg, intravenous, Once  ipratropium-albuteroL, 3 mL, nebulization, q6h  magnesium oxide, 400 mg, oral, BID  methylPREDNISolone sodium succinate (PF), 40 mg, intravenous, 4x daily  metoprolol succinate XL, 50 mg,  oral, Daily  pantoprazole, 40 mg, oral, Daily before breakfast  rosuvastatin, 10 mg, oral, Nightly  sennosides-docusate sodium, 2 tablet, oral, BID  spironolactone, 12.5 mg, oral, Daily  tiotropium, 2 puff, inhalation, Daily  valsartan, 160 mg, oral, Daily  [Held by provider] warfarin, 4 mg, oral, Daily      LAB RESULTS:   CBC:   Results from last 7 days   Lab Units 10/11/24  0623 10/10/24  1328 10/08/24  1539   WBC AUTO x10*3/uL 4.9 7.5 7.5   RBC AUTO x10*6/uL 2.99* 2.89* 3.23*   HEMOGLOBIN g/dL 9.4* 9.3* 10.3*   HEMATOCRIT % 28.6* 28.0* 31.4*   MCV fL 96 97 97   MCH pg 31.4 32.2 31.9   MCHC g/dL 32.9 33.2 32.8   RDW % 13.1 13.1 13.1   PLATELETS AUTO x10*3/uL 326 293 305     CMP:    Results from last 7 days   Lab Units 10/11/24  0623 10/10/24  1328 10/08/24  1539   SODIUM mmol/L 135* 135* 133*   POTASSIUM mmol/L 4.1 3.9 4.1   CHLORIDE mmol/L 99 99 98   CO2 mmol/L 27 29 28   BUN mg/dL 21 16 20   CREATININE mg/dL 1.05 1.00 1.10   GLUCOSE mg/dL 170* 99 106*   PROTEIN TOTAL g/dL 6.5 6.4 6.7   CALCIUM mg/dL 9.0 9.1 9.1   BILIRUBIN TOTAL mg/dL 0.7 0.8 0.7   ALK PHOS U/L 71 78 80   AST U/L 16 19 19   ALT U/L 15 16 17     BMP:    Results from last 7 days   Lab Units 10/11/24  0623 10/10/24  1328 10/08/24  1539   SODIUM mmol/L 135* 135* 133*   POTASSIUM mmol/L 4.1 3.9 4.1   CHLORIDE mmol/L 99 99 98   CO2 mmol/L 27 29 28   BUN mg/dL 21 16 20   CREATININE mg/dL 1.05 1.00 1.10   CALCIUM mg/dL 9.0 9.1 9.1   GLUCOSE mg/dL 170* 99 106*     Magnesium:  Results from last 7 days   Lab Units 10/11/24  0623 10/10/24  1328 10/08/24  1539   MAGNESIUM mg/dL 1.70 1.72 1.69     Troponin:    Results from last 7 days   Lab Units 10/10/24  1439 10/10/24  1328 10/08/24  1624   TROPHS ng/L 10 11 13     BNP:   Results from last 7 days   Lab Units 10/10/24  1328   BNP pg/mL 355*     Lipid Panel:    Results from last 7 days   Lab Units 10/11/24  0623   HDL mg/dL 32.5   CHOLESTEROL/HDL RATIO  2.2   VLDL mg/dL 8   TRIGLYCERIDES mg/dL 41   NON HDL  CHOL. mg/dL 39        Lab Results   Component Value Date    LDLCALC 30 10/11/2024    CREATININE 1.05 10/11/2024       Lab Results   Component Value Date    TSH <0.01 (L) 10/11/2024    [id number] 84 10/11/2022    THYROIDPAB <28 05/10/2023      Lab Results   Component Value Date    INR 5.2 (HH) 10/11/2024    INR 3.9 (H) 10/10/2024    INR 7.1 (HH) 10/08/2024    PROTIME 59.9 (HH) 10/11/2024    PROTIME 45.1 (H) 10/10/2024    PROTIME 81.6 (HH) 10/08/2024       IMAGING:   XR chest 1 view  Result Date: 10/10/2024  Diffuse interstitial infiltrates and bibasilar airspace consolidations.  Clinical correlation and continued follow-up until clearing is recommended.        ECG 12 lead  Result Date: 10/10/2024  Atrial fibrillation Abnormal QRS-T angle, consider primary T wave abnormality Prolonged QT      === 07/22/24 ===  CT CHEST WO IV CONTRAST  1. When compared to the prior examination dated 07/05/2023, there has  been interval increase in size of a right lower lobe solid pulmonary  nodule measuring up to 1.7 cm which has become more solid when  compared prior studies of 2022 and 2023, further evaluation with a  PET-CT is recommended to rule out slow growing primary lung neoplasm.  2. Persistent small right pleural effusion as well as round  atelectasis of the left lower lobe.  3. Severe coronary artery calcifications.  4. Additional findings as described above.     Transthoracic Echo (TTE) Complete : 10/10/2024   1. The left ventricular systolic function is normal, with a visually estimated ejection fraction of 60%.  2. Spectral Doppler shows an abnormal pattern of left ventricular diastolic filling.  3. There is normal right ventricular global systolic function.  4. RVSP 37 mm Hg.  5. The left atrium is moderate to severely dilated.  6. Mildly elevated right ventricular systolic pressure.  7. Normal valve structure and function.      LAST EKG  Encounter Date: 10/10/24   ECG 12 Lead   Result Value    Ventricular Rate 96    Atrial  Rate 92    QRS Duration 108    QT Interval 362    QTC Calculation(Bazett) 457    R Axis 29    T Axis -22    QRS Count 16    Q Onset 217    T Offset 398    QTC Fredericia 423       PROBLEMS ON ADMISSION:  Anemia, unspecified type [D64.9]  Atrial fibrillation, unspecified type (Multi) [I48.91]  Acute congestive heart failure, unspecified heart failure type [I50.9]    HOSPITAL PROBLEM LIST     CAD (coronary artery disease)    Benign prostatic hyperplasia    Current smoker    Hyperlipidemia    Paroxysmal atrial fibrillation (Multi)    High risk medication use    Hypertensive heart disease with heart failure    Shortness of breath     ASSESSMENT AND PLAN FOR 10/11/2024  The patient feels better after the IV Lasix.  He does not have palpitation.  He denies any chest pain or pressure.  His recent lab has been reviewed.  His TSH is low with slightly elevated T3 and T4.  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  Will wait to see how cardiology address this issue.  Not sure if we could do some other antiarrhythmic to bring his rhythm into normal sinus.  Patient might get cardioversion as well.  Minimal PT OT will be working with him.  His INR was high today.  Will hold his Coumadin this evening.  Rest of the medical therapy will be continued same as before according to the admission orders.  I will wait for cardiology recommendation.        P.S: This note was completed using Dragon voice recognition technology and may include unintended errors with respect to translation of words, typographical errors or grammar errors which may not have been identified while finalizing the chart.

## 2024-10-12 NOTE — PROGRESS NOTES
ADMISSION DATE: 10/10/2024  HOSPITAL DAY: 1    SUBJECTIVE:  Patient was seen at bedside.  He had 5 bowel movements but no diarrhea.  Patient tells me that he had abdominal gas after the bowel movement he feels much better.  This morning he feels much energetic.  Telemetry shows that he is in rate controlled atrial fibrillation.  Patient did not require any supplemental oxygen.      OBJECTIVE:  Vitals:    10/11/24 1630 10/11/24 1906 10/11/24 1930 10/12/24 0346   BP: (!) 191/85 121/62 134/63 124/59   BP Location:   Left arm Left arm   Patient Position:   Lying Lying   Pulse: 82 86 109 99   Resp:   18 18   Temp: 36.7 °C (98.1 °F) 36.3 °C (97.3 °F) 37.3 °C (99.1 °F) 37 °C (98.6 °F)   TempSrc:   Temporal Temporal   SpO2: 98% 96% 94% 97%   Weight:       Height:            Intake/Output Summary (Last 24 hours) at 10/12/2024 1155  Last data filed at 10/11/2024 1400  Gross per 24 hour   Intake 39.58 ml   Output --   Net 39.58 ml      Wt Readings from Last 10 Encounters:   10/10/24 79.4 kg (175 lb 0.7 oz)   10/08/24 79.4 kg (175 lb)   08/27/24 82.1 kg (181 lb)   08/21/24 82.1 kg (181 lb)   08/01/24 82.8 kg (182 lb 9.6 oz)   07/10/24 83.5 kg (184 lb)   04/25/24 85.9 kg (189 lb 6.4 oz)   03/13/24 86.2 kg (190 lb)   11/14/23 83.9 kg (185 lb)   10/16/23 83.5 kg (184 lb)       PHYSICAL EXAM:  Gen: Alert, awake, Oriented X 3. Not in any acute distress   HEENT:  Atraumatic, PERRL.  Conjunctivae clear.   Moist nasal mucous membranes. oropharynx non erythematous,   Neck:  Supple without thyromegaly or lymphadenopathy.  Lungs:  Clear to auscultation without rales, rhonchi, or rub.  Heart:  RRR, S1, S2, without M.  Abdomen:  Soft, non tender, no organ enlargement, bruit. Bowel sounds present . No CVA tenderness.  Extremities:  No edema. No calf swelling or tenderness.    Skin:  No rash, ecchymosis or erythema.    CURRENT ACTIVE MEDS:  aspirin, 81 mg, oral, Daily  ezetimibe, 10 mg, oral, Daily  finasteride, 5 mg, oral, Daily  formoterol,  20 mcg, nebulization, BID  furosemide, 20 mg, intravenous, q12h  furosemide, 40 mg, intravenous, Once  ipratropium-albuteroL, 3 mL, nebulization, q6h  magnesium oxide, 400 mg, oral, BID  methylPREDNISolone sodium succinate (PF), 40 mg, intravenous, 4x daily  metoprolol succinate XL, 50 mg, oral, Daily  pantoprazole, 40 mg, oral, Daily before breakfast  rosuvastatin, 10 mg, oral, Nightly  sennosides-docusate sodium, 2 tablet, oral, BID  spironolactone, 12.5 mg, oral, Daily  tiotropium, 2 puff, inhalation, Daily  valsartan, 160 mg, oral, Daily  [Held by provider] warfarin, 4 mg, oral, Daily      LAB RESULTS:   CBC:   Results from last 7 days   Lab Units 10/12/24  0625 10/11/24  0623 10/10/24  1328   WBC AUTO x10*3/uL 15.8* 4.9 7.5   RBC AUTO x10*6/uL 2.93* 2.99* 2.89*   HEMOGLOBIN g/dL 9.3* 9.4* 9.3*   HEMATOCRIT % 28.0* 28.6* 28.0*   MCV fL 96 96 97   MCH pg 31.7 31.4 32.2   MCHC g/dL 33.2 32.9 33.2   RDW % 13.4 13.1 13.1   PLATELETS AUTO x10*3/uL 314 326 293     CMP:    Results from last 7 days   Lab Units 10/12/24  0625 10/11/24  0623 10/10/24  1328   SODIUM mmol/L 134* 135* 135*   POTASSIUM mmol/L 3.8 4.1 3.9   CHLORIDE mmol/L 96* 99 99   CO2 mmol/L 29 27 29   BUN mg/dL 31* 21 16   CREATININE mg/dL 1.11 1.05 1.00   GLUCOSE mg/dL 158* 170* 99   PROTEIN TOTAL g/dL 6.2* 6.5 6.4   CALCIUM mg/dL 8.8 9.0 9.1   BILIRUBIN TOTAL mg/dL 0.6 0.7 0.8   ALK PHOS U/L 64 71 78   AST U/L 16 16 19   ALT U/L 15 15 16     BMP:    Results from last 7 days   Lab Units 10/12/24  0625 10/11/24  0623 10/10/24  1328   SODIUM mmol/L 134* 135* 135*   POTASSIUM mmol/L 3.8 4.1 3.9   CHLORIDE mmol/L 96* 99 99   CO2 mmol/L 29 27 29   BUN mg/dL 31* 21 16   CREATININE mg/dL 1.11 1.05 1.00   CALCIUM mg/dL 8.8 9.0 9.1   GLUCOSE mg/dL 158* 170* 99     Magnesium:  Results from last 7 days   Lab Units 10/12/24  0625 10/11/24  0623 10/10/24  1328   MAGNESIUM mg/dL 2.17 1.70 1.72     Troponin:    Results from last 7 days   Lab Units 10/10/24  1439  10/10/24  1328 10/08/24  1624   TROPHS ng/L 10 11 13     BNP:   Results from last 7 days   Lab Units 10/10/24  1328   BNP pg/mL 355*     Lipid Panel:    Results from last 7 days   Lab Units 10/11/24  0623   HDL mg/dL 32.5   CHOLESTEROL/HDL RATIO  2.2   VLDL mg/dL 8   TRIGLYCERIDES mg/dL 41   NON HDL CHOL. mg/dL 39        Lab Results   Component Value Date    LDLCALC 30 10/11/2024    CREATININE 1.11 10/12/2024       Lab Results   Component Value Date    TSH <0.01 (L) 10/11/2024    [id number] 84 10/11/2022    THYROIDPAB <28 05/10/2023      Lab Results   Component Value Date    INR 7.4 (HH) 10/12/2024    INR 5.2 (HH) 10/11/2024    INR 3.9 (H) 10/10/2024    PROTIME 85.7 (HH) 10/12/2024    PROTIME 59.9 (HH) 10/11/2024    PROTIME 45.1 (H) 10/10/2024       IMAGING:   XR chest 1 view  Result Date: 10/10/2024  Diffuse interstitial infiltrates and bibasilar airspace consolidations.  Clinical correlation and continued follow-up until clearing is recommended.        ECG 12 lead  Result Date: 10/10/2024  Atrial fibrillation Abnormal QRS-T angle, consider primary T wave abnormality Prolonged QT      === 07/22/24 ===  CT CHEST WO IV CONTRAST  1. When compared to the prior examination dated 07/05/2023, there has  been interval increase in size of a right lower lobe solid pulmonary  nodule measuring up to 1.7 cm which has become more solid when  compared prior studies of 2022 and 2023, further evaluation with a  PET-CT is recommended to rule out slow growing primary lung neoplasm.  2. Persistent small right pleural effusion as well as round  atelectasis of the left lower lobe.  3. Severe coronary artery calcifications.  4. Additional findings as described above.     Transthoracic Echo (TTE) Complete : 10/10/2024   1. The left ventricular systolic function is normal, with a visually estimated ejection fraction of 60%.  2. Spectral Doppler shows an abnormal pattern of left ventricular diastolic filling.  3. There is normal right  ventricular global systolic function.  4. RVSP 37 mm Hg.  5. The left atrium is moderate to severely dilated.  6. Mildly elevated right ventricular systolic pressure.  7. Normal valve structure and function.      LAST EKG  Encounter Date: 10/10/24   ECG 12 Lead   Result Value    Ventricular Rate 83    Atrial Rate 326    QRS Duration 106    QT Interval 402    QTC Calculation(Bazett) 472    R Axis 58    T Axis -27    QRS Count 14    Q Onset 214    T Offset 415    QTC Fredericia 448       PROBLEMS ON ADMISSION:  Anemia, unspecified type [D64.9]  Atrial fibrillation, unspecified type (Multi) [I48.91]  Acute congestive heart failure, unspecified heart failure type [I50.9]    HOSPITAL PROBLEM LIST     CAD (coronary artery disease)    Benign prostatic hyperplasia    Current smoker    Hyperlipidemia    Paroxysmal atrial fibrillation (Multi)    High risk medication use    Hypertensive heart disease with heart failure    Shortness of breath     ASSESSMENT AND PLAN FOR 10/11/2024  Patient is being treated for rate control atrial fibrillation.  Amiodarone caused hyperthyroidism.  He is off of amiodarone now as an attending admit.  Goal is to keep.  He is rate controlled and be on Coumadin.  Today his INR is 7.5.  We will also hold Coumadin for tonight as last night.  Patient does not have any active bleeding. He Is feeling much better.  He is anxious to go home however there is a possibility that cardiology may start him on sotalol as an antiarrhythmic medications.  Rest of the medical therapy will be continued as before.  Patient has been working with PT OT and so far doing well.  I anticipate discharge home with home care in the next 48 to 72 hours.        P.S: This note was completed using Dragon voice recognition technology and may include unintended errors with respect to translation of words, typographical errors or grammar errors which may not have been identified while finalizing the chart.

## 2024-10-12 NOTE — CARE PLAN
The patient's goals for the shift include      The clinical goals for the shift include Safety    Problem: Skin  Goal: Decreased wound size/increased tissue granulation at next dressing change  10/11/2024 2125 by Ortega Watkins RN  Outcome: Progressing  Flowsheets (Taken 10/10/2024 1716 by Enid Roth RN)  Decreased wound size/increased tissue granulation at next dressing change: Promote sleep for wound healing  10/11/2024 2125 by Ortega Watkins RN  Outcome: Progressing  10/11/2024 2124 by Ortega Watkins RN  Outcome: Progressing  Goal: Participates in plan/prevention/treatment measures  10/11/2024 2125 by Ortega Watkins RN  Outcome: Progressing  Flowsheets (Taken 10/11/2024 2125)  Participates in plan/prevention/treatment measures: Discuss with provider PT/OT consult  10/11/2024 2125 by Ortega Watkins RN  Outcome: Progressing  Flowsheets (Taken 10/11/2024 2125)  Participates in plan/prevention/treatment measures: Discuss with provider PT/OT consult  10/11/2024 2124 by Ortega Watkins RN  Outcome: Progressing  Goal: Prevent/manage excess moisture  10/11/2024 2125 by Ortega Watkins RN  Outcome: Progressing  Flowsheets (Taken 10/11/2024 2125)  Prevent/manage excess moisture: Cleanse incontinence/protect with barrier cream  10/11/2024 2125 by Ortega Watkins RN  Outcome: Progressing  Flowsheets (Taken 10/11/2024 2125)  Prevent/manage excess moisture: Cleanse incontinence/protect with barrier cream  10/11/2024 2124 by Ortega Watkins RN  Outcome: Progressing  Goal: Prevent/minimize sheer/friction injuries  10/11/2024 2125 by Ortega Watkins RN  Outcome: Progressing  Flowsheets (Taken 10/11/2024 2125)  Prevent/minimize sheer/friction injuries: HOB 30 degrees or less  10/11/2024 2125 by Ortega Watkins RN  Outcome: Progressing  Flowsheets (Taken 10/11/2024 2125)  Prevent/minimize sheer/friction injuries: HOB 30 degrees or less  10/11/2024 2124 by Ortega Watkins RN  Outcome: Progressing  Goal: Promote/optimize  nutrition  10/11/2024 2125 by Ortega Watkins RN  Outcome: Progressing  Flowsheets (Taken 10/11/2024 2125)  Promote/optimize nutrition: Consume > 50% meals/supplements  10/11/2024 2125 by Ortega Watkins RN  Outcome: Progressing  Flowsheets (Taken 10/11/2024 2125)  Promote/optimize nutrition: Consume > 50% meals/supplements  10/11/2024 2124 by Ortega Watkins RN  Outcome: Progressing  Goal: Promote skin healing  10/11/2024 2125 by Ortega Watkins RN  Outcome: Progressing  Flowsheets (Taken 10/11/2024 2125)  Promote skin healing: Turn/reposition every 2 hours/use positioning/transfer devices  10/11/2024 2125 by Ortega Watkins RN  Outcome: Progressing  Flowsheets (Taken 10/11/2024 2125)  Promote skin healing: Turn/reposition every 2 hours/use positioning/transfer devices  10/11/2024 2124 by Ortega Watkins RN  Outcome: Progressing     Problem: Pain - Adult  Goal: Verbalizes/displays adequate comfort level or baseline comfort level  10/11/2024 2125 by Ortega Watkins RN  Outcome: Progressing  10/11/2024 2125 by Ortega Watkins RN  Outcome: Progressing  10/11/2024 2124 by Ortega Watkins RN  Outcome: Progressing     Problem: Safety - Adult  Goal: Free from fall injury  10/11/2024 2125 by Ortega Watkins RN  Outcome: Progressing  10/11/2024 2125 by Ortega Watkins RN  Outcome: Progressing  10/11/2024 2124 by Ortega Watkins RN  Outcome: Progressing     Problem: Discharge Planning  Goal: Discharge to home or other facility with appropriate resources  10/11/2024 2125 by Ortega Watkins RN  Outcome: Progressing  10/11/2024 2125 by Ortega Watkins RN  Outcome: Progressing  10/11/2024 2124 by Ortega Watkins RN  Outcome: Progressing     Problem: Chronic Conditions and Co-morbidities  Goal: Patient's chronic conditions and co-morbidity symptoms are monitored and maintained or improved  10/11/2024 2125 by Ortega Watkins RN  Outcome: Progressing  10/11/2024 2125 by Ortega Watkins RN  Outcome: Progressing  10/11/2024 2124 by Ortega Watkins  RN  Outcome: Progressing     Problem: Pain  Goal: Takes deep breaths with improved pain control throughout the shift  10/11/2024 2125 by Ortega Watkins RN  Outcome: Progressing  10/11/2024 2125 by Ortega Watkins RN  Outcome: Progressing  10/11/2024 2124 by Ortega Watkins RN  Outcome: Progressing  Goal: Turns in bed with improved pain control throughout the shift  10/11/2024 2125 by Ortega Watkins RN  Outcome: Progressing  10/11/2024 2125 by Ortega Watkins RN  Outcome: Progressing  10/11/2024 2124 by Ortega Watkins RN  Outcome: Progressing  Goal: Walks with improved pain control throughout the shift  10/11/2024 2125 by Ortega Watkins RN  Outcome: Progressing  10/11/2024 2125 by Ortega Watkins RN  Outcome: Progressing  10/11/2024 2124 by Ortega Watkins RN  Outcome: Progressing  Goal: Performs ADL's with improved pain control throughout shift  10/11/2024 2125 by Ortega Watkins RN  Outcome: Progressing  10/11/2024 2125 by Ortega Watkins RN  Outcome: Progressing  10/11/2024 2124 by Ortega Watkins RN  Outcome: Progressing  Goal: Participates in PT with improved pain control throughout the shift  10/11/2024 2125 by Ortega Watkins RN  Outcome: Progressing  10/11/2024 2125 by Ortega Watkins RN  Outcome: Progressing  10/11/2024 2124 by Ortega Watkins RN  Outcome: Progressing  Goal: Free from opioid side effects throughout the shift  10/11/2024 2125 by Ortega Watkins RN  Outcome: Progressing  10/11/2024 2125 by Ortega Watkins RN  Outcome: Progressing  10/11/2024 2124 by Ortega Watkins RN  Outcome: Progressing  Goal: Free from acute confusion related to pain meds throughout the shift  10/11/2024 2125 by Ortega Watkins RN  Outcome: Progressing  10/11/2024 2125 by Ortega Watkins RN  Outcome: Progressing  10/11/2024 2124 by Ortega Watkins RN  Outcome: Progressing     Problem: Nutrition  Goal: Oral intake greater 75%  10/11/2024 2125 by Ortega Watkins RN  Outcome: Progressing  10/11/2024 2125 by Ortega Watkins, RN  Outcome:  Progressing  10/11/2024 2124 by Ortega Watkins RN  Outcome: Progressing  Goal: Adequate PO fluid intake  10/11/2024 2125 by Ortega Watkins RN  Outcome: Progressing  10/11/2024 2125 by Ortega Watkins RN  Outcome: Progressing  10/11/2024 2124 by Ortega Watkins RN  Outcome: Progressing  Goal: BG  mg/dL  10/11/2024 2125 by Ortega Watkins RN  Outcome: Progressing  10/11/2024 2125 by Ortega Watkins RN  Outcome: Progressing  10/11/2024 2124 by Ortega Watkins RN  Outcome: Progressing  Goal: Electrolytes WNL  10/11/2024 2125 by Ortega Watkins RN  Outcome: Progressing  10/11/2024 2125 by Ortega Watkins RN  Outcome: Progressing  10/11/2024 2124 by Ortega Watkins RN  Outcome: Progressing  Goal: Maintain stable weight  10/11/2024 2125 by Ortgea Watkins RN  Outcome: Progressing  10/11/2024 2125 by Ortega Watkins RN  Outcome: Progressing  10/11/2024 2124 by Ortega Watkins RN  Outcome: Progressing     Problem: Heart Failure  Goal: Improved gas exchange this shift  10/11/2024 2125 by Ortega Watkins RN  Outcome: Progressing  10/11/2024 2125 by Ortega Watkins RN  Outcome: Progressing  10/11/2024 2124 by Ortega Watkins RN  Outcome: Progressing  Goal: Improved urinary output this shift  10/11/2024 2125 by Ortega Watkins RN  Outcome: Progressing  10/11/2024 2125 by Ortega Watkins RN  Outcome: Progressing  10/11/2024 2124 by Ortega Watkins RN  Outcome: Progressing  Goal: Reduction in peripheral edema within 24 hours  10/11/2024 2125 by Ortega Watkins RN  Outcome: Progressing  10/11/2024 2125 by Ortega Watkins RN  Outcome: Progressing  10/11/2024 2124 by Ortega Watkins RN  Outcome: Progressing  Goal: Report improvement of dyspnea/breathlessness this shift  10/11/2024 2125 by Ortega Watknis RN  Outcome: Progressing  10/11/2024 2125 by Ortega Watkins RN  Outcome: Progressing  10/11/2024 2124 by Ortega Watkins RN  Outcome: Progressing  Goal: Weight from fluid excess reduced over 2-3 days, then stabilize  10/11/2024 2125 by Ortega  YANNICK Watkins  Outcome: Progressing  10/11/2024 2125 by Ortega Watkins RN  Outcome: Progressing  10/11/2024 2124 by Ortega Watkins RN  Outcome: Progressing  Goal: Increase self care and/or family involvement in 24 hours  10/11/2024 2125 by Ortega Watkins RN  Outcome: Progressing  10/11/2024 2125 by Ortega Watkins RN  Outcome: Progressing  10/11/2024 2124 by Ortega Watkins RN  Outcome: Progressing

## 2024-10-12 NOTE — PROGRESS NOTES
Cardiology Progress Note  Patient: Canelo Moore  Unit/Bed: 1115/1115-A  YOB: 1935  MRN: [id number]  Acct: [id number]   Admitting Diagnosis: Anemia, unspecified type [D64.9]  Atrial fibrillation, unspecified type (Multi) [I48.91]  Acute congestive heart failure, unspecified heart failure type [I50.9]  Date:  10/10/2024  Hospital Day: 2  Attending: Gordy Cueva MD   Rounding Cardiologist:  Roberto Fisher MD,   Primary Cardiologist: Dr. Yi Montejo      Complaint:  Chief Complaint   Patient presents with    Shortness of Breath     Patient states he has a fib and his heart is beating irradic making him short of breath he was just here for same thing tues    Atrial Fibrillation        SUBJECTIVE    10/12/2024 feels better today    10/11/2024 initial consult with increasing shortness of breath new onset atrial fibrillation suboptimal rate control also hyperthyroid likely secondary to the effects of amiodarone.  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  GXT April 2024 hypertensive blood pressure response no ischemia        VITALS      Vitals:    10/11/24 1630 10/11/24 1906 10/11/24 1930 10/12/24 0346   BP: (!) 191/85 121/62 134/63 124/59   BP Location:   Left arm Left arm   Patient Position:   Lying Lying   Pulse: 82 86 109 99   Resp:   18 18   Temp: 36.7 °C (98.1 °F) 36.3 °C (97.3 °F) 37.3 °C (99.1 °F) 37 °C (98.6 °F)   TempSrc:   Temporal Temporal   SpO2: 98% 96% 94% 97%   Weight:       Height:           Intake/Output Summary (Last 24 hours) at 10/12/2024 1227  Last data filed at 10/11/2024 1400  Gross per  24 hour   Intake 36.25 ml   Output --   Net 36.25 ml      Wt Readings from Last 4 Encounters:   10/10/24 79.4 kg (175 lb 0.7 oz)   10/08/24 79.4 kg (175 lb)   08/27/24 82.1 kg (181 lb)   08/21/24 82.1 kg (181 lb)        Allergies:  Allergies   Allergen Reactions    Ace Inhibitors Cough        PHYSICAL EXAM   Physical Exam  HENT:      Head: Normocephalic.   Cardiovascular:      Rate and Rhythm: Normal rate. Rhythm irregular.      Heart sounds: No murmur heard.  Musculoskeletal:         General: No swelling.   Skin:     General: Skin is warm.   Neurological:      General: No focal deficit present.      Mental Status: He is alert.   Psychiatric:         Mood and Affect: Mood normal.           LABS   CBC:   Results from last 7 days   Lab Units 10/12/24  0625 10/11/24  0623 10/10/24  1328   WBC AUTO x10*3/uL 15.8* 4.9 7.5   RBC AUTO x10*6/uL 2.93* 2.99* 2.89*   HEMOGLOBIN g/dL 9.3* 9.4* 9.3*   HEMATOCRIT % 28.0* 28.6* 28.0*   MCV fL 96 96 97   MCH pg 31.7 31.4 32.2   MCHC g/dL 33.2 32.9 33.2   RDW % 13.4 13.1 13.1   PLATELETS AUTO x10*3/uL 314 326 293     CMP:    Results from last 7 days   Lab Units 10/12/24  0625 10/11/24  0623 10/10/24  1328   SODIUM mmol/L 134* 135* 135*   POTASSIUM mmol/L 3.8 4.1 3.9   CHLORIDE mmol/L 96* 99 99   CO2 mmol/L 29 27 29   BUN mg/dL 31* 21 16   CREATININE mg/dL 1.11 1.05 1.00   GLUCOSE mg/dL 158* 170* 99   PROTEIN TOTAL g/dL 6.2* 6.5 6.4   CALCIUM mg/dL 8.8 9.0 9.1   BILIRUBIN TOTAL mg/dL 0.6 0.7 0.8   ALK PHOS U/L 64 71 78   AST U/L 16 16 19   ALT U/L 15 15 16     BMP:    Results from last 7 days   Lab Units 10/12/24  0625 10/11/24  0623 10/10/24  1328   SODIUM mmol/L 134* 135* 135*   POTASSIUM mmol/L 3.8 4.1 3.9   CHLORIDE mmol/L 96* 99 99   CO2 mmol/L 29 27 29   BUN mg/dL 31* 21 16   CREATININE mg/dL 1.11 1.05 1.00   CALCIUM mg/dL 8.8 9.0 9.1   GLUCOSE mg/dL 158* 170* 99     Magnesium:  Results from last 7 days   Lab Units 10/12/24  0625 10/11/24  0623 10/10/24  1328   MAGNESIUM mg/dL  2.17 1.70 1.72     Troponin:    Results from last 7 days   Lab Units 10/10/24  1439 10/10/24  1328 10/08/24  1624   TROPHS ng/L 10 11 13     BNP:   Results from last 7 days   Lab Units 10/10/24  1328   BNP pg/mL 355*     Lipid Panel:  Results from last 7 days   Lab Units 10/11/24  0623   HDL mg/dL 32.5   CHOLESTEROL/HDL RATIO  2.2   VLDL mg/dL 8   TRIGLYCERIDES mg/dL 41   NON HDL CHOL. mg/dL 39        aspirin, 81 mg, oral, Daily  ezetimibe, 10 mg, oral, Daily  finasteride, 5 mg, oral, Daily  formoterol, 20 mcg, nebulization, BID  furosemide, 20 mg, intravenous, q12h  furosemide, 40 mg, intravenous, Once  ipratropium-albuteroL, 3 mL, nebulization, q6h  magnesium oxide, 400 mg, oral, BID  methylPREDNISolone sodium succinate (PF), 40 mg, intravenous, 4x daily  metoprolol succinate XL, 50 mg, oral, Daily  pantoprazole, 40 mg, oral, Daily before breakfast  rosuvastatin, 10 mg, oral, Nightly  sennosides-docusate sodium, 2 tablet, oral, BID  spironolactone, 12.5 mg, oral, Daily  tiotropium, 2 puff, inhalation, Daily  valsartan, 160 mg, oral, Daily  [Held by provider] warfarin, 4 mg, oral, Daily           Current Outpatient Medications   Medication Instructions    albuterol (Ventolin HFA) 90 mcg/actuation inhaler 2 puffs, inhalation, Every 6 hours PRN    amiodarone (PACERONE) 100 mg, oral, Daily    aspirin 81 mg EC tablet 1 tablet, oral, Daily    ezetimibe (ZETIA) 10 mg, oral, Daily (0630)    finasteride (Proscar) 5 mg tablet 1 tablet, oral, Daily    magnesium oxide (MAG-OX) 400 mg, oral, 2 times daily    metoprolol succinate XL (TOPROL-XL) 12.5 mg, oral, Daily    multivitamin (One Daily Multivitamin) tablet 1 tablet, oral, Daily    nitroglycerin (NITROSTAT) 0.4 mg, sublingual, Every 5 min PRN, place 1 tablet under tongue every 5 minutes for up to 3 doses as needed for chest pain. Call 911 if pain persists    rosuvastatin (CRESTOR) 10 mg, oral, Nightly    tiotropium (Spiriva Respimat) 2.5 mcg/actuation inhaler 2 puffs,  inhalation, Daily    tiotropium (SPIRIVA WITH HANDIHALER) 18 mcg, inhalation, Daily RT    tiotropium-olodateroL (Stiolto Respimat) 2.5-2.5 mcg/actuation mist inhaler 2 Inhalations, inhalation, Daily    valsartan-hydrochlorothiazide (Diovan-HCT) 160-12.5 mg tablet 1 tablet, oral, Daily    warfarin (Coumadin) 5 mg tablet As directed to maintain therapeutic INR, number of tablets reflect a 90 day supply        Transthoracic Echo (TTE) Complete     1. The left ventricular systolic function is normal, with a visually estimated ejection fraction of 60%.   2. Spectral Doppler shows an abnormal pattern of left ventricular diastolic filling.   3. There is normal right ventricular global systolic function.   4. RVSP 37 mm Hg.   5. The left atrium is moderate to severely dilated.   6. Mildly elevated right ventricular systolic pressure.   7. Normal valve structure and function.        Encounter Date: 10/10/24   ECG 12 Lead   Result Value    Ventricular Rate 83    Atrial Rate 326    QRS Duration 106    QT Interval 402    QTC Calculation(Bazett) 472    R Axis 58    T Axis -27    QRS Count 14    Q Onset 214    T Offset 415    QTC Fredericia 448    Narrative    Atrial fibrillation  Nonspecific T wave abnormality  Prolonged QT  Abnormal ECG  When compared with ECG of 11-OCT-2024 11:55,  No significant change was found        Tele Monitoring: Atrial fibrillation average 96 episodes to 146    Assessment     Patient Active Problem List   Diagnosis    CAD (coronary artery disease)    Benign prostatic hyperplasia    Bradycardia    Current smoker    Elevated serum free T4 level    Emphysema/COPD (Multi)    External hemorrhoid    Hyperlipidemia    Hypertriglyceridemia    White coat syndrome with hypertension    Insomnia, idiopathic    Low back pain    Olecranon bursitis    Paroxysmal atrial fibrillation (Multi)    PVC's (premature ventricular contractions)    Right inguinal hernia    Sacroiliitis (CMS-HCC)    Umbilical hernia    High risk  medication use    Overweight    Stage 3a chronic kidney disease (CKD) (Multi)    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)    Hypertensive heart disease with heart failure    Hypercoagulable state due to atrial fibrillation, unspecified type (Multi)    Shortness of breath    Abnormal CT of the chest    Acute congestive heart failure, unspecified heart failure type          Impression/plan:  CHF: Mild clinically related to atrial fibrillation rapid rate  Atrial fibrillation: Rates remain excessive we will further increase beta-blocker if remains stable by tomorrow would discharge with a rate control strategy for the next week or 2 and then consider depending upon his symptoms and rate control whether to proceed to another antiarrhythmic agent.  Will plan to arrange follow-up with both Dr. Montejo and Dr. Schmitt.  Would continue to monitor for at least another 24 hours.  Thyroid disease/elevated INR: Both related to amiodarone.  Amiodarone is now discontinued.  I would anticipate INR improving as well as thyroid although thyroid will take some time.  Resume warfarin when INR less than 3          Electronically signed by Roberto Fisher MD on 10/12/2024 at 12:27 PM

## 2024-10-13 ENCOUNTER — APPOINTMENT (OUTPATIENT)
Dept: CARDIOLOGY | Facility: HOSPITAL | Age: 89
DRG: 291 | End: 2024-10-13
Payer: MEDICARE

## 2024-10-13 VITALS
HEART RATE: 89 BPM | BODY MASS INDEX: 26.53 KG/M2 | WEIGHT: 175.04 LBS | HEIGHT: 68 IN | TEMPERATURE: 97.5 F | RESPIRATION RATE: 16 BRPM | DIASTOLIC BLOOD PRESSURE: 79 MMHG | OXYGEN SATURATION: 96 % | SYSTOLIC BLOOD PRESSURE: 137 MMHG

## 2024-10-13 DIAGNOSIS — E03.2 HYPOTHYROIDISM DUE TO MEDICATION: Primary | ICD-10-CM

## 2024-10-13 DIAGNOSIS — R79.1 SUPRATHERAPEUTIC INR: ICD-10-CM

## 2024-10-13 LAB
ALBUMIN SERPL BCP-MCNC: 3.1 G/DL (ref 3.4–5)
ALP SERPL-CCNC: 71 U/L (ref 33–136)
ALT SERPL W P-5'-P-CCNC: 18 U/L (ref 10–52)
ANION GAP SERPL CALC-SCNC: 11 MMOL/L (ref 10–20)
AST SERPL W P-5'-P-CCNC: 18 U/L (ref 9–39)
ATRIAL RATE: 326 BPM
ATRIAL RATE: 375 BPM
BASOPHILS # BLD AUTO: 0.01 X10*3/UL (ref 0–0.1)
BASOPHILS NFR BLD AUTO: 0.1 %
BILIRUB SERPL-MCNC: 0.6 MG/DL (ref 0–1.2)
BUN SERPL-MCNC: 34 MG/DL (ref 6–23)
CALCIUM SERPL-MCNC: 8.7 MG/DL (ref 8.6–10.3)
CHLORIDE SERPL-SCNC: 97 MMOL/L (ref 98–107)
CO2 SERPL-SCNC: 31 MMOL/L (ref 21–32)
CREAT SERPL-MCNC: 1.14 MG/DL (ref 0.5–1.3)
EGFRCR SERPLBLD CKD-EPI 2021: 62 ML/MIN/1.73M*2
EOSINOPHIL # BLD AUTO: 0 X10*3/UL (ref 0–0.4)
EOSINOPHIL NFR BLD AUTO: 0 %
ERYTHROCYTE [DISTWIDTH] IN BLOOD BY AUTOMATED COUNT: 13.5 % (ref 11.5–14.5)
GLUCOSE SERPL-MCNC: 139 MG/DL (ref 74–99)
HCT VFR BLD AUTO: 29.7 % (ref 41–52)
HGB BLD-MCNC: 9.6 G/DL (ref 13.5–17.5)
HOLD SPECIMEN: NORMAL
IMM GRANULOCYTES # BLD AUTO: 0.11 X10*3/UL (ref 0–0.5)
IMM GRANULOCYTES NFR BLD AUTO: 0.8 % (ref 0–0.9)
INR PPP: 7 (ref 0.9–1.1)
LYMPHOCYTES # BLD AUTO: 0.32 X10*3/UL (ref 0.8–3)
LYMPHOCYTES NFR BLD AUTO: 2.4 %
MAGNESIUM SERPL-MCNC: 2.11 MG/DL (ref 1.6–2.4)
MCH RBC QN AUTO: 31.5 PG (ref 26–34)
MCHC RBC AUTO-ENTMCNC: 32.3 G/DL (ref 32–36)
MCV RBC AUTO: 97 FL (ref 80–100)
MONOCYTES # BLD AUTO: 0.48 X10*3/UL (ref 0.05–0.8)
MONOCYTES NFR BLD AUTO: 3.6 %
NEUTROPHILS # BLD AUTO: 12.58 X10*3/UL (ref 1.6–5.5)
NEUTROPHILS NFR BLD AUTO: 93.1 %
NRBC BLD-RTO: 0 /100 WBCS (ref 0–0)
PLATELET # BLD AUTO: 326 X10*3/UL (ref 150–450)
POTASSIUM SERPL-SCNC: 4.2 MMOL/L (ref 3.5–5.3)
PROT SERPL-MCNC: 6.2 G/DL (ref 6.4–8.2)
PROTHROMBIN TIME: 80.9 SECONDS (ref 9.8–12.8)
Q ONSET: 214 MS
Q ONSET: 214 MS
QRS COUNT: 13 BEATS
QRS COUNT: 14 BEATS
QRS DURATION: 106 MS
QRS DURATION: 108 MS
QT INTERVAL: 402 MS
QT INTERVAL: 416 MS
QTC CALCULATION(BAZETT): 468 MS
QTC CALCULATION(BAZETT): 472 MS
QTC FREDERICIA: 448 MS
QTC FREDERICIA: 449 MS
R AXIS: 58 DEGREES
R AXIS: 59 DEGREES
RBC # BLD AUTO: 3.05 X10*6/UL (ref 4.5–5.9)
SODIUM SERPL-SCNC: 135 MMOL/L (ref 136–145)
T AXIS: -27 DEGREES
T AXIS: -32 DEGREES
T OFFSET: 415 MS
T OFFSET: 422 MS
VENTRICULAR RATE: 76 BPM
VENTRICULAR RATE: 83 BPM
WBC # BLD AUTO: 13.5 X10*3/UL (ref 4.4–11.3)

## 2024-10-13 PROCEDURE — 80053 COMPREHEN METABOLIC PANEL: CPT | Performed by: INTERNAL MEDICINE

## 2024-10-13 PROCEDURE — 94640 AIRWAY INHALATION TREATMENT: CPT

## 2024-10-13 PROCEDURE — 93010 ELECTROCARDIOGRAM REPORT: CPT | Performed by: INTERNAL MEDICINE

## 2024-10-13 PROCEDURE — 36415 COLL VENOUS BLD VENIPUNCTURE: CPT | Performed by: INTERNAL MEDICINE

## 2024-10-13 PROCEDURE — 85025 COMPLETE CBC W/AUTO DIFF WBC: CPT | Performed by: INTERNAL MEDICINE

## 2024-10-13 PROCEDURE — 99238 HOSP IP/OBS DSCHRG MGMT 30/<: CPT | Performed by: INTERNAL MEDICINE

## 2024-10-13 PROCEDURE — 2500000002 HC RX 250 W HCPCS SELF ADMINISTERED DRUGS (ALT 637 FOR MEDICARE OP, ALT 636 FOR OP/ED): Performed by: NURSE PRACTITIONER

## 2024-10-13 PROCEDURE — 2500000002 HC RX 250 W HCPCS SELF ADMINISTERED DRUGS (ALT 637 FOR MEDICARE OP, ALT 636 FOR OP/ED): Performed by: STUDENT IN AN ORGANIZED HEALTH CARE EDUCATION/TRAINING PROGRAM

## 2024-10-13 PROCEDURE — 2500000002 HC RX 250 W HCPCS SELF ADMINISTERED DRUGS (ALT 637 FOR MEDICARE OP, ALT 636 FOR OP/ED): Performed by: INTERNAL MEDICINE

## 2024-10-13 PROCEDURE — 93005 ELECTROCARDIOGRAM TRACING: CPT

## 2024-10-13 PROCEDURE — 2500000001 HC RX 250 WO HCPCS SELF ADMINISTERED DRUGS (ALT 637 FOR MEDICARE OP): Performed by: INTERNAL MEDICINE

## 2024-10-13 PROCEDURE — 2500000004 HC RX 250 GENERAL PHARMACY W/ HCPCS (ALT 636 FOR OP/ED): Performed by: INTERNAL MEDICINE

## 2024-10-13 PROCEDURE — 83735 ASSAY OF MAGNESIUM: CPT | Performed by: INTERNAL MEDICINE

## 2024-10-13 PROCEDURE — 99233 SBSQ HOSP IP/OBS HIGH 50: CPT | Performed by: INTERNAL MEDICINE

## 2024-10-13 PROCEDURE — 85610 PROTHROMBIN TIME: CPT | Performed by: INTERNAL MEDICINE

## 2024-10-13 RX ORDER — METHIMAZOLE 10 MG/1
20 TABLET ORAL EVERY 12 HOURS SCHEDULED
Qty: 120 TABLET | Refills: 2 | Status: SHIPPED | OUTPATIENT
Start: 2024-10-13 | End: 2025-10-13

## 2024-10-13 RX ORDER — PHYTONADIONE 5 MG/1
2.5 TABLET ORAL ONCE
Status: COMPLETED | OUTPATIENT
Start: 2024-10-13 | End: 2024-10-13

## 2024-10-13 RX ORDER — METHYLPREDNISOLONE 4 MG/1
TABLET ORAL
Qty: 21 TABLET | Refills: 0 | Status: SHIPPED | OUTPATIENT
Start: 2024-10-13

## 2024-10-13 RX ORDER — METOPROLOL SUCCINATE 100 MG/1
100 TABLET, EXTENDED RELEASE ORAL DAILY
Qty: 30 TABLET | Refills: 2 | Status: SHIPPED | OUTPATIENT
Start: 2024-10-14 | End: 2025-10-14

## 2024-10-13 ASSESSMENT — COGNITIVE AND FUNCTIONAL STATUS - GENERAL
MOBILITY SCORE: 24
DAILY ACTIVITIY SCORE: 24

## 2024-10-13 ASSESSMENT — PAIN SCALES - GENERAL: PAINLEVEL_OUTOF10: 0 - NO PAIN

## 2024-10-13 NOTE — DISCHARGE SUMMARY
Discharge Diagnosis:   Acute congestive heart failure, unspecified heart failure type     Discharge Date: 10/13/24   Admission Date: 10/10/2024     Hospital Course:   Canelo Moore is a pleasant 89 y.o. male who has paroxysmal atrial fibrillation currently on amiodarone and Coumadin.  Patient has been fatigued and tired and that is why he came to the emergency department.  He was found to have atrial fibrillation most of the time in higher rate.  He was evaluated by cardiology.  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  Patient's INR was also high.  Coumadin was on hold.  Both hyperthyroidism and supratherapeutic INR was thought to be secondary to amiodarone; it was stopped.  Cardiology increase metoprolol XL to 100 mg/day and that seemed to make heart rate stay low and patient felt better.  He was evaluated by endocrinologist who started him on high dose of methimazole along with steroid.  Patient may need to be on this 2 medications for some time as amiodarone will be in the system.  He has outpatient follow-up with endocrinology cardiology and PCP.  On Tuesday he was given blood work for INR, T3-T4 and TSH.  Patient has been instructed that he should hold Coumadin and aspirin until his INR is between 2-3.      Discharge Physical Exam:    HEENT:  Atraumatic, PERRL. Conjunctivae clear.  Moist nasal mucous membranes.   Neck:  Supple without thyromegaly or lymphadenopathy.  Lungs:  Clear to auscultation without rales, rhonchi, or rub.  Heart:  RRR, S1, S2, without M.  Abdomen:  Soft, non tender, no organ enlargement.  Bowel sounds present . No CVA tenderness.  Extremities:  No edema. No calf swelling or tenderness.    Skin:  No rash, ecchymosis or erythema.    On the day of discharge patient was ambulating well, eating and drinking well. Physical exam was essentially benign and was at baseline. Blood chemistry and other lab testing results were at  acceptable for discharge. Patient remained hemodynamically stable and with no further acute concern. Patient verbalized understanding of discharge medications and the expected adverse effects, if any.       The detail of the hospital course  including admission H&P, consult recommendations, biochemical lab results, imaging studies can be found in EHR of [facility]. Total 29 minutes time was spent on discharge exam, medicine reconciliation, discharge instructions and preparing discharge summary.     Home Medications After Discharge    Scheduled   ezetimibe (Zetia) 10 mg tablet, Take 1 tablet (10 mg) by mouth early in the morning..   finasteride (Proscar) 5 mg tablet, Take 1 tablet (5 mg) by mouth once daily.   magnesium oxide (Mag-Ox) 400 mg (241.3 mg magnesium) tablet, Take 1 tablet (400 mg) by mouth 2 times a day.   methIMAzole (Tapazole) 10 mg tablet, Take 2 tablets (20 mg) by mouth every 12 hours.   metoprolol succinate XL (Toprol-XL) 100 mg 24 hr tablet, Take 1 tablet (100 mg) by mouth once daily. Do not crush or chew., Start: 10/14/24   multivitamin (One Daily Multivitamin) tablet, Take 1 tablet by mouth once daily.   rosuvastatin (Crestor) 10 mg tablet, Take 1 tablet (10 mg) by mouth once daily at bedtime.   tiotropium (Spiriva Respimat) 2.5 mcg/actuation inhaler, Inhale 2 puffs once daily.   tiotropium (Spiriva with HandiHaler) 18 mcg inhalation capsule, Place 1 capsule (18 mcg) into inhaler and inhale once daily.   tiotropium-olodateroL (Stiolto Respimat) 2.5-2.5 mcg/actuation mist inhaler, Inhale 2 Inhalations once daily.   valsartan-hydrochlorothiazide (Diovan-HCT) 160-12.5 mg tablet, Take 1 tablet by mouth once daily.    PRN   albuterol (Ventolin HFA) 90 mcg/actuation inhaler, Inhale 2 puffs every 6 hours if needed for shortness of breath.   nitroglycerin (Nitrostat) 0.4 mg SL tablet, Place 1 tablet (0.4 mg) under the tongue every 5 minutes if needed for chest pain. place 1 tablet under  tongue every 5 minutes for up to 3 doses as needed for chest pain. Call 911 if pain persists    No Frequency   methylPREDNISolone (Medrol Dospak) 4 mg tablets, Take as directed on package.         Outpatient Follow up:   Future Appointments   Date Time Provider Department Center   11/27/2024  2:20 PM NICOLASA Mckenzie-CNP [id number] [place]   2/6/2025 11:00 AM Yi Montejo MD [id number] [place]   2/26/2025 10:30 AM Gordy Cueva MD [id number] [place]     PCP: Gordy Cueva MD   The transitional care management team of [facility] will contact patient.    Patient was also advised to call PCP's office for hospital discharge follow-up in 7-10 days from today.          PS: This note was completed using Dragon voice recognition technology and may include unintended errors with respect to translation of words, typographical or grammar errors which may not have been identified while finalizing the chart.

## 2024-10-13 NOTE — CARE PLAN
The patient's goals for the shift include      The clinical goals for the shift include Pt. will be free of falls/injury throughout shift    Problem: Skin  Goal: Decreased wound size/increased tissue granulation at next dressing change  Outcome: Progressing  Goal: Participates in plan/prevention/treatment measures  Outcome: Met  Goal: Prevent/manage excess moisture  Outcome: Met  Goal: Prevent/minimize sheer/friction injuries  Outcome: Met  Goal: Promote/optimize nutrition  Outcome: Met     Problem: Pain - Adult  Goal: Verbalizes/displays adequate comfort level or baseline comfort level  Outcome: Met  Flowsheets (Taken 10/13/2024 1720)  Verbalizes/displays adequate comfort level or baseline comfort level:   Encourage patient to monitor pain and request assistance   Assess pain using appropriate pain scale   Implement non-pharmacological measures as appropriate and evaluate response   Notify Licensed Independent Practitioner if interventions unsuccessful or patient reports new pain   Consider cultural and social influences on pain and pain management     Problem: Discharge Planning  Goal: Discharge to home or other facility with appropriate resources  Outcome: Met  Flowsheets (Taken 10/13/2024 1720)  Discharge to home or other facility with appropriate resources:   Identify barriers to discharge with patient and caregiver   Arrange for needed discharge resources and transportation as appropriate   Identify discharge learning needs (meds, wound care, etc)   Arrange for interpreters to assist at discharge as needed   Refer to discharge planning if patient needs post-hospital services based on physician order or complex needs related to functional status, cognitive ability or social support system     Problem: Chronic Conditions and Co-morbidities  Goal: Patient's chronic conditions and co-morbidity symptoms are monitored and maintained or improved  Outcome: Progressing  Flowsheets (Taken 10/13/2024 1720)  Care Plan -  Patient's Chronic Conditions and Co-Morbidity Symptoms are Monitored and Maintained or Improved:   Monitor and assess patient's chronic conditions and comorbid symptoms for stability, deterioration, or improvement   Collaborate with multidisciplinary team to address chronic and comorbid conditions and prevent exacerbation or deterioration   Update acute care plan with appropriate goals if chronic or comorbid symptoms are exacerbated and prevent overall improvement and discharge     Problem: Pain  Goal: Takes deep breaths with improved pain control throughout the shift  Outcome: Met  Goal: Turns in bed with improved pain control throughout the shift  Outcome: Met  Goal: Walks with improved pain control throughout the shift  Outcome: Met  Goal: Performs ADL's with improved pain control throughout shift  Outcome: Met     Problem: Nutrition  Goal: Oral intake greater 75%  Outcome: Met

## 2024-10-13 NOTE — CONSULTS
Inpatient consult to Endocrinology  Consult performed by: Heriberto López MD  Consult ordered by: Gordy Cueva MD          Assessment/Plan      Hyperthyroidism s/p Amiodarone use --    Severe hyperthyroidism-(h/o normal labs 3 months ago)  TSI neg  Start Methimazole and will need steroids - on Solumedral to continue    Reason For Consult  Hyperthyroidism     History Of Present Illness  Canelo Moore is a 89 y.o. male with  has a past medical history of Atrial fibrillation (Multi), BPH (benign prostatic hyperplasia), CAD (coronary artery disease), Controlled atrial fibrillation (Multi), COPD (chronic obstructive pulmonary disease) (Multi), Hyperlipidemia, Hypertension, and Stage 3a chronic kidney disease (CKD) (Multi). presenting with endocrinology for Hyperthyroidism -was on Amiodarone - per HPI Canelo Moore is a 89 y.o.  male patient who is being at the request of Dr. Cueva for inpatient consultation of CHF. He was admitted on 10/10/2024.  Previous [facility] and [facility] records have been reviewed in detail.    Patient with a history of diastolic heart failure, persistent A-fib, hypertension, dyslipidemia  Patient states the last 2 weeks she has had increased shortness of breath having more difficulty lying flat whenever he exerts himself he felt like he was more short of breath.  He comes in with an elevated BNP and small bilateral pleural effusions.  Couple months ago he did have a PET scan and on the left and right side he did have nodules that he is seeing cardiothoracic surgery for.  He does have a history of PAF he is currently on Coumadin he sees Dr. Montejo in the past and he is taking amiodarone.  No fever, chills, orthopnea, claudications.  Positive for shortness of breath, fatigue, PND at times   .      Past Medical History  He has a past medical history of Atrial fibrillation (Multi), BPH (benign prostatic hyperplasia), CAD (coronary artery disease), Controlled atrial fibrillation (Multi), COPD  (chronic obstructive pulmonary disease) (Multi), Hyperlipidemia, Hypertension, and Stage 3a chronic kidney disease (CKD) (Multi).    Surgical History  He has a past surgical history that includes Other surgical history (11/04/2020); Other surgical history (11/04/2020); Other surgical history (11/04/2020); Other surgical history (11/04/2020); Other surgical history (11/06/2021); Other surgical history (11/06/2021); Other surgical history (11/06/2021); Other surgical history (12/14/2021); Other surgical history (12/14/2021); and US aspiration injection intermediate joint (2/8/2021).     Social History  He reports that he has been smoking cigarettes. He has a 30 pack-year smoking history. He uses smokeless tobacco. He reports that he does not currently use alcohol. He reports that he does not currently use drugs.    Family History  Family History   Problem Relation Name Age of Onset    Breast cancer Mother      Atrial fibrillation Mother      Colon cancer Brother      Prostate cancer Brother      Breast cancer Daughter      Prostate cancer Son      Colon cancer Son          Allergies  Ace inhibitors    Review of Systems    Past Medical History:   Diagnosis Date    Atrial fibrillation (Multi)     BPH (benign prostatic hyperplasia)     CAD (coronary artery disease)     Controlled atrial fibrillation (Multi)     COPD (chronic obstructive pulmonary disease) (Multi)     Hyperlipidemia     Hypertension     Stage 3a chronic kidney disease (CKD) (Multi)        Past Surgical History:   Procedure Laterality Date    OTHER SURGICAL HISTORY  11/04/2020    Varicose vein ligation    OTHER SURGICAL HISTORY  11/04/2020    [place] tooth extraction    OTHER SURGICAL HISTORY  11/04/2020    Tonsillectomy    OTHER SURGICAL HISTORY  11/04/2020    Colonoscopy complete for polypectomy    OTHER SURGICAL HISTORY  11/06/2021    Varicose vein sclerotherapy    OTHER SURGICAL HISTORY  11/06/2021    Pilonidal cyst removal    OTHER SURGICAL HISTORY   11/06/2021    Hernia repair    OTHER SURGICAL HISTORY  12/14/2021    Umbilical hernia repair    OTHER SURGICAL HISTORY  12/14/2021    Inguinal hernia repair    US ASPIRATION INJECTION INTERMEDIATE JOINT  2/8/2021    US ASPIRATION INJECTION INTERMEDIATE JOINT 2/8/2021 [facility]       Social History     Socioeconomic History    Marital status:      Spouse name: Not on file    Number of children: Not on file    Years of education: Not on file    Highest education level: Not on file   Occupational History    Not on file   Tobacco Use    Smoking status: Every Day     Current packs/day: 0.50     Average packs/day: 0.5 packs/day for 60.0 years (30.0 ttl pk-yrs)     Types: Cigarettes    Smokeless tobacco: Current   Substance and Sexual Activity    Alcohol use: Not Currently    Drug use: Not Currently    Sexual activity: Not on file   Other Topics Concern    Not on file   Social History Narrative    Not on file     Social Determinants of Health     Financial Resource Strain: Low Risk  (10/10/2024)    Overall Financial Resource Strain (CARDIA)     Difficulty of Paying Living Expenses: Not hard at all   Food Insecurity: No Food Insecurity (10/10/2024)    Hunger Vital Sign     Worried About Running Out of Food in the Last Year: Never true     Ran Out of Food in the Last Year: Never true   Transportation Needs: No Transportation Needs (10/10/2024)    PRAPARE - Transportation     Lack of Transportation (Medical): No     Lack of Transportation (Non-Medical): No   Physical Activity: Not on file   Stress: Not on file   Social Connections: Not on file   Intimate Partner Violence: Not At Risk (10/10/2024)    Humiliation, Afraid, Rape, and Kick questionnaire     Fear of Current or Ex-Partner: No     Emotionally Abused: No     Physically Abused: No     Sexually Abused: No   Housing Stability: Low Risk  (10/10/2024)    Housing Stability Vital Sign     Unable to Pay for Housing in the Last Year: No     Number of Times  "Moved in the Last Year: 1     Homeless in the Last Year: No        Physical Exam   deferred  ROS, PMH, FH/SH, surgical history and allergies have been reviewed.    Last Recorded Vitals  Blood pressure 133/58, pulse 92, temperature 36.8 °C (98.2 °F), resp. rate 18, height 1.727 m (5' 7.99\"), weight 79.4 kg (175 lb 0.7 oz), SpO2 94%.    Relevant Results  Results from last 7 days   Lab Units 10/12/24  0625 10/11/24  0623 10/10/24  1328 10/08/24  1539   GLUCOSE mg/dL 158* 170* 99 106*     No results found for: \"HGBA1C\"   Lab Results   Component Value Date    TSH <0.01 (L) 10/11/2024    FREET4 4.50 (H) 10/11/2024    T3FREE 5.8 (H) 10/11/2024    J9RIWON 84 10/11/2022    RECE <1.10 04/03/2024    THYROIDPAB <28 05/10/2023    TSI <1.0 12/20/2023      Latest Reference Range & Units 04/03/24 08:19 07/31/24 08:23 10/11/24 06:23   Thyroxine, Free 0.61 - 1.12 ng/dL 1.26 (H) 1.53 (H) 4.50 (H)   Thyroid Stimulating Hormone 0.44 - 3.98 mIU/L 3.74 1.40 <0.01 (L)   Vitamin D, 25-Hydroxy, Total 30 - 100 ng/mL  45    Triiodothyronine, Free 2.3 - 4.2 pg/mL 2.9 2.9 5.8 (H)   Thyroglobulin 1.3 - 31.8 ng/mL Not Applicable Not Applicable    Thyroglobulin LC-MS/MS 1.3 - 31.8 ng/mL 1.1 (L) 7.8    (H): Data is abnormally high  (L): Data is abnormally low    Gavin López MD FACE  Office phone - [phone]  Fax - [phone]  Address: [address] [facility] [address]  Address: [id number] [place], [place] [id number]  10/12/2024  9:25 PM  "

## 2024-10-13 NOTE — CARE PLAN
The patient's goals for the shift include      The clinical goals for the shift include Patient will remain safe and free from falls throughout the shift      Problem: Skin  Goal: Decreased wound size/increased tissue granulation at next dressing change  Outcome: Progressing  Goal: Participates in plan/prevention/treatment measures  Outcome: Progressing  Goal: Prevent/manage excess moisture  Outcome: Progressing  Goal: Prevent/minimize sheer/friction injuries  Outcome: Progressing  Goal: Promote/optimize nutrition  Outcome: Progressing  Goal: Promote skin healing  Outcome: Progressing     Problem: Pain - Adult  Goal: Verbalizes/displays adequate comfort level or baseline comfort level  Outcome: Progressing     Problem: Discharge Planning  Goal: Discharge to home or other facility with appropriate resources  Outcome: Progressing     Problem: Chronic Conditions and Co-morbidities  Goal: Patient's chronic conditions and co-morbidity symptoms are monitored and maintained or improved  Outcome: Progressing     Problem: Pain  Goal: Takes deep breaths with improved pain control throughout the shift  Outcome: Progressing  Goal: Turns in bed with improved pain control throughout the shift  Outcome: Progressing  Goal: Walks with improved pain control throughout the shift  Outcome: Progressing  Goal: Performs ADL's with improved pain control throughout shift  Outcome: Progressing  Goal: Participates in PT with improved pain control throughout the shift  Outcome: Progressing  Goal: Free from opioid side effects throughout the shift  Outcome: Progressing  Goal: Free from acute confusion related to pain meds throughout the shift  Outcome: Progressing     Problem: Nutrition  Goal: Oral intake greater 75%  Outcome: Progressing     Problem: Heart Failure  Goal: Improved gas exchange this shift  Outcome: Progressing  Goal: Improved urinary output this shift  Outcome: Progressing  Goal: Reduction in peripheral edema within 24  hours  Outcome: Progressing  Goal: Report improvement of dyspnea/breathlessness this shift  Outcome: Progressing  Goal: Weight from fluid excess reduced over 2-3 days, then stabilize  Outcome: Progressing  Goal: Increase self care and/or family involvement in 24 hours  Outcome: Progressing

## 2024-10-13 NOTE — DISCHARGE INSTR - OTHER ORDERS
"Hold Coumadin until INR is 2-3. Outpatient lab order for \"INR, T3, T4, TSH\" to be done on Tuesday (10/15/2024); no fasting needed. Dr. Cueva's office will call to set up follow-up appointment.  "

## 2024-10-14 ENCOUNTER — PATIENT OUTREACH (OUTPATIENT)
Dept: PRIMARY CARE | Facility: CLINIC | Age: 89
End: 2024-10-14
Payer: MEDICARE

## 2024-10-14 NOTE — PROGRESS NOTES
Discharge Facility:  [facility]    Discharge Diagnosis:  Acute congestive heart failure, unspecified heart failure type     Admission Date:  10/10/24  Discharge Date:   10/13/24    PCP Appointment Date:  TBD-Message sent to office staff requesting assistance.    Specialist Appointment Date:   10/17/24 Dr Schmitt   11/26/24 Dr Montejo  [facility] Encounter and Summary Linked: Yes    See discharge assessment below for further details  Medications  Medications reviewed with patient/caregiver?: Yes (10/14/2024  2:00 PM)  Is the patient having any side effects they believe may be caused by any medication additions or changes?: No (10/14/2024  2:00 PM)  Does the patient have all medications ordered at discharge?: Yes (10/14/2024  2:00 PM)  Prescription Comments: START taking: methIMAzole (Tapazole) methylPREDNISolone (Medrol Dospak)  CHANGE how you take: metoprolol succinate XL (Toprol-XL)  STOP taking: amiodarone 100 mg tablet (Pacerone) aspirin 81 mg EC tablet HOLD:warfarin 5 mg tablet (Coumadin) (10/14/2024  2:00 PM)  Is the patient taking all medications as directed (includes completed medication regime)?: Yes (10/14/2024  2:00 PM)  Care Management Interventions: Provided patient education (10/14/2024  2:00 PM)  Medication Comments: CM discussed referencing discharge paperwork to follow detailed daily medication schedule. Reminded to bring all medications to future appointments. Patient endorses understanding & compliance with medications. (10/14/2024  2:00 PM)    Appointments  Does the patient have a primary care provider?: Yes (10/14/2024  2:00 PM)  Care Management Interventions: Educated patient on importance of making appointment (10/14/2024  2:00 PM)  Has the patient kept scheduled appointments due by today?: Yes (10/14/2024  2:00 PM)  Care Management Interventions: Educated on importance of keeping appointment (reviewed cardio appts that are upcoming w pt) (10/14/2024  2:00 PM)    Self  "Management  Has home health visited the patient within 72 hours of discharge?: Not applicable (10/14/2024  2:00 PM)  What Durable Medical Equipment (DME) was ordered?: na (10/14/2024  2:00 PM)    Patient Teaching  Does the patient have access to their discharge instructions?: Yes (10/14/2024  2:00 PM)  Care Management Interventions: Reviewed instructions with patient (10/14/2024  2:00 PM)  What is the patient's perception of their health status since discharge?: Improving (10/14/2024  2:00 PM)  Is the patient/caregiver able to teach back the hierarchy of who to call/visit for symptoms/problems? PCP, Specialist, Home Health nurse, Urgent Care, ED, 911: Yes (10/14/2024  2:00 PM)  Patient/Caregiver Education Comments: Hold Coumadin until INR is 2-3. Outpatient lab order for \"INR, T3, T4, TSH\" to be done on Tuesday (10/15/2024); no fasting needed. Pt was already aware of need to labs tomorrow, He believed that he is also seeing Dr Cueva at that time but I do not see appt on books so messaged Clerical staff an urgent message to reach out to him about hosptial follow up with Dr Cueva. (10/14/2024  2:00 PM)    Wrap Up  Wrap Up Additional Comments: Successful transition of care outreach with patient. CM introduced myself and the TCM program to Canelo Moore. Reviewed hospital stay and answered any questions. Patient denies any further discharge questions/needs at this time. CM gave my contact information and encouraged to call if needing assistance or has any further non-emergent questions prior to my next outreach. (10/14/2024  2:00 PM)            "

## 2024-10-15 ENCOUNTER — ANTICOAGULATION - WARFARIN VISIT (OUTPATIENT)
Dept: CARDIOLOGY | Facility: CLINIC | Age: 89
End: 2024-10-15

## 2024-10-15 ENCOUNTER — LAB (OUTPATIENT)
Dept: LAB | Facility: LAB | Age: 89
End: 2024-10-15
Payer: COMMERCIAL

## 2024-10-15 DIAGNOSIS — E03.2 HYPOTHYROIDISM DUE TO MEDICATION: ICD-10-CM

## 2024-10-15 DIAGNOSIS — Z79.01 CURRENT USE OF LONG TERM ANTICOAGULATION: ICD-10-CM

## 2024-10-15 LAB
INR PPP: 1.9 (ref 0.9–1.1)
PROTHROMBIN TIME: 21.6 SECONDS (ref 9.8–12.8)
T3FREE SERPL-MCNC: 2.9 PG/ML (ref 2.3–4.2)
T4 FREE SERPL-MCNC: 3.1 NG/DL (ref 0.61–1.12)
TSH SERPL-ACNC: <0.01 MIU/L (ref 0.44–3.98)

## 2024-10-15 PROCEDURE — 84439 ASSAY OF FREE THYROXINE: CPT

## 2024-10-15 PROCEDURE — 36415 COLL VENOUS BLD VENIPUNCTURE: CPT

## 2024-10-15 PROCEDURE — 85610 PROTHROMBIN TIME: CPT

## 2024-10-15 PROCEDURE — 84443 ASSAY THYROID STIM HORMONE: CPT

## 2024-10-15 PROCEDURE — 84481 FREE ASSAY (FT-3): CPT

## 2024-10-15 NOTE — PROGRESS NOTES
Called and spoke with Mr. Moore regarding INR 1.9 with goal 2-3.  Will resume Coumadin 2.5 mg daily.  Will repeat INR Monday 10/21/2024.    Mr. Moore verbalizes understanding.

## 2024-10-16 ENCOUNTER — APPOINTMENT (OUTPATIENT)
Dept: CARDIOLOGY | Facility: CLINIC | Age: 89
End: 2024-10-16
Payer: MEDICARE

## 2024-10-17 ENCOUNTER — OFFICE VISIT (OUTPATIENT)
Dept: CARDIOLOGY | Facility: CLINIC | Age: 89
End: 2024-10-17
Payer: COMMERCIAL

## 2024-10-17 VITALS
SYSTOLIC BLOOD PRESSURE: 110 MMHG | HEIGHT: 68 IN | HEART RATE: 65 BPM | WEIGHT: 170.6 LBS | DIASTOLIC BLOOD PRESSURE: 52 MMHG | BODY MASS INDEX: 25.85 KG/M2

## 2024-10-17 DIAGNOSIS — Z51.81 ANTICOAGULATION MANAGEMENT ENCOUNTER: ICD-10-CM

## 2024-10-17 DIAGNOSIS — F17.200 CURRENT SMOKER: ICD-10-CM

## 2024-10-17 DIAGNOSIS — Z79.01 ANTICOAGULATION MANAGEMENT ENCOUNTER: ICD-10-CM

## 2024-10-17 DIAGNOSIS — Z79.899 HIGH RISK MEDICATION USE: Primary | ICD-10-CM

## 2024-10-17 DIAGNOSIS — R94.31 ABNORMAL EKG: ICD-10-CM

## 2024-10-17 DIAGNOSIS — I48.21 PERMANENT ATRIAL FIBRILLATION (MULTI): ICD-10-CM

## 2024-10-17 RX ORDER — WARFARIN SODIUM 5 MG/1
TABLET ORAL
COMMUNITY

## 2024-10-17 NOTE — PROGRESS NOTES
CARDIOLOGY OFFICE VISIT      CHIEF COMPLAINT  Chief Complaint   Patient presents with    Hospital Follow-up       HISTORY OF PRESENT ILLNESS  HPI  89-year-old male with a past medical history of diastolic heart failure, persistent atrial fibrillation, hypertension and hyperlipidemia.  Patient has a diagnosis of atrial fibrillation for almost 15 years.  Looking at his records he was evaluated at [facility] in 2022 after coming back from a trip from [place].  He was in atrial fibrillation.  he underwent a MOSHE guided cardioversion with successful restoration to sinus rhythm but after cardioversion, he developed a brief episode of ventricular tachycardia for which he was placed on amiodarone therapy.    Since then she states that his arrhythmia was very well-controlled until last 3 to 4 weeks and he has been noticing more episodes of atrial fibrillation by blood pressure cuff.  He denies symptoms like chest pain or shortness of breath.  No fluttering the chest.    Patient was recently evaluated at [facility] in October 2024 for shortness of breath.  He had elevated BNP and small bilateral effusions.  He had a PET scan on the left and right side and he did have nodules and he has been seen by cardiothoracic surgery for this.    During this admission, he was back in atrial fibrillation.  Occasionally episodes of rapid ventricular response.  He has been having also issues with significant hyperthyroidism related to low-dose amiodarone.  During this admission the decision was to discontinue amiodarone and also consider an antiarrhythmic therapy once patient is more stable.    Patient was left on warfarin therapy.    Patient states that since the discharge from the hospital he is feeling actually much better.  Denies any symptoms of chest pain or shortness of breath.          EKGs A-fib QTc is 482  Magnesium 1.70    Troponin 11, 10    Echocardiogram 2024  CONCLUSIONS:   1. The left ventricular  systolic function is normal, with a visually estimated ejection fraction of 60%.   2. Spectral Doppler shows an abnormal pattern of left ventricular diastolic filling.   3. There is normal right ventricular global systolic function.   4. RVSP 37 mm Hg.   5. The left atrium is moderate to severely dilated.   6. Mildly elevated right ventricular systolic pressure.   7. Normal valve structure and function.       Cardiac history  4/25/2024 echo  CONCLUSIONS:   1. Left ventricular systolic function is normal with a 60% estimated ejection fraction.   2. Borderline LVH at 11-12mm with very mild DUST but no LVOT obstruction.   3. Spectral Doppler shows an abnormal pattern of left ventricular diastolic filling.   4. Normal RV size and function      Borderline RVSP elevation at 31 mm HG.   5. Mild LAE.   6. Normal valve structure and function.     4/24 stress test  Summary:   1. Low functional capacity at 3.1 MET, limited by dyspnea, no chest pain.   2. No evidence ischemia by ecg criteria.   3. Markedly hypertensive BP response to exercise.   4. Frequent pvc/pac during recovery.   5. Achieved <85% THR at 82% but excellent HR/BP product, with no ecg changes likelihood of critical CAD low but cannot exclude the presence of CAD.   6. Inadequate level of stress achieved.  Past Medical History  Past Medical History:   Diagnosis Date    Atrial fibrillation (Multi)     BPH (benign prostatic hyperplasia)     CAD (coronary artery disease)     Controlled atrial fibrillation (Multi)     COPD (chronic obstructive pulmonary disease) (Multi)     Hyperlipidemia     Hypertension     Stage 3a chronic kidney disease (CKD) (Multi)        Social History  Social History     Tobacco Use    Smoking status: Every Day     Current packs/day: 0.50     Average packs/day: 0.5 packs/day for 60.0 years (30.0 ttl pk-yrs)     Types: Cigarettes    Smokeless tobacco: Current   Substance Use Topics    Alcohol use: Not Currently    Drug use: Not Currently        Family History     Family History   Problem Relation Name Age of Onset    Breast cancer Mother      Atrial fibrillation Mother      Colon cancer Brother      Prostate cancer Brother      Breast cancer Daughter      Prostate cancer Son      Colon cancer Son          Allergies:  Allergies   Allergen Reactions    Ace Inhibitors Cough        Outpatient Medications:  Current Outpatient Medications   Medication Instructions    albuterol (Ventolin HFA) 90 mcg/actuation inhaler 2 puffs, inhalation, Every 6 hours PRN    ezetimibe (ZETIA) 10 mg, oral, Daily (0630)    finasteride (Proscar) 5 mg tablet 1 tablet, Daily    magnesium oxide (MAG-OX) 400 mg, oral, 2 times daily    methIMAzole (TAPAZOLE) 20 mg, oral, Every 12 hours scheduled    methylPREDNISolone (Medrol Dospak) 4 mg tablets Take as directed on package.    metoprolol succinate XL (TOPROL-XL) 100 mg, oral, Daily, Do not crush or chew.    multivitamin (One Daily Multivitamin) tablet 1 tablet, Daily    nitroglycerin (NITROSTAT) 0.4 mg, sublingual, Every 5 min PRN, place 1 tablet under tongue every 5 minutes for up to 3 doses as needed for chest pain. Call 911 if pain persists    rosuvastatin (CRESTOR) 10 mg, oral, Nightly    tiotropium (Spiriva Respimat) 2.5 mcg/actuation inhaler 2 puffs, inhalation, Daily    tiotropium (SPIRIVA WITH HANDIHALER) 18 mcg, inhalation, Daily RT    tiotropium-olodateroL (Stiolto Respimat) 2.5-2.5 mcg/actuation mist inhaler 2 Inhalations, inhalation, Daily    valsartan-hydrochlorothiazide (Diovan-HCT) 160-12.5 mg tablet 1 tablet, oral, Daily    warfarin (Coumadin) 5 mg tablet oral, Take half tablet daily or as directed by physician for therapeutic INR.           REVIEW OF SYSTEMS  Review of Systems   All other systems reviewed and are negative.        VITALS  Vitals:    10/17/24 1309   BP: 110/52   Pulse: 65       PHYSICAL EXAM  Constitutional:       Appearance: Healthy appearance. Not in distress.   Neck:      Vascular: No JVR. JVD  normal.   Pulmonary:      Effort: Pulmonary effort is normal.      Breath sounds: Normal breath sounds. No wheezing. No rhonchi. No rales.   Chest:      Chest wall: Not tender to palpatation.   Cardiovascular:      PMI at left midclavicular line. Normal rate. Irregularly irregular rhythm. Normal S1. Normal S2.       Murmurs: There is no murmur.      No gallop.  No click. No rub.   Pulses:     Intact distal pulses.   Edema:     Peripheral edema absent.   Abdominal:      General: Bowel sounds are normal.      Palpations: Abdomen is soft.      Tenderness: There is no abdominal tenderness.   Musculoskeletal: Normal range of motion.         General: No tenderness. Skin:     General: Skin is warm and dry.   Neurological:      General: No focal deficit present.      Mental Status: Alert and oriented to person, place and time.           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  We discussed the option of rate control strategy versus rhythm control strategy for atrial fibrillation including using a different antiarrhythmic therapy and procedures (pulmonary isolation).    Patient states that he is not too symptomatic with this arrhythmia.  We will plan to leave him in observation for now.  He will follow-up my office in the next 2 to 3 months.    Regarding nonsustained ventricular tachycardia seen post cardioversion a couple of years ago, we discussed the option of long-term monitoring with a loop recorder implantation.  Loop recorder also will help us evaluating rates during atrial fibrillation.  Patient would like to think about this.    Continue with warfarin therapy.  This  can be switched to the new oral anticoagulants.    Follow my office in next 2 to 3 months or sooner needed.    Holter monitor for 48 hours prior to next office visit.    Risk factor modification and lifestyle modification discussed with patient. Diet , exercise and hydration discussed with patient.    I have personally review with patient during this office visit, laboratory data, echocardiogram results, stress test results, Holter-event monitor results prior and after the last electrophysiology visit. All questions has been answered.    Please excuse any errors in grammar or translation related to this dictation.  Voice recognition software was utilized to prepare this document.        Scribe Attestation  By signing my name below, I, Rita Rosario LPN , Scribe   attest that this documentation has been prepared under the direction and in the presence of Rodney Schmitt MD.

## 2024-10-17 NOTE — PATIENT INSTRUCTIONS
Call your insurance and ask if either of the listed medications are in your FORMULARY and the COST for a 90 day supply.  Xarleto 20 mg 1 tablet daily  Eliquis 5 mg 1 tablet twice daily     Pradaxa 150 mg 1 tablet twice daily    Dr. Schmitt is ordering a 24 hour Holter to be done prior to next appointment to see if you are having any arrhythmias and if so what type of arrhythmias.    You will be scheduled for a heart monitor to be applied.  On the day you come in to have the monitor applied, please shower prior to coming in and do not apply any creams, lotions or powders to your chest prior to coming in on the day your monitor is scheduled to be applied.         DID YOU KNOW  We have a pharmacy here in the [facility].  They can fill all prescriptions, not just cardiac medications.  Prescriptions from other pharmacies can easily be transferred to the  pharmacy by the  pharmacist on site.   pharmacies offer FREE HOME DELIVERY on medications to anywhere in [place]. They can sync your medications. Typically prescriptions can be ready in 10 - 15 minutes. If pharmacy is unable to fill your  prescription or if cost is more than your paying now the Pharmacist can easily transfer back to your Pharmacy of choice. Pharmacy phone # [phone].       Please bring all medicines, vitamins, and herbal supplements with you in original bottles to every appointment!!!!    Prescriptions will not be filled unless you are compliant with your follow up appointments or have a follow up appointment scheduled as per instruction of your physician. Refills should be requested at the time of your visit.

## 2024-10-18 ENCOUNTER — APPOINTMENT (OUTPATIENT)
Dept: CARDIOLOGY | Facility: CLINIC | Age: 89
End: 2024-10-18
Payer: MEDICARE

## 2024-10-21 ENCOUNTER — ANTICOAGULATION - WARFARIN VISIT (OUTPATIENT)
Dept: CARDIOLOGY | Facility: CLINIC | Age: 89
End: 2024-10-21

## 2024-10-21 ENCOUNTER — LAB (OUTPATIENT)
Dept: LAB | Facility: LAB | Age: 89
End: 2024-10-21
Payer: COMMERCIAL

## 2024-10-21 DIAGNOSIS — I48.0 PAROXYSMAL ATRIAL FIBRILLATION (MULTI): ICD-10-CM

## 2024-10-21 DIAGNOSIS — I50.9 ACUTE CONGESTIVE HEART FAILURE, UNSPECIFIED HEART FAILURE TYPE: ICD-10-CM

## 2024-10-21 DIAGNOSIS — I25.10 CORONARY ARTERY DISEASE INVOLVING NATIVE CORONARY ARTERY OF NATIVE HEART WITHOUT ANGINA PECTORIS: ICD-10-CM

## 2024-10-21 DIAGNOSIS — J43.2 CENTRILOBULAR EMPHYSEMA (MULTI): Primary | ICD-10-CM

## 2024-10-21 DIAGNOSIS — I11.0 HYPERTENSIVE HEART DISEASE WITH HEART FAILURE: ICD-10-CM

## 2024-10-21 DIAGNOSIS — Z79.01 CURRENT USE OF LONG TERM ANTICOAGULATION: ICD-10-CM

## 2024-10-21 DIAGNOSIS — R79.1 SUPRATHERAPEUTIC INR: ICD-10-CM

## 2024-10-21 LAB
INR PPP: 2.2 (ref 0.9–1.1)
PROTHROMBIN TIME: 24.8 SECONDS (ref 9.8–12.8)

## 2024-10-21 PROCEDURE — 36415 COLL VENOUS BLD VENIPUNCTURE: CPT

## 2024-10-21 PROCEDURE — 85610 PROTHROMBIN TIME: CPT

## 2024-10-21 NOTE — PROGRESS NOTES
Called and spoke with Mr. Moore regarding INR 2.2 with goal 2-3.  He states that he and Dr. Schmitt discussed changing to Pradaxa which he intends to do soon.      Will continue current coumadin dosing until orders provided from Dr. Schmitt to start Pradaxa.    Mr Moore verbalizes understanding.

## 2024-10-21 NOTE — TELEPHONE ENCOUNTER
Pt calls in states that Dr. Rodney Schmitt MD requested that he contact his insurance to see if they cover an alternative med for warfarin. Pt states that his insurance covers Pradaxa and is requesting new script. Please advise. Ramandeep LO

## 2024-10-22 RX ORDER — DABIGATRAN ETEXILATE 150 MG/1
150 CAPSULE ORAL 2 TIMES DAILY
Qty: 180 CAPSULE | Refills: 1 | Status: SHIPPED | OUTPATIENT
Start: 2024-10-22

## 2024-10-28 ENCOUNTER — TELEPHONE (OUTPATIENT)
Dept: CARDIOLOGY | Facility: CLINIC | Age: 89
End: 2024-10-28

## 2024-10-28 ENCOUNTER — APPOINTMENT (OUTPATIENT)
Dept: PRIMARY CARE | Facility: CLINIC | Age: 89
End: 2024-10-28
Payer: MEDICARE

## 2024-10-28 VITALS
TEMPERATURE: 98.8 F | HEIGHT: 68 IN | DIASTOLIC BLOOD PRESSURE: 76 MMHG | SYSTOLIC BLOOD PRESSURE: 128 MMHG | BODY MASS INDEX: 25.82 KG/M2 | WEIGHT: 170.4 LBS

## 2024-10-28 DIAGNOSIS — R06.02 SHORTNESS OF BREATH: ICD-10-CM

## 2024-10-28 DIAGNOSIS — I48.0 PAROXYSMAL ATRIAL FIBRILLATION (MULTI): Primary | ICD-10-CM

## 2024-10-28 DIAGNOSIS — Z23 NEED FOR IMMUNIZATION AGAINST INFLUENZA: ICD-10-CM

## 2024-10-28 DIAGNOSIS — J43.2 CENTRILOBULAR EMPHYSEMA (MULTI): ICD-10-CM

## 2024-10-28 DIAGNOSIS — E05.90 HYPERTHYROIDISM: ICD-10-CM

## 2024-10-28 PROCEDURE — 1111F DSCHRG MED/CURRENT MED MERGE: CPT | Performed by: INTERNAL MEDICINE

## 2024-10-28 PROCEDURE — 1159F MED LIST DOCD IN RCRD: CPT | Performed by: INTERNAL MEDICINE

## 2024-10-28 PROCEDURE — G2211 COMPLEX E/M VISIT ADD ON: HCPCS | Performed by: INTERNAL MEDICINE

## 2024-10-28 PROCEDURE — 99214 OFFICE O/P EST MOD 30 MIN: CPT | Performed by: INTERNAL MEDICINE

## 2024-10-28 PROCEDURE — 4004F PT TOBACCO SCREEN RCVD TLK: CPT | Performed by: INTERNAL MEDICINE

## 2024-10-28 PROCEDURE — 1123F ACP DISCUSS/DSCN MKR DOCD: CPT | Performed by: INTERNAL MEDICINE

## 2024-10-28 PROCEDURE — 90662 IIV NO PRSV INCREASED AG IM: CPT | Performed by: INTERNAL MEDICINE

## 2024-10-28 PROCEDURE — 3078F DIAST BP <80 MM HG: CPT | Performed by: INTERNAL MEDICINE

## 2024-10-28 PROCEDURE — 3074F SYST BP LT 130 MM HG: CPT | Performed by: INTERNAL MEDICINE

## 2024-10-28 PROCEDURE — G0008 ADMIN INFLUENZA VIRUS VAC: HCPCS | Performed by: INTERNAL MEDICINE

## 2024-10-28 PROCEDURE — 1157F ADVNC CARE PLAN IN RCRD: CPT | Performed by: INTERNAL MEDICINE

## 2024-10-28 RX ORDER — PREDNISONE 20 MG/1
20 TABLET ORAL 2 TIMES DAILY
Qty: 60 TABLET | Refills: 1 | Status: SHIPPED | OUTPATIENT
Start: 2024-10-28 | End: 2025-10-28

## 2024-10-28 RX ORDER — TIOTROPIUM BROMIDE 18 UG/1
1 CAPSULE ORAL; RESPIRATORY (INHALATION)
Qty: 30 CAPSULE | Refills: 3 | Status: SHIPPED | OUTPATIENT
Start: 2024-10-28

## 2024-10-28 RX ORDER — ALBUTEROL SULFATE 90 UG/1
2 INHALANT RESPIRATORY (INHALATION) EVERY 6 HOURS PRN
Qty: 18 G | Refills: 3 | Status: SHIPPED | OUTPATIENT
Start: 2024-10-28

## 2024-10-28 RX ORDER — ALFUZOSIN HYDROCHLORIDE 10 MG/1
10 TABLET, EXTENDED RELEASE ORAL DAILY
COMMUNITY

## 2024-10-28 RX ORDER — METHIMAZOLE 10 MG/1
20 TABLET ORAL EVERY 12 HOURS SCHEDULED
Qty: 120 TABLET | Refills: 0 | Status: SHIPPED | OUTPATIENT
Start: 2024-10-28 | End: 2024-11-27

## 2024-10-28 ASSESSMENT — ENCOUNTER SYMPTOMS
COUGH: 0
DYSURIA: 0
ACTIVITY CHANGE: 0
SORE THROAT: 0
ABDOMINAL PAIN: 0
LIGHT-HEADEDNESS: 0
MYALGIAS: 0

## 2024-10-30 ENCOUNTER — LAB (OUTPATIENT)
Dept: LAB | Facility: LAB | Age: 89
End: 2024-10-30
Payer: MEDICARE

## 2024-10-30 ENCOUNTER — PATIENT OUTREACH (OUTPATIENT)
Dept: PRIMARY CARE | Facility: CLINIC | Age: 89
End: 2024-10-30

## 2024-10-30 DIAGNOSIS — E78.2 MIXED HYPERLIPIDEMIA: ICD-10-CM

## 2024-10-30 DIAGNOSIS — E05.90 THYROTOXICOSIS, UNSPECIFIED WITHOUT THYROTOXIC CRISIS OR STORM: Primary | ICD-10-CM

## 2024-10-30 LAB
ALBUMIN SERPL BCP-MCNC: 3 G/DL (ref 3.4–5)
ALP SERPL-CCNC: 80 U/L (ref 33–136)
ALT SERPL W P-5'-P-CCNC: 13 U/L (ref 10–52)
ANION GAP SERPL CALC-SCNC: 11 MMOL/L (ref 10–20)
AST SERPL W P-5'-P-CCNC: 13 U/L (ref 9–39)
BILIRUB SERPL-MCNC: 0.7 MG/DL (ref 0–1.2)
BUN SERPL-MCNC: 15 MG/DL (ref 6–23)
CALCIUM SERPL-MCNC: 8.6 MG/DL (ref 8.6–10.3)
CHLORIDE SERPL-SCNC: 91 MMOL/L (ref 98–107)
CHOLEST SERPL-MCNC: 108 MG/DL (ref 0–199)
CHOLESTEROL/HDL RATIO: 2.3
CO2 SERPL-SCNC: 30 MMOL/L (ref 21–32)
CREAT SERPL-MCNC: 0.89 MG/DL (ref 0.5–1.3)
EGFRCR SERPLBLD CKD-EPI 2021: 83 ML/MIN/1.73M*2
GLUCOSE SERPL-MCNC: 136 MG/DL (ref 74–99)
HDLC SERPL-MCNC: 46.2 MG/DL
LDLC SERPL CALC-MCNC: 43 MG/DL
NON HDL CHOLESTEROL: 62 MG/DL (ref 0–149)
POTASSIUM SERPL-SCNC: 5.4 MMOL/L (ref 3.5–5.3)
PROT SERPL-MCNC: 5.5 G/DL (ref 6.4–8.2)
SODIUM SERPL-SCNC: 127 MMOL/L (ref 136–145)
T3FREE SERPL-MCNC: 3 PG/ML (ref 2.3–4.2)
T4 FREE SERPL-MCNC: 2.4 NG/DL (ref 0.61–1.12)
TRIGL SERPL-MCNC: 93 MG/DL (ref 0–149)
TSH SERPL-ACNC: 0.01 MIU/L (ref 0.44–3.98)
VLDL: 19 MG/DL (ref 0–40)

## 2024-10-30 PROCEDURE — 84481 FREE ASSAY (FT-3): CPT

## 2024-10-30 PROCEDURE — 36415 COLL VENOUS BLD VENIPUNCTURE: CPT

## 2024-10-30 PROCEDURE — 80053 COMPREHEN METABOLIC PANEL: CPT

## 2024-10-30 PROCEDURE — 80061 LIPID PANEL: CPT

## 2024-10-30 PROCEDURE — 84443 ASSAY THYROID STIM HORMONE: CPT

## 2024-10-30 PROCEDURE — 84439 ASSAY OF FREE THYROXINE: CPT

## 2024-11-04 ENCOUNTER — TELEMEDICINE (OUTPATIENT)
Dept: PRIMARY CARE | Facility: CLINIC | Age: 89
End: 2024-11-04
Payer: MEDICARE

## 2024-11-04 DIAGNOSIS — E05.90 HYPERTHYROIDISM: Primary | ICD-10-CM

## 2024-11-04 DIAGNOSIS — I48.0 PAROXYSMAL ATRIAL FIBRILLATION (MULTI): ICD-10-CM

## 2024-11-04 DIAGNOSIS — Z79.899 HIGH RISK MEDICATION USE: ICD-10-CM

## 2024-11-04 DIAGNOSIS — R79.89 ELEVATED SERUM FREE T4 LEVEL: ICD-10-CM

## 2024-11-04 PROCEDURE — 1157F ADVNC CARE PLAN IN RCRD: CPT | Performed by: INTERNAL MEDICINE

## 2024-11-04 PROCEDURE — 1111F DSCHRG MED/CURRENT MED MERGE: CPT | Performed by: INTERNAL MEDICINE

## 2024-11-04 PROCEDURE — 1159F MED LIST DOCD IN RCRD: CPT | Performed by: INTERNAL MEDICINE

## 2024-11-04 PROCEDURE — 1123F ACP DISCUSS/DSCN MKR DOCD: CPT | Performed by: INTERNAL MEDICINE

## 2024-11-04 PROCEDURE — 99443 PR PHYS/QHP TELEPHONE EVALUATION 21-30 MIN: CPT | Performed by: INTERNAL MEDICINE

## 2024-11-04 PROCEDURE — 1160F RVW MEDS BY RX/DR IN RCRD: CPT | Performed by: INTERNAL MEDICINE

## 2024-11-04 RX ORDER — AMIODARONE HYDROCHLORIDE 100 MG/1
100 TABLET ORAL
COMMUNITY
Start: 2024-11-01

## 2024-11-04 ASSESSMENT — PATIENT HEALTH QUESTIONNAIRE - PHQ9
2. FEELING DOWN, DEPRESSED OR HOPELESS: NOT AT ALL
SUM OF ALL RESPONSES TO PHQ9 QUESTIONS 1 AND 2: 0
1. LITTLE INTEREST OR PLEASURE IN DOING THINGS: NOT AT ALL

## 2024-11-07 PROBLEM — I48.91: Status: RESOLVED | Noted: 2024-03-13 | Resolved: 2024-11-07

## 2024-11-07 PROBLEM — R00.1 BRADYCARDIA: Status: RESOLVED | Noted: 2023-09-12 | Resolved: 2024-11-07

## 2024-11-07 PROBLEM — D68.69: Status: RESOLVED | Noted: 2024-03-13 | Resolved: 2024-11-07

## 2024-11-08 PROBLEM — E05.90 HYPERTHYROIDISM: Status: ACTIVE | Noted: 2024-11-08

## 2024-11-08 PROBLEM — R06.02 SHORTNESS OF BREATH: Status: RESOLVED | Noted: 2024-04-25 | Resolved: 2024-11-08

## 2024-11-08 PROBLEM — R93.89 ABNORMAL CT OF THE CHEST: Status: RESOLVED | Noted: 2024-04-25 | Resolved: 2024-11-08

## 2024-11-08 PROBLEM — I49.3 PVC'S (PREMATURE VENTRICULAR CONTRACTIONS): Status: RESOLVED | Noted: 2023-09-12 | Resolved: 2024-11-08

## 2024-11-08 ASSESSMENT — ENCOUNTER SYMPTOMS
COUGH: 0
ABDOMINAL PAIN: 0
LIGHT-HEADEDNESS: 0
SORE THROAT: 0
MYALGIAS: 0
ACTIVITY CHANGE: 0
DYSURIA: 0

## 2024-11-08 NOTE — PROGRESS NOTES
Canelo Moore, pleasant 89 y.o. male was seen today:   Chief Complaint   Patient presents with    Med Management     Virtual or Telephone Consent    A telephone visit (audio only) between the patient (at the originating site) and the provider (at the distant site) was utilized to provide this telehealth service.  Verbal consent was requested and obtained from Canelo Moore on this date, 11/04/24 for a telehealth visit.       VISIT SUMMERY:    Canelo Moore Was recently found to be suffering from hyperthyroidism.  This was due to amiodarone.  He used to be on the amiodarone for atrial fibrillation.  However due to the side effects it was stopped and his rate is being controlled with high-dose metoprolol.  Patient is currently on apixaban.  He was seen by the endocrinologist.  He has been started on methimazole including the steroid.  However patient is on the phone with me today that the steroid is causing him to have hyperacidity.  He is taking 20 mg 3 times a day.  Specially at night his third dose makes him very uncomfortable with nausea vomiting.  He denies any black tarry stool.  He did not have any hematemesis.  Patient was told to cut down on steroid.  He will be having 2 doses total 40 mg once a day.  He is going to see his endocrinologist in 7 days.  His last TSH was 0.01 with T4 of 2.40.           MEDICATIONS:   Current Outpatient Medications   Medication Instructions    albuterol (Ventolin HFA) 90 mcg/actuation inhaler 2 puffs, inhalation, Every 6 hours PRN    alfuzosin (UROXATRAL) 10 mg, Daily    amiodarone (PACERONE) 100 mg    apixaban (ELIQUIS) 5 mg, oral, 2 times daily    finasteride (Proscar) 5 mg tablet 1 tablet, Daily    magnesium oxide (MAG-OX) 400 mg, oral, 2 times daily    methIMAzole (TAPAZOLE) 20 mg, oral, Every 12 hours scheduled    metoprolol succinate XL (TOPROL-XL) 100 mg, oral, Daily, Do not crush or chew.    multivitamin (One Daily Multivitamin) tablet 1 tablet, Daily    nitroglycerin  (NITROSTAT) 0.4 mg, sublingual, Every 5 min PRN, place 1 tablet under tongue every 5 minutes for up to 3 doses as needed for chest pain. Call 911 if pain persists    predniSONE (DELTASONE) 20 mg, oral, 2 times daily, WITH FOOD    rosuvastatin (CRESTOR) 10 mg, oral, Nightly    tiotropium (SPIRIVA WITH HANDIHALER) 18 mcg, inhalation, Daily RT    valsartan-hydrochlorothiazide (Diovan-HCT) 160-12.5 mg tablet 1 tablet, oral, Daily       TODAY'S VISIT  DX:     1. Hyperthyroidism  Medication (amiodarone) induced; he is off of amiodarone.      2. High risk medication use  Patient has atrial fibrillation.  Currently on apixaban      3. Paroxysmal atrial fibrillation (Multi)  Rate is being controlled with metoprolol high-dose      4. Elevated serum free T4 level  Continue methimazole and total 40 mg of steroid with full stomach.  Patient will have follow-up appointment with endocrinologist.         MEDICAL DECISION MAKING:  - Treatment and therapy plan are as above   - Active medical co morbidities have been considered.   - Recent lab work and relevant imaging studies were reviewed.    - Correspondence/notes from specialty consultants were checked.    - Next Follow up in 3 months      Review of Systems   Constitutional:  Negative for activity change.   HENT:  Negative for congestion and sore throat.    Respiratory:  Negative for cough.    Cardiovascular:  Negative for chest pain.   Gastrointestinal:  Negative for abdominal pain.   Endocrine: Negative for polyuria.   Genitourinary:  Negative for dysuria.   Musculoskeletal:  Negative for myalgias.   Skin:  Negative for rash.   Neurological:  Negative for light-headedness.   Psychiatric/Behavioral:  Negative for behavioral problems.        Wt Readings from Last 10 Encounters:   10/28/24 77.3 kg (170 lb 6.4 oz)   10/17/24 77.4 kg (170 lb 9.6 oz)   10/10/24 79.4 kg (175 lb 0.7 oz)   10/08/24 79.4 kg (175 lb)   08/27/24 82.1 kg (181 lb)   08/21/24 82.1 kg (181 lb)   08/01/24 82.8  kg (182 lb 9.6 oz)   07/10/24 83.5 kg (184 lb)   04/25/24 85.9 kg (189 lb 6.4 oz)   03/13/24 86.2 kg (190 lb)     Physical Exam  Vitals and nursing note reviewed.   Constitutional:       Appearance: Normal appearance.   HENT:      Head: Normocephalic.      Right Ear: Tympanic membrane normal.      Left Ear: Tympanic membrane normal.      Nose: Nose normal.      Mouth/Throat:      Mouth: Mucous membranes are moist.   Cardiovascular:      Rate and Rhythm: Normal rate and regular rhythm.      Pulses: Normal pulses.      Heart sounds: No murmur heard.  Pulmonary:      Effort: No respiratory distress.      Breath sounds: Normal breath sounds.   Abdominal:      Palpations: Abdomen is soft.   Musculoskeletal:      Cervical back: Neck supple.      Right lower leg: No edema.      Left lower leg: No edema.   Skin:     General: Skin is warm.      Findings: No rash.   Neurological:      General: No focal deficit present.      Mental Status: He is alert and oriented to person, place, and time.   Psychiatric:         Mood and Affect: Mood normal.      RECENT LABS:  Lab Results   Component Value Date    WBC 13.5 (H) 10/13/2024    HGB 9.6 (L) 10/13/2024    HCT 29.7 (L) 10/13/2024     10/13/2024    CHOL 108 10/30/2024    TRIG 93 10/30/2024    HDL 46.2 10/30/2024    ALT 13 10/30/2024    AST 13 10/30/2024     (L) 10/30/2024    K 5.4 (H) 10/30/2024    CL 91 (L) 10/30/2024    CREATININE 0.89 10/30/2024    BUN 15 10/30/2024    CO2 30 10/30/2024    TSH 0.01 (L) 10/30/2024    PSA 2.86 07/31/2024    INR 2.2 (H) 10/21/2024     Lab Results   Component Value Date    GLUCOSE 136 (H) 10/30/2024    CALCIUM 8.6 10/30/2024     (L) 10/30/2024    K 5.4 (H) 10/30/2024    CO2 30 10/30/2024    CL 91 (L) 10/30/2024    BUN 15 10/30/2024    CREATININE 0.89 10/30/2024      Lab Results   Component Value Date    LDLCALC 43 10/30/2024    CREATININE 0.89 10/30/2024             P.S: This note was completed using Dragon voice recognition  technology and may include unintended errors with respect to translation of words, typographical errors or grammar errors which may not have been identified while finalizing the chart.

## 2024-11-18 NOTE — PROGRESS NOTES
" Patient: Canelo Moore    [id number]  : 1935 -- AGE 89 y.o.    Provider: Dasia FLANAGAN CNP     Location [facility]   Service Date: 24              [facility]  Follow Up Visit Note        HISTORY OF PRESENT ILLNESS     The patient's referring provider is: No ref. provider found    HISTORY OF PRESENT ILLNESS   Canelo Moore is a 89 y.o. male with a history of Emphysema, HTN, CAD, and hyperlipidemia,  who is a current smoker (34 pack years), who presents to a [facility] for an initial evaluation for shortness of breath.     I have independently interviewed and examined the patient in the office and reviewed available records.    Current History    Since last visit had emergency room visit for hyperthyroidism and A-fib.  Since it has been regulated he has been feeling much better.  Not needing to use albuterol.  Did not try Spiriva consistently so therefore he did not feel it was helping.  He reports over the past 3 weeks coughing up a small amount of bloody mucus every night but has not happened in the past 2 days.  Son is concerned for the future and trying to decide what his plans are going to be to help his father in the future, discussed consulting palliative care.    24: Since last visit he used Spiriva samples, felt a little improvement with BERKOWITZ. He he tried using albuterol before activities but it made him dizzy and his head felt \"foggy and blurry\".  He denies wheezing, shortness of breath at rest, chest tightness and ER visits for breathing issue    7/10/24: On today's visit, the patient reports having dyspnea on exertion has been going on for the past 2 to 3 months. States while he is out doing activities his blood pressure drops.  Has occasional cough with clear mucus mainly in the morning.  He is on amiodarone 100 mg daily.  He denies wheezing, shortness of breath at rest, chest tightness, GERD,and ER " visits for breathing issues.    Previous pulmonary history: Emphysema    Inhalers/nebulized medications: none    Hospitalization History: He has not been hospitalized over the last year for breathing related problem.    Sleep history: Denies snoring, apnea, feeling tired during the day or taking naps during the day.     ALLERGIES AND MEDICATIONS     ALLERGIES  Allergies   Allergen Reactions    Ace Inhibitors Cough       MEDICATIONS  Current Outpatient Medications   Medication Sig Dispense Refill    albuterol (Ventolin HFA) 90 mcg/actuation inhaler Inhale 2 puffs every 6 hours if needed for shortness of breath. 18 g 3    alfuzosin (Uroxatral) 10 mg 24 hr tablet Take 1 tablet (10 mg) by mouth once daily. Do not crush, chew, or split.      amiodarone (Pacerone) 100 mg tablet Take 1 tablet (100 mg) by mouth.      apixaban (Eliquis) 5 mg tablet Take 1 tablet (5 mg) by mouth 2 times a day. 60 tablet 11    finasteride (Proscar) 5 mg tablet Take 1 tablet (5 mg) by mouth once daily.      magnesium oxide (Mag-Ox) 400 mg (241.3 mg magnesium) tablet Take 1 tablet (400 mg) by mouth 2 times a day. 180 tablet 3    methIMAzole (Tapazole) 10 mg tablet Take 2 tablets (20 mg) by mouth every 12 hours. 120 tablet 0    metoprolol succinate XL (Toprol-XL) 100 mg 24 hr tablet Take 1 tablet (100 mg) by mouth once daily. Do not crush or chew. 30 tablet 2    multivitamin (One Daily Multivitamin) tablet Take 1 tablet by mouth once daily.      nitroglycerin (Nitrostat) 0.4 mg SL tablet Place 1 tablet (0.4 mg) under the tongue every 5 minutes if needed for chest pain. place 1 tablet under tongue every 5 minutes for up to 3 doses as needed for chest pain. Call 911 if pain persists 25 tablet 5    predniSONE (Deltasone) 20 mg tablet Take 1 tablet (20 mg) by mouth 2 times a day. WITH FOOD 60 tablet 1    rosuvastatin (Crestor) 10 mg tablet Take 1 tablet (10 mg) by mouth once daily at bedtime. 90 tablet 3    tiotropium (Spiriva with HandiHaler) 18 mcg  inhalation capsule Place 1 capsule (18 mcg) into inhaler and inhale once daily. 30 capsule 3    valsartan-hydrochlorothiazide (Diovan-HCT) 160-12.5 mg tablet Take 1 tablet by mouth once daily. 90 tablet 3     No current facility-administered medications for this visit.         PAST HISTORY     PAST MEDICAL HISTORY  CAD  COPD  Emphysema  HTN  Hyperlipidemia    PAST SURGICAL HISTORY  Past Surgical History:   Procedure Laterality Date    OTHER SURGICAL HISTORY  11/04/2020    Varicose vein ligation    OTHER SURGICAL HISTORY  11/04/2020    [place] tooth extraction    OTHER SURGICAL HISTORY  11/04/2020    Tonsillectomy    OTHER SURGICAL HISTORY  11/04/2020    Colonoscopy complete for polypectomy    OTHER SURGICAL HISTORY  11/06/2021    Varicose vein sclerotherapy    OTHER SURGICAL HISTORY  11/06/2021    Pilonidal cyst removal    OTHER SURGICAL HISTORY  11/06/2021    Hernia repair    OTHER SURGICAL HISTORY  12/14/2021    Umbilical hernia repair    OTHER SURGICAL HISTORY  12/14/2021    Inguinal hernia repair    US ASPIRATION INJECTION INTERMEDIATE JOINT  2/8/2021    US ASPIRATION INJECTION INTERMEDIATE JOINT 2/8/2021 [facility]       IMMUNIZATION HISTORY  Immunization History   Administered Date(s) Administered    AS03 Adjuvant 10/03/2017, 10/08/2019    Flu vaccine (IIV4), preservative free *Check age/dose* 10/08/2019, 09/27/2021    Flu vaccine, quadrivalent, high-dose, preservative free, age 65y+ (FLUZONE) 09/26/2020, 09/28/2021, 09/19/2022    Flu vaccine, trivalent, preservative free, HIGH-DOSE, age 65y+ (Fluzone) 09/03/2015, 10/17/2016, 09/23/2018, 09/26/2020, 09/28/2021, 09/19/2022, 10/28/2024    Influenza Nasal, Unspecified 10/22/2011, 10/01/2013, 10/01/2014, 10/01/2018    Influenza, Seasonal, Quadrivalent, Adjuvanted 10/20/2023    Influenza, [place] 09/30/2014    Influenza, Unspecified 10/22/2011, 10/01/2013, 10/01/2014, 09/03/2015, 09/03/2015, 10/01/2018, 10/01/2020    Influenza, seasonal,  injectable 10/01/2020    Influenza, trivalent, adjuvanted 10/03/2017, 10/08/2019    Pfizer COVID-19 vaccine, 12 years and older, (30mcg/0.3mL) (Comirnaty) 10/20/2023    Pfizer COVID-19 vaccine, bivalent, age 12 years and older (30 mcg/0.3 mL) 2022    Pfizer Gray Cap SARS-CoV-2 2022    Pfizer Purple Cap SARS-CoV-2 2021, 2021, 2021    Pneumococcal conjugate vaccine, 13-valent (PREVNAR 13) 10/17/2016, 10/08/2019    Tdap vaccine, age 7 year and older (BOOSTRIX, ADACEL) 2022    Zoster vaccine, recombinant, adult (SHINGRIX) 2020, 2020    Zoster, live 2015, 2015       SOCIAL HISTORY  Tobacco Smokin- current - 1/2 ppd -34 pack years  Illicit drugs: none  Alcohol consumption: none  Pets: no    OCCUPATIONAL/ENVIRONMENTAL HISTORY  Occupation: Retired /   No known exposure to asbestos, silica, beryllium or inhaled metals.  No exposure to birds or exotic animals.    FAMILY HISTORY  Family History   Problem Relation Name Age of Onset    Breast cancer Mother      Atrial fibrillation Mother      Colon cancer Brother      Prostate cancer Brother      Breast cancer Daughter      Prostate cancer Son      Colon cancer Son       No family history of pulmonary disease.  No family history of autoimmune disorders.    REVIEW OF SYSTEMS     REVIEW OF SYSTEMS  Review of Systems    Constitutional: No fever, no chills, no night sweats.    Eyes: No double vision, no floaters, no dry eyes.   ENT: See HPI.   Neck: No neck stiffness.  Cardiovascular: No sharp chest pain, no heart racing, no leg swelling.  Respiratory: as noted in HPI.   Gastrointestinal: No nausea, no vomiting, no diarrhea.   Musculoskeletal: No joint pain, no back pain.   Integumentary: No rashes or sores.  Neurological: No dizziness, no headaches. Sleeping well.  Psychiatric: No mood changes.   Endocrine: No hot flashes, no cold intolerance, weight is stable.  Hematologic: No easy bruising or  bleeding.    PHYSICAL EXAM     VITAL SIGNS:   Vitals:    11/27/24 1426   BP: 93/56   Pulse: 79   Resp: 18   Temp: 36.9 °C (98.4 °F)   SpO2: 97%          CURRENT WEIGHT: Body mass index is 25.06 kg/m².        PREVIOUS WEIGHTS:  Wt Readings from Last 3 Encounters:   10/28/24 77.3 kg (170 lb 6.4 oz)   10/17/24 77.4 kg (170 lb 9.6 oz)   10/10/24 79.4 kg (175 lb 0.7 oz)       Physical Exam    Constitutional: General appearance: Alert and oriented.  No acute distress. Well developed, well nourished.  Head and face: Symmetric  ENT: external inspection of ear and nose normal. No intranasal polyps. No oropharyngeal exudates.    Oropharynx: normal   Neck: supple, no lymphadenopathy  Pulmonary: Chest is normal. No increased work of breathing or signs of respiratory distress. Clear to auscultation bilaterally - no crackles, wheezing, or rhonchi.   Cardiovascular: Heart rate and rhythm normal. Normal S1, S2 - no murmurs, gallops, or pericardial rub.   Abdomen: Soft, non tender, +BS  Extremities: No edema. No clubbing or cyanosis of the fingernails.    Neurologic: Moves all four extremities   MSK: Normal movements of extremities. Gait normal   Psychiatric: Intact judgement and insight.    RESULTS/DATA     Pulmonary Function Test Results                           Chest Radiograph     XR chest 2 view     Narrative  Interpreted By:  ALYSSA ALEXANDER MD  MRN: [id number]  Patient Name: SRINIVASAN CLEMENTE    STUDY:   CHEST 2 VIEW PA AND LAT;    INDICATION:  N/A  Z79.01: Anticoagulant long-term use Z79.899: High risk  medication use.    COMPARISON:  07/06/2022.    ACCESSION NUMBER(S):  [id number]    ORDERING CLINICIAN:  LEBRON GARRISON    FINDINGS:  The cardiac silhouette size is within normal limits.  There is no focal consolidation, edema or pneumothorax.  Small bilateral pleural effusions with blunting of the costophrenic  angles noted.    Impression  1. Small bilateral pleural effusions, similar to the previous  radiograph from  07/06/2022      Chest CT Scan     STUDY:  CT CHEST W CONTRAST;  7/5/2023 2:07 pm     INDICATION:  cough, hemoptysis  Z79.899: High risk medication use F17.200: Current  smoker R04.2: Cough with hemoptysis.     COMPARISON:  CT abdomen and pelvis 03/09/2022     ACCESSION NUMBER(S):  [id number]     ORDERING CLINICIAN:  POLO HANDY     TECHNIQUE:  Helical data acquisition of the chest was obtained with IV contrast  material. 75 cc IV Omnipaque 350 was administered. Images were  reformatted in axial, coronal, and sagittal planes.     FINDINGS:  LUNGS AND AIRWAYS:  Central airways are patent without endobronchial lesions bilateral  bronchial wall thickening, predominantly in the lower lung zones.  Upper lobe predominant centrilobular emphysema. Biapical scarring.     Lower lobe predominant interlobular septal thickening. Small right  pleural effusion with atelectasis. Stable 12 x 14 mm left lower lobe  subpleural nodule (series 203, image 203), not significantly changed  since size since 2022. Few scattered ground-glass opacities likely  infectious/inflammatory in etiology. No suspicious pulmonary nodule.  No focal consolidation. No pneumothorax stable loculated fluid within  the left main fissure..     MEDIASTINUM AND STEPHANIE, LOWER NECK AND AXILLA:  The visualized thyroid gland is within normal limits.     Compared with the CT from 03/09/2022, there has been interval  decrease in the size of multiple hilar and mediastinal nodes for  example a 19 x 11 mm right hilar node (series 202, image 131),  previously 29 x 18 mm; a 9 mm left para-aortic node, previously 12  mm. Multiple other prominent but nonenlarged by size criteria  mediastinal nodes are also noted and also decreased in size.     Small hiatal hernia.     HEART AND VESSELS:  The thoracic aorta is of normal course and caliber. Mild  atherosclerotic calcification thoracic aorta.     Main pulmonary artery and its branches are normal in caliber.     Severe coronary  artery calcifications are seen. The study is not  optimized for evaluation of coronary arteries.     Mild cardiomegaly.     No evidence of pericardial effusion.     UPPER ABDOMEN:  Multiple bilateral simple renal cysts.     CHEST WALL AND OSSEOUS STRUCTURES:  There are no suspicious osseous lesions. Degenerative changes of the  thoracic spine. No acute osseous abnormality.      Impression   1. Small right pleural effusion with atelectasis, interlobular septal  thickening and few scattered ground-glass opacities, concerning for  pulmonary edema.  2. Stable 12 x 14 mm left lower lobe subpleural nodule since 2022  likely secondary to round atelectasis.  3. Upper lobe predominant centrilobular and paraseptal emphysema.  4. Interval decrease in size of multiple mediastinal and hilar nodes,  since 2022.         Echocardiogram     Study Date:        5/28/2024            Ordering Provider:    [id number] LEBRON GARRISON  MRN/PID:           [id number]             Fellow:  Accession#:        [id number]         Nurse:  Date of Birth/Age: 1935 / 88 years Sonographer:          Iris Mcneil RDMS,                                                                LESLY, RVT  Gender:            M                    Additional Staff:  Height:            172.72 cm            Admit Date:  Weight:            85.73 kg             Admission Status:     Outpatient  BSA / BMI:         2.00 m2 / 28.74      Department Location:  [facility]                     kg/m2                                      [employer]  Blood Pressure: 136 /62 mmHg     Study Type:    TRANSTHORACIC ECHO (TTE) COMPLETE  Diagnosis/ICD: Atherosclerotic heart disease of native coronary artery without                 angina pectoris-I25.10; Shortness of breath-R06.02; Abnormal                 findings on diagnostic imaging of other specified body                 structures-R93.89  Indication:    Afib, Abn CT Chest, CAD, CHF, Dyspnea, HTN, HLD and Smoker  CPT Codes:     Echo  Complete w Full Doppler-[id number]   Study Detail: The following Echo studies were performed: 2D, M-Mode, Doppler and                color flow.        PHYSICIAN INTERPRETATION:  Left Ventricle: Left ventricular systolic function is normal, with an estimated ejection fraction of 60%. There are no regional wall motion abnormalities. The left ventricular cavity size is normal. Spectral Doppler shows an abnormal pattern of left ventricular diastolic filling. Borderline LVH at 11-12mm with very mild DUST but no LVOT obstruction.  Left Atrium: The left atrium is mildly dilated. Mild LAE.  Right Ventricle: The right ventricle is normal in size. There is normal right ventricular global systolic function. Normal RV size and function  Borderline RVSP elevation at 31 mm HG.  Right Atrium: The right atrium is normal in size.  Aortic Valve: The aortic valve appears structurally normal. There is no evidence of aortic valve regurgitation. The peak instantaneous gradient of the aortic valve is 5.6 mmHg. The mean gradient of the aortic valve is 3.0 mmHg.  Mitral Valve: The mitral valve is normal in structure. There is trace mitral valve regurgitation.  Tricuspid Valve: The tricuspid valve is structurally normal. There is trace to mild tricuspid regurgitation.  Pulmonic Valve: The pulmonic valve is structurally normal. There is trace pulmonic valve regurgitation.  Pericardium: There is no pericardial effusion noted.  Aorta: The aortic root is normal.        CONCLUSIONS:   1. Left ventricular systolic function is normal with a 60% estimated ejection fraction.   2. Borderline LVH at 11-12mm with very mild DUST but no LVOT obstruction.   3. Spectral Doppler shows an abnormal pattern of left ventricular diastolic filling.   4. Normal RV size and function      Borderline RVSP elevation at 31 mm HG.   5. Mild LAE.   6. Normal valve structure and function.     QUANTITATIVE DATA SUMMARY:  2D MEASUREMENTS:                           Normal  Ranges:  Ao Root d:     3.30 cm   (2.0-3.7cm)  LAs:           4.10 cm   (2.7-4.0cm)  RVIDd:         3.33 cm   (0.9-3.6cm)  IVSd:          1.21 cm   (0.6-1.1cm)  LVPWd:         0.96 cm   (0.6-1.1cm)  LVIDd:         4.61 cm   (3.9-5.9cm)  LVIDs:         3.04 cm  LV Mass Index: 89.2 g/m2  LV % FS        34.1 %     LA VOLUME:                              Normal Ranges:  LA Volume Index: 30.7 ml/m2     AORTA MEASUREMENTS:                     Normal Ranges:  Asc Ao, d: 2.90 cm (2.1-3.4cm)     LV SYSTOLIC FUNCTION BY 2D PLANIMETRY (MOD):                      Normal Ranges:  EF-A4C View: 59.0 % (>=55%)  EF-A2C View: 59.6 %  EF-Biplane:  59.1 %     LV DIASTOLIC FUNCTION:                         Normal Ranges:  MV Peak E:    0.50 m/s (0.7-1.2 m/s)  MV Peak A:    0.81 m/s (0.42-0.7 m/s)  E/A Ratio:    0.61     (1.0-2.2)  MV e'         0.05 m/s (>8.0)  MV lateral e' 0.07 m/s  MV medial e'  0.05 m/s  E/e' Ratio:   10.62    (<8.0)     MITRAL VALVE:                       Normal Ranges:  MV Vmax:    0.86 m/s (<=1.3m/s)  MV peak P.9 mmHg (<5mmHg)  MV mean P.0 mmHg (<48mmHg)  MV DT:      317 msec (150-240msec)  MV PHT:     92 msec  (30-60msec)  MVA by PHT: 2.39 cm2 (4-6cm2)     MITRAL INSUFFICIENCY:                       Normal Ranges:  MR Vmax: 418.00 cm/s     AORTIC VALVE:                                    Normal Ranges:  AoV Vmax:                1.18 m/s (<=1.7m/s)  AoV Peak P.6 mmHg (<20mmHg)  AoV Mean PG:             3.0 mmHg (1.7-11.5mmHg)  LVOT Max Amadou:            0.90 m/s (<=1.1m/s)  AoV VTI:                 29.50 cm (18-25cm)  LVOT VTI:                23.20 cm  LVOT Diameter:           2.30 cm  (1.8-2.4cm)  AoV Area, VTI:           3.27 cm2 (2.5-5.5cm2)  AoV Area,Vmax:           3.18 cm2 (2.5-4.5cm2)  AoV Dimensionless Index: 0.79     TRICUSPID VALVE/RVSP:                              Normal Ranges:  Peak TR Velocity: 2.56 m/s  Est. RA Pressure: 5 mmHg  RV Syst Pressure: 31.2 mmHg (< 30mmHg)    "  PULMONIC VALVE:                       Normal Ranges:  PV Max Amadou: 0.7 m/s  (0.6-0.9m/s)  PV Max P.8 mmHg        [id number] Roberto Fisher MD, [facility]  Electronically signed on 2024 at 10:37:59 PM       Labwork   Complete Blood Count  Lab Results   Component Value Date    WBC 13.5 (H) 10/13/2024    HGB 9.6 (L) 10/13/2024    HCT 29.7 (L) 10/13/2024    MCV 97 10/13/2024     10/13/2024       Peripheral Eosinophil Count/Percentage:   Eosinophils Absolute (x10*3/uL)   Date Value   10/13/2024 0.00     Eosinophils % (%)   Date Value   10/13/2024 0.0       Serum Immunoglobulin E:    No results found for: \"IGE\"     ASSESSMENT/PLAN   Mr. Moore is a 89 y.o. male, with a history of Emphysema, HTN, CAD, and hyperlipidemia,  who is a current smoker (34 pack years), who presents to a [facility] for an initial evaluation for shortness of breath.     PFT 2024: pirometry and lung volumes by plethysmography indicate a complex restrictive ventilatory impairment. The DLCO is normal.   Emphysema on CT Chest.     CT Chest: Small right pleural effusion with atelectasis, interlobular septal   thickening and few scattered ground-glass opacities, concerning for   pulmonary edema.   2. Stable 12 x 14 mm left lower lobe subpleural nodule since    likely secondary to round atelectasis.   3. Upper lobe predominant centrilobular and paraseptal emphysema.   4. Interval decrease in size of multiple mediastinal and hilar nodes,   since .     Problem List and Orders  Problem List Items Addressed This Visit    None        Assessment and Plan / Recommendations:  Problem List Items Addressed This Visit    None     Emphysema on CT scan  - Continue albuterol HFA every 4-6 hours as needed for shortness of breath  - Try Spiriva 2.5 mcg 2 puffs once daily for a month to see if there is any improvement    Lung nodule; 12 x 14 mm left lower lobe subpleural nodule  - PET is showing hypermetabolic activity SUV " max 12.9- does not want to pursue biopsy  - Palliative Care Consult    Follow up as needed.    If you have any questions please call the office [phone]    Thank you for visiting the Pulmonary clinic today!   Dasia Bustillo CNP  [phone]

## 2024-11-21 DIAGNOSIS — E05.90 THYROTOXICOSIS, UNSPECIFIED WITHOUT THYROTOXIC CRISIS OR STORM: Primary | ICD-10-CM

## 2024-11-26 ENCOUNTER — APPOINTMENT (OUTPATIENT)
Dept: CARDIOLOGY | Facility: CLINIC | Age: 89
End: 2024-11-26
Payer: COMMERCIAL

## 2024-11-26 ENCOUNTER — LAB (OUTPATIENT)
Dept: LAB | Facility: LAB | Age: 89
End: 2024-11-26
Payer: MEDICARE

## 2024-11-26 VITALS
WEIGHT: 163.6 LBS | HEART RATE: 59 BPM | HEIGHT: 68 IN | BODY MASS INDEX: 24.8 KG/M2 | DIASTOLIC BLOOD PRESSURE: 72 MMHG | SYSTOLIC BLOOD PRESSURE: 116 MMHG

## 2024-11-26 DIAGNOSIS — I48.91 ATRIAL FIBRILLATION WITH RAPID VENTRICULAR RESPONSE (MULTI): ICD-10-CM

## 2024-11-26 DIAGNOSIS — I48.0 PAROXYSMAL ATRIAL FIBRILLATION (MULTI): ICD-10-CM

## 2024-11-26 DIAGNOSIS — Z79.01 LONG TERM CURRENT USE OF ANTICOAGULANT THERAPY: ICD-10-CM

## 2024-11-26 DIAGNOSIS — I25.10 CORONARY ARTERY DISEASE INVOLVING NATIVE CORONARY ARTERY OF NATIVE HEART WITHOUT ANGINA PECTORIS: ICD-10-CM

## 2024-11-26 DIAGNOSIS — E05.90 THYROTOXICOSIS, UNSPECIFIED WITHOUT THYROTOXIC CRISIS OR STORM: ICD-10-CM

## 2024-11-26 DIAGNOSIS — Z79.899 HIGH RISK MEDICATION USE: ICD-10-CM

## 2024-11-26 DIAGNOSIS — E78.2 MIXED HYPERLIPIDEMIA: ICD-10-CM

## 2024-11-26 LAB
ALBUMIN SERPL BCP-MCNC: 3.4 G/DL (ref 3.4–5)
ALP SERPL-CCNC: 77 U/L (ref 33–136)
ALT SERPL W P-5'-P-CCNC: 22 U/L (ref 10–52)
ANION GAP SERPL CALC-SCNC: 9 MMOL/L (ref 10–20)
AST SERPL W P-5'-P-CCNC: 17 U/L (ref 9–39)
BILIRUB SERPL-MCNC: 1.3 MG/DL (ref 0–1.2)
BUN SERPL-MCNC: 23 MG/DL (ref 6–23)
CALCIUM SERPL-MCNC: 9.1 MG/DL (ref 8.6–10.3)
CHLORIDE SERPL-SCNC: 89 MMOL/L (ref 98–107)
CO2 SERPL-SCNC: 34 MMOL/L (ref 21–32)
CREAT SERPL-MCNC: 1.08 MG/DL (ref 0.5–1.3)
EGFRCR SERPLBLD CKD-EPI 2021: 66 ML/MIN/1.73M*2
ERYTHROCYTE [DISTWIDTH] IN BLOOD BY AUTOMATED COUNT: 15.4 % (ref 11.5–14.5)
GLUCOSE SERPL-MCNC: 152 MG/DL (ref 74–99)
HCT VFR BLD AUTO: 39.7 % (ref 41–52)
HGB BLD-MCNC: 13.1 G/DL (ref 13.5–17.5)
MCH RBC QN AUTO: 31.1 PG (ref 26–34)
MCHC RBC AUTO-ENTMCNC: 33 G/DL (ref 32–36)
MCV RBC AUTO: 94 FL (ref 80–100)
NRBC BLD-RTO: 0 /100 WBCS (ref 0–0)
PLATELET # BLD AUTO: 273 X10*3/UL (ref 150–450)
POTASSIUM SERPL-SCNC: 5.3 MMOL/L (ref 3.5–5.3)
PROT SERPL-MCNC: 5.4 G/DL (ref 6.4–8.2)
RBC # BLD AUTO: 4.21 X10*6/UL (ref 4.5–5.9)
SODIUM SERPL-SCNC: 127 MMOL/L (ref 136–145)
T3FREE SERPL-MCNC: 2.1 PG/ML (ref 2.3–4.2)
T4 FREE SERPL-MCNC: 1.14 NG/DL (ref 0.61–1.12)
TSH SERPL-ACNC: 3.4 MIU/L (ref 0.44–3.98)
WBC # BLD AUTO: 15.5 X10*3/UL (ref 4.4–11.3)

## 2024-11-26 PROCEDURE — 36415 COLL VENOUS BLD VENIPUNCTURE: CPT

## 2024-11-26 PROCEDURE — 3074F SYST BP LT 130 MM HG: CPT | Performed by: INTERNAL MEDICINE

## 2024-11-26 PROCEDURE — G2211 COMPLEX E/M VISIT ADD ON: HCPCS | Performed by: INTERNAL MEDICINE

## 2024-11-26 PROCEDURE — 85027 COMPLETE CBC AUTOMATED: CPT

## 2024-11-26 PROCEDURE — 99214 OFFICE O/P EST MOD 30 MIN: CPT | Performed by: INTERNAL MEDICINE

## 2024-11-26 PROCEDURE — 84443 ASSAY THYROID STIM HORMONE: CPT

## 2024-11-26 PROCEDURE — 84439 ASSAY OF FREE THYROXINE: CPT

## 2024-11-26 PROCEDURE — 1160F RVW MEDS BY RX/DR IN RCRD: CPT | Performed by: INTERNAL MEDICINE

## 2024-11-26 PROCEDURE — 80053 COMPREHEN METABOLIC PANEL: CPT

## 2024-11-26 PROCEDURE — 4004F PT TOBACCO SCREEN RCVD TLK: CPT | Performed by: INTERNAL MEDICINE

## 2024-11-26 PROCEDURE — 1159F MED LIST DOCD IN RCRD: CPT | Performed by: INTERNAL MEDICINE

## 2024-11-26 PROCEDURE — 1123F ACP DISCUSS/DSCN MKR DOCD: CPT | Performed by: INTERNAL MEDICINE

## 2024-11-26 PROCEDURE — 1157F ADVNC CARE PLAN IN RCRD: CPT | Performed by: INTERNAL MEDICINE

## 2024-11-26 PROCEDURE — 3078F DIAST BP <80 MM HG: CPT | Performed by: INTERNAL MEDICINE

## 2024-11-26 PROCEDURE — 84481 FREE ASSAY (FT-3): CPT

## 2024-11-26 RX ORDER — ROSUVASTATIN CALCIUM 10 MG/1
10 TABLET, COATED ORAL NIGHTLY
Qty: 90 TABLET | Refills: 3 | Status: SHIPPED | OUTPATIENT
Start: 2024-11-26

## 2024-11-26 NOTE — PROGRESS NOTES
Last office visit with me August 2024.  Since then seen by EP service and I have reviewed notes.  This is a hospital discharge follow-up.  Following the October visit with EP, patient was switched from warfarin to Eliquis.  Reviewed recent notes by Dr. Spencer.  Patient developed hyperthyroidism.  Felt to be due to amiodarone.  Amiodarone has since been discontinued, patient did not bring his medications or list, and is not sure.  He is not on methimazole.  He is also on prednisone.  Subjective :   Denies palpitations undue shortness of breath or chest discomfort  Every night he says he coughs up a small globule of dark blood.  He does not have a cough of bright red blood, and no other bleeding is noted.  He knows he has pulmonary nodules, but wants conservative approach, does not want biopsy, has follow-up with pulmonary service tomorrow.  No falls.  History so Far :  1. Paroxysmal atrial fibrillation-functional class I.  2. High-risk medication-warfarin, PT/INR levels therapeutic.  3. Hypertension-controlled.  4. [id number] score of 4.  5. Atherosclerotic native vessel coronary artery disease, most  recent perfusion study in September of 2016, small infarct, basal  portion in inferior wall, left ventricular ejection fraction is 51%,  normal TID.  6. Left atrial size 4.1 cm based on echo of 2015.  7. Chronic total right coronary artery occlusion, distal right  coronary artery filled by left-to-right collaterals.  8. Increased body mass index. The patient is following a very  active lifestyle.  9. Hyperlipidemia-at target  10. Nicotine dependence-not motivated to quit patient says this is the only thing he has left to enjoy, he does not smoke much a few cigarettes a day. He understands that cigarette smoking is harmful from a vascular disease and pulmonary standpoint  11. High risk medication warfarin no bleeding diathesis PT/INR reviewed therapeutic  12. 48-hour Holter December 7, 2020-basic rhythm sinus  minimum heart rate 43 maximum heart rate 97 average heart rate 60 PACs comprised 1.6% of the total heartbeats  There were 41 short bursts of supraventricular rhythm longest of these was an ectopic atrial rhythm of 18 beats no documented sustained atrial fibrillation frequent ventricular premature beats comprising 4.5% of the total heartbeats 1 ventricular triplet and over 150 ventricular couplets occasional bigeminal and trigeminal episodes noted no complaints were elicited  13. Echocardiogram 12/7/2020-left atrium 4 cm LVEF 50% inferior basal moderate hypokinesis impaired relaxation pattern of LV diastolic filling mild mitral regurgitation trace to mild tricuspid regurgitation RV systolic pressure 40 mmHg no change compared to prior study of 2015  14. Recent hospitalization to [facility] with class IV left heart failure precipitated by atrial fibrillation L beat controlled ventricular rate, this has resolved with restoration of sinus rhythm  15. Remains on high risk medication amiodarone.  16. Wide-complex tachycardia, cannot exclude nonsustained ventricular tachycardia versus atrial fibrillation with aberrancy  17. Echocardiogram March 2022-LVEF 45 to 50% left atrial diameter 4.7 cm LV end-systolic dimension 3.58 cm left atrial volume index 34 mL/mÂ² RV systolic pressure 37 mmHg IVC diameter 1.85 cm, there was mild mitral and tricuspid regurgitation reported.  18. CTA negative for pulmonary embolism March 2022 CTA revealed borderline enlarged right hilar lymph node measuring up to 2.9 x 1.8 cm in size and subcentimeter left hilar lymph nodes probably nonspecific possibly reactive, defer to primary/pulmonary  19. Anemia hemoglobin and hematocrit 11.4 and 35.7 with MCV of 102 March 2022  20. Treadmill stress Myoview April 2022-3.8 METS, 90% age-predicted maximum heart rate, see AEP protocol, inferior wall hypokinesis mild, basal portion, LVEF 48% transient ischemic dilatation 0.9 which is normal, there was evidence of  "mild diaphragm attenuation artifact. No evidence of ischemia. Compared to 2016, LVEF and functional capacity not significantly different.  21. 48-hour Holter monitor April 2022-frequent PACs and PVCs, comprising 1.6% of total heartbeats no atrial fibrillation average heart rate 56 bpm 8 short bursts of ectopic atrial tachycardia longest being 10 beats no evidence of high degree AV block longest pause 1.9 seconds  22.  Varicose veins both lower extremities, history of bilateral varicose vein stripping 1984  23.  Echocardiogram April 2024-LVEF 60%, borderline left ventricular hypertrophy, abnormal pattern of LV diastolic filling, normal RV size and systolic function, trace to mild tricuspid regurgitation trace mitral regurgitation left atrial diameter 4.1 cm, RV systolic pressure 31 mmHg.      24.  Treadmill stress test May 20 24-3.4 METS 83% age-predicted maximum heart rate ,shortness of breath was noted, frequent PVC and PAC in recovery  25.  Echocardiogram October 2024-LVEF 60% abnormal pattern of LV diastolic filling RVSP 37 mmHg left atrial diameter 5.2 cm normal RV systolic function RVSP 37 mmHg, left atrial volume index 42 mL/m²  26.  Amiodarone discontinued due to hyperthyroidism.  Methimazole started  Objective   Wt Readings from Last 3 Encounters:   11/26/24 74.2 kg (163 lb 9.6 oz)   10/28/24 77.3 kg (170 lb 6.4 oz)   10/17/24 77.4 kg (170 lb 9.6 oz)            Vitals:    11/26/24 1414   BP: 116/72   BP Location: Left arm   Patient Position: Sitting   Pulse: 59   Weight: 74.2 kg (163 lb 9.6 oz)   Height: 1.727 m (5' 8\")                Physical Exam:    GENERAL APPEARANCE: in no acute distress.  CHEST: Symmetric and non-tender.  INTEGUMENT: Skin warm and dry  HEENT: No gross abnormalities identified.No pallor or scleral icterus.  NECK: Supple, no JVD, no bruit.   NEURO/PSHCY: Alert and oriented x3; appropriate behavior and responses and responses  LUNGS: Clear to auscultation anteriorly diminished at the " bases posteriorly with coarse crepitations left lower posterior lung field  HEART: Rate and rhythm irregular with no evident murmur; no gallop appreciated.   ABDOMEN: Soft, non tender.  MUSCULOSKELETAL: No gross deformities.  EXTREMITIES: Warm  There is no edema noted.    Meds:  Current Outpatient Medications   Medication Instructions    albuterol (Ventolin HFA) 90 mcg/actuation inhaler 2 puffs, inhalation, Every 6 hours PRN    alfuzosin (UROXATRAL) 10 mg, Daily    apixaban (ELIQUIS) 5 mg, oral, 2 times daily    finasteride (Proscar) 5 mg tablet 1 tablet, Daily    magnesium oxide (MAG-OX) 400 mg, oral, 2 times daily    methIMAzole (TAPAZOLE) 20 mg, oral, Every 12 hours scheduled    metoprolol succinate XL (TOPROL-XL) 100 mg, oral, Daily, Do not crush or chew.    multivitamin (One Daily Multivitamin) tablet 1 tablet, Daily    nitroglycerin (NITROSTAT) 0.4 mg, sublingual, Every 5 min PRN, place 1 tablet under tongue every 5 minutes for up to 3 doses as needed for chest pain. Call 911 if pain persists    predniSONE (DELTASONE) 20 mg, oral, 2 times daily, WITH FOOD    rosuvastatin (CRESTOR) 10 mg, oral, Nightly    tiotropium (SPIRIVA WITH HANDIHALER) 18 mcg, inhalation, Daily RT    valsartan-hydrochlorothiazide (Diovan-HCT) 160-12.5 mg tablet 1 tablet, oral, Daily          Allergies   Allergen Reactions    Ace Inhibitors Cough             LABS:    Lab Results   Component Value Date    WBC 15.5 (H) 11/26/2024    HGB 13.1 (L) 11/26/2024    HCT 39.7 (L) 11/26/2024     11/26/2024    CHOL 108 10/30/2024    TRIG 93 10/30/2024    HDL 46.2 10/30/2024    LDLDIRECT 70 07/31/2024    ALT 22 11/26/2024    AST 17 11/26/2024     (L) 11/26/2024    K 5.3 11/26/2024    CL 89 (L) 11/26/2024    CREATININE 1.08 11/26/2024    BUN 23 11/26/2024    CO2 34 (H) 11/26/2024    TSH 3.40 11/26/2024    PSA 2.86 07/31/2024    INR 2.2 (H) 10/21/2024            Hemoglobin and hematocrit have improved since October 2024.  Hyponatremia  persists.  Has tendency for hyperkalemia.    TSH this morning is normal at 3.4, was 0.013 weeks ago.  Free T4 is elevated at 1.14, but is down from 4.5 a month ago.  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) 11/14/2023    CAD (coronary artery disease) 09/12/2023    Benign prostatic hyperplasia 09/12/2023    Current smoker 09/12/2023    Elevated serum free T4 level 09/12/2023    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 Paroxysmal atrial fibrillation (Multi)  Follow Up In Cardiology      2. High risk medication use  rosuvastatin (Crestor) 10 mg tablet    Follow Up In Cardiology      3. Mixed hyperlipidemia  rosuvastatin (Crestor) 10 mg tablet    Follow Up In Cardiology      4. Coronary artery disease involving native coronary artery of native heart without angina pectoris  rosuvastatin (Crestor) 10 mg tablet    Follow Up In Cardiology      5. Body mass index (BMI) 24.0-24.9, adult   Follow Up In Cardiology      6. Atrial fibrillation with rapid ventricular response (Multi)  Referral to Cardiology    Follow Up In Cardiology      7. Long term current use of anticoagulant therapy           Clinical decision making:  From a general cardiology perspective no indication to change any medications  As far as the hemoptysis goes, it seems like he is coughing up old blood, small amount every evening.  I asked him to discuss with this with pulmonary tomorrow, and also with EP service, given that he is on anticoagulation.  His CBC however has improved compared to a month ago.  So I do not think he is actively bleeding  As far as the coronary artery disease goes, he does not have any angina he is functional class I.  Lipid profile is at target  Follow up : 1 year        Provider Attestation - Scribe documentation    All medical record entries made by the Scribe were at my direction and personally dictated by me. I have reviewed the chart and agree that the record accurately reflects my personal performance of the history, physical exam, discussion and plan.       Scribe Attestation  By signing my name below, I, Yi Montejo MD  , Scribe   attest that this documentation has been prepared under the direction and in the presence of Yi Montejo MD.

## 2024-11-27 ENCOUNTER — APPOINTMENT (OUTPATIENT)
Dept: PULMONOLOGY | Facility: CLINIC | Age: 89
End: 2024-11-27
Payer: MEDICARE

## 2024-11-27 VITALS
WEIGHT: 164.8 LBS | DIASTOLIC BLOOD PRESSURE: 56 MMHG | RESPIRATION RATE: 18 BRPM | OXYGEN SATURATION: 97 % | BODY MASS INDEX: 24.98 KG/M2 | HEIGHT: 68 IN | TEMPERATURE: 98.4 F | HEART RATE: 79 BPM | SYSTOLIC BLOOD PRESSURE: 93 MMHG

## 2024-11-27 DIAGNOSIS — J43.2 CENTRILOBULAR EMPHYSEMA (MULTI): ICD-10-CM

## 2024-11-27 DIAGNOSIS — R91.8 LUNG NODULES: Primary | ICD-10-CM

## 2024-11-27 PROCEDURE — 1123F ACP DISCUSS/DSCN MKR DOCD: CPT

## 2024-11-27 PROCEDURE — 3078F DIAST BP <80 MM HG: CPT

## 2024-11-27 PROCEDURE — 1157F ADVNC CARE PLAN IN RCRD: CPT

## 2024-11-27 PROCEDURE — 1159F MED LIST DOCD IN RCRD: CPT

## 2024-11-27 PROCEDURE — 4004F PT TOBACCO SCREEN RCVD TLK: CPT

## 2024-11-27 PROCEDURE — 99214 OFFICE O/P EST MOD 30 MIN: CPT

## 2024-11-27 PROCEDURE — 3074F SYST BP LT 130 MM HG: CPT

## 2024-11-27 ASSESSMENT — COPD QUESTIONNAIRES
QUESTION3_CHESTTIGHTNESS: 0 - MY CHEST DOES NOT FEEL TIGHT AT ALL
CAT_TOTALSCORE: 3
QUESTION8_ENERGYLEVEL: 0 - I HAVE LOTS OF ENERGY
QUESTION1_COUGHFREQUENCY: 0 - I NEVER COUGH
QUESTION4_WALKINCLINE: 1
QUESTION7_SLEEPQUALITY: 0 - I SLEEP SOUNDLY
QUESTION5_HOMEACTIVITIES: 1
QUESTION2_CHESTPHLEGM: 1
QUESTION6_LEAVINGHOUSE: 0 - I AM CONFIDENT LEAVING MY HOME DESPITE MY LUNG CONDITION

## 2024-11-27 ASSESSMENT — ENCOUNTER SYMPTOMS
OCCASIONAL FEELINGS OF UNSTEADINESS: 0
LOSS OF SENSATION IN FEET: 0
DEPRESSION: 0

## 2024-11-27 ASSESSMENT — COLUMBIA-SUICIDE SEVERITY RATING SCALE - C-SSRS
2. HAVE YOU ACTUALLY HAD ANY THOUGHTS OF KILLING YOURSELF?: NO
6. HAVE YOU EVER DONE ANYTHING, STARTED TO DO ANYTHING, OR PREPARED TO DO ANYTHING TO END YOUR LIFE?: NO
1. IN THE PAST MONTH, HAVE YOU WISHED YOU WERE DEAD OR WISHED YOU COULD GO TO SLEEP AND NOT WAKE UP?: NO

## 2024-12-03 DIAGNOSIS — E05.90 HYPERTHYROIDISM: ICD-10-CM

## 2024-12-05 ENCOUNTER — PATIENT MESSAGE (OUTPATIENT)
Dept: PRIMARY CARE | Facility: CLINIC | Age: 89
End: 2024-12-05
Payer: MEDICARE

## 2024-12-05 ENCOUNTER — PATIENT OUTREACH (OUTPATIENT)
Dept: CARDIOLOGY | Facility: CLINIC | Age: 89
End: 2024-12-05
Payer: MEDICARE

## 2024-12-05 DIAGNOSIS — E05.90 HYPERTHYROIDISM: ICD-10-CM

## 2024-12-06 RX ORDER — PREDNISONE 20 MG/1
20 TABLET ORAL 2 TIMES DAILY
Qty: 60 TABLET | Refills: 1 | Status: SHIPPED | OUTPATIENT
Start: 2024-12-06 | End: 2025-12-06

## 2024-12-06 NOTE — PROGRESS NOTES
Successful outreach to patient regarding hospitalization as patient continues TCM program.   At time of outreach call the patient feels as if their condition has improved since initial visit with PCP or specialist.  Questions or concerns addressed at this time with patient.   Provided contact information to patient if any further non-emergent needs arise.     Currently working with PCP office to straighten out a prednisone RX that needs refilled. Patient actively uses [facility] for communication.

## 2024-12-06 NOTE — TELEPHONE ENCOUNTER
Call to Pt reports that he received the prednisone 20 mg while in the hospital at that time shortness of breath, diagnosed with pulmonary fibrosis. Shares no longer a heavy smoker. Please Advise.

## 2024-12-09 ENCOUNTER — TELEPHONE (OUTPATIENT)
Dept: PULMONOLOGY | Facility: CLINIC | Age: 89
End: 2024-12-09
Payer: MEDICARE

## 2024-12-09 RX ORDER — PREDNISONE 20 MG/1
20 TABLET ORAL 2 TIMES DAILY
Qty: 60 TABLET | Refills: 1 | OUTPATIENT
Start: 2024-12-09 | End: 2025-12-09

## 2024-12-09 NOTE — TELEPHONE ENCOUNTER
Patient wanted appointment for not feeling well. He states has been coughing up blood morning and night recently. Advise to go to ED. He states he will observe for now.

## 2025-01-06 ENCOUNTER — PATIENT OUTREACH (OUTPATIENT)
Dept: PRIMARY CARE | Facility: CLINIC | Age: 89
End: 2025-01-06
Payer: MEDICARE

## 2025-01-09 DIAGNOSIS — I48.0 PAROXYSMAL ATRIAL FIBRILLATION (MULTI): ICD-10-CM

## 2025-01-09 RX ORDER — METOPROLOL SUCCINATE 100 MG/1
100 TABLET, EXTENDED RELEASE ORAL DAILY
Qty: 90 TABLET | Refills: 1 | Status: SHIPPED | OUTPATIENT
Start: 2025-01-09 | End: 2026-01-09

## 2025-01-13 ENCOUNTER — TELEPHONE (OUTPATIENT)
Facility: CLINIC | Age: 89
End: 2025-01-13
Payer: MEDICARE

## 2025-01-13 NOTE — TELEPHONE ENCOUNTER
"Patient called in and canceled his appointment stating: \"The medication is working and I dont feel I need to see her this soon.\"   "

## 2025-01-14 ENCOUNTER — OFFICE VISIT (OUTPATIENT)
Dept: PRIMARY CARE | Facility: CLINIC | Age: 89
End: 2025-01-14
Payer: MEDICARE

## 2025-01-14 VITALS
HEART RATE: 64 BPM | WEIGHT: 170 LBS | SYSTOLIC BLOOD PRESSURE: 122 MMHG | HEIGHT: 68 IN | DIASTOLIC BLOOD PRESSURE: 74 MMHG | TEMPERATURE: 96.5 F | BODY MASS INDEX: 25.76 KG/M2

## 2025-01-14 DIAGNOSIS — J43.9 PULMONARY EMPHYSEMA, UNSPECIFIED EMPHYSEMA TYPE (MULTI): ICD-10-CM

## 2025-01-14 DIAGNOSIS — N18.31 STAGE 3A CHRONIC KIDNEY DISEASE (CKD) (MULTI): ICD-10-CM

## 2025-01-14 DIAGNOSIS — I48.91 ATRIAL FIBRILLATION WITH RAPID VENTRICULAR RESPONSE (MULTI): ICD-10-CM

## 2025-01-14 DIAGNOSIS — I50.33 ACUTE ON CHRONIC DIASTOLIC (CONGESTIVE) HEART FAILURE: Primary | ICD-10-CM

## 2025-01-14 PROBLEM — E66.3 OVERWEIGHT: Status: RESOLVED | Noted: 2023-09-12 | Resolved: 2025-01-14

## 2025-01-14 PROBLEM — Z79.01 LONG TERM CURRENT USE OF ANTICOAGULANT THERAPY: Status: RESOLVED | Noted: 2024-11-26 | Resolved: 2025-01-14

## 2025-01-14 PROBLEM — I50.9 ACUTE CONGESTIVE HEART FAILURE, UNSPECIFIED HEART FAILURE TYPE: Status: RESOLVED | Noted: 2024-10-10 | Resolved: 2025-01-14

## 2025-01-14 PROBLEM — I10 WHITE COAT SYNDROME WITH HYPERTENSION: Status: RESOLVED | Noted: 2023-09-12 | Resolved: 2025-01-14

## 2025-01-14 PROCEDURE — G2211 COMPLEX E/M VISIT ADD ON: HCPCS | Performed by: INTERNAL MEDICINE

## 2025-01-14 PROCEDURE — 1160F RVW MEDS BY RX/DR IN RCRD: CPT | Performed by: INTERNAL MEDICINE

## 2025-01-14 PROCEDURE — 99214 OFFICE O/P EST MOD 30 MIN: CPT | Performed by: INTERNAL MEDICINE

## 2025-01-14 PROCEDURE — 1123F ACP DISCUSS/DSCN MKR DOCD: CPT | Performed by: INTERNAL MEDICINE

## 2025-01-14 PROCEDURE — 1157F ADVNC CARE PLAN IN RCRD: CPT | Performed by: INTERNAL MEDICINE

## 2025-01-14 PROCEDURE — 1158F ADVNC CARE PLAN TLK DOCD: CPT | Performed by: INTERNAL MEDICINE

## 2025-01-14 PROCEDURE — 1159F MED LIST DOCD IN RCRD: CPT | Performed by: INTERNAL MEDICINE

## 2025-01-14 RX ORDER — FUROSEMIDE 40 MG/1
40 TABLET ORAL
Qty: 12 TABLET | Refills: 0 | Status: SHIPPED | OUTPATIENT
Start: 2025-01-15 | End: 2026-01-15

## 2025-01-14 ASSESSMENT — ENCOUNTER SYMPTOMS
DYSURIA: 0
ACTIVITY CHANGE: 0
LIGHT-HEADEDNESS: 0
ABDOMINAL PAIN: 0
COUGH: 0
MYALGIAS: 0
SORE THROAT: 0

## 2025-01-14 NOTE — PROGRESS NOTES
Canelo Moore, [facility] 89 y.o. male was seen today:     Chief Complaint   Patient presents with    Foot Swelling     Patient in office today for complaints of left foot swelling x 1 week       VISIT BECKY:    Canelo Moore   Comes here with lower extremity swelling and so also water retention.  Patient has been on prednisone with a high dose from his endocrinologist.  He has been found to have hyperthyroidism currently on methimazole with steroid.  Patient had atrial fibrillation and was on amiodarone.  Hyperthyroidism was thought to be secondary to amiodarone.  Patient will eventually come off of steroid down the road.  His TSH still is very low.  Patient does have a history of congestive heart failure.  He has just seen his cardiologist where he did not have any weight gain.  But last 7 to 10 days he has been having retention of water in the lower extremity.  He is increasingly getting short of breath.  He denies any cough, or congestion.  I have a feeling that this is a starting of his congestive heart failure acute on chronic.  I will treat him with p.o. Lasix 40 mg 3 times a week x 4 weeks.      MEDICATIONS:   Current Outpatient Medications   Medication Instructions    albuterol (Ventolin HFA) 90 mcg/actuation inhaler 2 puffs, inhalation, Every 6 hours PRN    alfuzosin (UROXATRAL) 10 mg, Daily    apixaban (ELIQUIS) 5 mg, oral, 2 times daily    finasteride (Proscar) 5 mg tablet 1 tablet, Daily    magnesium oxide (MAG-OX) 400 mg, oral, 2 times daily    methIMAzole (TAPAZOLE) 20 mg, oral, Every 12 hours scheduled    metoprolol succinate XL (TOPROL-XL) 100 mg, oral, Daily, Do not crush or chew.    multivitamin (One Daily Multivitamin) tablet 1 tablet, Daily    nitroglycerin (NITROSTAT) 0.4 mg, sublingual, Every 5 min PRN, place 1 tablet under tongue every 5 minutes for up to 3 doses as needed for chest pain. Call 911 if pain persists    predniSONE (DELTASONE) 20 mg, oral, 2 times daily, WITH FOOD     "rosuvastatin (CRESTOR) 10 mg, oral, Nightly    tiotropium (SPIRIVA WITH HANDIHALER) 18 mcg, inhalation, Daily RT    valsartan-hydrochlorothiazide (Diovan-HCT) 160-12.5 mg tablet 1 tablet, oral, Daily       TODAY'S VISIT  DX:     1. Acute on chronic diastolic (congestive) heart failure  furosemide (Lasix) 40 mg tablet      2. Pulmonary emphysema, unspecified emphysema type (Multi)  Stable at this time.  Patient continues to smoke.  We discussed about smoking cessation.      3. Atrial fibrillation with rapid ventricular response (Multi)  Currently the strategy is to keep the rate under control.  His Toprol has been increased.  He is off of Coumadin.  Currently takes Eliquis.      4. Stage 3a chronic kidney disease (CKD) (Multi)  Stable at this time.  We are watching nephrotoxins.  Although at times patient needs Lasix for CHF         MEDICAL DECISION MAKING:  - Treatment and therapy plan are as above   - Active medical co morbidities have been considered.   - Recent lab work and relevant imaging studies were reviewed.    - Correspondence/notes from specialty consultants were checked.    - Next Follow up in 3 months    Review of Systems   Constitutional:  Negative for activity change.   HENT:  Negative for congestion and sore throat.    Respiratory:  Negative for cough.    Cardiovascular:  Negative for chest pain.   Gastrointestinal:  Negative for abdominal pain.   Endocrine: Negative for polyuria.   Genitourinary:  Negative for dysuria.   Musculoskeletal:  Negative for myalgias.   Skin:  Negative for rash.   Neurological:  Negative for light-headedness.   Psychiatric/Behavioral:  Negative for behavioral problems.      Visit Vitals  /74 (BP Location: Left arm, Patient Position: Sitting, BP Cuff Size: Adult)   Pulse 64   Temp 35.8 °C (96.5 °F) (Temporal)   Ht 1.727 m (5' 8\")   Wt 77.1 kg (170 lb)   BMI 25.85 kg/m²   Smoking Status Every Day   BSA 1.92 m²         Wt Readings from Last 10 Encounters:   01/14/25 77.1 " kg (170 lb)   11/27/24 74.8 kg (164 lb 12.8 oz)   11/26/24 74.2 kg (163 lb 9.6 oz)   10/28/24 77.3 kg (170 lb 6.4 oz)   10/17/24 77.4 kg (170 lb 9.6 oz)   10/10/24 79.4 kg (175 lb 0.7 oz)   10/08/24 79.4 kg (175 lb)   08/27/24 82.1 kg (181 lb)   08/21/24 82.1 kg (181 lb)   08/01/24 82.8 kg (182 lb 9.6 oz)     Physical Exam  Vitals and nursing note reviewed.   Constitutional:       Appearance: Normal appearance.   HENT:      Head: Normocephalic.      Right Ear: Tympanic membrane normal.      Left Ear: Tympanic membrane normal.      Nose: Nose normal.      Mouth/Throat:      Mouth: Mucous membranes are moist.   Cardiovascular:      Rate and Rhythm: Normal rate and regular rhythm.      Pulses: Normal pulses.      Heart sounds: No murmur heard.  Pulmonary:      Effort: No respiratory distress.      Breath sounds: Normal breath sounds.   Abdominal:      Palpations: Abdomen is soft.   Musculoskeletal:      Cervical back: Neck supple.      Right lower leg: No edema.      Left lower leg: No edema.   Skin:     General: Skin is warm.      Findings: No rash.   Neurological:      General: No focal deficit present.      Mental Status: He is alert and oriented to person, place, and time.   Psychiatric:         Mood and Affect: Mood normal.        RECENT LABS:  Lab Results   Component Value Date    WBC 15.5 (H) 11/26/2024    HGB 13.1 (L) 11/26/2024    HCT 39.7 (L) 11/26/2024     11/26/2024    CHOL 108 10/30/2024    TRIG 93 10/30/2024    HDL 46.2 10/30/2024    ALT 22 11/26/2024    AST 17 11/26/2024     (L) 11/26/2024    K 5.3 11/26/2024    CL 89 (L) 11/26/2024    CREATININE 1.08 11/26/2024    BUN 23 11/26/2024    CO2 34 (H) 11/26/2024    TSH 3.40 11/26/2024    PSA 2.86 07/31/2024    INR 2.2 (H) 10/21/2024     Lab Results   Component Value Date    GLUCOSE 152 (H) 11/26/2024    CALCIUM 9.1 11/26/2024     (L) 11/26/2024    K 5.3 11/26/2024    CO2 34 (H) 11/26/2024    CL 89 (L) 11/26/2024    BUN 23 11/26/2024     CREATININE 1.08 11/26/2024      Lab Results   Component Value Date    LDLCALC 43 10/30/2024       Lab Results   Component Value Date    LDLCALC 43 10/30/2024    CREATININE 1.08 11/26/2024     Lab Results   Component Value Date    PSA 2.86 07/31/2024    PSA 3.48 12/20/2023    PSA 2.50 12/20/2022         P.S: This note was completed using Dragon voice recognition technology and may include unintended errors with respect to translation of words, typographical errors or grammar errors which may not have been identified while finalizing the chart.

## 2025-01-20 ENCOUNTER — APPOINTMENT (OUTPATIENT)
Dept: CARDIOLOGY | Facility: CLINIC | Age: 89
End: 2025-01-20
Payer: COMMERCIAL

## 2025-01-28 LAB — PSA SERPL-MCNC: 1.64 NG/ML

## 2025-01-29 ENCOUNTER — APPOINTMENT (OUTPATIENT)
Dept: PRIMARY CARE | Facility: CLINIC | Age: 89
End: 2025-01-29
Payer: MEDICARE

## 2025-01-29 LAB — PROSTATE SPECIFIC ANTIGEN: 1.64 NG/ML

## 2025-02-03 ENCOUNTER — APPOINTMENT (OUTPATIENT)
Dept: CARDIOLOGY | Facility: CLINIC | Age: 89
End: 2025-02-03
Payer: COMMERCIAL

## 2025-02-06 ENCOUNTER — APPOINTMENT (OUTPATIENT)
Dept: CARDIOLOGY | Facility: CLINIC | Age: 89
End: 2025-02-06
Payer: MEDICARE

## 2025-02-10 ENCOUNTER — HOSPITAL ENCOUNTER (OUTPATIENT)
Dept: RADIOLOGY | Facility: CLINIC | Age: 89
Discharge: HOME | End: 2025-02-10
Payer: MEDICARE

## 2025-02-10 DIAGNOSIS — M16.11 UNILATERAL PRIMARY OSTEOARTHRITIS, RIGHT HIP: ICD-10-CM

## 2025-02-10 PROCEDURE — 73502 X-RAY EXAM HIP UNI 2-3 VIEWS: CPT | Mod: RT

## 2025-02-10 PROCEDURE — 73502 X-RAY EXAM HIP UNI 2-3 VIEWS: CPT | Mod: RIGHT SIDE | Performed by: RADIOLOGY

## 2025-02-11 ENCOUNTER — APPOINTMENT (OUTPATIENT)
Dept: PRIMARY CARE | Facility: CLINIC | Age: 89
End: 2025-02-11
Payer: MEDICARE

## 2025-02-14 DIAGNOSIS — I50.33 ACUTE ON CHRONIC DIASTOLIC (CONGESTIVE) HEART FAILURE: ICD-10-CM

## 2025-02-14 RX ORDER — FUROSEMIDE 40 MG/1
40 TABLET ORAL
Qty: 36 TABLET | Refills: 3 | Status: SHIPPED | OUTPATIENT
Start: 2025-02-14

## 2025-02-18 ENCOUNTER — APPOINTMENT (OUTPATIENT)
Dept: PRIMARY CARE | Facility: CLINIC | Age: 89
End: 2025-02-18
Payer: MEDICARE

## 2025-02-18 VITALS
TEMPERATURE: 98.3 F | WEIGHT: 169.4 LBS | BODY MASS INDEX: 25.67 KG/M2 | HEART RATE: 95 BPM | DIASTOLIC BLOOD PRESSURE: 70 MMHG | HEIGHT: 68 IN | SYSTOLIC BLOOD PRESSURE: 118 MMHG | OXYGEN SATURATION: 97 %

## 2025-02-18 DIAGNOSIS — N40.0 BENIGN PROSTATIC HYPERPLASIA WITHOUT LOWER URINARY TRACT SYMPTOMS: ICD-10-CM

## 2025-02-18 DIAGNOSIS — R06.02 SHORTNESS OF BREATH: ICD-10-CM

## 2025-02-18 DIAGNOSIS — I25.10 CORONARY ARTERY DISEASE INVOLVING NATIVE CORONARY ARTERY OF NATIVE HEART WITHOUT ANGINA PECTORIS: ICD-10-CM

## 2025-02-18 DIAGNOSIS — E78.2 MIXED HYPERLIPIDEMIA: ICD-10-CM

## 2025-02-18 DIAGNOSIS — I48.0 PAROXYSMAL ATRIAL FIBRILLATION (MULTI): Primary | ICD-10-CM

## 2025-02-18 DIAGNOSIS — Z79.899 HIGH RISK MEDICATION USE: ICD-10-CM

## 2025-02-18 DIAGNOSIS — J43.2 CENTRILOBULAR EMPHYSEMA (MULTI): ICD-10-CM

## 2025-02-18 PROCEDURE — 1160F RVW MEDS BY RX/DR IN RCRD: CPT | Performed by: INTERNAL MEDICINE

## 2025-02-18 PROCEDURE — 1158F ADVNC CARE PLAN TLK DOCD: CPT | Performed by: INTERNAL MEDICINE

## 2025-02-18 PROCEDURE — 1157F ADVNC CARE PLAN IN RCRD: CPT | Performed by: INTERNAL MEDICINE

## 2025-02-18 PROCEDURE — 1159F MED LIST DOCD IN RCRD: CPT | Performed by: INTERNAL MEDICINE

## 2025-02-18 PROCEDURE — G2211 COMPLEX E/M VISIT ADD ON: HCPCS | Performed by: INTERNAL MEDICINE

## 2025-02-18 PROCEDURE — 99214 OFFICE O/P EST MOD 30 MIN: CPT | Performed by: INTERNAL MEDICINE

## 2025-02-18 PROCEDURE — 1123F ACP DISCUSS/DSCN MKR DOCD: CPT | Performed by: INTERNAL MEDICINE

## 2025-02-18 RX ORDER — AMIODARONE HYDROCHLORIDE 100 MG/1
100 TABLET ORAL DAILY
COMMUNITY
End: 2025-02-18 | Stop reason: WASHOUT

## 2025-02-18 RX ORDER — ALBUTEROL SULFATE 90 UG/1
2 INHALANT RESPIRATORY (INHALATION) EVERY 6 HOURS PRN
Qty: 18 G | Refills: 3 | Status: SHIPPED | OUTPATIENT
Start: 2025-02-18

## 2025-02-18 RX ORDER — TIOTROPIUM BROMIDE 18 UG/1
1 CAPSULE ORAL; RESPIRATORY (INHALATION)
Qty: 30 CAPSULE | Refills: 3 | Status: SHIPPED | OUTPATIENT
Start: 2025-02-18 | End: 2026-02-18

## 2025-02-18 RX ORDER — METOPROLOL SUCCINATE 100 MG/1
100 TABLET, EXTENDED RELEASE ORAL DAILY
Qty: 90 TABLET | Refills: 3 | Status: SHIPPED | OUTPATIENT
Start: 2025-02-18 | End: 2026-02-18

## 2025-02-18 RX ORDER — VALSARTAN AND HYDROCHLOROTHIAZIDE 160; 12.5 MG/1; MG/1
1 TABLET, FILM COATED ORAL DAILY
Qty: 90 TABLET | Refills: 3 | Status: SHIPPED | OUTPATIENT
Start: 2025-02-18 | End: 2026-02-18

## 2025-02-18 RX ORDER — FINASTERIDE 5 MG/1
5 TABLET, FILM COATED ORAL DAILY
Qty: 90 TABLET | Refills: 3 | Status: SHIPPED | OUTPATIENT
Start: 2025-02-18 | End: 2026-02-18

## 2025-02-18 RX ORDER — LANOLIN ALCOHOL/MO/W.PET/CERES
1 CREAM (GRAM) TOPICAL 2 TIMES DAILY
Qty: 180 TABLET | Refills: 3 | Status: SHIPPED | OUTPATIENT
Start: 2025-02-18 | End: 2026-02-18

## 2025-02-18 RX ORDER — ALFUZOSIN HYDROCHLORIDE 10 MG/1
10 TABLET, EXTENDED RELEASE ORAL DAILY
Qty: 90 TABLET | Refills: 3 | Status: SHIPPED | OUTPATIENT
Start: 2025-02-18 | End: 2026-02-18

## 2025-02-18 ASSESSMENT — ENCOUNTER SYMPTOMS
LIGHT-HEADEDNESS: 0
MYALGIAS: 0
ABDOMINAL PAIN: 0
COUGH: 0
DYSURIA: 0
ACTIVITY CHANGE: 0
SORE THROAT: 0

## 2025-02-19 DIAGNOSIS — E05.90 HYPERTHYROIDISM: ICD-10-CM

## 2025-02-19 NOTE — TELEPHONE ENCOUNTER
Patient calling requesting a refill on prednisone. Patient advised that it was missed at his appointment yesterday. Patient is requesting it to be sent to [facility].

## 2025-02-19 NOTE — PROGRESS NOTES
Canelo Moore, [facility] 89 y.o. male was seen today:   Chief Complaint   Patient presents with    Results     Xray hip and pelvis review       Canelo Moore   Here for follow-up with right hip x-ray.  Patient does have osteoarthritis.  He does have low back pain.  He recently started doing some home exercises and he must have overdone it.  He does have flareup of right hip osteoarthritis.  He is getting better.  He has taken multiple chiropractor visits.  Pain is tolerable now.  He he uses ice pack.  Patient had a hip x-ray done which did show worsening osteoarthritis.  However he is not interested to go for any orthopedic surgery at this time.  Patient is on Eliquis for his atrial fibrillation.  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  He is not taking amiodarone anymore.  Patient denies any black tarry stool.  He sees Dr. López for the management of hyperthyroidism.  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  Patient is medications refill.  He got new mail away pharmacy which is [facility].      MEDICATIONS:   Current Outpatient Medications   Medication Instructions    albuterol (Ventolin HFA) 90 mcg/actuation inhaler 2 puffs, inhalation, Every 6 hours PRN    alfuzosin (UROXATRAL) 10 mg, oral, Daily, Do not crush, chew, or split.    apixaban (ELIQUIS) 5 mg, oral, 2 times daily    finasteride (PROSCAR) 5 mg, oral, Daily    furosemide (LASIX) 40 mg, oral, Every Mon/Wed/Fri    magnesium oxide (MAG-OX) 400 mg, oral, 2 times daily    methIMAzole (TAPAZOLE) 20 mg, oral, Every 12 hours scheduled    metoprolol succinate XL (TOPROL-XL) 100 mg, oral, Daily, Do not crush or chew.    multivitamin (One Daily Multivitamin) tablet 1 tablet, Daily    nitroglycerin (NITROSTAT) 0.4 mg, sublingual, Every 5 min PRN, place 1 tablet under tongue every 5 minutes for up to 3 doses as needed for chest pain. Call 911 if pain persists    predniSONE (DELTASONE) 20 mg, oral, 2 times daily, WITH FOOD    rosuvastatin  (CRESTOR) 10 mg, oral, Nightly    tiotropium (SPIRIVA WITH HANDIHALER) 18 mcg, inhalation, Daily RT    valsartan-hydrochlorothiazide (Diovan-HCT) 160-12.5 mg tablet 1 tablet, oral, Daily       TODAY'S VISIT  DX:     1. Paroxysmal atrial fibrillation (Multi)  metoprolol succinate XL (Toprol-XL) 100 mg 24 hr tablet    apixaban (Eliquis) 5 mg tablet      2. High risk medication use  valsartan-hydrochlorothiazide (Diovan-HCT) 160-12.5 mg tablet    magnesium oxide (Mag-Ox) 400 mg (241.3 mg magnesium) tablet      3. Mixed hyperlipidemia  valsartan-hydrochlorothiazide (Diovan-HCT) 160-12.5 mg tablet    magnesium oxide (Mag-Ox) 400 mg (241.3 mg magnesium) tablet      4. Coronary artery disease involving native coronary artery of native heart without angina pectoris  valsartan-hydrochlorothiazide (Diovan-HCT) 160-12.5 mg tablet    magnesium oxide (Mag-Ox) 400 mg (241.3 mg magnesium) tablet      5. Centrilobular emphysema (Multi)  tiotropium (Spiriva with HandiHaler) 18 mcg inhalation capsule      6. Shortness of breath  albuterol (Ventolin HFA) 90 mcg/actuation inhaler      7. Benign prostatic hyperplasia without lower urinary tract symptoms  finasteride (Proscar) 5 mg tablet    alfuzosin (Uroxatral) 10 mg 24 hr tablet           MEDICAL DECISION MAKING:  - Treatment and therapy plan are as above   - Active medical co morbidities have been considered.   - Recent lab work and relevant imaging studies were reviewed.    - Correspondence/notes from specialty consultants were checked.    - Next Follow up in August 2025    Review of Systems   Constitutional:  Negative for activity change.   HENT:  Negative for congestion and sore throat.    Respiratory:  Negative for cough.    Cardiovascular:  Negative for chest pain.   Gastrointestinal:  Negative for abdominal pain.   Endocrine: Negative for polyuria.   Genitourinary:  Negative for dysuria.   Musculoskeletal:  Negative for myalgias.   Skin:  Negative for rash.   Neurological:   "Negative for light-headedness.   Psychiatric/Behavioral:  Negative for behavioral problems.      Visit Vitals  /70 (BP Location: Left arm, Patient Position: Sitting)   Pulse 95   Temp 36.8 °C (98.3 °F) (Temporal)   Ht 1.727 m (5' 8\")   Wt 76.8 kg (169 lb 6.4 oz)   SpO2 97% Comment: ra   BMI 25.76 kg/m²   Smoking Status Every Day   BSA 1.92 m²         Wt Readings from Last 10 Encounters:   02/18/25 76.8 kg (169 lb 6.4 oz)   01/14/25 77.1 kg (170 lb)   11/27/24 74.8 kg (164 lb 12.8 oz)   11/26/24 74.2 kg (163 lb 9.6 oz)   10/28/24 77.3 kg (170 lb 6.4 oz)   10/17/24 77.4 kg (170 lb 9.6 oz)   10/10/24 79.4 kg (175 lb 0.7 oz)   10/08/24 79.4 kg (175 lb)   08/27/24 82.1 kg (181 lb)   08/21/24 82.1 kg (181 lb)     Physical Exam  Vitals and nursing note reviewed.   Constitutional:       Appearance: Normal appearance.   HENT:      Head: Normocephalic.      Right Ear: Tympanic membrane normal.      Left Ear: Tympanic membrane normal.      Nose: Nose normal.      Mouth/Throat:      Mouth: Mucous membranes are moist.   Cardiovascular:      Rate and Rhythm: Normal rate and regular rhythm.      Pulses: Normal pulses.      Heart sounds: No murmur heard.  Pulmonary:      Effort: No respiratory distress.      Breath sounds: Normal breath sounds.   Abdominal:      Palpations: Abdomen is soft.   Musculoskeletal:      Cervical back: Neck supple.      Right lower leg: No edema.      Left lower leg: No edema.   Skin:     General: Skin is warm.      Findings: No rash.   Neurological:      General: No focal deficit present.      Mental Status: He is alert and oriented to person, place, and time.   Psychiatric:         Mood and Affect: Mood normal.        RECENT LABS:  Lab Results   Component Value Date    WBC 15.5 (H) 11/26/2024    HGB 13.1 (L) 11/26/2024    HCT 39.7 (L) 11/26/2024     11/26/2024    CHOL 108 10/30/2024    TRIG 93 10/30/2024    HDL 46.2 10/30/2024    ALT 22 11/26/2024    AST 17 11/26/2024     (L) " 11/26/2024    K 5.3 11/26/2024    CL 89 (L) 11/26/2024    CREATININE 1.08 11/26/2024    BUN 23 11/26/2024    CO2 34 (H) 11/26/2024    TSH 3.40 11/26/2024    PSA 1.64 01/28/2025    INR 2.2 (H) 10/21/2024     Lab Results   Component Value Date    GLUCOSE 152 (H) 11/26/2024    CALCIUM 9.1 11/26/2024     (L) 11/26/2024    K 5.3 11/26/2024    CO2 34 (H) 11/26/2024    CL 89 (L) 11/26/2024    BUN 23 11/26/2024    CREATININE 1.08 11/26/2024      Lab Results   Component Value Date    LDLCALC 43 10/30/2024     Lab Results   Component Value Date    LDLCALC 43 10/30/2024    CREATININE 1.08 11/26/2024     Lab Results   Component Value Date    PSA 1.64 01/28/2025    PSA 2.86 07/31/2024    PSA 3.48 12/20/2023         P.S: This note was completed using Dragon voice recognition technology and may include unintended errors with respect to translation of words, typographical errors or grammar errors which may not have been identified while finalizing the chart.

## 2025-02-21 ENCOUNTER — OFFICE VISIT (OUTPATIENT)
Dept: UROLOGY | Age: 89
End: 2025-02-21
Payer: MEDICARE

## 2025-02-21 VITALS
DIASTOLIC BLOOD PRESSURE: 60 MMHG | BODY MASS INDEX: 25.46 KG/M2 | HEART RATE: 90 BPM | HEIGHT: 68 IN | WEIGHT: 168 LBS | SYSTOLIC BLOOD PRESSURE: 102 MMHG

## 2025-02-21 DIAGNOSIS — N40.1 BPH WITH OBSTRUCTION/LOWER URINARY TRACT SYMPTOMS: Primary | ICD-10-CM

## 2025-02-21 DIAGNOSIS — N13.8 BPH WITH OBSTRUCTION/LOWER URINARY TRACT SYMPTOMS: Primary | ICD-10-CM

## 2025-02-21 PROCEDURE — 99213 OFFICE O/P EST LOW 20 MIN: CPT | Performed by: UROLOGY

## 2025-02-21 PROCEDURE — G8419 CALC BMI OUT NRM PARAM NOF/U: HCPCS | Performed by: UROLOGY

## 2025-02-21 PROCEDURE — 4004F PT TOBACCO SCREEN RCVD TLK: CPT | Performed by: UROLOGY

## 2025-02-21 PROCEDURE — 1123F ACP DISCUSS/DSCN MKR DOCD: CPT | Performed by: UROLOGY

## 2025-02-21 PROCEDURE — G8427 DOCREV CUR MEDS BY ELIG CLIN: HCPCS | Performed by: UROLOGY

## 2025-02-21 PROCEDURE — 1159F MED LIST DOCD IN RCRD: CPT | Performed by: UROLOGY

## 2025-02-21 PROCEDURE — 1160F RVW MEDS BY RX/DR IN RCRD: CPT | Performed by: UROLOGY

## 2025-02-21 RX ORDER — TIOTROPIUM BROMIDE 18 UG/1
18 CAPSULE ORAL; RESPIRATORY (INHALATION)
COMMUNITY
Start: 2025-02-18 | End: 2026-02-18

## 2025-02-21 RX ORDER — METOPROLOL SUCCINATE 100 MG/1
TABLET, EXTENDED RELEASE ORAL
COMMUNITY
Start: 2025-02-18

## 2025-02-21 RX ORDER — VALSARTAN AND HYDROCHLOROTHIAZIDE 160; 12.5 MG/1; MG/1
TABLET, FILM COATED ORAL
COMMUNITY
Start: 2025-02-18

## 2025-02-21 RX ORDER — PREDNISONE 10 MG/1
20 TABLET ORAL DAILY
COMMUNITY
Start: 2025-02-13

## 2025-02-21 RX ORDER — PREDNISONE 20 MG/1
20 TABLET ORAL 2 TIMES DAILY
Qty: 60 TABLET | Refills: 1 | OUTPATIENT
Start: 2025-02-21 | End: 2026-02-21

## 2025-02-21 ASSESSMENT — ENCOUNTER SYMPTOMS
ABDOMINAL PAIN: 0
ABDOMINAL DISTENTION: 0

## 2025-02-21 NOTE — PROGRESS NOTES
[facility]    Hunger Vital Sign     Worried About Running Out of Food in the Last Year: Never true     Ran Out of Food in the Last Year: Never true   Transportation Needs: No Transportation Needs (10/10/2024)    Received from [facility]    PRAPARE - Transportation     Lack of Transportation (Medical): No     Lack of Transportation (Non-Medical): No   Intimate Partner Violence: Not At Risk (10/10/2024)    Received from [facility]    Humiliation, Afraid, Rape, and Kick questionnaire     Fear of Current or Ex-Partner: No     Emotionally Abused: No     Physically Abused: No     Sexually Abused: No   Housing Stability: Low Risk  (10/10/2024)    Received from [facility]    Housing Stability Vital Sign     Unable to Pay for Housing in the Last Year: No     Number of Times Moved in the Last Year: 1     Homeless in the Last Year: No     Family History   Problem Relation Age of Onset    Heart Disease Brother     Arthritis Brother     Stroke Brother      Current Outpatient Medications   Medication Sig Dispense Refill    apixaban (ELIQUIS) 5 MG TABS tablet Take 1 tablet by mouth 2 times daily      metoprolol succinate (TOPROL XL) 100 MG extended release tablet       predniSONE (DELTASONE) 10 MG tablet Take 2 tablets by mouth daily      tiotropium (SPIRIVA) 18 MCG inhalation capsule Inhale 1 capsule into the lungs      valsartan-hydroCHLOROthiazide (DIOVAN-HCT) 160-12.5 MG per tablet       finasteride (PROSCAR) 5 MG tablet TAKE 1 TABLET BY MOUTH EVERY DAY 90 tablet 3    alfuzosin (UROXATRAL) 10 MG extended release tablet TAKE 1 TABLET DAILY 90 tablet 3    vitamin D (CHOLECALCIFEROL) 25 MCG (1000 UT) TABS tablet Take 1 tablet by mouth daily      amiodarone (PACERONE) 100 MG tablet Take 1 tablet by mouth daily      FLUZONE HIGH-DOSE 0.5 ML DEBBY injection TO BE ADMINISTERED BY PHARMACIST FOR IMMUNIZATION  0    aspirin EC 81 MG EC tablet Take 1 tablet by mouth

## 2025-02-21 NOTE — TELEPHONE ENCOUNTER
Call to Pt to provide Dr. Cueva advise. Pt states he will call Dr. López to ask question, awaiting Pt findings.    PLEASE DENY order request.

## 2025-02-24 ENCOUNTER — APPOINTMENT (OUTPATIENT)
Dept: PRIMARY CARE | Facility: CLINIC | Age: 89
End: 2025-02-24
Payer: MEDICARE

## 2025-02-26 ENCOUNTER — APPOINTMENT (OUTPATIENT)
Dept: PRIMARY CARE | Facility: CLINIC | Age: 89
End: 2025-02-26
Payer: MEDICARE

## 2025-03-22 ENCOUNTER — APPOINTMENT (OUTPATIENT)
Dept: CARDIOLOGY | Facility: HOSPITAL | Age: 89
DRG: 065 | End: 2025-03-22
Payer: MEDICARE

## 2025-03-22 ENCOUNTER — HOSPITAL ENCOUNTER (INPATIENT)
Facility: HOSPITAL | Age: 89
DRG: 065 | End: 2025-03-22
Attending: EMERGENCY MEDICINE | Admitting: INTERNAL MEDICINE
Payer: MEDICARE

## 2025-03-22 ENCOUNTER — APPOINTMENT (OUTPATIENT)
Dept: RADIOLOGY | Facility: HOSPITAL | Age: 89
DRG: 065 | End: 2025-03-22
Payer: MEDICARE

## 2025-03-22 DIAGNOSIS — I50.32 CHRONIC HEART FAILURE WITH PRESERVED EJECTION FRACTION (HFPEF): ICD-10-CM

## 2025-03-22 DIAGNOSIS — I48.21 PERMANENT ATRIAL FIBRILLATION (MULTI): ICD-10-CM

## 2025-03-22 DIAGNOSIS — R29.90 STROKE-LIKE SYMPTOMS: Primary | ICD-10-CM

## 2025-03-22 DIAGNOSIS — I63.9 ACUTE CVA (CEREBROVASCULAR ACCIDENT) (MULTI): ICD-10-CM

## 2025-03-22 DIAGNOSIS — G45.9 MINI STROKE: ICD-10-CM

## 2025-03-22 DIAGNOSIS — R47.01 EXPRESSIVE APHASIA: ICD-10-CM

## 2025-03-22 DIAGNOSIS — Z86.73 RECENT CEREBROVASCULAR ACCIDENT (CVA): ICD-10-CM

## 2025-03-22 DIAGNOSIS — R79.89 ELEVATED TROPONIN: ICD-10-CM

## 2025-03-22 DIAGNOSIS — E87.1 HYPONATREMIA: ICD-10-CM

## 2025-03-22 DIAGNOSIS — R47.81 SLURRED SPEECH: ICD-10-CM

## 2025-03-22 DIAGNOSIS — Z86.73 CEREBELLAR CEREBROVASCULAR ACCIDENT WITHOUT LATE EFFECT: ICD-10-CM

## 2025-03-22 PROBLEM — K40.90 RIGHT INGUINAL HERNIA: Status: RESOLVED | Noted: 2023-09-12 | Resolved: 2025-03-22

## 2025-03-22 PROBLEM — E05.80 AMIODARONE-INDUCED HYPERTHYROIDISM: Status: ACTIVE | Noted: 2024-11-08

## 2025-03-22 PROBLEM — I50.33 ACUTE ON CHRONIC DIASTOLIC (CONGESTIVE) HEART FAILURE: Status: RESOLVED | Noted: 2025-01-14 | Resolved: 2025-03-22

## 2025-03-22 PROBLEM — T46.2X5A AMIODARONE-INDUCED HYPERTHYROIDISM: Status: ACTIVE | Noted: 2024-11-08

## 2025-03-22 PROBLEM — K64.4 EXTERNAL HEMORRHOID: Status: RESOLVED | Noted: 2023-09-12 | Resolved: 2025-03-22

## 2025-03-22 LAB
ALBUMIN SERPL BCP-MCNC: 3.5 G/DL (ref 3.4–5)
ALP SERPL-CCNC: 70 U/L (ref 33–136)
ALT SERPL W P-5'-P-CCNC: 11 U/L (ref 10–52)
ANION GAP SERPL CALC-SCNC: 11 MMOL/L (ref 10–20)
ANION GAP SERPL CALC-SCNC: 12 MMOL/L (ref 10–20)
APTT PPP: 28 SECONDS (ref 26–36)
AST SERPL W P-5'-P-CCNC: 18 U/L (ref 9–39)
BASOPHILS # BLD AUTO: 0.04 X10*3/UL (ref 0–0.1)
BASOPHILS NFR BLD AUTO: 0.4 %
BILIRUB SERPL-MCNC: 1.3 MG/DL (ref 0–1.2)
BNP SERPL-MCNC: 414 PG/ML (ref 0–99)
BUN SERPL-MCNC: 14 MG/DL (ref 6–23)
BUN SERPL-MCNC: 16 MG/DL (ref 6–23)
CALCIUM SERPL-MCNC: 9.2 MG/DL (ref 8.6–10.3)
CALCIUM SERPL-MCNC: 9.6 MG/DL (ref 8.6–10.3)
CARDIAC TROPONIN I PNL SERPL HS: 155 NG/L (ref 0–20)
CARDIAC TROPONIN I PNL SERPL HS: 189 NG/L (ref 0–20)
CHLORIDE SERPL-SCNC: 91 MMOL/L (ref 98–107)
CHLORIDE SERPL-SCNC: 91 MMOL/L (ref 98–107)
CO2 SERPL-SCNC: 28 MMOL/L (ref 21–32)
CO2 SERPL-SCNC: 31 MMOL/L (ref 21–32)
CREAT SERPL-MCNC: 1.02 MG/DL (ref 0.5–1.3)
CREAT SERPL-MCNC: 1.05 MG/DL (ref 0.5–1.3)
EGFRCR SERPLBLD CKD-EPI 2021: 69 ML/MIN/1.73M*2
EGFRCR SERPLBLD CKD-EPI 2021: 71 ML/MIN/1.73M*2
EOSINOPHIL # BLD AUTO: 0.01 X10*3/UL (ref 0–0.4)
EOSINOPHIL NFR BLD AUTO: 0.1 %
ERYTHROCYTE [DISTWIDTH] IN BLOOD BY AUTOMATED COUNT: 15 % (ref 11.5–14.5)
GLUCOSE BLD MANUAL STRIP-MCNC: 143 MG/DL (ref 74–99)
GLUCOSE BLD MANUAL STRIP-MCNC: 90 MG/DL (ref 74–99)
GLUCOSE SERPL-MCNC: 142 MG/DL (ref 74–99)
GLUCOSE SERPL-MCNC: 97 MG/DL (ref 74–99)
HCT VFR BLD AUTO: 33.6 % (ref 41–52)
HGB BLD-MCNC: 11.1 G/DL (ref 13.5–17.5)
HOLD SPECIMEN: NORMAL
IMM GRANULOCYTES # BLD AUTO: 0.03 X10*3/UL (ref 0–0.5)
IMM GRANULOCYTES NFR BLD AUTO: 0.3 % (ref 0–0.9)
INR PPP: 1.6 (ref 0.9–1.1)
LYMPHOCYTES # BLD AUTO: 0.48 X10*3/UL (ref 0.8–3)
LYMPHOCYTES NFR BLD AUTO: 5.3 %
MCH RBC QN AUTO: 32.4 PG (ref 26–34)
MCHC RBC AUTO-ENTMCNC: 33 G/DL (ref 32–36)
MCV RBC AUTO: 98 FL (ref 80–100)
MONOCYTES # BLD AUTO: 0.67 X10*3/UL (ref 0.05–0.8)
MONOCYTES NFR BLD AUTO: 7.4 %
NEUTROPHILS # BLD AUTO: 7.88 X10*3/UL (ref 1.6–5.5)
NEUTROPHILS NFR BLD AUTO: 86.5 %
NRBC BLD-RTO: 0 /100 WBCS (ref 0–0)
OSMOLALITY SERPL: 277 MOSM/KG (ref 280–300)
PLATELET # BLD AUTO: 196 X10*3/UL (ref 150–450)
POTASSIUM SERPL-SCNC: 3.9 MMOL/L (ref 3.5–5.3)
POTASSIUM SERPL-SCNC: 4.3 MMOL/L (ref 3.5–5.3)
PROT SERPL-MCNC: 6.1 G/DL (ref 6.4–8.2)
PROTHROMBIN TIME: 17.3 SECONDS (ref 9.8–12.4)
RBC # BLD AUTO: 3.43 X10*6/UL (ref 4.5–5.9)
SODIUM SERPL-SCNC: 127 MMOL/L (ref 136–145)
SODIUM SERPL-SCNC: 129 MMOL/L (ref 136–145)
UFH PPP CHRO-ACNC: >2 IU/ML
WBC # BLD AUTO: 9.1 X10*3/UL (ref 4.4–11.3)

## 2025-03-22 PROCEDURE — 83880 ASSAY OF NATRIURETIC PEPTIDE: CPT | Performed by: INTERNAL MEDICINE

## 2025-03-22 PROCEDURE — 82947 ASSAY GLUCOSE BLOOD QUANT: CPT

## 2025-03-22 PROCEDURE — 85730 THROMBOPLASTIN TIME PARTIAL: CPT | Performed by: EMERGENCY MEDICINE

## 2025-03-22 PROCEDURE — 84484 ASSAY OF TROPONIN QUANT: CPT | Performed by: EMERGENCY MEDICINE

## 2025-03-22 PROCEDURE — 36415 COLL VENOUS BLD VENIPUNCTURE: CPT | Performed by: INTERNAL MEDICINE

## 2025-03-22 PROCEDURE — 2500000001 HC RX 250 WO HCPCS SELF ADMINISTERED DRUGS (ALT 637 FOR MEDICARE OP): Performed by: INTERNAL MEDICINE

## 2025-03-22 PROCEDURE — 2500000002 HC RX 250 W HCPCS SELF ADMINISTERED DRUGS (ALT 637 FOR MEDICARE OP, ALT 636 FOR OP/ED): Performed by: INTERNAL MEDICINE

## 2025-03-22 PROCEDURE — 85025 COMPLETE CBC W/AUTO DIFF WBC: CPT | Performed by: EMERGENCY MEDICINE

## 2025-03-22 PROCEDURE — 2500000004 HC RX 250 GENERAL PHARMACY W/ HCPCS (ALT 636 FOR OP/ED): Performed by: INTERNAL MEDICINE

## 2025-03-22 PROCEDURE — 93005 ELECTROCARDIOGRAM TRACING: CPT

## 2025-03-22 PROCEDURE — 84484 ASSAY OF TROPONIN QUANT: CPT | Performed by: INTERNAL MEDICINE

## 2025-03-22 PROCEDURE — 85520 HEPARIN ASSAY: CPT | Performed by: EMERGENCY MEDICINE

## 2025-03-22 PROCEDURE — 83930 ASSAY OF BLOOD OSMOLALITY: CPT | Mod: ELYLAB | Performed by: INTERNAL MEDICINE

## 2025-03-22 PROCEDURE — 85610 PROTHROMBIN TIME: CPT | Performed by: EMERGENCY MEDICINE

## 2025-03-22 PROCEDURE — 1200000002 HC GENERAL ROOM WITH TELEMETRY DAILY

## 2025-03-22 PROCEDURE — 70450 CT HEAD/BRAIN W/O DYE: CPT | Performed by: RADIOLOGY

## 2025-03-22 PROCEDURE — 80053 COMPREHEN METABOLIC PANEL: CPT | Performed by: EMERGENCY MEDICINE

## 2025-03-22 PROCEDURE — 99223 1ST HOSP IP/OBS HIGH 75: CPT | Performed by: INTERNAL MEDICINE

## 2025-03-22 PROCEDURE — 36415 COLL VENOUS BLD VENIPUNCTURE: CPT | Performed by: EMERGENCY MEDICINE

## 2025-03-22 PROCEDURE — 70450 CT HEAD/BRAIN W/O DYE: CPT

## 2025-03-22 PROCEDURE — 99291 CRITICAL CARE FIRST HOUR: CPT | Performed by: EMERGENCY MEDICINE

## 2025-03-22 PROCEDURE — 85260 CLOT FACTOR X STUART-POWER: CPT | Mod: ELYLAB | Performed by: INTERNAL MEDICINE

## 2025-03-22 PROCEDURE — 80048 BASIC METABOLIC PNL TOTAL CA: CPT | Mod: CCI | Performed by: INTERNAL MEDICINE

## 2025-03-22 PROCEDURE — G0426 INPT/ED TELECONSULT50: HCPCS | Performed by: STUDENT IN AN ORGANIZED HEALTH CARE EDUCATION/TRAINING PROGRAM

## 2025-03-22 RX ORDER — ROSUVASTATIN CALCIUM 10 MG/1
10 TABLET, COATED ORAL NIGHTLY
Status: DISCONTINUED | OUTPATIENT
Start: 2025-03-22 | End: 2025-03-25 | Stop reason: HOSPADM

## 2025-03-22 RX ORDER — FINASTERIDE 5 MG/1
5 TABLET, FILM COATED ORAL DAILY
Status: DISCONTINUED | OUTPATIENT
Start: 2025-03-22 | End: 2025-03-25 | Stop reason: HOSPADM

## 2025-03-22 RX ORDER — IBUPROFEN 200 MG
1 TABLET ORAL DAILY
Status: DISCONTINUED | OUTPATIENT
Start: 2025-03-22 | End: 2025-03-25 | Stop reason: HOSPADM

## 2025-03-22 RX ORDER — NITROGLYCERIN 0.4 MG/1
0.4 TABLET SUBLINGUAL EVERY 5 MIN PRN
Status: DISCONTINUED | OUTPATIENT
Start: 2025-03-22 | End: 2025-03-25 | Stop reason: HOSPADM

## 2025-03-22 RX ORDER — ONDANSETRON 4 MG/1
4 TABLET, FILM COATED ORAL EVERY 8 HOURS PRN
Status: DISCONTINUED | OUTPATIENT
Start: 2025-03-22 | End: 2025-03-25 | Stop reason: HOSPADM

## 2025-03-22 RX ORDER — ACETAMINOPHEN 325 MG/1
650 TABLET ORAL EVERY 4 HOURS PRN
Status: DISCONTINUED | OUTPATIENT
Start: 2025-03-22 | End: 2025-03-25 | Stop reason: HOSPADM

## 2025-03-22 RX ORDER — LANOLIN ALCOHOL/MO/W.PET/CERES
400 CREAM (GRAM) TOPICAL 2 TIMES DAILY
Status: DISCONTINUED | OUTPATIENT
Start: 2025-03-22 | End: 2025-03-25 | Stop reason: HOSPADM

## 2025-03-22 RX ORDER — ALBUTEROL SULFATE 90 UG/1
2 INHALANT RESPIRATORY (INHALATION) EVERY 6 HOURS PRN
Status: DISCONTINUED | OUTPATIENT
Start: 2025-03-22 | End: 2025-03-25 | Stop reason: HOSPADM

## 2025-03-22 RX ORDER — ONDANSETRON HYDROCHLORIDE 2 MG/ML
4 INJECTION, SOLUTION INTRAVENOUS EVERY 4 HOURS PRN
Status: DISCONTINUED | OUTPATIENT
Start: 2025-03-22 | End: 2025-03-25 | Stop reason: HOSPADM

## 2025-03-22 RX ORDER — FUROSEMIDE 40 MG/1
40 TABLET ORAL
Status: DISCONTINUED | OUTPATIENT
Start: 2025-03-24 | End: 2025-03-25 | Stop reason: HOSPADM

## 2025-03-22 RX ORDER — ACETAMINOPHEN 500 MG
5 TABLET ORAL NIGHTLY PRN
Status: DISCONTINUED | OUTPATIENT
Start: 2025-03-22 | End: 2025-03-25 | Stop reason: HOSPADM

## 2025-03-22 RX ORDER — IPRATROPIUM BROMIDE AND ALBUTEROL SULFATE 2.5; .5 MG/3ML; MG/3ML
3 SOLUTION RESPIRATORY (INHALATION) 2 TIMES DAILY
Status: DISCONTINUED | OUTPATIENT
Start: 2025-03-22 | End: 2025-03-25 | Stop reason: HOSPADM

## 2025-03-22 RX ORDER — ALUMINUM HYDROXIDE, MAGNESIUM HYDROXIDE, AND SIMETHICONE 1200; 120; 1200 MG/30ML; MG/30ML; MG/30ML
30 SUSPENSION ORAL 4 TIMES DAILY PRN
Status: DISCONTINUED | OUTPATIENT
Start: 2025-03-22 | End: 2025-03-25 | Stop reason: HOSPADM

## 2025-03-22 RX ORDER — ALFUZOSIN HYDROCHLORIDE 10 MG/1
10 TABLET, EXTENDED RELEASE ORAL DAILY
Status: DISCONTINUED | OUTPATIENT
Start: 2025-03-22 | End: 2025-03-25 | Stop reason: HOSPADM

## 2025-03-22 RX ORDER — VALSARTAN 80 MG/1
40 TABLET ORAL DAILY
Status: DISCONTINUED | OUTPATIENT
Start: 2025-03-22 | End: 2025-03-25 | Stop reason: HOSPADM

## 2025-03-22 RX ORDER — PREDNISONE 10 MG/1
10 TABLET ORAL DAILY
Status: DISCONTINUED | OUTPATIENT
Start: 2025-03-22 | End: 2025-03-25 | Stop reason: HOSPADM

## 2025-03-22 RX ORDER — METHIMAZOLE 5 MG/1
20 TABLET ORAL DAILY
Status: DISCONTINUED | OUTPATIENT
Start: 2025-03-22 | End: 2025-03-25 | Stop reason: HOSPADM

## 2025-03-22 RX ORDER — ADHESIVE BANDAGE
10 BANDAGE TOPICAL DAILY PRN
Status: DISCONTINUED | OUTPATIENT
Start: 2025-03-22 | End: 2025-03-25 | Stop reason: HOSPADM

## 2025-03-22 RX ADMIN — MAGNESIUM OXIDE 400 MG (241.3 MG MAGNESIUM) TABLET 400 MG: TABLET at 22:18

## 2025-03-22 RX ADMIN — VALSARTAN 40 MG: 40 TABLET, FILM COATED ORAL at 23:22

## 2025-03-22 RX ADMIN — ALFUZOSIN HYDROCHLORIDE 10 MG: 10 TABLET, EXTENDED RELEASE ORAL at 23:22

## 2025-03-22 RX ADMIN — APIXABAN 5 MG: 5 TABLET, FILM COATED ORAL at 22:19

## 2025-03-22 RX ADMIN — PREDNISONE 10 MG: 10 TABLET ORAL at 22:18

## 2025-03-22 RX ADMIN — FINASTERIDE 5 MG: 5 TABLET, FILM COATED ORAL at 22:19

## 2025-03-22 RX ADMIN — ROSUVASTATIN CALCIUM 10 MG: 10 TABLET, FILM COATED ORAL at 22:18

## 2025-03-22 SDOH — SOCIAL STABILITY: SOCIAL INSECURITY: HAS ANYONE EVER THREATENED TO HURT YOUR FAMILY OR YOUR PETS?: NO

## 2025-03-22 SDOH — SOCIAL STABILITY: SOCIAL INSECURITY: HAVE YOU HAD THOUGHTS OF HARMING ANYONE ELSE?: NO

## 2025-03-22 SDOH — SOCIAL STABILITY: SOCIAL INSECURITY: DOES ANYONE TRY TO KEEP YOU FROM HAVING/CONTACTING OTHER FRIENDS OR DOING THINGS OUTSIDE YOUR HOME?: NO

## 2025-03-22 SDOH — SOCIAL STABILITY: SOCIAL INSECURITY: ARE THERE ANY APPARENT SIGNS OF INJURIES/BEHAVIORS THAT COULD BE RELATED TO ABUSE/NEGLECT?: NO

## 2025-03-22 SDOH — SOCIAL STABILITY: SOCIAL INSECURITY: DO YOU FEEL ANYONE HAS EXPLOITED OR TAKEN ADVANTAGE OF YOU FINANCIALLY OR OF YOUR PERSONAL PROPERTY?: NO

## 2025-03-22 SDOH — SOCIAL STABILITY: SOCIAL INSECURITY: DO YOU FEEL UNSAFE GOING BACK TO THE PLACE WHERE YOU ARE LIVING?: NO

## 2025-03-22 SDOH — SOCIAL STABILITY: SOCIAL INSECURITY: ABUSE: ADULT

## 2025-03-22 SDOH — SOCIAL STABILITY: SOCIAL INSECURITY: ARE YOU OR HAVE YOU BEEN THREATENED OR ABUSED PHYSICALLY, EMOTIONALLY, OR SEXUALLY BY ANYONE?: NO

## 2025-03-22 SDOH — SOCIAL STABILITY: SOCIAL INSECURITY: WERE YOU ABLE TO COMPLETE ALL THE BEHAVIORAL HEALTH SCREENINGS?: YES

## 2025-03-22 ASSESSMENT — ACTIVITIES OF DAILY LIVING (ADL)
LACK_OF_TRANSPORTATION: NO
BATHING: INDEPENDENT
ASSISTIVE_DEVICE: DENTURES UPPER;EYEGLASSES
HEARING - RIGHT EAR: HEARING AID
FEEDING YOURSELF: INDEPENDENT
JUDGMENT_ADEQUATE_SAFELY_COMPLETE_DAILY_ACTIVITIES: YES
TOILETING: INDEPENDENT
HEARING - LEFT EAR: HEARING AID
PATIENT'S MEMORY ADEQUATE TO SAFELY COMPLETE DAILY ACTIVITIES?: YES
DRESSING YOURSELF: INDEPENDENT
ADEQUATE_TO_COMPLETE_ADL: YES
GROOMING: INDEPENDENT
WALKS IN HOME: INDEPENDENT

## 2025-03-22 ASSESSMENT — PATIENT HEALTH QUESTIONNAIRE - PHQ9
2. FEELING DOWN, DEPRESSED OR HOPELESS: NOT AT ALL
1. LITTLE INTEREST OR PLEASURE IN DOING THINGS: NOT AT ALL
SUM OF ALL RESPONSES TO PHQ9 QUESTIONS 1 & 2: 0

## 2025-03-22 ASSESSMENT — PAIN SCALES - GENERAL
PAINLEVEL_OUTOF10: 0 - NO PAIN

## 2025-03-22 ASSESSMENT — LIFESTYLE VARIABLES
HAVE PEOPLE ANNOYED YOU BY CRITICIZING YOUR DRINKING: NO
HOW MANY STANDARD DRINKS CONTAINING ALCOHOL DO YOU HAVE ON A TYPICAL DAY: PATIENT DOES NOT DRINK
HOW OFTEN DO YOU HAVE A DRINK CONTAINING ALCOHOL: NEVER
TOTAL SCORE: 0
AUDIT-C TOTAL SCORE: 0
EVER HAD A DRINK FIRST THING IN THE MORNING TO STEADY YOUR NERVES TO GET RID OF A HANGOVER: NO
SKIP TO QUESTIONS 9-10: 1
AUDIT-C TOTAL SCORE: 0
HOW OFTEN DO YOU HAVE 6 OR MORE DRINKS ON ONE OCCASION: NEVER
EVER FELT BAD OR GUILTY ABOUT YOUR DRINKING: NO
HAVE YOU EVER FELT YOU SHOULD CUT DOWN ON YOUR DRINKING: NO

## 2025-03-22 ASSESSMENT — COGNITIVE AND FUNCTIONAL STATUS - GENERAL
MOVING TO AND FROM BED TO CHAIR: A LITTLE
PERSONAL GROOMING: A LITTLE
STANDING UP FROM CHAIR USING ARMS: A LITTLE
TOILETING: A LITTLE
PATIENT BASELINE BEDBOUND: NO
TURNING FROM BACK TO SIDE WHILE IN FLAT BAD: A LITTLE
EATING MEALS: A LITTLE
DAILY ACTIVITIY SCORE: 18
DRESSING REGULAR LOWER BODY CLOTHING: A LITTLE
DRESSING REGULAR UPPER BODY CLOTHING: A LITTLE
MOBILITY SCORE: 18
HELP NEEDED FOR BATHING: A LITTLE
WALKING IN HOSPITAL ROOM: A LITTLE
MOVING FROM LYING ON BACK TO SITTING ON SIDE OF FLAT BED WITH BEDRAILS: A LITTLE
CLIMB 3 TO 5 STEPS WITH RAILING: A LITTLE

## 2025-03-22 ASSESSMENT — COLUMBIA-SUICIDE SEVERITY RATING SCALE - C-SSRS
2. HAVE YOU ACTUALLY HAD ANY THOUGHTS OF KILLING YOURSELF?: NO
1. IN THE PAST MONTH, HAVE YOU WISHED YOU WERE DEAD OR WISHED YOU COULD GO TO SLEEP AND NOT WAKE UP?: NO
6. HAVE YOU EVER DONE ANYTHING, STARTED TO DO ANYTHING, OR PREPARED TO DO ANYTHING TO END YOUR LIFE?: NO

## 2025-03-22 ASSESSMENT — PAIN - FUNCTIONAL ASSESSMENT
PAIN_FUNCTIONAL_ASSESSMENT: 0-10
PAIN_FUNCTIONAL_ASSESSMENT: 0-10

## 2025-03-22 NOTE — ED PROVIDER NOTES
HPI   Chief Complaint   Patient presents with    Stroke     Patient came in through triage stating that his son told him to come in due to slurred speech. Patient states he awoke with symptoms, LKW last night.        HPI: 89-year-old male history of A-fib on Eliquis, coronary artery disease, COPD, hyperlipidemia presents with family for concerns for slurred speech.  Patient son states that he noticed it approximately 8:00 this morning.  He states that he seems like it is getting progressively worse.  Patient states that last night he felt a little    Family HX: Denies any significant/pertinent family history.  Social Hx: Denies ETOH or drug use.  Review of systems:  Gen.: No weight loss, fatigue, anorexia, insomnia, fever.   Eyes: No vision loss, double vision, drainage, eye pain.   ENT: No pharyngitis, dry mouth.   Cardiac: No chest pain, palpitations, syncope, near syncope.   Pulmonary: No shortness of breath, cough, hemoptysis.   Heme/lymph: No swollen glands, fever, bleeding.   GI: No abdominal pain, change in bowel habits, melena, hematemesis, hematochezia, nausea, vomiting, diarrhea.   : No discharge, dysuria, frequency, urgency, hematuria.   Musculoskeletal: No limb pain, joint pain, joint swelling.   Skin: No rashes.   Psych: No depression, anxiety, suicidality, homicidality.   Review of systems is otherwise negative unless stated above or in history of present illness.    Physical Exam:    Appearance: Alert, oriented , cooperative,  in no acute distress. Well nourished & well hydrated.    Skin: Intact,  dry skin, no lesions, rash, petechiae or purpura.     Eyes: PERRLA, EOMs intact,  Conjunctiva pink with no redness or exudates. Eyelids without lesions. No scleral icterus.     ENT: Hearing grossly intact. External auditory canals patent, tympanic membranes intact with visible landmarks. Nares patent, mucus membranes moist. Dentition without lesions. Pharynx clear, uvula midline.     Neck: Supple, without  meningismus. Thyroid not palpable. Trachea at midline. No lymphadenopathy.    Pulmonary: Clear bilaterally with good chest wall excursion. No rales, rhonchi or wheezing. No accessory muscle use or stridor.    Cardiac: Normal S1, S2 without murmur, rub, gallop or extrasystole. No JVD, Carotids without bruits.    Abdomen: Soft, nontender, active bowel sounds.  No palpable organomegaly.  No rebound or guarding.  No CVA tenderness.    Genitourinary: Exam deferred.    Musculoskeletal: Full range of motion. no pain, edema, or deformity. Pulses full and equal. No cyanosis, clubbing, or edema.    Neurological:  Cranial nerves II through XII are grossly intact, finger-nose touch is normal, NIH 2 VAN negative    Psychiatric: Appropriate mood and affect.     Medical Decision-Making:  Testing: EKG was obtained which is interpreted by me shows A-fib rate of 99 without evidence of obvious ST elevations or T wave inversions to indicate acute ischemia or infarct.  Patient was a stroke alert and went emergently to the CT scanner.  Patient was also evaluated telestroke by the stroke attending at [facility].  CT of the head was obtained and read by radiology as no acute findings.  Patient had initial NIH stroke scale of 2.  Given the timing patient does not a TNK candidate.  The stroke attending recommended that the patient be admitted for an MRI and a heparin assay to evaluate the effectiveness of the Eliquis.  He recommends that if it is not appearing to be effective that we change the anticoagulant to Xarelto.  I did speak to Dr. Glasgow.  Patient will be admitted treatment:   Reevaluation:   Plan: Homegoing. Discussed differential. Will follow-up with the primary physician in the next 2-3 days. Return if worse. They understand return precautions and discharge instructions. Patient and family/friend/caregiver are in agreement with this plan.   Impression:   1.  Slurred speech  2.  Expressive aphasia    Labs Reviewed  CBC WITH AUTO  DIFFERENTIAL - Abnormal     WBC                           9.1                    nRBC                          0.0                    RBC                           3.43 (*)               Hemoglobin                    11.1 (*)               Hematocrit                    33.6 (*)               MCV                           98                     MCH                           32.4                   MCHC                          33.0                   RDW                           15.0 (*)               Platelets                     196                    Neutrophils %                 86.5                   Immature Granulocytes %, Automated   0.3                    Lymphocytes %                 5.3                    Monocytes %                   7.4                    Eosinophils %                 0.1                    Basophils %                   0.4                    Neutrophils Absolute          7.88 (*)               Immature Granulocytes Absolute, Au*   0.03                   Lymphocytes Absolute          0.48 (*)               Monocytes Absolute            0.67                   Eosinophils Absolute          0.01                   Basophils Absolute            0.04                COMPREHENSIVE METABOLIC PANEL - Abnormal     Glucose                       142 (*)                Sodium                        127 (*)                Potassium                     3.9                    Chloride                      91 (*)                 Bicarbonate                   28                     Anion Gap                     12                     Urea Nitrogen                 16                     Creatinine                    1.02                   eGFR                          71                     Calcium                       9.2                    Albumin                       3.5                    Alkaline Phosphatase          70                     Total Protein                 6.1 (*)                AST                            18                     Bilirubin, Total              1.3 (*)                ALT                           11                  TROPONIN I, HIGH SENSITIVITY - Abnormal     Troponin I, High Sensitivity   155 (*)                  Narrative: Less than 99th percentile of normal range cutoff-                Female and children under 18 years old <14 ng/L; Male <21 ng/L: Negative                Repeat testing should be performed if clinically indicated.                                 Female and children under 18 years old 14-50 ng/L; Male 21-50 ng/L:                Consistent with possible cardiac damage and possible increased clinical                 risk. Serial measurements may help to assess extent of myocardial damage.                                 >50 ng/L: Consistent with cardiac damage, increased clinical risk and                myocardial infarction. Serial measurements may help assess extent of                 myocardial damage.                                  NOTE: Children less than 1 year old may have higher baseline troponin                 levels and results should be interpreted in conjunction with the overall                 clinical context.                                 NOTE: Troponin I testing is performed using a different                 testing methodology at [facility] [place] [facility] than at other                 [facility]. Direct result comparisons should only                 be made within the same method.  PROTIME-INR - Abnormal     Protime                       17.3 (*)               INR                           1.6 (*)             HEPARIN ASSAY - Abnormal     Heparin Unfractionated        >2.0 (*)                 Narrative: The therapeutic reference range for UFH may be either 0.3-0.6 IU/mL or 0.3-0.7 IU/mL based on the clinical setting for anticoagulant therapy and the associated nomogram used. For Heparin dosing guidelines based on clinical scenario and Heparin  Assay results, please refer to local Pharmacy and the [facility] Guidelines for Anticoagulation Therapy available on the [facility] intranet at: [email or web address]  POCT GLUCOSE - Abnormal     POCT Glucose                  143 (*)             APTT - Normal     aPTT                          28                       Narrative: The APTT is no longer used for monitoring Unfractionated Heparin Therapy. For monitoring Heparin Therapy, use the Heparin Assay.  POCT GLUCOSE - Normal     POCT Glucose                  90                  GRAY TOP  FACTOR 10 ACTIVITY  POCT GLUCOSE METER     CT brain attack head wo IV contrast   Final Result    Mild age related degenerative change as described without acute    findings.          MACRO:    Alfonso Womack discussed the significance and urgency of this critical    finding by secure chat with  SALO UMAÑA on 3/22/2025 at 1:38 pm.    (**-RCF-**) Findings:  See findings.          Signed by: Alfonso Womack 3/22/2025 1:39 PM    Dictation workstation:   [id number]                    Patient History   Past Medical History:   Diagnosis Date    Atrial fibrillation (Multi)     BPH (benign prostatic hyperplasia)     CAD (coronary artery disease)     Controlled atrial fibrillation (Multi)     COPD (chronic obstructive pulmonary disease) (Multi)     Hyperlipidemia     Hypertension     Stage 3a chronic kidney disease (CKD) (Multi)      Past Surgical History:   Procedure Laterality Date    OTHER SURGICAL HISTORY  11/04/2020    Varicose vein ligation    OTHER SURGICAL HISTORY  11/04/2020    [place] tooth extraction    OTHER SURGICAL HISTORY  11/04/2020    Tonsillectomy    OTHER SURGICAL HISTORY  11/04/2020    Colonoscopy complete for polypectomy    OTHER SURGICAL HISTORY  11/06/2021    Varicose vein sclerotherapy    OTHER SURGICAL HISTORY  11/06/2021    Pilonidal cyst removal    OTHER SURGICAL HISTORY  11/06/2021     Hernia repair    OTHER SURGICAL HISTORY  12/14/2021    Umbilical hernia repair    OTHER SURGICAL HISTORY  12/14/2021    Inguinal hernia repair    US ASPIRATION INJECTION INTERMEDIATE JOINT  2/8/2021    US ASPIRATION INJECTION INTERMEDIATE JOINT 2/8/2021 [facility]     Family History   Problem Relation Name Age of Onset    Breast cancer Mother      Atrial fibrillation Mother      Colon cancer Brother      Prostate cancer Brother      Breast cancer Daughter      Prostate cancer Son      Colon cancer Son       Social History     Tobacco Use    Smoking status: Every Day     Current packs/day: 0.50     Average packs/day: 0.5 packs/day for 60.0 years (30.0 ttl pk-yrs)     Types: Cigarettes    Smokeless tobacco: Never   Vaping Use    Vaping status: Never Used   Substance Use Topics    Alcohol use: Not Currently    Drug use: Not Currently       Physical Exam   ED Triage Vitals   Temp Pulse Resp BP   -- -- -- --      SpO2 Temp src Heart Rate Source Patient Position   -- -- -- --      BP Location FiO2 (%)     -- --       Physical Exam      ED Course & MDM   Diagnoses as of 03/22/25 1452   Slurred speech   Elevated troponin   Expressive aphasia   Hyponatremia                 No data recorded     [place] Coma Scale Score: 15 (03/22/25 1402 : Maya Rashid, RN)       NIH Stroke Scale: 1 (03/22/25 1350 : Maya Rashid, RN)                   Medical Decision Making      Procedure  Procedures     Dominga Lazar MD  03/22/25 1451       Dominga Lazar MD  03/22/25 1452       Dominga Lazar MD  03/22/25 1909

## 2025-03-22 NOTE — ED PROCEDURE NOTE
Procedure  Critical Care    Performed by: Dominga Lazar MD  Authorized by: Dominga Lazar MD    Critical care provider statement:     Critical care time (minutes):  35    Critical care time was exclusive of:  Separately billable procedures and treating other patients    Critical care was necessary to treat or prevent imminent or life-threatening deterioration of the following conditions: stroke.    Critical care was time spent personally by me on the following activities:  Ordering and performing treatments and interventions, ordering and review of laboratory studies, ordering and review of radiographic studies, pulse oximetry, discussions with consultants, examination of patient and evaluation of patient's response to treatment    Care discussed with: admitting provider                 Dominga Lazar MD  03/22/25 [id number]

## 2025-03-22 NOTE — H&P
"03/22/25       CHIEF COMPLAINT:      Chief Complaint   Patient presents with    Stroke     Patient came in through triage stating that his son told him to come in due to slurred speech. Patient states he awoke with symptoms, LKW last night.         PCP is Gordy Cueva MD, cardiologist is Dr. Montejo, he physician is Dr. Schmitt, pulmonologist is Dr. Bustillo    History is taken from the patient and his son who are good historians, discussion with the ER physician & ER records, [facility] outpatient medical records, [facility] medical records and records from [facility].    Canelo Moore is a 89 y.o. 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  He was also found to have a lung nodule with + PET scan last year consistent with lung CA, they declined biopsy.    Yesterday afternoon he had his hearing aids adjusted.  After that he noted that he felt somewhat \"foggy\".  He went out for dinner and to watch TV with a friend, when he got home around 22: 30 he told his son he felt \"not right\" and \"foggy\".  He seemed extra tired.  This morning when he got up between 8-10 a.m. his son noted his speech was slower and he was unable to finish sentences correctly.  There was some problems with finding words, as well as some mild slurring.  The patient was also complaining of his vision being slightly blurry/hazy.  Symptoms worsened so they brought him to the ER.  Denied any headaches, he was not having any palpitations.    In the ER his blood pressure was 103/64.  Chemistry panel with a blood sugar of 142, he was hyponatremic with a sodium of 127 (previously hyponatremic 11/2024 at the same level), potassium 3.9, chloride 91.  BUN 16 with a creatinine of 1.02.  LFTs normal other " "than a bilirubin of 1.3.  Initial troponin was elevated at 155 (normal in the past).  CBC with a WBC of 9.1, H/H was 11.1/33.6.  Platelet count 196 K.  .  EKG atrial fibrillation with a rate of 99 with nonspecific ST-T wave changes.  Unchanged from 10/2024.  Stroke alert was called, CTA of the brain with mild ventriculomegaly consistent with atrophy but no acute stroke.  Stroke team downtown recommended no TN K, admit for MRI/MRA and further workup.  Also recommended checking factor Xa assay to see if he has a poor response to Eliquis, if so changed to Xarelto.  His heparin assay was >2.0 consistent with adequate anticoagulation.    Chart review:  PET scan 8/14/2024:  IMPRESSION:  1. A hypermetabolic pulmonary nodule is seen in the right lower lobe, and a hypermetabolic nodule is seen in the left lower lobe, compatible with malignancy. Further evaluation with tissue sampling is recommended.  2. No hypermetabolic kobi or distant malignancy.  3. Hypermetabolic activity is seen in the distal esophagus/GE junction, likely secondary to esophagitis.    Echocardiogram 5/28/2024-bubble study not done.  CONCLUSIONS:   1. Left ventricular systolic function is normal with a 60% estimated ejection fraction.   2. Borderline LVH at 11-12mm with very mild DUST but no LVOT obstruction.   3. Spectral Doppler shows an abnormal pattern of left ventricular diastolic filling.   4. Normal RV size and function      Borderline RVSP elevation at 31 mm HG.   5. Mild LAE.   6. Normal valve structure and function.    ROS:   Denies any headaches dizziness, but his son did note that his gait was a bit unstable walking into the ER.  Denies any weakness of his arms or legs.  He denies chest pains or palpitations.  Over the last 6 months they both admit that he is \"going downhill\" and that he has become weaker, cannot walk very far without getting short of breath.  He has some cough with some phlegm which is clear, does use his inhaler but " does not have a nebulizer.  He is still smoking but has cut back to 1/2 pack daily.  He does get an occasional dark clots when he blows his nose.  He denies heartburn, no nausea or vomiting.  No constipation or diarrhea.  No abdominal pain.  He has nocturia 5-6 times at night but no dysuria.  He gets occasional pedal edema.  No fevers/chills/night sweats.    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  -Cardiac catheterization 2001-chronic total occlusion proximal RCA, fills by bridging collaterals.  EF = 45-50% with inferior basilar akinesis.  -Colonoscopy 2014-diverticulosis  -Incarcerated right inguinal hernia and umbilical hernia repair with mesh 2021  -DC cardioversion 3/11/2022  -Mohs surgery for right cheek nodular basal cell carcinoma 2022  -Mohs for right nasal sidewall basal cell carcinoma 3/2023    FAMILY HISTORY:   -Father- age 64 in an MVA  -Mother- age 88-history of breast CVA, atrial fibrillation  -Siblings-4 brothers-all -1  age 89 with history A-fib, HTN, DM.  1  in his 60s with A-fib and heart problems.  1  in his 70s with heart problems, 1  from heart disease.  2 sisters-1 A&W, 1  in her 50s with Parkinson's.  -Children-4 sons and 1 daughter living-1 son with history of colon CA, 1 son with history of prostate CA, 1 with history of A-fib and HTN, 1 A&W other than some deafness.  Daughter alive with history of breast CA.    SOCIAL HISTORY:   -Tobacco-1 pack daily for >60 years, has cut back to 1/2 pack  "daily.  -Alcohol-denied  -Drugs-denied  -Marital-, lives with his son.  Independent in his ADLs but having increasing shortness of breath with walking  -Occupation-retired     ASSESSMENT/PLAN:   -Strokelike symptoms in patient with atrial fibrillation adequately anticoagulated.  Ordering neurochecks, MRI/MRA head and neck, carotid duplex, limited echo bubble study.  Also concerned about possible brain metastasis.  -Elevated troponins-check and repeat troponin and EKG.  -Hyponatremia-doubt his low sodium is the cause of his present symptoms as it was similarly low last year.  May be due to SIADH from his presumed lung CA-ordering serum and urine osmolality, urine electrolytes, & repeat BMP.  He is also on a thiazide diuretic.  Will continue to follow.  -Permanent atrial fibrillation on apixaban anticoagulation-telemetry.  Rate presently controlled.  Continuing his metoprolol succinate.  -COPD-possible mild exacerbation, continuing his tiotropium, adding DuoNebs.  Continuing his oral prednisone.  -Chronic HFpEF-continuing his furosemide and metoprolol succinate  -Amiodarone induced hyperthyroidism-was previously on methimazole, currently on hold.  Check TSH with free T4.  -Tobacco abuse-will start nicotine patch after his MRIs are done.  -Hypertension-blood pressure is on the low side on admission, will hold the HCTZ and decrease the valsartan, continue to monitor.  -Hyperlipidemia-continue his rosuvastatin  -BPH-continue his finasteride and alfuzosin.  -CODE STATUS discussed with the patient and his son-he wants to be DNR [facility], does have a living will with advanced directives.  [place] DNR form filled out.    PHYSICAL EXAM:   I&O:  No intake or output data in the 24 hours ending 03/22/25 1821    Vital Signs:  Blood pressure 113/71, pulse 85, temperature 37.1 °C (98.8 °F), temperature source Temporal, resp. rate 18, height 1.727 m (5' 8\"), weight 79.4 kg (175 lb), SpO2 96%.    Physical Exam:  -General " appearance: Elderly white male, sitting up on the [employer] alert, in NAD.  Has only mild word finding difficulty (per son has improved significantly since this morning).  -Vital signs:  As above  -HEENT: Pupils are reactive, extraocular movements intact.  Lids, conjunctiva, sclera are normal.  Mouth/pharynx upper dentures otherwise normal  -Neck: Carotids are 2+.  -Chest: Some expiratory wheezes both lung bases.  No rales or rhonchi heard.  -Cardiac: Irregularly irregular rhythm with a controlled rate  -Abdomen: Soft, bowel sounds active.  Nontender to palpation.  No palpable masses or organomegaly.  -Extremities: 1+ pitting edema both ankles.  -Skin: No rash  -Neurologic:   Patient is alert and oriented x3.  Cranial nerves II through XII are intact.  He has some problems with word finding, minimal expressive deficits.  -Behavior/Emotional:  Appropriate, cooperative    ALLERGIES:     Allergies   Allergen Reactions    Ace Inhibitors Cough       Medications prior to admission:     Prior to Admission medications    Medication Sig Start Date End Date Taking? Authorizing Provider   albuterol (Ventolin HFA) 90 mcg/actuation inhaler Inhale 2 puffs every 6 hours if needed for shortness of breath. 2/18/25   Gordy Cueva MD   alfuzosin (Uroxatral) 10 mg 24 hr tablet Take 1 tablet (10 mg) by mouth once daily. Do not crush, chew, or split. 2/18/25 2/18/26  Gordy Cueva MD   apixaban (Eliquis) 5 mg tablet Take 1 tablet (5 mg) by mouth 2 times a day. 2/18/25 2/18/26  Gordy Cueva MD   finasteride (Proscar) 5 mg tablet Take 1 tablet (5 mg) by mouth once daily. 2/18/25 2/18/26  Gordy Cueva MD   furosemide (Lasix) 40 mg tablet Take 1 tablet (40 mg) by mouth once a day on Monday, Wednesday, and Friday. 2/14/25   Gordy Cueva MD   magnesium oxide (Mag-Ox) 400 mg (241.3 mg magnesium) tablet Take 1 tablet (400 mg) by mouth 2 times a day. 2/18/25 2/18/26  Gordy Cueva MD   methIMAzole (Tapazole) 10 mg tablet Take 2  tablets (20 mg) by mouth every 12 hours.  Patient taking differently: Take 2 tablets (20 mg) by mouth once daily. ON HOLD 10/28/24 1/14/25  Gordy Cueva MD   metoprolol succinate XL (Toprol-XL) 100 mg 24 hr tablet Take 1 tablet (100 mg) by mouth once daily. Do not crush or chew. 2/18/25 2/18/26  Gordy Cueva MD   multivitamin (One Daily Multivitamin) tablet Take 1 tablet by mouth once daily.    Historical Provider, MD   nitroglycerin (Nitrostat) 0.4 mg SL tablet Place 1 tablet (0.4 mg) under the tongue every 5 minutes if needed for chest pain. place 1 tablet under tongue every 5 minutes for up to 3 doses as needed for chest pain. Call 911 if pain persists 11/14/23   Yi Montejo MD   rosuvastatin (Crestor) 10 mg tablet Take 1 tablet (10 mg) by mouth once daily at bedtime. 11/26/24   Yi Montejo MD   tiotropium (Spiriva with HandiHaler) 18 mcg inhalation capsule Place 1 capsule (18 mcg) into inhaler and inhale once daily. 2/18/25 2/18/26  Gordy Cueva MD   valsartan-hydrochlorothiazide (Diovan-HCT) 160-12.5 mg tablet Take 1 tablet by mouth once daily. 2/18/25 2/18/26  Gordy Cueva MD   predniSONE (Deltasone) 20 mg tablet Take 1 tablet (20 mg) by mouth 2 times a day. WITH FOOD 12/6/24 3/22/25  Gordy Cueva MD         Present Medications:  Scheduled medications   Medication Dose Route Frequency    alfuzosin  10 mg oral Daily    apixaban  5 mg oral BID    finasteride  5 mg oral Daily    [START ON 3/24/2025] furosemide  40 mg oral Every Mon/Wed/Fri    ipratropium-albuteroL  3 mL nebulization BID    magnesium oxide  400 mg oral BID    [Held by provider] methIMAzole  20 mg oral Daily    nicotine  1 patch transdermal Daily    perflutren lipid microspheres  0.5-10 mL of dilution intravenous Once in imaging    predniSONE  10 mg oral Daily    rosuvastatin  10 mg oral Nightly    [START ON 3/23/2025] tiotropium  1 puff inhalation Daily    valsartan  40 mg oral Daily         PRN medications   Medication     acetaminophen    albuterol    alum-mag hydroxide-simeth    magnesium hydroxide    melatonin    nitroglycerin    ondansetron    ondansetron        Labs:    CBC:   Results from last 7 days   Lab Units 03/22/25  1342   WBC AUTO x10*3/uL 9.1   RBC AUTO x10*6/uL 3.43*   HEMOGLOBIN g/dL 11.1*   HEMATOCRIT % 33.6*   MCV fL 98   MCH pg 32.4   MCHC g/dL 33.0   RDW % 15.0*   PLATELETS AUTO x10*3/uL 196     CMP:    Results from last 7 days   Lab Units 03/22/25  1342   SODIUM mmol/L 127*   POTASSIUM mmol/L 3.9   CHLORIDE mmol/L 91*   CO2 mmol/L 28   BUN mg/dL 16   CREATININE mg/dL 1.02   GLUCOSE mg/dL 142*   PROTEIN TOTAL g/dL 6.1*   CALCIUM mg/dL 9.2   BILIRUBIN TOTAL mg/dL 1.3*   ALK PHOS U/L 70   AST U/L 18   ALT U/L 11     INR:    Lab Results   Component Value Date    INR 1.6 (H) 03/22/2025    INR 2.2 (H) 10/21/2024    INR 1.9 (H) 10/15/2024    PROTIME 17.3 (H) 03/22/2025    PROTIME 24.8 (H) 10/21/2024    PROTIME 21.6 (H) 10/15/2024     Troponin:    Results from last 7 days   Lab Units 03/22/25  1342   TROPHS ng/L 155*       Results for orders placed or performed during the hospital encounter of 03/22/25 (from the past 24 hours)   POCT GLUCOSE   Result Value Ref Range    POCT Glucose 90 74 - 99 mg/dL   CBC and Auto Differential   Result Value Ref Range    WBC 9.1 4.4 - 11.3 x10*3/uL    nRBC 0.0 0.0 - 0.0 /100 WBCs    RBC 3.43 (L) 4.50 - 5.90 x10*6/uL    Hemoglobin 11.1 (L) 13.5 - 17.5 g/dL    Hematocrit 33.6 (L) 41.0 - 52.0 %    MCV 98 80 - 100 fL    MCH 32.4 26.0 - 34.0 pg    MCHC 33.0 32.0 - 36.0 g/dL    RDW 15.0 (H) 11.5 - 14.5 %    Platelets 196 150 - 450 x10*3/uL    Neutrophils % 86.5 40.0 - 80.0 %    Immature Granulocytes %, Automated 0.3 0.0 - 0.9 %    Lymphocytes % 5.3 13.0 - 44.0 %    Monocytes % 7.4 2.0 - 10.0 %    Eosinophils % 0.1 0.0 - 6.0 %    Basophils % 0.4 0.0 - 2.0 %    Neutrophils Absolute 7.88 (H) 1.60 - 5.50 x10*3/uL    Immature Granulocytes Absolute, Automated 0.03 0.00 - 0.50 x10*3/uL    Lymphocytes  Absolute 0.48 (L) 0.80 - 3.00 x10*3/uL    Monocytes Absolute 0.67 0.05 - 0.80 x10*3/uL    Eosinophils Absolute 0.01 0.00 - 0.40 x10*3/uL    Basophils Absolute 0.04 0.00 - 0.10 x10*3/uL   Comprehensive metabolic panel   Result Value Ref Range    Glucose 142 (H) 74 - 99 mg/dL    Sodium 127 (L) 136 - 145 mmol/L    Potassium 3.9 3.5 - 5.3 mmol/L    Chloride 91 (L) 98 - 107 mmol/L    Bicarbonate 28 21 - 32 mmol/L    Anion Gap 12 10 - 20 mmol/L    Urea Nitrogen 16 6 - 23 mg/dL    Creatinine 1.02 0.50 - 1.30 mg/dL    eGFR 71 >60 mL/min/1.73m*2    Calcium 9.2 8.6 - 10.3 mg/dL    Albumin 3.5 3.4 - 5.0 g/dL    Alkaline Phosphatase 70 33 - 136 U/L    Total Protein 6.1 (L) 6.4 - 8.2 g/dL    AST 18 9 - 39 U/L    Bilirubin, Total 1.3 (H) 0.0 - 1.2 mg/dL    ALT 11 10 - 52 U/L   Troponin I, High Sensitivity   Result Value Ref Range    Troponin I, High Sensitivity 155 (HH) 0 - 20 ng/L   Protime-INR   Result Value Ref Range    Protime 17.3 (H) 9.8 - 12.4 seconds    INR 1.6 (H) 0.9 - 1.1   APTT   Result Value Ref Range    aPTT 28 26 - 36 seconds   Gray Top   Result Value Ref Range    Extra Tube Hold for add-ons.    Heparin Assay   Result Value Ref Range    Heparin Unfractionated >2.0 (HH) See Comment Below for Therapeutic Ranges IU/mL   POCT GLUCOSE   Result Value Ref Range    POCT Glucose 143 (H) 74 - 99 mg/dL   ECG 12 lead   Result Value Ref Range    Ventricular Rate 99 BPM    Atrial Rate 111 BPM    QRS Duration 96 ms    QT Interval 354 ms    QTC Calculation(Bazett) 454 ms    R Axis 49 degrees    T Axis -29 degrees    QRS Count 16 beats    Q Onset 218 ms    T Offset 395 ms    QTC Fredericia 418 ms     *Note: Due to a large number of results and/or encounters for the requested time period, some results have not been displayed. A complete set of results can be found in Results Review.     Urinalysis:    Lab Results   Component Value Date    COLORU Light-Yellow 07/31/2024    APPEARANCEU Clear 07/31/2024    SPECGRAVU 1.010 07/31/2024     RADHA 7.5 07/31/2024    PROTUR NEGATIVE 07/31/2024    GLUCOSEU Normal 07/31/2024    BLOODU NEGATIVE 07/31/2024    KETONESU NEGATIVE 07/31/2024    BILIRUBINU NEGATIVE 07/31/2024    UROBILINOGEN Normal 07/31/2024    NITRITEU NEGATIVE 07/31/2024    LEUKOCYTESU NEGATIVE 07/31/2024    WBCU 1 11/01/2020    RBCU 2 11/01/2020    SQUAMEPIU <1 11/01/2020     Radiology:  CT brain attack head wo IV contrast   Final Result   Mild age related degenerative change as described without acute   findings.        MACRO:   Alfonso Womack discussed the significance and urgency of this critical   finding by secure chat with  SALO UMAÑA on 3/22/2025 at 1:38 pm.   (**-RCF-**) Findings:  See findings.        Signed by: Alfonso Womack 3/22/2025 1:39 PM   Dictation workstation:   [id number]      Transthoracic Echo (TTE) Limited    (Results Pending)   MR brain wo IV contrast    (Results Pending)   MR angio head wo IV contrast    (Results Pending)   MR angio neck wo IV contrast    (Results Pending)   Carotid duplex bilateral    (Results Pending)        Melia Caceres MD      *Some of this note was completed using Dragon voice recognition technology and may include unintended errors with respect to translation of words, typographical errors or grammar errors which may not have been identified prior to finalization of the chart note.

## 2025-03-22 NOTE — CONSULTS
"Inpatient consult to Neuro TeleStroke  Consult performed by: Micah Bolivar MD  Consult ordered by: Dominga Lazar MD        Virtual Visit start time: 13:37    History Of Present Illness:  Historian: Patient  Canelo Moore is a 89 y.o. male with HTN, HLD, A.fib on Eliquis presenting with mild aphasia and dysarthria.    Patient says he had been feeling foggy since 11am yesterday, this morning when he woke up his son noticed a bit of slurred speech and he had subtle difficulty with word finding. No focal weakness, numbness, or facial droop.     Last known well: 11:00am yesterday  Had stroke symptoms resolved at time of presentation: No - Only subtle dysarthria left  Current antiplatelet/anticoagulant use: Apixaban    Prior Functional Status (Modified [place] Scale):  0 The patient has no residual symptoms.    Stroke Risk Factors:  Hypertension, Hyperlipidemia, and Atrial Fibrillation    Last Recorded Vitals:  Blood pressure 103/64, pulse 99, temperature 37.1 °C (98.8 °F), temperature source Temporal, resp. rate 18, height 1.727 m (5' 8\"), weight 79.4 kg (175 lb), SpO2 98%.      NIHSS:  1A. Level of Consciousness: 0  1B. Ask Month and Age: 0  1C. Blink Eyes & Squeeze Hands: 0  2. Best Gaze: 0  3. Visual: 0  4. Facial Palsy: 0  5A. Motor - Left Arm: 0  5B. Motor - Right Arm: 0  6A. Motor - Left Le  6B. Motor - Right Le  7. Limb Ataxia: 0  8. Sensory Loss: 0  9. Best Language: 0  10. Dysarthria: 1  11. Extinction and Inattention: 0  NIH Stroke Scale: 1       Relevant Results:  LABS:  INR   Date Value Ref Range Status   2025 1.6 (H) 0.9 - 1.1 Final      Results for orders placed or performed during the hospital encounter of 25 (from the past 24 hours)   POCT GLUCOSE   Result Value Ref Range    POCT Glucose 90 74 - 99 mg/dL   CBC and Auto Differential   Result Value Ref Range    WBC 9.1 4.4 - 11.3 x10*3/uL    nRBC 0.0 0.0 - 0.0 /100 WBCs    RBC 3.43 (L) 4.50 - 5.90 x10*6/uL    Hemoglobin 11.1 (L) " 13.5 - 17.5 g/dL    Hematocrit 33.6 (L) 41.0 - 52.0 %    MCV 98 80 - 100 fL    MCH 32.4 26.0 - 34.0 pg    MCHC 33.0 32.0 - 36.0 g/dL    RDW 15.0 (H) 11.5 - 14.5 %    Platelets 196 150 - 450 x10*3/uL    Neutrophils % 86.5 40.0 - 80.0 %    Immature Granulocytes %, Automated 0.3 0.0 - 0.9 %    Lymphocytes % 5.3 13.0 - 44.0 %    Monocytes % 7.4 2.0 - 10.0 %    Eosinophils % 0.1 0.0 - 6.0 %    Basophils % 0.4 0.0 - 2.0 %    Neutrophils Absolute 7.88 (H) 1.60 - 5.50 x10*3/uL    Immature Granulocytes Absolute, Automated 0.03 0.00 - 0.50 x10*3/uL    Lymphocytes Absolute 0.48 (L) 0.80 - 3.00 x10*3/uL    Monocytes Absolute 0.67 0.05 - 0.80 x10*3/uL    Eosinophils Absolute 0.01 0.00 - 0.40 x10*3/uL    Basophils Absolute 0.04 0.00 - 0.10 x10*3/uL   Protime-INR   Result Value Ref Range    Protime 17.3 (H) 9.8 - 12.4 seconds    INR 1.6 (H) 0.9 - 1.1   APTT   Result Value Ref Range    aPTT 28 26 - 36 seconds   POCT GLUCOSE   Result Value Ref Range    POCT Glucose 143 (H) 74 - 99 mg/dL     *Note: Due to a large number of results and/or encounters for the requested time period, some results have not been displayed. A complete set of results can be found in Results Review.        CT Head Imaging:  CTH imaging personally reviewed, showed no acute ischemic / hemorrhagic changes     CTA Head and Neck Imaging:  CTA imaging not performed     Diagnosis:  Supect Acute Ischemic Stroke    IV Thrombolysis IV Thrombolysis Checklist        IV Thrombolysis Given: No; Thrombolysis contraindication reason: Neurologic signs are mild and judged to be non-disabling and Time from Last Known Well (or stroke onset) is >4.5 hours           Patient is a candidate for thrombectomy:  yes/no: No; contraindication reason: NIHSS <6    Assessment:  89y     Additional Recommendations:  MRI Brain w/o contrast stroke protocol or repeat CTH 24 hours after initial CTH if unable to get MRI.  CTA head and neck or MRA head and neck to evaluate for cerebral vasculature    EKG, troponin.  Contine Eliquis 5mg BID    If MRI confirms new stroke  Check Factor Xa assay, if suggest poor response to Eliquis, change to Xarelto  TTE with bubble study  Holter monitor  Lipid Goals: education on healthy diet and statin therapy to maintain or achieve goal LDL-cholesterol < 70mg. Continue home statin.  Blood pressure goals: avoid hypotension SBP <100 that could worsen cerebral perfusion. Ischemic stroke- early permissive hypertension SBP < 220 mmHg with cautious inpatient lowering..  Glucose Goals: early treatment of hyperglycemia to goal glucose 140-180 mg/dl with long-term goal A1c < 7%.  NPO until nurse bedside swallow assessment.  Consider focused stroke order set.  Smoking Cessation and Education.  Assessment for Rehabilitation needs.  Patient and family education on signs and symptoms of stroke, calling 911, healthy strategies for stroke prevention.        Disposition:  Patient will remain at referring facility for further evaluation and management.    Virtual or Telephone Consent    An interactive audio and video telecommunication system which permits real time communications between the patient (at the originating site) and provider (at the distant site) was utilized to provide this telehealth service.   Verbal consent was requested and obtained from Canelo Moore on this date, 03/22/25 for a telehealth visit.      Telestroke is covered in shift work. If there are further Neurological questions or concerns please contact your regional neurologist on call during daytime hours or contact the transfer center with an ADT20 order.    Micah Bolivar MD

## 2025-03-23 ENCOUNTER — APPOINTMENT (OUTPATIENT)
Dept: RADIOLOGY | Facility: HOSPITAL | Age: 89
DRG: 065 | End: 2025-03-23
Payer: MEDICARE

## 2025-03-23 ENCOUNTER — APPOINTMENT (OUTPATIENT)
Dept: CARDIOLOGY | Facility: HOSPITAL | Age: 89
DRG: 065 | End: 2025-03-23
Payer: MEDICARE

## 2025-03-23 VITALS
RESPIRATION RATE: 18 BRPM | TEMPERATURE: 97.9 F | OXYGEN SATURATION: 92 % | WEIGHT: 175 LBS | HEIGHT: 68 IN | SYSTOLIC BLOOD PRESSURE: 105 MMHG | BODY MASS INDEX: 26.52 KG/M2 | DIASTOLIC BLOOD PRESSURE: 58 MMHG | HEART RATE: 106 BPM

## 2025-03-23 PROBLEM — I63.9 ACUTE CVA (CEREBROVASCULAR ACCIDENT) (MULTI): Status: ACTIVE | Noted: 2025-03-23

## 2025-03-23 LAB
ALBUMIN SERPL BCP-MCNC: 3.1 G/DL (ref 3.4–5)
ALP SERPL-CCNC: 61 U/L (ref 33–136)
ALT SERPL W P-5'-P-CCNC: 10 U/L (ref 10–52)
ANION GAP SERPL CALC-SCNC: 10 MMOL/L (ref 10–20)
AST SERPL W P-5'-P-CCNC: 17 U/L (ref 9–39)
ATRIAL RATE: 111 BPM
ATRIAL RATE: 394 BPM
BILIRUB SERPL-MCNC: 1.5 MG/DL (ref 0–1.2)
BUN SERPL-MCNC: 14 MG/DL (ref 6–23)
CALCIUM SERPL-MCNC: 8.9 MG/DL (ref 8.6–10.3)
CARDIAC TROPONIN I PNL SERPL HS: 136 NG/L (ref 0–20)
CHLORIDE SERPL-SCNC: 93 MMOL/L (ref 98–107)
CHLORIDE UR-SCNC: 31 MMOL/L
CHLORIDE/CREATININE (MMOL/G) IN URINE: 33 MMOL/G CREAT (ref 23–275)
CHOLEST SERPL-MCNC: 96 MG/DL (ref 0–199)
CHOLESTEROL/HDL RATIO: 2.3
CO2 SERPL-SCNC: 29 MMOL/L (ref 21–32)
CREAT SERPL-MCNC: 0.87 MG/DL (ref 0.5–1.3)
CREAT UR-MCNC: 100.2 MG/DL (ref 20–370)
CREAT UR-MCNC: 95 MG/DL (ref 20–370)
CREAT UR-MCNC: 95 MG/DL (ref 20–370)
EGFRCR SERPLBLD CKD-EPI 2021: 84 ML/MIN/1.73M*2
ERYTHROCYTE [DISTWIDTH] IN BLOOD BY AUTOMATED COUNT: 15.2 % (ref 11.5–14.5)
GLUCOSE SERPL-MCNC: 109 MG/DL (ref 74–99)
HCT VFR BLD AUTO: 30.7 % (ref 41–52)
HDLC SERPL-MCNC: 41.2 MG/DL
HGB BLD-MCNC: 10.6 G/DL (ref 13.5–17.5)
HOLD SPECIMEN: NORMAL
LDLC SERPL CALC-MCNC: 45 MG/DL
MAGNESIUM SERPL-MCNC: 1.73 MG/DL (ref 1.6–2.4)
MCH RBC QN AUTO: 33.3 PG (ref 26–34)
MCHC RBC AUTO-ENTMCNC: 34.5 G/DL (ref 32–36)
MCV RBC AUTO: 97 FL (ref 80–100)
NON HDL CHOLESTEROL: 55 MG/DL (ref 0–149)
NRBC BLD-RTO: 0 /100 WBCS (ref 0–0)
OSMOLALITY UR: 409 MOSM/KG (ref 200–1200)
PLATELET # BLD AUTO: 172 X10*3/UL (ref 150–450)
POTASSIUM SERPL-SCNC: 4 MMOL/L (ref 3.5–5.3)
POTASSIUM UR-SCNC: 44 MMOL/L
POTASSIUM/CREAT UR-RTO: 46 MMOL/G CREAT
PROT SERPL-MCNC: 5.7 G/DL (ref 6.4–8.2)
Q ONSET: 218 MS
Q ONSET: 218 MS
QRS COUNT: 15 BEATS
QRS COUNT: 16 BEATS
QRS DURATION: 96 MS
QRS DURATION: 98 MS
QT INTERVAL: 340 MS
QT INTERVAL: 354 MS
QTC CALCULATION(BAZETT): 422 MS
QTC CALCULATION(BAZETT): 454 MS
QTC FREDERICIA: 393 MS
QTC FREDERICIA: 418 MS
R AXIS: 20 DEGREES
R AXIS: 49 DEGREES
RBC # BLD AUTO: 3.18 X10*6/UL (ref 4.5–5.9)
SODIUM SERPL-SCNC: 128 MMOL/L (ref 136–145)
SODIUM UR-SCNC: 62 MMOL/L
SODIUM/CREAT UR-RTO: 65 MMOL/G CREAT
T AXIS: -29 DEGREES
T AXIS: -49 DEGREES
T OFFSET: 388 MS
T OFFSET: 395 MS
TRIGL SERPL-MCNC: 49 MG/DL (ref 0–149)
TSH SERPL-ACNC: 1.69 MIU/L (ref 0.44–3.98)
URATE SERPL-MCNC: 5 MG/DL (ref 4–7.5)
URATE UR-MCNC: 72 MG/DL
UREA/CREAT UR-SRTO: 6 G/G CREAT
URIC ACID/CREATININE (MG/G CREAT) IN URINE: 719 MG/G CREAT
UUN UR-MCNC: 567 MG/DL
VENTRICULAR RATE: 93 BPM
VENTRICULAR RATE: 99 BPM
VLDL: 10 MG/DL (ref 0–40)
WBC # BLD AUTO: 6.8 X10*3/UL (ref 4.4–11.3)

## 2025-03-23 PROCEDURE — 2550000001 HC RX 255 CONTRASTS: Performed by: INTERNAL MEDICINE

## 2025-03-23 PROCEDURE — 2500000004 HC RX 250 GENERAL PHARMACY W/ HCPCS (ALT 636 FOR OP/ED): Performed by: INTERNAL MEDICINE

## 2025-03-23 PROCEDURE — 82570 ASSAY OF URINE CREATININE: CPT | Performed by: INTERNAL MEDICINE

## 2025-03-23 PROCEDURE — 84443 ASSAY THYROID STIM HORMONE: CPT | Performed by: INTERNAL MEDICINE

## 2025-03-23 PROCEDURE — 83935 ASSAY OF URINE OSMOLALITY: CPT | Mod: ELYLAB | Performed by: INTERNAL MEDICINE

## 2025-03-23 PROCEDURE — 1200000002 HC GENERAL ROOM WITH TELEMETRY DAILY

## 2025-03-23 PROCEDURE — 70553 MRI BRAIN STEM W/O & W/DYE: CPT

## 2025-03-23 PROCEDURE — 99233 SBSQ HOSP IP/OBS HIGH 50: CPT | Performed by: INTERNAL MEDICINE

## 2025-03-23 PROCEDURE — 70553 MRI BRAIN STEM W/O & W/DYE: CPT | Performed by: RADIOLOGY

## 2025-03-23 PROCEDURE — 36415 COLL VENOUS BLD VENIPUNCTURE: CPT | Performed by: INTERNAL MEDICINE

## 2025-03-23 PROCEDURE — 70544 MR ANGIOGRAPHY HEAD W/O DYE: CPT | Performed by: RADIOLOGY

## 2025-03-23 PROCEDURE — 84560 ASSAY OF URINE/URIC ACID: CPT | Mod: ELYLAB | Performed by: INTERNAL MEDICINE

## 2025-03-23 PROCEDURE — 97165 OT EVAL LOW COMPLEX 30 MIN: CPT | Mod: GO

## 2025-03-23 PROCEDURE — 94640 AIRWAY INHALATION TREATMENT: CPT

## 2025-03-23 PROCEDURE — 97161 PT EVAL LOW COMPLEX 20 MIN: CPT | Mod: GP

## 2025-03-23 PROCEDURE — A9575 INJ GADOTERATE MEGLUMI 0.1ML: HCPCS | Performed by: INTERNAL MEDICINE

## 2025-03-23 PROCEDURE — 80053 COMPREHEN METABOLIC PANEL: CPT | Performed by: INTERNAL MEDICINE

## 2025-03-23 PROCEDURE — S4991 NICOTINE PATCH NONLEGEND: HCPCS | Performed by: INTERNAL MEDICINE

## 2025-03-23 PROCEDURE — 93882 EXTRACRANIAL UNI/LTD STUDY: CPT | Performed by: RADIOLOGY

## 2025-03-23 PROCEDURE — 2500000002 HC RX 250 W HCPCS SELF ADMINISTERED DRUGS (ALT 637 FOR MEDICARE OP, ALT 636 FOR OP/ED): Performed by: INTERNAL MEDICINE

## 2025-03-23 PROCEDURE — 80061 LIPID PANEL: CPT | Performed by: INTERNAL MEDICINE

## 2025-03-23 PROCEDURE — 2500000001 HC RX 250 WO HCPCS SELF ADMINISTERED DRUGS (ALT 637 FOR MEDICARE OP): Performed by: INTERNAL MEDICINE

## 2025-03-23 PROCEDURE — 84550 ASSAY OF BLOOD/URIC ACID: CPT | Performed by: INTERNAL MEDICINE

## 2025-03-23 PROCEDURE — 70544 MR ANGIOGRAPHY HEAD W/O DYE: CPT

## 2025-03-23 PROCEDURE — 93005 ELECTROCARDIOGRAM TRACING: CPT

## 2025-03-23 PROCEDURE — 93010 ELECTROCARDIOGRAM REPORT: CPT | Performed by: INTERNAL MEDICINE

## 2025-03-23 PROCEDURE — 83735 ASSAY OF MAGNESIUM: CPT | Performed by: INTERNAL MEDICINE

## 2025-03-23 PROCEDURE — 93880 EXTRACRANIAL BILAT STUDY: CPT

## 2025-03-23 PROCEDURE — 85027 COMPLETE CBC AUTOMATED: CPT | Performed by: INTERNAL MEDICINE

## 2025-03-23 PROCEDURE — 82436 ASSAY OF URINE CHLORIDE: CPT | Performed by: INTERNAL MEDICINE

## 2025-03-23 PROCEDURE — 70547 MR ANGIOGRAPHY NECK W/O DYE: CPT | Performed by: RADIOLOGY

## 2025-03-23 PROCEDURE — 99223 1ST HOSP IP/OBS HIGH 75: CPT | Performed by: PSYCHIATRY & NEUROLOGY

## 2025-03-23 PROCEDURE — 70547 MR ANGIOGRAPHY NECK W/O DYE: CPT

## 2025-03-23 PROCEDURE — 84484 ASSAY OF TROPONIN QUANT: CPT | Performed by: INTERNAL MEDICINE

## 2025-03-23 RX ORDER — GADOTERATE MEGLUMINE 376.9 MG/ML
0.2 INJECTION INTRAVENOUS
Status: COMPLETED | OUTPATIENT
Start: 2025-03-23 | End: 2025-03-23

## 2025-03-23 RX ADMIN — NICOTINE 1 PATCH: 21 PATCH, EXTENDED RELEASE TRANSDERMAL at 09:18

## 2025-03-23 RX ADMIN — IPRATROPIUM BROMIDE AND ALBUTEROL SULFATE 3 ML: 2.5; .5 SOLUTION RESPIRATORY (INHALATION) at 07:24

## 2025-03-23 RX ADMIN — ALFUZOSIN HYDROCHLORIDE 10 MG: 10 TABLET, EXTENDED RELEASE ORAL at 09:17

## 2025-03-23 RX ADMIN — IPRATROPIUM BROMIDE AND ALBUTEROL SULFATE 3 ML: 2.5; .5 SOLUTION RESPIRATORY (INHALATION) at 19:18

## 2025-03-23 RX ADMIN — APIXABAN 5 MG: 5 TABLET, FILM COATED ORAL at 20:04

## 2025-03-23 RX ADMIN — VALSARTAN 40 MG: 40 TABLET, FILM COATED ORAL at 09:18

## 2025-03-23 RX ADMIN — APIXABAN 5 MG: 5 TABLET, FILM COATED ORAL at 09:18

## 2025-03-23 RX ADMIN — MAGNESIUM OXIDE 400 MG (241.3 MG MAGNESIUM) TABLET 400 MG: TABLET at 20:04

## 2025-03-23 RX ADMIN — TIOTROPIUM BROMIDE INHALATION SPRAY 1 PUFF: 3.12 SPRAY, METERED RESPIRATORY (INHALATION) at 09:45

## 2025-03-23 RX ADMIN — ROSUVASTATIN CALCIUM 10 MG: 10 TABLET, FILM COATED ORAL at 20:04

## 2025-03-23 RX ADMIN — GADOTERATE MEGLUMINE 16 ML: 376.9 INJECTION INTRAVENOUS at 13:48

## 2025-03-23 RX ADMIN — PREDNISONE 10 MG: 10 TABLET ORAL at 09:18

## 2025-03-23 RX ADMIN — MAGNESIUM OXIDE 400 MG (241.3 MG MAGNESIUM) TABLET 400 MG: TABLET at 09:18

## 2025-03-23 RX ADMIN — FINASTERIDE 5 MG: 5 TABLET, FILM COATED ORAL at 09:18

## 2025-03-23 SDOH — SOCIAL STABILITY: SOCIAL INSECURITY: WITHIN THE LAST YEAR, HAVE YOU BEEN AFRAID OF YOUR PARTNER OR EX-PARTNER?: NO

## 2025-03-23 SDOH — ECONOMIC STABILITY: FOOD INSECURITY: WITHIN THE PAST 12 MONTHS, THE FOOD YOU BOUGHT JUST DIDN'T LAST AND YOU DIDN'T HAVE MONEY TO GET MORE.: NEVER TRUE

## 2025-03-23 SDOH — ECONOMIC STABILITY: FOOD INSECURITY: WITHIN THE PAST 12 MONTHS, YOU WORRIED THAT YOUR FOOD WOULD RUN OUT BEFORE YOU GOT THE MONEY TO BUY MORE.: NEVER TRUE

## 2025-03-23 SDOH — ECONOMIC STABILITY: INCOME INSECURITY: IN THE PAST 12 MONTHS HAS THE ELECTRIC, GAS, OIL, OR WATER COMPANY THREATENED TO SHUT OFF SERVICES IN YOUR HOME?: NO

## 2025-03-23 SDOH — SOCIAL STABILITY: SOCIAL INSECURITY: WITHIN THE LAST YEAR, HAVE YOU BEEN HUMILIATED OR EMOTIONALLY ABUSED IN OTHER WAYS BY YOUR PARTNER OR EX-PARTNER?: NO

## 2025-03-23 ASSESSMENT — COGNITIVE AND FUNCTIONAL STATUS - GENERAL
STANDING UP FROM CHAIR USING ARMS: A LITTLE
DAILY ACTIVITIY SCORE: 18
WALKING IN HOSPITAL ROOM: A LITTLE
STANDING UP FROM CHAIR USING ARMS: A LITTLE
TOILETING: A LITTLE
MOVING FROM LYING ON BACK TO SITTING ON SIDE OF FLAT BED WITH BEDRAILS: A LITTLE
DRESSING REGULAR UPPER BODY CLOTHING: A LITTLE
CLIMB 3 TO 5 STEPS WITH RAILING: A LITTLE
DAILY ACTIVITIY SCORE: 18
TURNING FROM BACK TO SIDE WHILE IN FLAT BAD: A LITTLE
EATING MEALS: A LITTLE
MOBILITY SCORE: 18
HELP NEEDED FOR BATHING: A LITTLE
MOBILITY SCORE: 24
MOBILITY SCORE: 18
DRESSING REGULAR UPPER BODY CLOTHING: A LITTLE
DRESSING REGULAR LOWER BODY CLOTHING: A LITTLE
CLIMB 3 TO 5 STEPS WITH RAILING: A LITTLE
HELP NEEDED FOR BATHING: A LITTLE
DRESSING REGULAR LOWER BODY CLOTHING: A LITTLE
TOILETING: A LITTLE
PERSONAL GROOMING: A LITTLE
DAILY ACTIVITIY SCORE: 24
PERSONAL GROOMING: A LITTLE
WALKING IN HOSPITAL ROOM: A LITTLE
EATING MEALS: A LITTLE
TURNING FROM BACK TO SIDE WHILE IN FLAT BAD: A LITTLE
MOVING FROM LYING ON BACK TO SITTING ON SIDE OF FLAT BED WITH BEDRAILS: A LITTLE
MOVING TO AND FROM BED TO CHAIR: A LITTLE
MOVING TO AND FROM BED TO CHAIR: A LITTLE

## 2025-03-23 ASSESSMENT — ENCOUNTER SYMPTOMS
CONFUSION: 0
HYPERACTIVE: 0
SHORTNESS OF BREATH: 0
ARTHRALGIAS: 0
ABDOMINAL PAIN: 0
LIGHT-HEADEDNESS: 0
SEIZURES: 0
WEAKNESS: 1
FATIGUE: 0
JOINT SWELLING: 0
NECK PAIN: 0
FREQUENCY: 0
ADENOPATHY: 0
HEADACHES: 0
SLEEP DISTURBANCE: 0
TROUBLE SWALLOWING: 0
EYE PAIN: 0
HALLUCINATIONS: 0
DIZZINESS: 1
DIFFICULTY URINATING: 0
FACIAL ASYMMETRY: 0
VOMITING: 0
BACK PAIN: 0
NECK STIFFNESS: 0
UNEXPECTED WEIGHT CHANGE: 0
AGITATION: 0
NAUSEA: 0
PALPITATIONS: 0
BRUISES/BLEEDS EASILY: 0
COUGH: 0
TREMORS: 0
WHEEZING: 0
SINUS PRESSURE: 0
SPEECH DIFFICULTY: 1
NUMBNESS: 0
FEVER: 0
PHOTOPHOBIA: 0

## 2025-03-23 ASSESSMENT — PAIN - FUNCTIONAL ASSESSMENT
PAIN_FUNCTIONAL_ASSESSMENT: 0-10

## 2025-03-23 ASSESSMENT — PAIN SCALES - GENERAL
PAINLEVEL_OUTOF10: 0 - NO PAIN

## 2025-03-23 ASSESSMENT — ACTIVITIES OF DAILY LIVING (ADL): BATHING_ASSISTANCE: MODIFIED INDEPENDENT (DEVICE)

## 2025-03-23 NOTE — CARE PLAN
The patient's goals for the shift include  being able to find my words    The clinical goals for the shift include Patient will maintain neurological status at baseline throughout shift

## 2025-03-23 NOTE — PROGRESS NOTES
Canelo Moore is a 89 y.o. male on day 1 of admission presenting with Stroke-like symptoms.      ASSESSMENT/PLAN   -Strokelike symptoms in patient with atrial fibrillation adequately anticoagulated, but also has lung CA.  Speech is a bit garbled this morning compared to when I saw him last night.  Carotid duplex with no occlusions.  MRI/MRA should be done this afternoon.  Neurology consulted.  -Elevated troponins-troponins peaked at 189 last night, 136 this morning.  Possibly due to his stroke.  Repeat EKG ordered last night was not done-will do this morning.  On telemetry in A-fib with PVCs continues to deny any chest pain.  Cardiology consulted.  -Hyponatremia-doubt his low sodium is the cause of his present symptoms as it was similarly low last year.  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  HCTZ held, serum osmolality low, urine osmolality and electrolytes pending.  This morning stable at 128.  -Permanent atrial fibrillation on apixaban anticoagulation-telemetry.  Rate presently controlled.  Continuing his metoprolol succinate.  -COPD-possible mild exacerbation, continuing his tiotropium, adding DuoNebs.  Continuing his oral prednisone.  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 López for this.  TSH normal.  -Tobacco abuse-nicotine patch after his MRIs are done.  -Hypertension-blood pressure is on the low side holding his valsartan HCT.  -Hyperlipidemia-on rosuvastatin  -BPH-on finasteride and alfuzosin.      SUBJECTIVE/OBJECTIVE   03/23/25   -Denies any chest pains or palpitations, no headaches.  Aware that his speech is bad again this morning.  No other symptoms of stroke.  -Breathing is a bit better with the nebulized treatments.  Presently no cough or sputum production.  -Patient's speech is more garbled this morning, but he is  otherwise alert and coherent.  -He is afebrile, blood pressures remain normal, continuing to hold the HCTZ, lowered dose valsartan.  Will place valsartan on hold for some permissive hypertension while awaiting to see if he has a CVA versus possible metastasis.  -On telemetry in A-fib with a controlled rate with PVCs.  -Chest is clear to auscultation  -Cardiac exam is a regular rhythm  -Chemistry panel with a blood sugar of 109, sodium 128, rest of his electrolytes unremarkable.-Uric acid 5.0.  -Serum osmolality low at 277-urine electrolytes and osmolality pending.  - (up from 355 10/2024).  -BUN 14 with a creatinine of 0.87.  -LFTs normal other than a low albumin of 3.1 and a bilirubin of 1.5.  Transaminases normal.  -Cholesterol 96, HDL 41, LDL 45, triglycerides 49.  -Magnesium 1.73.  -Troponin last night up to 189, down to 136 this morning.  -TSH 1.69.  -CBC with a WBC of 6.8, H/H down to 10.6/30.7.  Platelet count 172 K  -Carotid duplex with no significant stenoses.  -MRI/MRA still pending-should be done this afternoon.  -Limited echo for bubble study pending  -I called his son and updated him.  -Discussed with his PCP,  Dr. Gordy Cueva-patient is on prednisone for his hyperthyroidism, not for his lungs.    *These notes are being done using Dragon voice recognition technology and may include unintended errors with respect to translation of words, typographical errors or grammar errors which may not have been identified prior to finalization of the chart note.    CURRENT MEDICATIONS     Scheduled Medications:    Current Facility-Administered Medications:     acetaminophen (Tylenol) tablet 650 mg, 650 mg, oral, q4h PRN, Melia Caceres MD    albuterol 90 mcg/actuation inhaler 2 puff, 2 puff, inhalation, q6h PRN, Melia Caceres MD    alfuzosin (Uroxatral) 24 hr tablet 10 mg, 10 mg, oral, Daily, Melia Caceres MD, 10 mg at 03/22/25 7732    alum-mag hydroxide-simeth (Mylanta) 200-200-20 mg/5 mL oral suspension 30 mL,  30 mL, oral, 4x daily PRN, Melia Caceres MD    apixaban (Eliquis) tablet 5 mg, 5 mg, oral, BID, Melia Caceres MD, 5 mg at 03/22/25 2219    finasteride (Proscar) tablet 5 mg, 5 mg, oral, Daily, Melia Caceres MD, 5 mg at 03/22/25 2219    [START ON 3/24/2025] furosemide (Lasix) tablet 40 mg, 40 mg, oral, Every Mon/Wed/Fri, Melia Caceres MD    ipratropium-albuteroL (Duo-Neb) 0.5-2.5 mg/3 mL nebulizer solution 3 mL, 3 mL, nebulization, BID, Melia Caceres MD, 3 mL at 03/23/25 0724    magnesium hydroxide (Milk of Magnesia) 400 mg/5 mL suspension 10 mL, 10 mL, oral, Daily PRN, Melia Caceres MD    magnesium oxide (Mag-Ox) tablet 400 mg, 400 mg, oral, BID, Melia Caceres MD, 400 mg at 03/22/25 2218    melatonin tablet 5 mg, 5 mg, oral, Nightly PRN, Melia Caceres MD    [Held by provider] methIMAzole (Tapazole) tablet 20 mg, 20 mg, oral, Daily, Melia Caceres MD    nicotine (Nicoderm CQ) 21 mg/24 hr patch 1 patch, 1 patch, transdermal, Daily, Melia Caceres MD    nitroglycerin (Nitrostat) SL tablet 0.4 mg, 0.4 mg, sublingual, q5 min PRN, Melia Caceres MD    ondansetron (Zofran) injection 4 mg, 4 mg, intravenous, q4h PRN, Melia Caceres MD    ondansetron (Zofran) tablet 4 mg, 4 mg, oral, q8h PRN, Melia Caceres MD    perflutren lipid microspheres (Definity) injection 0.5-10 mL of dilution, 0.5-10 mL of dilution, intravenous, Once in imaging, Melia Caceres MD    predniSONE (Deltasone) tablet 10 mg, 10 mg, oral, Daily, Melia Caceres MD, 10 mg at 03/22/25 2218    rosuvastatin (Crestor) tablet 10 mg, 10 mg, oral, Nightly, Melia Caceres MD, 10 mg at 03/22/25 2212    tiotropium (Spiriva Respimat) 2.5 mcg/actuation inhaler 1 puff, 1 puff, inhalation, Daily, Melia Caceres MD    valsartan (Diovan) tablet 40 mg, 40 mg, oral, Daily, Melia Caceres MD, 40 mg at 03/22/25 8535     PRN Medications:  PRN medications   Medication    acetaminophen    albuterol    alum-mag hydroxide-simeth    magnesium hydroxide    melatonin    nitroglycerin    ondansetron    ondansetron         IVs:         I&Os     Intake/Output last 3 Shifts:  I/O last 3 completed shifts:  In: - (0 mL/kg)   Out: 600 (7.6 mL/kg) [Urine:600 (0.2 mL/kg/hr)]  Weight: 79.4 kg     VITAL SIGNS     Vitals:    03/22/25 1920 03/22/25 2133 03/23/25 0524 03/23/25 0707   BP: 122/66 125/61 106/55 118/59   BP Location:       Patient Position:       Pulse: 80 83 98    Resp: 16 16 16    Temp:  37.1 °C (98.8 °F) 36.5 °C (97.7 °F) 36.8 °C (98.2 °F)   TempSrc:       SpO2: 95% 94% 92% 95%   Weight:       Height:           PHYSICAL EXAM   Physical exam:  -General appearance: Pleasant elderly white male, sitting up in the recliner.  This morning his speech is rather garbled compared to last night on admission.  He is aware of it.  -Vital signs:  As above  -HEENT: No obvious facial asymmetry  -Neck: Carotids 2+, no bruits  -Chest: Lungs with only occasional rhonchus, improved since yesterday.  No wheezes.  -Cardiac: Irregularly irregular rhythm with a controlled rate  -Abdomen: Bowel sounds active  -Extremities: 1+ edema  -Skin: No rash  -Neurologic:   Patient is alert and oriented x3, but speech more garbled than last night  -Behavior/Emotional:  Appropriate, cooperative    LABS   Relevant Results:  Results from last 7 days   Lab Units 03/23/25  0612 03/22/25  1902 03/22/25  1357 03/22/25  1342 03/22/25  1325   POCT GLUCOSE mg/dL  --   --  143*  --  90   GLUCOSE mg/dL 109* 97  --  142*  --      CBC:   Lab Results   Component Value Date    WBC 6.8 03/23/2025    HGB 10.6 (L) 03/23/2025    HCT 30.7 (L) 03/23/2025    MCV 97 03/23/2025     03/23/2025       Results from last 7 days   Lab Units 03/23/25  0612 03/22/25  1342   WBC AUTO x10*3/uL 6.8 9.1   HEMOGLOBIN g/dL 10.6* 11.1*   HEMATOCRIT % 30.7* 33.6*   PLATELETS AUTO x10*3/uL 172 196   NEUTROS PCT AUTO %  --  86.5   LYMPHS PCT AUTO %  --  5.3   MONOS PCT AUTO %  --  7.4   EOS PCT AUTO %  --  0.1     CMP:    Results from last 7 days   Lab Units 03/23/25  0612 03/22/25  1902 03/22/25  1342    SODIUM mmol/L 128* 129* 127*   POTASSIUM mmol/L 4.0 4.3 3.9   CHLORIDE mmol/L 93* 91* 91*   CO2 mmol/L 29 31 28   BUN mg/dL 14 14 16   CREATININE mg/dL 0.87 1.05 1.02   CALCIUM mg/dL 8.9 9.6 9.2   PROTEIN TOTAL g/dL 5.7*  --  6.1*   BILIRUBIN TOTAL mg/dL 1.5*  --  1.3*   ALK PHOS U/L 61  --  70   ALT U/L 10  --  11   AST U/L 17  --  18   GLUCOSE mg/dL 109* 97 142*     Magnesium:   Results from last 7 days   Lab Units 03/23/25  0612   MAGNESIUM mg/dL 1.73     Troponin:    Results from last 7 days   Lab Units 03/23/25  0612 03/22/25  1902 03/22/25  1342   TROPHS ng/L 136* 189* 155*     INR:    Lab Results   Component Value Date    INR 1.6 (H) 03/22/2025    INR 2.2 (H) 10/21/2024    INR 1.9 (H) 10/15/2024    PROTIME 17.3 (H) 03/22/2025    PROTIME 24.8 (H) 10/21/2024    PROTIME 21.6 (H) 10/15/2024     BNP:   Results from last 7 days   Lab Units 03/22/25  1902   BNP pg/mL 414*     Lipid Panel:    Lab Results   Component Value Date    CHOL 96 03/23/2025    CHOL 108 10/30/2024     Lab Results   Component Value Date    HDL 41.2 03/23/2025    HDL 46.2 10/30/2024     Lab Results   Component Value Date    LDLCALC 45 03/23/2025    LDLCALC 43 10/30/2024     Lab Results   Component Value Date    TRIG 49 03/23/2025    TRIG 93 10/30/2024     Urinalysis:    Lab Results   Component Value Date    COLORU Light-Yellow 07/31/2024    APPEARANCEU Clear 07/31/2024    SPECGRAVU 1.010 07/31/2024    RADHA 7.5 07/31/2024    PROTUR NEGATIVE 07/31/2024    GLUCOSEU Normal 07/31/2024    BLOODU NEGATIVE 07/31/2024    KETONESU NEGATIVE 07/31/2024    BILIRUBINU NEGATIVE 07/31/2024    UROBILINOGEN Normal 07/31/2024    NITRITEU NEGATIVE 07/31/2024    LEUKOCYTESU NEGATIVE 07/31/2024    WBCU 1 11/01/2020    RBCU 2 11/01/2020    SQUAMEPIU <1 11/01/2020         Results for orders placed or performed during the hospital encounter of 03/22/25 (from the past 24 hours)   POCT GLUCOSE   Result Value Ref Range    POCT Glucose 90 74 - 99 mg/dL   CBC and Auto  Differential   Result Value Ref Range    WBC 9.1 4.4 - 11.3 x10*3/uL    nRBC 0.0 0.0 - 0.0 /100 WBCs    RBC 3.43 (L) 4.50 - 5.90 x10*6/uL    Hemoglobin 11.1 (L) 13.5 - 17.5 g/dL    Hematocrit 33.6 (L) 41.0 - 52.0 %    MCV 98 80 - 100 fL    MCH 32.4 26.0 - 34.0 pg    MCHC 33.0 32.0 - 36.0 g/dL    RDW 15.0 (H) 11.5 - 14.5 %    Platelets 196 150 - 450 x10*3/uL    Neutrophils % 86.5 40.0 - 80.0 %    Immature Granulocytes %, Automated 0.3 0.0 - 0.9 %    Lymphocytes % 5.3 13.0 - 44.0 %    Monocytes % 7.4 2.0 - 10.0 %    Eosinophils % 0.1 0.0 - 6.0 %    Basophils % 0.4 0.0 - 2.0 %    Neutrophils Absolute 7.88 (H) 1.60 - 5.50 x10*3/uL    Immature Granulocytes Absolute, Automated 0.03 0.00 - 0.50 x10*3/uL    Lymphocytes Absolute 0.48 (L) 0.80 - 3.00 x10*3/uL    Monocytes Absolute 0.67 0.05 - 0.80 x10*3/uL    Eosinophils Absolute 0.01 0.00 - 0.40 x10*3/uL    Basophils Absolute 0.04 0.00 - 0.10 x10*3/uL   Comprehensive metabolic panel   Result Value Ref Range    Glucose 142 (H) 74 - 99 mg/dL    Sodium 127 (L) 136 - 145 mmol/L    Potassium 3.9 3.5 - 5.3 mmol/L    Chloride 91 (L) 98 - 107 mmol/L    Bicarbonate 28 21 - 32 mmol/L    Anion Gap 12 10 - 20 mmol/L    Urea Nitrogen 16 6 - 23 mg/dL    Creatinine 1.02 0.50 - 1.30 mg/dL    eGFR 71 >60 mL/min/1.73m*2    Calcium 9.2 8.6 - 10.3 mg/dL    Albumin 3.5 3.4 - 5.0 g/dL    Alkaline Phosphatase 70 33 - 136 U/L    Total Protein 6.1 (L) 6.4 - 8.2 g/dL    AST 18 9 - 39 U/L    Bilirubin, Total 1.3 (H) 0.0 - 1.2 mg/dL    ALT 11 10 - 52 U/L   Troponin I, High Sensitivity   Result Value Ref Range    Troponin I, High Sensitivity 155 (HH) 0 - 20 ng/L   Protime-INR   Result Value Ref Range    Protime 17.3 (H) 9.8 - 12.4 seconds    INR 1.6 (H) 0.9 - 1.1   APTT   Result Value Ref Range    aPTT 28 26 - 36 seconds   Gray Top   Result Value Ref Range    Extra Tube Hold for add-ons.    Heparin Assay   Result Value Ref Range    Heparin Unfractionated >2.0 (HH) See Comment Below for Therapeutic  Ranges IU/mL   POCT GLUCOSE   Result Value Ref Range    POCT Glucose 143 (H) 74 - 99 mg/dL   ECG 12 lead   Result Value Ref Range    Ventricular Rate 99 BPM    Atrial Rate 111 BPM    QRS Duration 96 ms    QT Interval 354 ms    QTC Calculation(Bazett) 454 ms    R Axis 49 degrees    T Axis -29 degrees    QRS Count 16 beats    Q Onset 218 ms    T Offset 395 ms    QTC Fredericia 418 ms   Troponin I, High Sensitivity   Result Value Ref Range    Troponin I, High Sensitivity 189 (HH) 0 - 20 ng/L   Basic metabolic panel   Result Value Ref Range    Glucose 97 74 - 99 mg/dL    Sodium 129 (L) 136 - 145 mmol/L    Potassium 4.3 3.5 - 5.3 mmol/L    Chloride 91 (L) 98 - 107 mmol/L    Bicarbonate 31 21 - 32 mmol/L    Anion Gap 11 10 - 20 mmol/L    Urea Nitrogen 14 6 - 23 mg/dL    Creatinine 1.05 0.50 - 1.30 mg/dL    eGFR 69 >60 mL/min/1.73m*2    Calcium 9.6 8.6 - 10.3 mg/dL   Osmolality   Result Value Ref Range    Osmolality, Serum 277 (L) 280 - 300 mOsm/kg   B-type natriuretic peptide   Result Value Ref Range     (H) 0 - 99 pg/mL   SST TOP   Result Value Ref Range    Extra Tube Hold for add-ons.    Magnesium   Result Value Ref Range    Magnesium 1.73 1.60 - 2.40 mg/dL   CBC   Result Value Ref Range    WBC 6.8 4.4 - 11.3 x10*3/uL    nRBC 0.0 0.0 - 0.0 /100 WBCs    RBC 3.18 (L) 4.50 - 5.90 x10*6/uL    Hemoglobin 10.6 (L) 13.5 - 17.5 g/dL    Hematocrit 30.7 (L) 41.0 - 52.0 %    MCV 97 80 - 100 fL    MCH 33.3 26.0 - 34.0 pg    MCHC 34.5 32.0 - 36.0 g/dL    RDW 15.2 (H) 11.5 - 14.5 %    Platelets 172 150 - 450 x10*3/uL   Comprehensive Metabolic Panel   Result Value Ref Range    Glucose 109 (H) 74 - 99 mg/dL    Sodium 128 (L) 136 - 145 mmol/L    Potassium 4.0 3.5 - 5.3 mmol/L    Chloride 93 (L) 98 - 107 mmol/L    Bicarbonate 29 21 - 32 mmol/L    Anion Gap 10 10 - 20 mmol/L    Urea Nitrogen 14 6 - 23 mg/dL    Creatinine 0.87 0.50 - 1.30 mg/dL    eGFR 84 >60 mL/min/1.73m*2    Calcium 8.9 8.6 - 10.3 mg/dL    Albumin 3.1 (L) 3.4 -  5.0 g/dL    Alkaline Phosphatase 61 33 - 136 U/L    Total Protein 5.7 (L) 6.4 - 8.2 g/dL    AST 17 9 - 39 U/L    Bilirubin, Total 1.5 (H) 0.0 - 1.2 mg/dL    ALT 10 10 - 52 U/L   Lipid Panel   Result Value Ref Range    Cholesterol 96 0 - 199 mg/dL    HDL-Cholesterol 41.2 mg/dL    Cholesterol/HDL Ratio 2.3     LDL Calculated 45 <=99 mg/dL    VLDL 10 0 - 40 mg/dL    Triglycerides 49 0 - 149 mg/dL    Non HDL Cholesterol 55 0 - 149 mg/dL   TSH with reflex to Free T4 if abnormal   Result Value Ref Range    Thyroid Stimulating Hormone 1.69 0.44 - 3.98 mIU/L   Troponin I, High Sensitivity   Result Value Ref Range    Troponin I, High Sensitivity 136 (HH) 0 - 20 ng/L   Carotid duplex bilateral   Result Value Ref Range    BSA 1.95 m2     *Note: Due to a large number of results and/or encounters for the requested time period, some results have not been displayed. A complete set of results can be found in Results Review.     Results for orders placed or performed during the hospital encounter of 03/22/25 (from the past 24 hours)   POCT GLUCOSE   Result Value Ref Range    POCT Glucose 90 74 - 99 mg/dL   CBC and Auto Differential   Result Value Ref Range    WBC 9.1 4.4 - 11.3 x10*3/uL    nRBC 0.0 0.0 - 0.0 /100 WBCs    RBC 3.43 (L) 4.50 - 5.90 x10*6/uL    Hemoglobin 11.1 (L) 13.5 - 17.5 g/dL    Hematocrit 33.6 (L) 41.0 - 52.0 %    MCV 98 80 - 100 fL    MCH 32.4 26.0 - 34.0 pg    MCHC 33.0 32.0 - 36.0 g/dL    RDW 15.0 (H) 11.5 - 14.5 %    Platelets 196 150 - 450 x10*3/uL    Neutrophils % 86.5 40.0 - 80.0 %    Immature Granulocytes %, Automated 0.3 0.0 - 0.9 %    Lymphocytes % 5.3 13.0 - 44.0 %    Monocytes % 7.4 2.0 - 10.0 %    Eosinophils % 0.1 0.0 - 6.0 %    Basophils % 0.4 0.0 - 2.0 %    Neutrophils Absolute 7.88 (H) 1.60 - 5.50 x10*3/uL    Immature Granulocytes Absolute, Automated 0.03 0.00 - 0.50 x10*3/uL    Lymphocytes Absolute 0.48 (L) 0.80 - 3.00 x10*3/uL    Monocytes Absolute 0.67 0.05 - 0.80 x10*3/uL    Eosinophils  Absolute 0.01 0.00 - 0.40 x10*3/uL    Basophils Absolute 0.04 0.00 - 0.10 x10*3/uL   Comprehensive metabolic panel   Result Value Ref Range    Glucose 142 (H) 74 - 99 mg/dL    Sodium 127 (L) 136 - 145 mmol/L    Potassium 3.9 3.5 - 5.3 mmol/L    Chloride 91 (L) 98 - 107 mmol/L    Bicarbonate 28 21 - 32 mmol/L    Anion Gap 12 10 - 20 mmol/L    Urea Nitrogen 16 6 - 23 mg/dL    Creatinine 1.02 0.50 - 1.30 mg/dL    eGFR 71 >60 mL/min/1.73m*2    Calcium 9.2 8.6 - 10.3 mg/dL    Albumin 3.5 3.4 - 5.0 g/dL    Alkaline Phosphatase 70 33 - 136 U/L    Total Protein 6.1 (L) 6.4 - 8.2 g/dL    AST 18 9 - 39 U/L    Bilirubin, Total 1.3 (H) 0.0 - 1.2 mg/dL    ALT 11 10 - 52 U/L   Troponin I, High Sensitivity   Result Value Ref Range    Troponin I, High Sensitivity 155 (HH) 0 - 20 ng/L   Protime-INR   Result Value Ref Range    Protime 17.3 (H) 9.8 - 12.4 seconds    INR 1.6 (H) 0.9 - 1.1   APTT   Result Value Ref Range    aPTT 28 26 - 36 seconds   Gray Top   Result Value Ref Range    Extra Tube Hold for add-ons.    Heparin Assay   Result Value Ref Range    Heparin Unfractionated >2.0 (HH) See Comment Below for Therapeutic Ranges IU/mL   POCT GLUCOSE   Result Value Ref Range    POCT Glucose 143 (H) 74 - 99 mg/dL   ECG 12 lead   Result Value Ref Range    Ventricular Rate 99 BPM    Atrial Rate 111 BPM    QRS Duration 96 ms    QT Interval 354 ms    QTC Calculation(Bazett) 454 ms    R Axis 49 degrees    T Axis -29 degrees    QRS Count 16 beats    Q Onset 218 ms    T Offset 395 ms    QTC Fredericia 418 ms   Troponin I, High Sensitivity   Result Value Ref Range    Troponin I, High Sensitivity 189 (HH) 0 - 20 ng/L   Basic metabolic panel   Result Value Ref Range    Glucose 97 74 - 99 mg/dL    Sodium 129 (L) 136 - 145 mmol/L    Potassium 4.3 3.5 - 5.3 mmol/L    Chloride 91 (L) 98 - 107 mmol/L    Bicarbonate 31 21 - 32 mmol/L    Anion Gap 11 10 - 20 mmol/L    Urea Nitrogen 14 6 - 23 mg/dL    Creatinine 1.05 0.50 - 1.30 mg/dL    eGFR 69 >60  mL/min/1.73m*2    Calcium 9.6 8.6 - 10.3 mg/dL   Osmolality   Result Value Ref Range    Osmolality, Serum 277 (L) 280 - 300 mOsm/kg   B-type natriuretic peptide   Result Value Ref Range     (H) 0 - 99 pg/mL   SST TOP   Result Value Ref Range    Extra Tube Hold for add-ons.    Magnesium   Result Value Ref Range    Magnesium 1.73 1.60 - 2.40 mg/dL   CBC   Result Value Ref Range    WBC 6.8 4.4 - 11.3 x10*3/uL    nRBC 0.0 0.0 - 0.0 /100 WBCs    RBC 3.18 (L) 4.50 - 5.90 x10*6/uL    Hemoglobin 10.6 (L) 13.5 - 17.5 g/dL    Hematocrit 30.7 (L) 41.0 - 52.0 %    MCV 97 80 - 100 fL    MCH 33.3 26.0 - 34.0 pg    MCHC 34.5 32.0 - 36.0 g/dL    RDW 15.2 (H) 11.5 - 14.5 %    Platelets 172 150 - 450 x10*3/uL   Comprehensive Metabolic Panel   Result Value Ref Range    Glucose 109 (H) 74 - 99 mg/dL    Sodium 128 (L) 136 - 145 mmol/L    Potassium 4.0 3.5 - 5.3 mmol/L    Chloride 93 (L) 98 - 107 mmol/L    Bicarbonate 29 21 - 32 mmol/L    Anion Gap 10 10 - 20 mmol/L    Urea Nitrogen 14 6 - 23 mg/dL    Creatinine 0.87 0.50 - 1.30 mg/dL    eGFR 84 >60 mL/min/1.73m*2    Calcium 8.9 8.6 - 10.3 mg/dL    Albumin 3.1 (L) 3.4 - 5.0 g/dL    Alkaline Phosphatase 61 33 - 136 U/L    Total Protein 5.7 (L) 6.4 - 8.2 g/dL    AST 17 9 - 39 U/L    Bilirubin, Total 1.5 (H) 0.0 - 1.2 mg/dL    ALT 10 10 - 52 U/L   Lipid Panel   Result Value Ref Range    Cholesterol 96 0 - 199 mg/dL    HDL-Cholesterol 41.2 mg/dL    Cholesterol/HDL Ratio 2.3     LDL Calculated 45 <=99 mg/dL    VLDL 10 0 - 40 mg/dL    Triglycerides 49 0 - 149 mg/dL    Non HDL Cholesterol 55 0 - 149 mg/dL   TSH with reflex to Free T4 if abnormal   Result Value Ref Range    Thyroid Stimulating Hormone 1.69 0.44 - 3.98 mIU/L   Troponin I, High Sensitivity   Result Value Ref Range    Troponin I, High Sensitivity 136 (HH) 0 - 20 ng/L   Carotid duplex bilateral   Result Value Ref Range    BSA 1.95 m2     ABG:        IMAGING     Carotid duplex bilateral         CT brain attack head wo IV  contrast   Final Result   Mild age related degenerative change as described without acute   findings.        MACRO:   Alfonso Womack discussed the significance and urgency of this critical   finding by secure chat with  SALO UMAÑA on 3/22/2025 at 1:38 pm.   (**-RCF-**) Findings:  See findings.        Signed by: Alfonso Womack 3/22/2025 1:39 PM   Dictation workstation:   [id number]      Transthoracic Echo (TTE) Limited    (Results Pending)   MR angio neck wo IV contrast    (Results Pending)   MR brain w and wo IV contrast    (Results Pending)      I spent 55 minutes in the professional and overall care of this patient.    Melia Caceres MD

## 2025-03-23 NOTE — PROGRESS NOTES
Occupational Therapy    Evaluation    Patient Name: Canelo Moore  MRN: [id number]  Today's Date: 3/23/2025  Time Calculation  Start Time: 0850  Stop Time: 0901  Time Calculation (min): 11 min  911/911-A    Assessment  IP OT Assessment  OT Assessment: Pt is MOD I <> IND with all ADLs and functional transfers. Does not qualify for acute OT at this time. Pt would benefit from SLP eval.  Barriers to Discharge Home: No anticipated barriers  Medical Staff Made Aware: Yes  End of Session Communication: Bedside nurse, Physician  End of Session Patient Position: Up in chair, Alarm on    Plan:  No Skilled OT: No acute OT goals identified  OT Frequency: OT eval only  OT Discharge Recommendations: No further acute OT  OT Recommended Transfer Status: Independent  OT - OK to Discharge: Yes (ok to d/c to next level of care once medically cleared)    Subjective     Current Problem:  1. Stroke-like symptoms  Transthoracic Echo (TTE) Limited    Carotid duplex bilateral    Transthoracic Echo (TTE) Limited    Carotid duplex bilateral      2. Slurred speech  Heparin Assay    Heparin Assay    Heparin Assay      3. Elevated troponin        4. Expressive aphasia        5. Hyponatremia        6. Recent cerebrovascular accident (CVA)  Transthoracic Echo (TTE) Limited    Transthoracic Echo (TTE) Limited      7. 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  Carotid duplex bilateral    Carotid duplex bilateral          General:  General  Reason for Referral: ADL impairment  Referred By: OT/PT  Morris 3/22  Past Medical History Relevant to Rehab: COPD,HLD,HTN,CAD,CHF,Afib,CKD,BPH,CA,smoker,emphysema,hyperthyroidism, lung nodule  Family/Caregiver Present: No  Co-Treatment: PT  Co-Treatment Reason: to maximize pt safety and function  Prior to Session Communication: Bedside nurse  Patient Position Received: Bed, 3 rail up, Alarm on  General Comment: Pt is an 89 y/o M who came to the hospital on 3/22 with stroke like symptoms, slurred  "speech, blurred vision, feeling \"foggy\". Tests/labs: , trop 189, CT brain attack: (-), MRI brain pending.    Precautions:  Medical Precautions: No known precautions/limitation    Pain:  Pain Assessment  Pain Assessment: 0-10  0-10 (Numeric) Pain Score: 0 - No pain    Objective     Cognition:  Overall Cognitive Status: Within Functional Limits (Dysarthric speech. Denies any blurred vision or \"foggy head feelings\")  Orientation Level: Oriented X4  Attention: Within Functional Limits  Processing Speed: Within funtional limits             Home Living:  Home Living Comments: Pt lives in a 1-level in-law suite at his son's home. 3 ASIM with HR. Pt has a walk in shower with grab bar.     Prior Function:  Prior Function Comments: PTA, pt reports completing all ADLs IND, assist with iADLs, ambulating IND (uses rollator at night). Denies falls in the past 3 months. Drives.    ADL:  Eating Assistance: Independent  Grooming Assistance: Independent  Bathing Assistance: Modified independent (Device)  UE Dressing Assistance: Independent  LE Dressing Assistance: Independent  Toileting Assistance with Device: Independent    Activity Tolerance:  Endurance: Endurance does not limit participation in activity    Bed Mobility/Transfers:   Bed Mobility  Bed Mobility:  (Supine to sit EOB IND)  Transfers  Transfer:  (Sit to/from stand without AD IND)    Ambulation/Gait Training:  Functional Mobility  Functional Mobility Performed:  (Pt completed short distance functional mobility throughout room without device IND)    Sitting Balance:  Static Sitting Balance  Static Sitting-Comment/Number of Minutes: Good  Dynamic Sitting Balance  Dynamic Sitting-Comments: Good    Standing Balance:  Static Standing Balance  Static Standing-Comment/Number of Minutes: Good  Dynamic Standing Balance  Dynamic Standing-Comments: Good    Vision: Vision - Basic Assessment  Current Vision: Wears glasses all the time   and Vision - Complex Assessment  Vision " Comments: Denies blurry vision during eval.    Strength:  Strength Comments: BUE grossly 5/5     Coordination:  Movements are Fluid and Coordinated: Yes  Finger to Nose: Intact (eyes open and closed)  Finger to Target: Intact  Coordination Comment: WFL     Hand Function:  Hand Function  Gross Grasp: Functional  Coordination: Functional    Extremities: RUE   RUE : Within Functional Limits and LUE   LUE: Within Functional Limits    Outcome Measures: [facility] Daily Activity  Putting on and taking off regular lower body clothing: None  Bathing (including washing, rinsing, drying): None  Putting on and taking off regular upper body clothing: None  Toileting, which includes using toilet, bedpan or urinal: None  Taking care of personal grooming such as brushing teeth: None  Eating Meals: None  Daily Activity - Total Score: 24                       EDUCATION:  Education  Individual(s) Educated: Patient  Education Provided: Fall precautons, Risk and benefits of OT discussed with patient or other, POC discussed and agreed upon  Education Documentation  Body Mechanics, taught by Mindi Ziegler OT at 3/23/2025  2:45 PM.  Learner: Patient  Readiness: Acceptance  Method: Explanation  Response: Verbalizes Understanding, Demonstrated Understanding

## 2025-03-23 NOTE — ASSESSMENT & PLAN NOTE
Impression.  Acute speech difficulty with difficulty finding words most likely left cerebral CVA/TIA need to rule out seizure which is unlikely.  Chronic atrial fibrillation currently on Eliquis  Hyponatremia  CA of the lung  Plan.  Continue with current treatment and await for MRI MRA of the head.  Patient already had carotids done which was normal and echo which is pending with bubble study.  Patient is DNR Comfort Care arrest and if the MRI shows any infarction may add a baby aspirin.  Since patient is taking Eliquis adequately and wait for factor Xa assay to see whether Eliquis is effective or not    Plan course and the prognosis was discussed with patient which she understood and will follow with you    Due to technical limitations of voice recognition and human error, this note may not accurately reflect the care of the patient.

## 2025-03-23 NOTE — PROGRESS NOTES
Physical Therapy    Physical Therapy Evaluation    Patient Name: Canelo Moore  MRN: [id number]  Today's Date: 3/23/2025   Time Calculation  Start Time: 0849  Stop Time: 0900  Time Calculation (min): 11 min  911/911-A    Assessment/Plan   PT Assessment  Barriers to Discharge Home: No anticipated barriers  End of Session Communication: Bedside nurse  Assessment Comment: PT eval only pt at his baseline for mobility no current PT needs .  End of Session Patient Position: Up in chair, Alarm on  IP OR SWING BED PT PLAN  Inpatient or Swing Bed: Inpatient  PT Plan  PT Plan: PT Eval only  PT Eval Only Reason: At baseline function  PT Frequency: PT eval only  PT Discharge Recommendations: No further acute PT  PT Recommended Transfer Status: Independent  PT - OK to Discharge: Yes (once medically ready for discharge to next level of care.)    Subjective     Current Problem:  1. Stroke-like symptoms  Transthoracic Echo (TTE) Limited    Carotid duplex bilateral    Transthoracic Echo (TTE) Limited    Carotid duplex bilateral      2. Slurred speech  Heparin Assay    Heparin Assay    Heparin Assay      3. Elevated troponin        4. Expressive aphasia        5. Hyponatremia        6. Recent cerebrovascular accident (CVA)  Transthoracic Echo (TTE) Limited    Transthoracic Echo (TTE) Limited      7. 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  slurred speech ,  "blurred vision . feeling \" foggy \" test/labs:  ,trop 189, CT Brain attack : neg,  MRI brain : pending.  Home Living:  Home Living  Home Living Comments: pt lives in an in law suite at his sons home 1 level . 3 steps with HR to enter . pt  has a Walk in shower with grab bar.  Prior Level of Function:  Prior Function Per Pt/Caregiver Report  Prior Function Comments: per pt IND with mobility and gait . rollator at night time.  mod I adls  assistance with iadls.  no falls still drives  Precautions:  Precautions  Medical Precautions: No known precautions/limitation  Objective   Pain:  Pain Assessment  Pain Assessment: 0-10  0-10 (Numeric) Pain Score: 0 - No pain  Cognition:  Cognition  Overall Cognitive Status: Within Functional Limits (dysarthric speech .  facial droop on R  noted.  pt denies anymore blurred vision or \" foggy head \" feelings .)  Orientation Level: Oriented X4  Attention: Within Functional Limits  Processing Speed: Within funtional limits  General Assessments:  General Observation  General Observation: pt IND with mobility / gait  good strength and balanced .  noted slurred speech . needs SLP consult.   Activity Tolerance  Endurance: Endurance does not limit participation in activity  Sensation  Sensation Comment: intact BLEs  Strength  Strength Comments: BLE 5/5  Coordination  Movements are Fluid and Coordinated: Yes  Finger to Nose: Intact (eyes open and closed)  Finger to Target: Intact  Coordination Comment: WFL  Static Sitting Balance  Static Sitting-Comment/Number of Minutes: good  Dynamic Sitting Balance  Dynamic Sitting-Comments: good  Static Standing Balance  Static Standing-Comment/Number of Minutes: good  Dynamic Standing Balance  Dynamic Standing-Comments: good  Functional Assessments:  Bed Mobility  Bed Mobility: Yes  Bed Mobility 1  Bed Mobility 1: Supine to sitting  Level of Assistance 1: Independent  Bed Mobility Comments 1: IND to EOB  Transfers  Transfer: Yes  Transfer " 1  Technique 1: Sit to stand, Stand to sit  Transfer Level of Assistance 1: Independent  Trials/Comments 1: IND no A/D  Ambulation/Gait Training  Ambulation/Gait Training Performed: Yes  Ambulation/Gait Training 1  Surface 1: Level tile  Device 1: No device  Assistance 1: Independent  Quality of Gait 1:  (slow gait speed)  Comments/Distance (ft) 1: 30ft with no A/D IND  Extremity/Trunk Assessments:  RLE   RLE : Within Functional Limits  LLE   LLE : Within Functional Limits  Outcome Measures:  [facility] Basic Mobility  Turning from your back to your side while in a flat bed without using bedrails: None  Moving from lying on your back to sitting on the side of a flat bed without using bedrails: None  Moving to and from bed to chair (including a wheelchair): None  Standing up from a chair using your arms (e.g. wheelchair or bedside chair): None  To walk in hospital room: None  Climbing 3-5 steps with railing: None  Basic Mobility - Total Score: 24

## 2025-03-23 NOTE — CONSULTS
History Of Present Illness  Canelo Moore is a 89 y.o. male presenting with speech difficulty and completing sentences.  According the patient he had his hearing aid adjusted yesterday and did not feel right after that.  He felt foggy and extra tired.  The next morning when he woke up his son noticed that he was having some speech difficulty and was quite slow and finishing sentences.  Was having problem finding the words and complained of some blurred vision    At the time of my evaluation is a 20 lunch and denies any vision difficulty but his speech is still slow no facial droop focal weakness numbness or any seizure or seizure-like activity    He has a longstanding history of multiple medical problems including recent diagnosis of cancer of the lung for which she does not want to have the biopsy any further treatment done.  He has a history of permanent atrial fibrillation currently on Eliquis, hypertension, hyperlipidemia, coronary artery disease, COPD, chronic tobacco use and chronic diastolic congestive heart failure.    Please refer to the initial H&P and the ER records for details and while in the ER his blood pressure was normal and the CT of the brain showed mild ventriculomegaly.  Patient was not a TNKase candidate the symptoms started long time back.    He had a heparin assay which was greater than 2.  Factor Xa assay is pending.      Past Medical History  Past Medical History:   Diagnosis Date    Atrial fibrillation (Multi)     BPH (benign prostatic hyperplasia)     CAD (coronary artery disease)     Controlled atrial fibrillation (Multi)     COPD (chronic obstructive pulmonary disease) (Multi)     External hemorrhoid 09/12/2023    Hyperlipidemia     Hypertension     Right inguinal hernia 09/12/2023    Stage 3a chronic kidney disease (CKD) (Multi)      Surgical History  Past Surgical History:   Procedure Laterality Date    OTHER SURGICAL HISTORY  11/04/2020    Varicose vein ligation    OTHER SURGICAL  HISTORY  11/04/2020    [place] tooth extraction    OTHER SURGICAL HISTORY  11/04/2020    Tonsillectomy    OTHER SURGICAL HISTORY  11/04/2020    Colonoscopy complete for polypectomy    OTHER SURGICAL HISTORY  11/06/2021    Varicose vein sclerotherapy    OTHER SURGICAL HISTORY  11/06/2021    Pilonidal cyst removal    OTHER SURGICAL HISTORY  11/06/2021    Hernia repair    OTHER SURGICAL HISTORY  12/14/2021    Umbilical hernia repair    OTHER SURGICAL HISTORY  12/14/2021    Inguinal hernia repair    US ASPIRATION INJECTION INTERMEDIATE JOINT  2/8/2021    US ASPIRATION INJECTION INTERMEDIATE JOINT 2/8/2021 [facility]     Social History  Social History     Tobacco Use    Smoking status: Every Day     Current packs/day: 0.50     Average packs/day: 0.5 packs/day for 60.0 years (30.0 ttl pk-yrs)     Types: Cigarettes    Smokeless tobacco: Never   Vaping Use    Vaping status: Never Used   Substance Use Topics    Alcohol use: Not Currently    Drug use: Not Currently     Allergies  Ace inhibitors  Home Medications  Medications Prior to Admission   Medication Sig Dispense Refill Last Dose/Taking    alfuzosin (Uroxatral) 10 mg 24 hr tablet Take 1 tablet (10 mg) by mouth once daily. Do not crush, chew, or split. 90 tablet 3 3/21/2025    apixaban (Eliquis) 5 mg tablet Take 1 tablet (5 mg) by mouth 2 times a day. 180 tablet 3 3/22/2025    finasteride (Proscar) 5 mg tablet Take 1 tablet (5 mg) by mouth once daily. 90 tablet 3 3/22/2025    furosemide (Lasix) 40 mg tablet Take 1 tablet (40 mg) by mouth once a day on Monday, Wednesday, and Friday. 36 tablet 3 3/21/2025    magnesium oxide (Mag-Ox) 400 mg (241.3 mg magnesium) tablet Take 1 tablet (400 mg) by mouth 2 times a day. 180 tablet 3 3/22/2025    metoprolol succinate XL (Toprol-XL) 100 mg 24 hr tablet Take 1 tablet (100 mg) by mouth once daily. Do not crush or chew. 90 tablet 3 3/22/2025    multivitamin (One Daily Multivitamin) tablet Take 1 tablet by mouth once daily.    3/22/2025    valsartan-hydrochlorothiazide (Diovan-HCT) 160-12.5 mg tablet Take 1 tablet by mouth once daily. 90 tablet 3 3/21/2025    albuterol (Ventolin HFA) 90 mcg/actuation inhaler Inhale 2 puffs every 6 hours if needed for shortness of breath. 18 g 3 Unknown    methIMAzole (Tapazole) 10 mg tablet Take 2 tablets (20 mg) by mouth every 12 hours. (Patient taking differently: Take 2 tablets (20 mg) by mouth once daily. ON HOLD) 120 tablet 0     nitroglycerin (Nitrostat) 0.4 mg SL tablet Place 1 tablet (0.4 mg) under the tongue every 5 minutes if needed for chest pain. place 1 tablet under tongue every 5 minutes for up to 3 doses as needed for chest pain. Call 911 if pain persists 25 tablet 5 Unknown    rosuvastatin (Crestor) 10 mg tablet Take 1 tablet (10 mg) by mouth once daily at bedtime. 90 tablet 3     tiotropium (Spiriva with HandiHaler) 18 mcg inhalation capsule Place 1 capsule (18 mcg) into inhaler and inhale once daily. 30 capsule 3 Unknown       Review of Systems   Constitutional:  Negative for fatigue, fever and unexpected weight change.   HENT:  Negative for dental problem, ear pain, hearing loss, sinus pressure, tinnitus and trouble swallowing.    Eyes:  Negative for photophobia, pain and visual disturbance.   Respiratory:  Negative for cough, shortness of breath and wheezing.    Cardiovascular:  Negative for chest pain, palpitations and leg swelling.   Gastrointestinal:  Negative for abdominal pain, nausea and vomiting.   Genitourinary:  Negative for difficulty urinating, enuresis and frequency.   Musculoskeletal:  Negative for arthralgias, back pain, joint swelling, neck pain and neck stiffness.   Skin:  Negative for pallor and rash.   Allergic/Immunologic: Negative for food allergies.   Neurological:  Positive for dizziness, speech difficulty and weakness. Negative for tremors, seizures, syncope, facial asymmetry, light-headedness, numbness and headaches.   Hematological:  Negative for adenopathy.  "Does not bruise/bleed easily.   Psychiatric/Behavioral:  Negative for agitation, behavioral problems, confusion, hallucinations and sleep disturbance. The patient is not hyperactive.        Physical Exam  Patient was alert awake eating his lunch.  Not in acute distress.  AG-Risperidone to be equal reactive and he is wearing glasses.  Ear nose and throat was unremarkable.  Neck was supple without any lymphadenopathy.  Cardiovascular examination was normal S1-S2 with clear breath sounds    Abdomen soft distended difficult to palpate.  Extremity that ecchymotic area of the right deformity without any edema or cyanosis.  Neurological Exam  Patient was alert awake oriented to himself place and time.  Speech was slightly slurred over the form sentences.  Was able to follow simple ankle and straight memory was intact.  Attention span judgment concentration was adequate.  No induration.    Revealed normal extraluminally face being symmetrical with elevated palate without difficulty over the hypopharynx.  Sensations were normal    Motor exam revealed normal tone and power is able to function with no prior drift on outstretched hand    Reflexes were bilaterally hypoactive ventricular response bilaterally    Coordination Station and gait were deferred    Sensory exam was intact to light touch and pinprick.  Last Recorded Vitals  Blood pressure 114/58, pulse 91, temperature 36.4 °C (97.5 °F), resp. rate 16, height 1.727 m (5' 8\"), weight 79.4 kg (175 lb), SpO2 99%.      Relevant Results  Recent Results (from the past 24 hours)   POCT GLUCOSE    Collection Time: 03/22/25  1:25 PM   Result Value Ref Range    POCT Glucose 90 74 - 99 mg/dL   CBC and Auto Differential    Collection Time: 03/22/25  1:42 PM   Result Value Ref Range    WBC 9.1 4.4 - 11.3 x10*3/uL    nRBC 0.0 0.0 - 0.0 /100 WBCs    RBC 3.43 (L) 4.50 - 5.90 x10*6/uL    Hemoglobin 11.1 (L) 13.5 - 17.5 g/dL    Hematocrit 33.6 (L) 41.0 - 52.0 %    MCV 98 80 - 100 fL    MCH " 32.4 26.0 - 34.0 pg    MCHC 33.0 32.0 - 36.0 g/dL    RDW 15.0 (H) 11.5 - 14.5 %    Platelets 196 150 - 450 x10*3/uL    Neutrophils % 86.5 40.0 - 80.0 %    Immature Granulocytes %, Automated 0.3 0.0 - 0.9 %    Lymphocytes % 5.3 13.0 - 44.0 %    Monocytes % 7.4 2.0 - 10.0 %    Eosinophils % 0.1 0.0 - 6.0 %    Basophils % 0.4 0.0 - 2.0 %    Neutrophils Absolute 7.88 (H) 1.60 - 5.50 x10*3/uL    Immature Granulocytes Absolute, Automated 0.03 0.00 - 0.50 x10*3/uL    Lymphocytes Absolute 0.48 (L) 0.80 - 3.00 x10*3/uL    Monocytes Absolute 0.67 0.05 - 0.80 x10*3/uL    Eosinophils Absolute 0.01 0.00 - 0.40 x10*3/uL    Basophils Absolute 0.04 0.00 - 0.10 x10*3/uL   Comprehensive metabolic panel    Collection Time: 03/22/25  1:42 PM   Result Value Ref Range    Glucose 142 (H) 74 - 99 mg/dL    Sodium 127 (L) 136 - 145 mmol/L    Potassium 3.9 3.5 - 5.3 mmol/L    Chloride 91 (L) 98 - 107 mmol/L    Bicarbonate 28 21 - 32 mmol/L    Anion Gap 12 10 - 20 mmol/L    Urea Nitrogen 16 6 - 23 mg/dL    Creatinine 1.02 0.50 - 1.30 mg/dL    eGFR 71 >60 mL/min/1.73m*2    Calcium 9.2 8.6 - 10.3 mg/dL    Albumin 3.5 3.4 - 5.0 g/dL    Alkaline Phosphatase 70 33 - 136 U/L    Total Protein 6.1 (L) 6.4 - 8.2 g/dL    AST 18 9 - 39 U/L    Bilirubin, Total 1.3 (H) 0.0 - 1.2 mg/dL    ALT 11 10 - 52 U/L   Troponin I, High Sensitivity    Collection Time: 03/22/25  1:42 PM   Result Value Ref Range    Troponin I, High Sensitivity 155 (HH) 0 - 20 ng/L   Protime-INR    Collection Time: 03/22/25  1:42 PM   Result Value Ref Range    Protime 17.3 (H) 9.8 - 12.4 seconds    INR 1.6 (H) 0.9 - 1.1   APTT    Collection Time: 03/22/25  1:42 PM   Result Value Ref Range    aPTT 28 26 - 36 seconds   Gray Top    Collection Time: 03/22/25  1:42 PM   Result Value Ref Range    Extra Tube Hold for add-ons.    Heparin Assay    Collection Time: 03/22/25  1:42 PM   Result Value Ref Range    Heparin Unfractionated >2.0 (HH) See Comment Below for Therapeutic Ranges IU/mL   POCT  GLUCOSE    Collection Time: 03/22/25  1:57 PM   Result Value Ref Range    POCT Glucose 143 (H) 74 - 99 mg/dL   ECG 12 lead    Collection Time: 03/22/25  2:14 PM   Result Value Ref Range    Ventricular Rate 99 BPM    Atrial Rate 111 BPM    QRS Duration 96 ms    QT Interval 354 ms    QTC Calculation(Bazett) 454 ms    R Axis 49 degrees    T Axis -29 degrees    QRS Count 16 beats    Q Onset 218 ms    T Offset 395 ms    QTC Fredericia 418 ms   Troponin I, High Sensitivity    Collection Time: 03/22/25  7:02 PM   Result Value Ref Range    Troponin I, High Sensitivity 189 (HH) 0 - 20 ng/L   Basic metabolic panel    Collection Time: 03/22/25  7:02 PM   Result Value Ref Range    Glucose 97 74 - 99 mg/dL    Sodium 129 (L) 136 - 145 mmol/L    Potassium 4.3 3.5 - 5.3 mmol/L    Chloride 91 (L) 98 - 107 mmol/L    Bicarbonate 31 21 - 32 mmol/L    Anion Gap 11 10 - 20 mmol/L    Urea Nitrogen 14 6 - 23 mg/dL    Creatinine 1.05 0.50 - 1.30 mg/dL    eGFR 69 >60 mL/min/1.73m*2    Calcium 9.6 8.6 - 10.3 mg/dL   Osmolality    Collection Time: 03/22/25  7:02 PM   Result Value Ref Range    Osmolality, Serum 277 (L) 280 - 300 mOsm/kg   B-type natriuretic peptide    Collection Time: 03/22/25  7:02 PM   Result Value Ref Range     (H) 0 - 99 pg/mL   SST TOP    Collection Time: 03/23/25  6:11 AM   Result Value Ref Range    Extra Tube Hold for add-ons.    Magnesium    Collection Time: 03/23/25  6:12 AM   Result Value Ref Range    Magnesium 1.73 1.60 - 2.40 mg/dL   CBC    Collection Time: 03/23/25  6:12 AM   Result Value Ref Range    WBC 6.8 4.4 - 11.3 x10*3/uL    nRBC 0.0 0.0 - 0.0 /100 WBCs    RBC 3.18 (L) 4.50 - 5.90 x10*6/uL    Hemoglobin 10.6 (L) 13.5 - 17.5 g/dL    Hematocrit 30.7 (L) 41.0 - 52.0 %    MCV 97 80 - 100 fL    MCH 33.3 26.0 - 34.0 pg    MCHC 34.5 32.0 - 36.0 g/dL    RDW 15.2 (H) 11.5 - 14.5 %    Platelets 172 150 - 450 x10*3/uL   Comprehensive Metabolic Panel    Collection Time: 03/23/25  6:12 AM   Result Value Ref Range     Glucose 109 (H) 74 - 99 mg/dL    Sodium 128 (L) 136 - 145 mmol/L    Potassium 4.0 3.5 - 5.3 mmol/L    Chloride 93 (L) 98 - 107 mmol/L    Bicarbonate 29 21 - 32 mmol/L    Anion Gap 10 10 - 20 mmol/L    Urea Nitrogen 14 6 - 23 mg/dL    Creatinine 0.87 0.50 - 1.30 mg/dL    eGFR 84 >60 mL/min/1.73m*2    Calcium 8.9 8.6 - 10.3 mg/dL    Albumin 3.1 (L) 3.4 - 5.0 g/dL    Alkaline Phosphatase 61 33 - 136 U/L    Total Protein 5.7 (L) 6.4 - 8.2 g/dL    AST 17 9 - 39 U/L    Bilirubin, Total 1.5 (H) 0.0 - 1.2 mg/dL    ALT 10 10 - 52 U/L   Lipid Panel    Collection Time: 03/23/25  6:12 AM   Result Value Ref Range    Cholesterol 96 0 - 199 mg/dL    HDL-Cholesterol 41.2 mg/dL    Cholesterol/HDL Ratio 2.3     LDL Calculated 45 <=99 mg/dL    VLDL 10 0 - 40 mg/dL    Triglycerides 49 0 - 149 mg/dL    Non HDL Cholesterol 55 0 - 149 mg/dL   TSH with reflex to Free T4 if abnormal    Collection Time: 03/23/25  6:12 AM   Result Value Ref Range    Thyroid Stimulating Hormone 1.69 0.44 - 3.98 mIU/L   Troponin I, High Sensitivity    Collection Time: 03/23/25  6:12 AM   Result Value Ref Range    Troponin I, High Sensitivity 136 (HH) 0 - 20 ng/L   Uric Acid    Collection Time: 03/23/25  6:12 AM   Result Value Ref Range    Uric Acid 5.0 4.0 - 7.5 mg/dL   Urine electrolytes    Collection Time: 03/23/25  9:36 AM   Result Value Ref Range    Sodium, Urine Random 62 mmol/L    Sodium/Creatinine Ratio 65 Not established. mmol/g Creat    Potassium, Urine Random 44 mmol/L    Potassium/Creatinine Ratio 46 Not established mmol/g Creat    Chloride, Urine Random 31 mmol/L    Chloride/Creatinine Ratio 33 23 - 275 mmol/g creat    Creatinine, Urine Random 95.0 20.0 - 370.0 mg/dL   Urea Nitrogen, Urine Random    Collection Time: 03/23/25  9:36 AM   Result Value Ref Range    Urea Nitrogen, Urine Random 567 mg/dL    Creatinine, Urine Random 95.0 20.0 - 370.0 mg/dL    Urea Nitrogen/Creatinine Ratio 6.0 Not established. g/g creat   ECG 12 Lead    Collection Time:  03/23/25 11:42 AM   Result Value Ref Range    Ventricular Rate 93 BPM    Atrial Rate 394 BPM    QRS Duration 98 ms    QT Interval 340 ms    QTC Calculation(Bazett) 422 ms    R Axis 20 degrees    T Axis -49 degrees    QRS Count 15 beats    Q Onset 218 ms    T Offset 388 ms    QTC Fredericia 393 ms      NIH Stroke Scale  1A. Level of Consciousness: Alert, Keenly Responsive  1B. Ask Month and Age: Both Questions Right  1C. Blink Eyes & Squeeze Hands: Performs Both Tasks  2. Best Gaze: Normal  3. Visual: No Visual Loss  4. Facial Palsy: Normal Symmetrical Movements  5A. Motor - Left Arm: No Drift  5B. Motor - Right Arm: No Drift  6A. Motor - Left Leg: No Drift  6B. Motor - Right Leg: No Drift  7. Limb Ataxia: Absent  8. Sensory Loss: Normal  9. Best Language: No Aphasia  10. Dysarthria: Mild-to-Moderate Dysarthria  11. Extinction and Inattention: No Abnormality  NIH Stroke Scale: 1           [place] Coma Scale  Best Eye Response: Spontaneous  Best Verbal Response: Oriented  Best Motor Response: Follows commands  [place] Coma Scale Score: 15              CMP:    Results from last 7 days   Lab Units 03/23/25  0612 03/22/25  1902 03/22/25  1342   SODIUM mmol/L 128* 129* 127*   POTASSIUM mmol/L 4.0 4.3 3.9   CHLORIDE mmol/L 93* 91* 91*   CO2 mmol/L 29 31 28   BUN mg/dL 14 14 16   CREATININE mg/dL 0.87 1.05 1.02   GLUCOSE mg/dL 109* 97 142*   PROTEIN TOTAL g/dL 5.7*  --  6.1*   CALCIUM mg/dL 8.9 9.6 9.2   BILIRUBIN TOTAL mg/dL 1.5*  --  1.3*   ALK PHOS U/L 61  --  70   AST U/L 17  --  18   ALT U/L 10  --  11       Lipid Panel:  Results from last 7 days   Lab Units 03/23/25  0612   HDL mg/dL 41.2   CHOLESTEROL/HDL RATIO  2.3   VLDL mg/dL 10   TRIGLYCERIDES mg/dL 49   NON HDL CHOL. mg/dL 55            No MRI head results found for the past 14 days  CT brain attack head wo IV contrast    Result Date: 3/22/2025  Interpreted By:  Alfonso Womack, STUDY: CT BRAIN ATTACK HEAD WO IV CONTRAST;  3/22/2025 1:32 pm   INDICATION:  Signs/Symptoms:Stroke Evaluation.   COMPARISON: None.   ACCESSION NUMBER(S): [id number]   ORDERING CLINICIAN: SALO UMAÑA   TECHNIQUE: Sequential trans axial images were obtained  .   FINDINGS: INTRACRANIAL:   There is mild prominence of the cortical sulci indicating atrophy.   There is mild ventriculomegaly, again consistent with atrophy.   Mild decreased attenuation of the periventricular and long tracks of the white matter most consistent with gliosis from arterial disease. There is no evidence of definite subacute infarction, intracranial hemorrhage or mass.     EXTRACRANIAL: Visualized paranasal sinuses and mastoids are clear. The calvarium is intact.       Mild age related degenerative change as described without acute findings.   MACRO: Alfonso Womack discussed the significance and urgency of this critical finding by secure chat with  SALO UMAÑA on 3/22/2025 at 1:38 pm. (**-RCF-**) Findings:  See findings.   Signed by: Alfonso Womack 3/22/2025 1:39 PM Dictation workstation:   [id number]   No echocardiogram results found for the past 14 days        BNP   Date/Time Value Ref Range Status   03/22/2025 07:02  (H) 0 - 99 pg/mL Final        I have personally reviewed the following imaging results ECG 12 Lead    Result Date: 3/23/2025  Atrial fibrillation with premature ventricular or aberrantly conducted complexes Cannot rule out Anterior infarct , age undetermined Abnormal ECG When compared with ECG of 22-MAR-2025 14:07, Nonspecific T wave abnormality now evident in Anterior leads    ECG 12 lead    Result Date: 3/23/2025  Atrial fibrillation Nonspecific T wave abnormality Abnormal ECG When compared with ECG of 13-OCT-2024 07:07, No significant change was found See ED provider note for full interpretation and clinical correlation Confirmed by Nevin Durand ([id number]) on 3/23/2025 10:33:41 AM    Carotid duplex bilateral    Result Date: 3/23/2025  Interpreted By:  Rhett Rendon, STUDY: [facility] US CAROTID ARTERY  DUPLEX BILATERAL;  3/23/2025 8:26 am   INDICATION: Signs/Symptoms:CVA sx. ,R29.90 Unspecified symptoms and signs involving the nervous system,Z86.73 Personal history of transient ischemic attack (TIA), and cerebral infarction without residual deficits   COMPARISON: None.   ACCESSION NUMBER(S): [id number]   ORDERING CLINICIAN: ALEXIS LEAVITT   TECHNIQUE: Ultrasound of the bilateral carotid arterial systems was performed from the angle of the mandible down through the base of the neck, using grey scale imaging, color doppler, and spectral doppler.   FINDINGS: Right Side: Right CCA PSV 40 cm/sec; CCA EDV was 15 cm/sec;  ICA PSV was  43 cm/sec; ICA EDV  9 cm/sec.  Right ICA:CCA PSV Ratio was 1.1. These spectral findings fall within the 0-50 % diameter stenosis category. On grey scale imaging and color doppler,  there was  very mild calcified plaque in the carotid bulb and proximal ICA.. Right vertebral artery flow was antegrade.   Left Side: Left CCA PSV 49 cm/sec; CCA EDV was 18 cm/sec; ICA PSV was 46 cm/sec; ICA EDV 15 cm/sec .  Left ICA:CCA PSV Ratio was 0.9. These spectral findings fall within the 0-50 % diameter stenosis category.  On grey scale imaging and color doppler, there was  very mild calcified plaque in the carotid bulb and proximal ICA.. Left vertebral artery flow was antegrade.       Very mild, hemodynamically insignificant calcified plaque in each carotid bulb and in each proximal ICA as described.   MACRO: None   Signed by: Rhett Rendon 3/23/2025 9:58 AM Dictation workstation:   [id number]    CT brain attack head wo IV contrast    Result Date: 3/22/2025  Interpreted By:  Alfonso Womack, STUDY: CT BRAIN ATTACK HEAD WO IV CONTRAST;  3/22/2025 1:32 pm   INDICATION: Signs/Symptoms:Stroke Evaluation.   COMPARISON: None.   ACCESSION NUMBER(S): [id number]   ORDERING CLINICIAN: SALO UMAÑA   TECHNIQUE: Sequential trans axial images were obtained  .   FINDINGS: INTRACRANIAL:   There is mild prominence of the  cortical sulci indicating atrophy.   There is mild ventriculomegaly, again consistent with atrophy.   Mild decreased attenuation of the periventricular and long tracks of the white matter most consistent with gliosis from arterial disease. There is no evidence of definite subacute infarction, intracranial hemorrhage or mass.     EXTRACRANIAL: Visualized paranasal sinuses and mastoids are clear. The calvarium is intact.       Mild age related degenerative change as described without acute findings.   MACRO: Alfonso Womack discussed the significance and urgency of this critical finding by secure chat with  SALO UMAÑA on 3/22/2025 at 1:38 pm. (**-RCF-**) Findings:  See findings.   Signed by: Alfonso Womack 3/22/2025 1:39 PM Dictation workstation:   [id number]  .            Assessment/Plan   Assessment & Plan  Stroke-like symptoms    CAD (coronary artery disease)    Benign prostatic hyperplasia    Current smoker    Emphysema/COPD (Multi)    Hyperlipidemia    Primary hypertension    Amiodarone-induced hyperthyroidism    Slurred speech    Chronic heart failure with preserved ejection fraction (HFpEF)    Permanent atrial fibrillation (Multi)    Hyponatremia    Elevated troponin  Impression.  Acute speech difficulty with difficulty finding words most likely left cerebral CVA/TIA need to rule out seizure which is unlikely.  Chronic atrial fibrillation currently on Eliquis  Hyponatremia  CA of the lung  Plan.  Continue with current treatment and await for MRI MRA of the head.  Patient already had carotids done which was normal and echo which is pending with bubble study.  Patient is DNR Comfort Care arrest and if the MRI shows any infarction may add a baby aspirin.  Since patient is taking Eliquis adequately and wait for factor Xa assay to see whether Eliquis is effective or not    Plan course and the prognosis was discussed with patient which she understood and will follow with you    Due to technical limitations of voice  recognition and human error, this note may not accurately reflect the care of the patient.        NIHSS (worst at presentation):     Vascular Risk Factor modification goals:  Blood pressure goals: avoid hypotension SBP <100 that could worsen cerebral perfusion,   Lipid Goals: education on healthy diet and   Glucose Goals: early treatment of hyperglycemia to goal glucose 140-180 mg/dl with long-term goal A1c < 7%   Smoking Cessation and Education  Assessment for Rehabilitation needs   Patient and family education on signs and symptoms of stroke, calling 911, healthy strategies for stroke prevention.           Due to technical limitations of voice recognition and human error, this note may not accurately reflect the care of the patient.   Juan Carlos Hoffman MD

## 2025-03-24 ENCOUNTER — APPOINTMENT (OUTPATIENT)
Dept: CARDIOLOGY | Facility: HOSPITAL | Age: 89
DRG: 065 | End: 2025-03-24
Payer: MEDICARE

## 2025-03-24 LAB
ANION GAP SERPL CALC-SCNC: 9 MMOL/L (ref 10–20)
ATRIAL RATE: 375 BPM
BUN SERPL-MCNC: 12 MG/DL (ref 6–23)
CALCIUM SERPL-MCNC: 8.6 MG/DL (ref 8.6–10.3)
CHLORIDE SERPL-SCNC: 94 MMOL/L (ref 98–107)
CO2 SERPL-SCNC: 30 MMOL/L (ref 21–32)
CREAT SERPL-MCNC: 0.75 MG/DL (ref 0.5–1.3)
EGFRCR SERPLBLD CKD-EPI 2021: 87 ML/MIN/1.73M*2
EJECTION FRACTION APICAL 4 CHAMBER: 45
EJECTION FRACTION: 45 %
ERYTHROCYTE [DISTWIDTH] IN BLOOD BY AUTOMATED COUNT: 14.9 % (ref 11.5–14.5)
FACT X ACT/NOR PPP: 69 % (ref 75–130)
GLUCOSE SERPL-MCNC: 107 MG/DL (ref 74–99)
HCT VFR BLD AUTO: 30 % (ref 41–52)
HGB BLD-MCNC: 10.1 G/DL (ref 13.5–17.5)
LEFT VENTRICLE INTERNAL DIMENSION DIASTOLE: 4.21 CM (ref 3.5–6)
LV EJECTION FRACTION BIPLANE: 45 %
MCH RBC QN AUTO: 32.8 PG (ref 26–34)
MCHC RBC AUTO-ENTMCNC: 33.7 G/DL (ref 32–36)
MCV RBC AUTO: 97 FL (ref 80–100)
NRBC BLD-RTO: 0 /100 WBCS (ref 0–0)
PLATELET # BLD AUTO: 164 X10*3/UL (ref 150–450)
POTASSIUM SERPL-SCNC: 3.7 MMOL/L (ref 3.5–5.3)
Q ONSET: 224 MS
QRS COUNT: 15 BEATS
QRS DURATION: 88 MS
QT INTERVAL: 340 MS
QTC CALCULATION(BAZETT): 418 MS
QTC FREDERICIA: 391 MS
R AXIS: 14 DEGREES
RBC # BLD AUTO: 3.08 X10*6/UL (ref 4.5–5.9)
RIGHT VENTRICLE PEAK SYSTOLIC PRESSURE: 32.2 MMHG
SODIUM SERPL-SCNC: 129 MMOL/L (ref 136–145)
T AXIS: -28 DEGREES
T OFFSET: 394 MS
VENTRICULAR RATE: 91 BPM
WBC # BLD AUTO: 8 X10*3/UL (ref 4.4–11.3)

## 2025-03-24 PROCEDURE — 80048 BASIC METABOLIC PNL TOTAL CA: CPT | Performed by: INTERNAL MEDICINE

## 2025-03-24 PROCEDURE — 94640 AIRWAY INHALATION TREATMENT: CPT

## 2025-03-24 PROCEDURE — 2500000004 HC RX 250 GENERAL PHARMACY W/ HCPCS (ALT 636 FOR OP/ED): Performed by: INTERNAL MEDICINE

## 2025-03-24 PROCEDURE — 93321 DOPPLER ECHO F-UP/LMTD STD: CPT | Performed by: INTERNAL MEDICINE

## 2025-03-24 PROCEDURE — 93325 DOPPLER ECHO COLOR FLOW MAPG: CPT | Performed by: INTERNAL MEDICINE

## 2025-03-24 PROCEDURE — 99232 SBSQ HOSP IP/OBS MODERATE 35: CPT | Performed by: STUDENT IN AN ORGANIZED HEALTH CARE EDUCATION/TRAINING PROGRAM

## 2025-03-24 PROCEDURE — 93325 DOPPLER ECHO COLOR FLOW MAPG: CPT

## 2025-03-24 PROCEDURE — 99233 SBSQ HOSP IP/OBS HIGH 50: CPT | Performed by: INTERNAL MEDICINE

## 2025-03-24 PROCEDURE — 2500000001 HC RX 250 WO HCPCS SELF ADMINISTERED DRUGS (ALT 637 FOR MEDICARE OP): Performed by: NURSE PRACTITIONER

## 2025-03-24 PROCEDURE — 93005 ELECTROCARDIOGRAM TRACING: CPT

## 2025-03-24 PROCEDURE — 2500000002 HC RX 250 W HCPCS SELF ADMINISTERED DRUGS (ALT 637 FOR MEDICARE OP, ALT 636 FOR OP/ED): Performed by: INTERNAL MEDICINE

## 2025-03-24 PROCEDURE — 92523 SPEECH SOUND LANG COMPREHEN: CPT | Mod: GN

## 2025-03-24 PROCEDURE — S4991 NICOTINE PATCH NONLEGEND: HCPCS | Performed by: INTERNAL MEDICINE

## 2025-03-24 PROCEDURE — 1200000002 HC GENERAL ROOM WITH TELEMETRY DAILY

## 2025-03-24 PROCEDURE — 93010 ELECTROCARDIOGRAM REPORT: CPT | Performed by: INTERNAL MEDICINE

## 2025-03-24 PROCEDURE — 99223 1ST HOSP IP/OBS HIGH 75: CPT | Performed by: INTERNAL MEDICINE

## 2025-03-24 PROCEDURE — 85027 COMPLETE CBC AUTOMATED: CPT | Performed by: INTERNAL MEDICINE

## 2025-03-24 PROCEDURE — 36415 COLL VENOUS BLD VENIPUNCTURE: CPT | Performed by: INTERNAL MEDICINE

## 2025-03-24 PROCEDURE — 93308 TTE F-UP OR LMTD: CPT | Performed by: INTERNAL MEDICINE

## 2025-03-24 PROCEDURE — 2500000001 HC RX 250 WO HCPCS SELF ADMINISTERED DRUGS (ALT 637 FOR MEDICARE OP): Performed by: INTERNAL MEDICINE

## 2025-03-24 RX ORDER — METOPROLOL SUCCINATE 50 MG/1
100 TABLET, EXTENDED RELEASE ORAL DAILY
Status: DISCONTINUED | OUTPATIENT
Start: 2025-03-24 | End: 2025-03-25 | Stop reason: HOSPADM

## 2025-03-24 RX ORDER — DABIGATRAN ETEXILATE 150 MG/1
150 CAPSULE ORAL 2 TIMES DAILY
Status: DISCONTINUED | OUTPATIENT
Start: 2025-03-24 | End: 2025-03-25 | Stop reason: HOSPADM

## 2025-03-24 RX ORDER — METOPROLOL SUCCINATE 50 MG/1
100 TABLET, EXTENDED RELEASE ORAL DAILY
Status: CANCELLED | OUTPATIENT
Start: 2025-03-24

## 2025-03-24 RX ADMIN — PREDNISONE 10 MG: 10 TABLET ORAL at 08:45

## 2025-03-24 RX ADMIN — APIXABAN 5 MG: 5 TABLET, FILM COATED ORAL at 08:45

## 2025-03-24 RX ADMIN — ALFUZOSIN HYDROCHLORIDE 10 MG: 10 TABLET, EXTENDED RELEASE ORAL at 08:45

## 2025-03-24 RX ADMIN — METOPROLOL SUCCINATE 100 MG: 50 TABLET, EXTENDED RELEASE ORAL at 08:45

## 2025-03-24 RX ADMIN — IPRATROPIUM BROMIDE AND ALBUTEROL SULFATE 3 ML: 2.5; .5 SOLUTION RESPIRATORY (INHALATION) at 20:09

## 2025-03-24 RX ADMIN — FUROSEMIDE 40 MG: 40 TABLET ORAL at 08:45

## 2025-03-24 RX ADMIN — MAGNESIUM OXIDE 400 MG (241.3 MG MAGNESIUM) TABLET 400 MG: TABLET at 08:45

## 2025-03-24 RX ADMIN — NICOTINE 1 PATCH: 21 PATCH, EXTENDED RELEASE TRANSDERMAL at 08:45

## 2025-03-24 RX ADMIN — TIOTROPIUM BROMIDE INHALATION SPRAY 1 PUFF: 3.12 SPRAY, METERED RESPIRATORY (INHALATION) at 08:14

## 2025-03-24 RX ADMIN — IPRATROPIUM BROMIDE AND ALBUTEROL SULFATE 3 ML: 2.5; .5 SOLUTION RESPIRATORY (INHALATION) at 06:44

## 2025-03-24 RX ADMIN — FINASTERIDE 5 MG: 5 TABLET, FILM COATED ORAL at 08:45

## 2025-03-24 RX ADMIN — PERFLUTREN 2 ML OF DILUTION: 6.52 INJECTION, SUSPENSION INTRAVENOUS at 08:05

## 2025-03-24 RX ADMIN — DABIGATRAN ETEXILATE MESYLATE 150 MG: 150 CAPSULE ORAL at 21:31

## 2025-03-24 RX ADMIN — ROSUVASTATIN CALCIUM 10 MG: 10 TABLET, FILM COATED ORAL at 21:31

## 2025-03-24 RX ADMIN — MAGNESIUM OXIDE 400 MG (241.3 MG MAGNESIUM) TABLET 400 MG: TABLET at 21:31

## 2025-03-24 ASSESSMENT — COGNITIVE AND FUNCTIONAL STATUS - GENERAL
HELP NEEDED FOR BATHING: A LITTLE
HELP NEEDED FOR BATHING: A LITTLE
MOVING TO AND FROM BED TO CHAIR: A LITTLE
PERSONAL GROOMING: A LITTLE
MOBILITY SCORE: 18
PERSONAL GROOMING: A LITTLE
STANDING UP FROM CHAIR USING ARMS: A LITTLE
WALKING IN HOSPITAL ROOM: A LITTLE
DRESSING REGULAR LOWER BODY CLOTHING: A LITTLE
DAILY ACTIVITIY SCORE: 18
STANDING UP FROM CHAIR USING ARMS: A LITTLE
WALKING IN HOSPITAL ROOM: A LITTLE
MOBILITY SCORE: 18
CLIMB 3 TO 5 STEPS WITH RAILING: A LITTLE
TURNING FROM BACK TO SIDE WHILE IN FLAT BAD: A LITTLE
TOILETING: A LITTLE
DRESSING REGULAR LOWER BODY CLOTHING: A LITTLE
TOILETING: A LITTLE
MOVING FROM LYING ON BACK TO SITTING ON SIDE OF FLAT BED WITH BEDRAILS: A LITTLE
EATING MEALS: A LITTLE
TURNING FROM BACK TO SIDE WHILE IN FLAT BAD: A LITTLE
DAILY ACTIVITIY SCORE: 18
DRESSING REGULAR UPPER BODY CLOTHING: A LITTLE
MOVING TO AND FROM BED TO CHAIR: A LITTLE
CLIMB 3 TO 5 STEPS WITH RAILING: A LITTLE
MOVING FROM LYING ON BACK TO SITTING ON SIDE OF FLAT BED WITH BEDRAILS: A LITTLE
EATING MEALS: A LITTLE
DRESSING REGULAR UPPER BODY CLOTHING: A LITTLE

## 2025-03-24 ASSESSMENT — PAIN SCALES - GENERAL
PAINLEVEL_OUTOF10: 0 - NO PAIN

## 2025-03-24 ASSESSMENT — PAIN - FUNCTIONAL ASSESSMENT
PAIN_FUNCTIONAL_ASSESSMENT: 0-10

## 2025-03-24 ASSESSMENT — ACTIVITIES OF DAILY LIVING (ADL): LACK_OF_TRANSPORTATION: NO

## 2025-03-24 NOTE — PROGRESS NOTES
03/24/25 1124   Discharge Planning   Living Arrangements Children   Support Systems Family members;Children   Assistance Needed PTA, pt reports  all ADLs IND, has assist with IADLs, ambulates IND (RWW at night).no falls in the past 3 months. Drives. SLP consult saw pt. rec. continued spch tx.   Type of Residence Private residence  (in-law suite at sons home 1 level)   Number of Stairs to Enter Residence 3  (w. HR)   Number of Stairs Within Residence 0   Who is requesting discharge planning? Provider   Home or Post Acute Services Community services;Other (Comment)  (pt prefers outpatient speech tx. no pt/ot needs)   Type of Home Care Services   (vs outpt)   Expected Discharge Disposition Home  (outpt speech tx list given)   Does the patient need discharge transport arranged? No   Transportation Needs   In the past 12 months, has lack of transportation kept you from medical appointments or from getting medications? no   In the past 12 months, has lack of transportation kept you from meetings, work, or from getting things needed for daily living? No   Stroke Family Assessment   Stroke Family Assessment Needed Yes   Physical Layout of Home One Story   Caregiver/Family can provide assistance with ADL's Other  (NA)   Caregiver/Family can provide mobility assistance for transfers in and out of bed, chair or car Other  (NA)   Medications: Caregiver/Family can obtain prescriptions Yes  (pt drives)   Medications: Caregiver/Family can assist with medication administration Other  (NA- no defcit w/ cognition)   Caregiver/Family agrees with discharge plan to home Other  (pt prefers outpt speech tx. has no need for placement. no pt/ot needs)   Intensity of Service   Intensity of Service 0-30 min     Pt has dysarthria. Able to communicate, slow response at times.  Pt confirms demo' s. Pta was independent in adls , he has a friend who cooks for him or he goes out to eat w/ the friend. Otherwise can complete own iadls. Pt uses a  cane when outside the home. A fww at  in the home. Pt self manages medications. To go home on Eliquis tx. Rn navigator consulted.  Pt was given option [facility] vs outpatient for  his speech tx needs. He prefers outpt.  Pt given  locations. Will need rx/referral. Rn updated.

## 2025-03-24 NOTE — CARE PLAN
The patient's goals for the shift include rest     The clinical goals for the shift include Patient will maintain neurological status at baseline throughout shift

## 2025-03-24 NOTE — H&P (VIEW-ONLY)
Cardiology Consult Note      Date:   3/24/2025  Patient name:  Canelo Moore  Date of admission:  3/22/2025  1:28 PM  MRN:   [id number]  YOB: 1935  Time of Consult:  9:02 AM    Consulting Cardiologist: Dr. Garo Vaca, APRN, CNP  Primary Cardiologist:  Dr. Yi Montejo    Referring Provider: Dr Caceres      Admission Diagnosis:     Acute CVA (cerebrovascular accident) (Multi)      History of Present Illness:      Canelo Moore is a 89 y.o.  male patient who is being at the request of Dr. Caceres for inpatient consultation of  acute CVA . He was admitted on 3/22/2025.  Previous [facility] and [facility] records have been reviewed in detail.    Patient with a history of PAF, hypertension, dyslipidemia, tobacco abuse, hyperparathyroidism, CAD, lung CA refused biopsy last year  Patient states was at home yesterday was having episodes where he found it difficult to find words he had his son drive him into the emergency department they admitted him to the ninth floor and kindly asked cardiology to get involved with this case.  He does have chronic A-fib where he is currently on Eliquis he was taking Coumadin in the past but he had seen EP and they had switched him because he had episodes of having high INR's to Eliquis.  Patient states that he does take his Eliquis every morning and every evening.  when he was at home he states it came on all of a sudden just having difficulty finding words he was seen by neurology they were concerned for left cerebral CVA.  Patient states he has been doing very well he has not been having any chest pain he has been feeling pretty good this was sudden onset of the slurred speech he states that never really had any weaknesses to her upper or lower extremities this is all just trying to find his words when speaking.  His chest pain, shortness of breath, nausea, vomiting, PND, orthopnea, claudications  Positive for acute speech difficulty with finding words    EKG  is A-fib with PVCs  Troponin 155, 189, 136  Hemoglobin 10.1  Hematocrit 30.0  Sodium 129  Potassium 3.7  Creatinine 0.75    Cardiac history  10/11/2024 echo  CONCLUSIONS:   1. The left ventricular systolic function is normal, with a visually estimated ejection fraction of 60%.   2. Spectral Doppler shows an abnormal pattern of left ventricular diastolic filling.   3. There is normal right ventricular global systolic function.   4. RVSP 37 mm Hg.   5. The left atrium is moderate to severely dilated.   6. Mildly elevated right ventricular systolic pressure.   7. Normal valve structure and function.     4/25/2024 stress test  Summary:   1. Low functional capacity at 3.1 MET, limited by dyspnea, no chest pain.   2. No evidence ischemia by ecg criteria.   3. Markedly hypertensive BP response to exercise.   4. Frequent pvc/pac during recovery.   5. Achieved <85% THR at 82% but excellent HR/BP product, with no ecg changes likelihood of critical CAD low but cannot exclude the presence of CAD.   6. Inadequate level of stress achieved.             Allergies:     Allergies   Allergen Reactions    Ace Inhibitors Cough         Past Medical History:     Past Medical History:   Diagnosis Date    Atrial fibrillation (Multi)     BPH (benign prostatic hyperplasia)     CAD (coronary artery disease)     Controlled atrial fibrillation (Multi)     COPD (chronic obstructive pulmonary disease) (Multi)     External hemorrhoid 09/12/2023    Hyperlipidemia     Hypertension     Right inguinal hernia 09/12/2023    Stage 3a chronic kidney disease (CKD) (Multi)        Past Surgical History:     Past Surgical History:   Procedure Laterality Date    OTHER SURGICAL HISTORY  11/04/2020    Varicose vein ligation    OTHER SURGICAL HISTORY  11/04/2020    [place] tooth extraction    OTHER SURGICAL HISTORY  11/04/2020    Tonsillectomy    OTHER SURGICAL HISTORY  11/04/2020    Colonoscopy complete for polypectomy    OTHER SURGICAL HISTORY  11/06/2021     Varicose vein sclerotherapy    OTHER SURGICAL HISTORY  11/06/2021    Pilonidal cyst removal    OTHER SURGICAL HISTORY  11/06/2021    Hernia repair    OTHER SURGICAL HISTORY  12/14/2021    Umbilical hernia repair    OTHER SURGICAL HISTORY  12/14/2021    Inguinal hernia repair    US ASPIRATION INJECTION INTERMEDIATE JOINT  2/8/2021    US ASPIRATION INJECTION INTERMEDIATE JOINT 2/8/2021 [facility]       Family History:     Family History   Problem Relation Name Age of Onset    Breast cancer Mother      Atrial fibrillation Mother      Colon cancer Brother      Prostate cancer Brother      Breast cancer Daughter      Prostate cancer Son      Colon cancer Son         Social History:     Social History     Tobacco Use    Smoking status: Every Day     Current packs/day: 0.50     Average packs/day: 0.5 packs/day for 60.0 years (30.0 ttl pk-yrs)     Types: Cigarettes    Smokeless tobacco: Never   Vaping Use    Vaping status: Never Used   Substance Use Topics    Alcohol use: Not Currently    Drug use: Not Currently       CURRENT INPATIENT MEDICATIONS    alfuzosin, 10 mg, oral, Daily  apixaban, 5 mg, oral, BID  finasteride, 5 mg, oral, Daily  furosemide, 40 mg, oral, Every Mon/Wed/Fri  ipratropium-albuteroL, 3 mL, nebulization, BID  magnesium oxide, 400 mg, oral, BID  [Held by provider] methIMAzole, 20 mg, oral, Daily  metoprolol succinate XL, 100 mg, oral, Daily  nicotine, 1 patch, transdermal, Daily  predniSONE, 10 mg, oral, Daily  rosuvastatin, 10 mg, oral, Nightly  tiotropium, 1 puff, inhalation, Daily  [Held by provider] valsartan, 40 mg, oral, Daily         Current Outpatient Medications   Medication Instructions    albuterol (Ventolin HFA) 90 mcg/actuation inhaler 2 puffs, inhalation, Every 6 hours PRN    alfuzosin (UROXATRAL) 10 mg, oral, Daily, Do not crush, chew, or split.    apixaban (ELIQUIS) 5 mg, oral, 2 times daily    finasteride (PROSCAR) 5 mg, oral, Daily    furosemide (LASIX) 40 mg, oral, Every  Mon/Wed/Fri    magnesium oxide (MAG-OX) 400 mg, oral, 2 times daily    methIMAzole (TAPAZOLE) 20 mg, oral, Every 12 hours scheduled    metoprolol succinate XL (TOPROL-XL) 100 mg, oral, Daily, Do not crush or chew.    multivitamin (One Daily Multivitamin) tablet 1 tablet, Daily    nitroglycerin (NITROSTAT) 0.4 mg, sublingual, Every 5 min PRN, place 1 tablet under tongue every 5 minutes for up to 3 doses as needed for chest pain. Call 911 if pain persists    rosuvastatin (CRESTOR) 10 mg, oral, Nightly    tiotropium (SPIRIVA WITH HANDIHALER) 18 mcg, inhalation, Daily RT    valsartan-hydrochlorothiazide (Diovan-HCT) 160-12.5 mg tablet 1 tablet, oral, Daily        Review of Systems:      12 point review of systems was obtained in detail and is negative other than that detailed above.    Vital Signs:     Vitals:    03/24/25 0101 03/24/25 0553 03/24/25 0644 03/24/25 0829   BP: 103/62 116/63  114/56   Pulse: 110 106  98   Resp: 20 20  17   Temp: 36.5 °C (97.7 °F) 37.1 °C (98.8 °F)  36.8 °C (98.2 °F)   TempSrc:       SpO2: 93% 93% 93% 93%   Weight:       Height:           Intake/Output Summary (Last 24 hours) at 3/24/2025 0902  Last data filed at 3/23/2025 1906  Gross per 24 hour   Intake --   Output 600 ml   Net -600 ml       Wt Readings from Last 4 Encounters:   03/22/25 79.4 kg (175 lb)   02/18/25 76.8 kg (169 lb 6.4 oz)   01/14/25 77.1 kg (170 lb)   11/27/24 74.8 kg (164 lb 12.8 oz)       Physical Examination:     GENERAL APPEARANCE: Well developed, well nourished, in no acute distress.  CHEST: Symmetric and non-tender.  INTEGUMENT: Skin warm and dry, without gross excoriationis or lesions.  HEENT: No gross abnormalities of conjunctiva, teeth, gums, oral mucosa  NECK: Supple, no JVD, no bruit. Thyroid not palpable. Carotid upstrokes normal.  NEURO/PSHCY: Oriented x 3.  He does have difficulty finding words at times  LUNGS: Clear to auscultation bilaterally; normal respiratory effort.  HEART: S1, S2 irregular with 1 out  of 6 systolic murmur  ABDOMEN: Soft, nontender, no palpable hepatosplenomegaly, no mases, no bruits. Abdominal aorta not noted to be enlarged.  MUSCULOSKELETAL: Ambulatory with normal tandem gait.  EXTREMITIES: Warm with good color, no clubbing or cyanois. There is no edema noted.  PERIPHERAL VASCULAR: Pulses present and equally palpable; 1+ throughout. No femoral bruits.      Lab:     CBC:   Results from last 7 days   Lab Units 03/24/25  0604 03/23/25  0612 03/22/25  1342   WBC AUTO x10*3/uL 8.0 6.8 9.1   RBC AUTO x10*6/uL 3.08* 3.18* 3.43*   HEMOGLOBIN g/dL 10.1* 10.6* 11.1*   HEMATOCRIT % 30.0* 30.7* 33.6*   MCV fL 97 97 98   MCH pg 32.8 33.3 32.4   MCHC g/dL 33.7 34.5 33.0   RDW % 14.9* 15.2* 15.0*   PLATELETS AUTO x10*3/uL 164 172 196     CMP:    Results from last 7 days   Lab Units 03/24/25  0604 03/23/25  0612 03/22/25  1902 03/22/25  1342   SODIUM mmol/L 129* 128* 129* 127*   POTASSIUM mmol/L 3.7 4.0 4.3 3.9   CHLORIDE mmol/L 94* 93* 91* 91*   CO2 mmol/L 30 29 31 28   BUN mg/dL 12 14 14 16   CREATININE mg/dL 0.75 0.87 1.05 1.02   GLUCOSE mg/dL 107* 109* 97 142*   PROTEIN TOTAL g/dL  --  5.7*  --  6.1*   CALCIUM mg/dL 8.6 8.9 9.6 9.2   BILIRUBIN TOTAL mg/dL  --  1.5*  --  1.3*   ALK PHOS U/L  --  61  --  70   AST U/L  --  17  --  18   ALT U/L  --  10  --  11     BMP:    Results from last 7 days   Lab Units 03/24/25  0604 03/23/25  0612 03/22/25  1902   SODIUM mmol/L 129* 128* 129*   POTASSIUM mmol/L 3.7 4.0 4.3   CHLORIDE mmol/L 94* 93* 91*   CO2 mmol/L 30 29 31   BUN mg/dL 12 14 14   CREATININE mg/dL 0.75 0.87 1.05   CALCIUM mg/dL 8.6 8.9 9.6   GLUCOSE mg/dL 107* 109* 97     Magnesium:  Results from last 7 days   Lab Units 03/23/25  0612   MAGNESIUM mg/dL 1.73     Troponin:    Results from last 7 days   Lab Units 03/23/25  0612 03/22/25  1902 03/22/25  1342   TROPHS ng/L 136* 189* 155*     BNP:   Results from last 7 days   Lab Units 03/22/25  1902   BNP pg/mL 414*     Lipid Panel:  Results from last 7 days    Lab Units 03/23/25  0612   HDL mg/dL 41.2   CHOLESTEROL/HDL RATIO  2.3   VLDL mg/dL 10   TRIGLYCERIDES mg/dL 49   NON HDL CHOL. mg/dL 55        Diagnostic Studies:   @No results found for this or any previous visit.    MR angio neck wo IV contrast    Result Date: 3/23/2025  Interpreted By:  Mathieu Canseco, STUDY: MR BRAIN W AND WO IV CONTRAST; MR ANGIO HEAD WO IV CONTRAST; MR ANGIO NECK WO IV CONTRAST;  3/23/2025 2:10 pm   INDICATION: Signs/Symptoms:Possible CVA, but has history of probable lung CA-rule out CVA, metastasis.; Signs/Symptoms:Strokelike symptoms, also history lung CA; Signs/Symptoms:CVA symptoms-has had prior MRIs without problems.   COMPARISON: None.   ACCESSION NUMBER(S): [id number]; [id number]; [id number]   ORDERING CLINICIAN: ALEXIS LEAVITT   TECHNIQUE: The brain was studied in the sagittal axial and coronal planes utilizing diffusion, gradient echo T2 weighted FLAIR, T1 and T2 weighted images   Following intravenous injection of gadolinium contrast, T1 weighted fat suppressed multiplanar images were also performed.   FINDINGS: There is slight prominence of the cortical sulci and sylvian fissures. There is mild ventricular dilatation.  There are scattered and confluence foci of abnormal signal within the periventricular and subcortical white matter bilaterally.  These are compatible with minimal small vessel ischemic changes.  These nonspecific findings could also be produced by a demyelinating or post inflammatory process.  The visualized skull base paranasal sinuses and orbital structures are unremarkable. Diffusion weighted images and associated ADC maps of the brain demonstrate acute/subacute infarction in the distribution of the right posterior cerebral artery.. Acute or subacute patchy infarctions are also noted at the left cerebral vertex also consistent with acute/subacute cortical infarctions. Gradient echo T2 weighted images fail to demonstrate hemosiderin deposition or other  evidence of hemorrhage.   Postcontrast examination is limited due to motion artifact. There are no measurable metastasis or evidence of edema.. There is normal contrast opacification of the dural venous sinuses.   Magnetic resonance angiography Axial 3-D time-of-flight acquisition was performed and multiplanar reconstructions were made. There is narrowing at the origin of the left internal carotid artery with ulceration or intimal outpouching at the left carotid bifurcation. There is less than 50% luminal stenosis. The right carotid bifurcation is normal. There is loss of signal in the middle 3rd of the right internal carotid artery which is likely artifactual.. Magnetic resonance angiography of the intracranial vessels was performed utilizing 3-D time-of-flight imaging with 3-D reconstruction.  The examination fails to reveal the presence of focal narrowing or branch occlusion.  There is no evidence of aneurysm or vascular malformation.   IMPRESSION * Motion limited examination fails to demonstrate metastasis or edema suggestive of underlying neoplasm *Acute/subacute infarction in the distribution of the right posterior cerebral artery *Abnormal diffusion restriction at the left cerebral vertex also consistent with acute/subacute infarction in the distribution of the left middle cerebral artery. *A proximally 50% luminal narrowing at the origin of the left internal carotid artery with intimal outpouching or short segment dissection *Loss of normal flow related signal in the middle 3rd of the right internal carotid artery is likely artifactual.     MACRO: none   Signed by: Mathieu Canseco 3/23/2025 2:29 PM Dictation workstation:   [id number]    MR brain w and wo IV contrast    Result Date: 3/23/2025  Interpreted By:  Mathieu Canseco, STUDY: MR BRAIN W AND WO IV CONTRAST; MR ANGIO HEAD WO IV CONTRAST; MR ANGIO NECK WO IV CONTRAST;  3/23/2025 2:10 pm   INDICATION: Signs/Symptoms:Possible CVA, but has history of  probable lung CA-rule out CVA, metastasis.; Signs/Symptoms:Strokelike symptoms, also history lung CA; Signs/Symptoms:CVA symptoms-has had prior MRIs without problems.   COMPARISON: None.   ACCESSION NUMBER(S): [id number]; [id number]; [id number]   ORDERING CLINICIAN: ALEXIS LEAVITT   TECHNIQUE: The brain was studied in the sagittal axial and coronal planes utilizing diffusion, gradient echo T2 weighted FLAIR, T1 and T2 weighted images   Following intravenous injection of gadolinium contrast, T1 weighted fat suppressed multiplanar images were also performed.   FINDINGS: There is slight prominence of the cortical sulci and sylvian fissures. There is mild ventricular dilatation.  There are scattered and confluence foci of abnormal signal within the periventricular and subcortical white matter bilaterally.  These are compatible with minimal small vessel ischemic changes.  These nonspecific findings could also be produced by a demyelinating or post inflammatory process.  The visualized skull base paranasal sinuses and orbital structures are unremarkable. Diffusion weighted images and associated ADC maps of the brain demonstrate acute/subacute infarction in the distribution of the right posterior cerebral artery.. Acute or subacute patchy infarctions are also noted at the left cerebral vertex also consistent with acute/subacute cortical infarctions. Gradient echo T2 weighted images fail to demonstrate hemosiderin deposition or other evidence of hemorrhage.   Postcontrast examination is limited due to motion artifact. There are no measurable metastasis or evidence of edema.. There is normal contrast opacification of the dural venous sinuses.   Magnetic resonance angiography Axial 3-D time-of-flight acquisition was performed and multiplanar reconstructions were made. There is narrowing at the origin of the left internal carotid artery with ulceration or intimal outpouching at the left carotid bifurcation. There is less  than 50% luminal stenosis. The right carotid bifurcation is normal. There is loss of signal in the middle 3rd of the right internal carotid artery which is likely artifactual.. Magnetic resonance angiography of the intracranial vessels was performed utilizing 3-D time-of-flight imaging with 3-D reconstruction.  The examination fails to reveal the presence of focal narrowing or branch occlusion.  There is no evidence of aneurysm or vascular malformation.   IMPRESSION * Motion limited examination fails to demonstrate metastasis or edema suggestive of underlying neoplasm *Acute/subacute infarction in the distribution of the right posterior cerebral artery *Abnormal diffusion restriction at the left cerebral vertex also consistent with acute/subacute infarction in the distribution of the left middle cerebral artery. *A proximally 50% luminal narrowing at the origin of the left internal carotid artery with intimal outpouching or short segment dissection *Loss of normal flow related signal in the middle 3rd of the right internal carotid artery is likely artifactual.     MACRO: none   Signed by: Mathieu Canseco 3/23/2025 2:29 PM Dictation workstation:   [id number]    MR angio head wo IV contrast    Result Date: 3/23/2025  Interpreted By:  Mathieu Canseco, STUDY: MR BRAIN W AND WO IV CONTRAST; MR ANGIO HEAD WO IV CONTRAST; MR ANGIO NECK WO IV CONTRAST;  3/23/2025 2:10 pm   INDICATION: Signs/Symptoms:Possible CVA, but has history of probable lung CA-rule out CVA, metastasis.; Signs/Symptoms:Strokelike symptoms, also history lung CA; Signs/Symptoms:CVA symptoms-has had prior MRIs without problems.   COMPARISON: None.   ACCESSION NUMBER(S): [id number]; [id number]; [id number]   ORDERING CLINICIAN: ALEXIS LEAVITT   TECHNIQUE: The brain was studied in the sagittal axial and coronal planes utilizing diffusion, gradient echo T2 weighted FLAIR, T1 and T2 weighted images   Following intravenous injection of gadolinium  contrast, T1 weighted fat suppressed multiplanar images were also performed.   FINDINGS: There is slight prominence of the cortical sulci and sylvian fissures. There is mild ventricular dilatation.  There are scattered and confluence foci of abnormal signal within the periventricular and subcortical white matter bilaterally.  These are compatible with minimal small vessel ischemic changes.  These nonspecific findings could also be produced by a demyelinating or post inflammatory process.  The visualized skull base paranasal sinuses and orbital structures are unremarkable. Diffusion weighted images and associated ADC maps of the brain demonstrate acute/subacute infarction in the distribution of the right posterior cerebral artery.. Acute or subacute patchy infarctions are also noted at the left cerebral vertex also consistent with acute/subacute cortical infarctions. Gradient echo T2 weighted images fail to demonstrate hemosiderin deposition or other evidence of hemorrhage.   Postcontrast examination is limited due to motion artifact. There are no measurable metastasis or evidence of edema.. There is normal contrast opacification of the dural venous sinuses.   Magnetic resonance angiography Axial 3-D time-of-flight acquisition was performed and multiplanar reconstructions were made. There is narrowing at the origin of the left internal carotid artery with ulceration or intimal outpouching at the left carotid bifurcation. There is less than 50% luminal stenosis. The right carotid bifurcation is normal. There is loss of signal in the middle 3rd of the right internal carotid artery which is likely artifactual.. Magnetic resonance angiography of the intracranial vessels was performed utilizing 3-D time-of-flight imaging with 3-D reconstruction.  The examination fails to reveal the presence of focal narrowing or branch occlusion.  There is no evidence of aneurysm or vascular malformation.   IMPRESSION * Motion limited  examination fails to demonstrate metastasis or edema suggestive of underlying neoplasm *Acute/subacute infarction in the distribution of the right posterior cerebral artery *Abnormal diffusion restriction at the left cerebral vertex also consistent with acute/subacute infarction in the distribution of the left middle cerebral artery. *A proximally 50% luminal narrowing at the origin of the left internal carotid artery with intimal outpouching or short segment dissection *Loss of normal flow related signal in the middle 3rd of the right internal carotid artery is likely artifactual.     MACRO: none   Signed by: Mathieu Canseco 3/23/2025 2:29 PM Dictation workstation:   [id number]    ECG 12 Lead    Result Date: 3/23/2025  Atrial fibrillation with premature ventricular or aberrantly conducted complexes Cannot rule out Anterior infarct , age undetermined Abnormal ECG When compared with ECG of 22-MAR-2025 14:07, Nonspecific T wave abnormality now evident in Anterior leads Confirmed by Melia Caceres ([id number]) on 3/23/2025 1:55:46 PM    ECG 12 lead    Result Date: 3/23/2025  Atrial fibrillation Nonspecific T wave abnormality Abnormal ECG When compared with ECG of 13-OCT-2024 07:07, No significant change was found See ED provider note for full interpretation and clinical correlation Confirmed by Nevin Durand ([id number]) on 3/23/2025 10:33:41 AM    Carotid duplex bilateral    Result Date: 3/23/2025  Interpreted By:  Rhett Rendon, STUDY: [facility] CAROTID ARTERY DUPLEX BILATERAL;  3/23/2025 8:26 am   INDICATION: Signs/Symptoms:CVA sx. ,R29.90 Unspecified symptoms and signs involving the nervous system,Z86.73 Personal history of transient ischemic attack (TIA), and cerebral infarction without residual deficits   COMPARISON: None.   ACCESSION NUMBER(S): [id number]   ORDERING CLINICIAN: MELIA CACERES   TECHNIQUE: Ultrasound of the bilateral carotid arterial systems was performed from the angle of the mandible down through the base of the  neck, using grey scale imaging, color doppler, and spectral doppler.   FINDINGS: Right Side: Right CCA PSV 40 cm/sec; CCA EDV was 15 cm/sec;  ICA PSV was  43 cm/sec; ICA EDV  9 cm/sec.  Right ICA:CCA PSV Ratio was 1.1. These spectral findings fall within the 0-50 % diameter stenosis category. On grey scale imaging and color doppler,  there was  very mild calcified plaque in the carotid bulb and proximal ICA.. Right vertebral artery flow was antegrade.   Left Side: Left CCA PSV 49 cm/sec; CCA EDV was 18 cm/sec; ICA PSV was 46 cm/sec; ICA EDV 15 cm/sec .  Left ICA:CCA PSV Ratio was 0.9. These spectral findings fall within the 0-50 % diameter stenosis category.  On grey scale imaging and color doppler, there was  very mild calcified plaque in the carotid bulb and proximal ICA.. Left vertebral artery flow was antegrade.       Very mild, hemodynamically insignificant calcified plaque in each carotid bulb and in each proximal ICA as described.   MACRO: None   Signed by: Rhett Rendon 3/23/2025 9:58 AM Dictation workstation:   [id number]    CT brain attack head wo IV contrast    Result Date: 3/22/2025  Interpreted By:  Alfonso Womack, STUDY: CT BRAIN ATTACK HEAD WO IV CONTRAST;  3/22/2025 1:32 pm   INDICATION: Signs/Symptoms:Stroke Evaluation.   COMPARISON: None.   ACCESSION NUMBER(S): [id number]   ORDERING CLINICIAN: SALO UMAÑA   TECHNIQUE: Sequential trans axial images were obtained  .   FINDINGS: INTRACRANIAL:   There is mild prominence of the cortical sulci indicating atrophy.   There is mild ventriculomegaly, again consistent with atrophy.   Mild decreased attenuation of the periventricular and long tracks of the white matter most consistent with gliosis from arterial disease. There is no evidence of definite subacute infarction, intracranial hemorrhage or mass.     EXTRACRANIAL: Visualized paranasal sinuses and mastoids are clear. The calvarium is intact.       Mild age related degenerative change as described  without acute findings.   MACRO: Alfonso Womack discussed the significance and urgency of this critical finding by secure chat with  SALO UMAÑA on 3/22/2025 at 1:38 pm. (**-RCF-**) Findings:  See findings.   Signed by: Alfonso Womack 3/22/2025 1:39 PM Dictation workstation:   [id number]      No echocardiogram results found for the past 14 days    Radiology:     Transthoracic Echo (TTE) Limited         MR angio neck wo IV contrast   Final Result      MR brain w and wo IV contrast   Final Result      MR angio head wo IV contrast   Final Result      Carotid duplex bilateral   Final Result   Very mild, hemodynamically insignificant calcified plaque in each   carotid bulb and in each proximal ICA as described.        MACRO:   None        Signed by: Rhett Rendon 3/23/2025 9:58 AM   Dictation workstation:   [id number]      CT brain attack head wo IV contrast   Final Result   Mild age related degenerative change as described without acute   findings.        MACRO:   Alfonso Womack discussed the significance and urgency of this critical   finding by secure chat with  SALO UMAÑA on 3/22/2025 at 1:38 pm.   (**-RCF-**) Findings:  See findings.        Signed by: Alfonso Womack 3/22/2025 1:39 PM   Dictation workstation:   [id number]          Problem List:     Patient Active Problem List   Diagnosis    CAD (coronary artery disease)    Benign prostatic hyperplasia    Current smoker    Elevated serum free T4 level    Emphysema/COPD (Multi)    Hyperlipidemia    Hypertriglyceridemia    Primary hypertension    Insomnia, idiopathic    Low back pain    Olecranon bursitis    Paroxysmal atrial fibrillation (Multi)    Sacroiliitis (CMS-HCC)    Umbilical hernia    High risk medication use    Stage 3a chronic kidney disease (CKD) (Multi)    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)    Hypertensive heart disease with heart failure    Amiodarone-induced hyperthyroidism    Stroke-like symptoms    Slurred speech     Chronic heart failure with preserved ejection fraction (HFpEF)    Permanent atrial fibrillation (Multi)    Hyponatremia    Elevated troponin    Acute CVA (cerebrovascular accident) (Multi)       Assessment:   Acute CVA  Elevated biomarkers  Chronic A-fib on Eliquis  CAD  Hypertension  Dyslipidemia  Tobacco abuse  Chronic diastolic heart failure  Lungs CA  Hyponatremia    Plan:   Tele monitoring  Serial enzymnes  2d echo limited  Daily EKG's  Continue the Eliquis 5 mg p.o. twice daily  Will discuss with neurology adding aspirin after the MRI  Toprol- mg daily  Crestor 10 mg daily  Diovan 40 mg daily on hold 1 keep blood pressure between 140 and 160      Ezequiel Vaca CNP  [facility]      Of note, this documentation is completed using the Dragon Dictation system (voice recognition software). There may be spelling and/or grammatical errors that were not corrected prior to final submission.      Electronically signed by NICOLASA Huff-CNP, on 3/24/2025 at 9:02 AM    Patient seen and examined in conjunction with NICOLASA Dobson and agree with the evaluation as noted above.  I have personally interviewed and examined the patient.   I have personally and independently reviewed the pertinentlabs and diagnostic testing.  I have personally verified the elements of the history and physical listed above and changes, if any, are noted below.    89-year-old gentleman who was admitted with a recent CVA associated with vascular risk for and was noted to have an incidental finding of mobile mass in the right ventricle for which cardiology was consulted.  Patient has a history of paroxysmal atrial fibrillation for which she said he has been compliant with anticoagulation.  He also has a history of lung cancer and refused biopsy and further treatment.  He denies any chest pain or significant shortness of breath.  He just noticed yesterday sudden difficulty of  finding words and some slurred speech and was brought to the emergency room by the son.  He was previously anticoagulated with INR but due to significant fluctuations, he was switched to Eliquis which he said he has been compliant with.  He has no prior history of coronary artery disease.  EKG shows atrial fibrillation with occasional PVCs.  Labs show mild troponin elevation between 136-189    I personally reviewed his transthoracic echocardiogram and agree with the finding of a large mobile echodensity in the right ventricle.  Differentials include possible mass but cannot rule out possible large thrombus.      Assessment and plan:  1.  CVA: With what appears to be an embolic source in the right ventricle.  Will recommend MOSHE for further evaluation of the mass and also consideration for possible trans-catheter treatment options if he is considered to be a candidate for this.  In the meantime, agree with continued anticoagulation and this will be switched to Pradaxa per neurology to explore other mechanism of action for his long-term anticoagulation.  2.  History of lung mass: The findings on the echocardiogram may represent metastasis from suspected lung cancer which the patient has refused biopsy.  Will defer to primary team for continued management.  3.  Persistent atrial fibrillation: Rate is well-controlled on current medications which we will continue.

## 2025-03-24 NOTE — CARE PLAN
The patient's goals for the shift include      The clinical goals for the shift include Paitent will not have further neurologival symptoms

## 2025-03-24 NOTE — PROGRESS NOTES
Speech-Language Pathology  SLP ADULT Inpatient Speech-Language Cognition    Patient Name: Canelo Moore  MRN: [id number]  : 1935  Today's Date: 25  Time Calculation  Start Time: 945  Stop Time:   Time Calculation (min): 35 min      Room:[address] [place] [address]    Assessment:  SLP Assessment  SLP Assessment Results: Motor Speech Deficits  Prognosis: Good  Treatment Provided: No  Pt. presenting with a mild to moderate dysarthria of speech primarily in conversational speech level.  No receptive or expressive language deficits noted.  Patient was able to complete the [place] aphasia screening test with a total score of 95 out of 100 points.  See below for details.  Patient has a good prognosis for improvement.  He is able to identify his air productions and with cueing can improve his articulatory accuracy.  Recommend speech therapy for neuromuscular reeducation 3 times per week.      Oral Mechanism Exam   Diadochokinetic testing: Patient had decreased accuracy and speed in producing the P-T-K in succession.  When saying the days of the week and months of the year patient had dysarthria of speech that he was able to be cued to improve his articulation.  Was also able to self-correct his air productions but also shows frustration with inability to produce speech clearly on the initial attempts.    Trigeminal Nerve (V)   Jaw open/close: Adequate     Bite:  Adequate    Sensation of face:  Adequate    Sensation of oral cavity:  Adequate    Sensation of tongue: Adequate    Facial Nerve (VII)    Symmetry at rest: Adequate    Eyebrow raise: Adequate    Lip pucker: Adequate    Lip retraction/smile: Adequate      Cheek puffed: Adequate    Taste: Did not test  Vagus (X)  Volitional cough: Did not test  Hypoglossal (XII)       Lingual protrusion: Adequate    Tongue ROM/laterization, elevation: Adequate    Tongue strength: Adequate    Dentition: Within functional limits      Plan:  Plan  SLP Plan: Skilled SLP  SLP  Frequency: 3x per week  Duration: 30 days     Goals:  Goals established on 03/24/25  (To be met by discharge)  1.  Patient will be able to complete automatic speech patterns without articulatory errors/distortions with 100% accuracy.  2.  Patient will be able to communicate in conversational speech without dysarthria of speech with 100% accuracy.  3.  Patient will complete articulatory agility task with 100% accuracy.  4.  Patient will be able to self-correct his articulatory errors with 100% accuracy.    General information:  General Information  Reason for Referral: CVA  Referred By: Dr. Cueva  Past Medical History Relevant to Rehab: COPD, lung CA, A-fib, HTN, hyperlipidemia, BPH, and strokelike symptoms i.e. garbled speech  Co-Treatment: PT  Co-Treatment Reason: to maximize pt safety and function  Prior to Session Communication: Bedside nurse    Current Admission:   Past Medical History: Patient admitted with strokelike symptoms i.e. garbled speech.  He has past medical history of COPD, lung CA, A-fib, HTN, hyperlipidemia and BPH.    Pain:   Pain Assessment  Pain Assessment: 0-10  0-10 (Numeric) Pain Score: 0 - No pain    Cognition:  Orientation Level: Oriented X4     Subtest of [place] aphasia screening test:    Receptive language:  Subtests of the [place] aphasia screening test  Yes/no accuracy 20/20  Object recognition 10/10  Following instructions 10/10  Reading instructions 10/10    Receptive subscale 50/50    Expressive Language:  Subtest of the [place] aphasia screening test  Naming 10/10  Automatic speech 10/10  Repetition 10/10  Writing 10/10  Verbal fluency 5/10    Expressive subscale:  45/50    Total score: 95/100    Education:     Learner: caregiver, patient  Education topic: Spoke with patient regarding importance of over exaggerating articulation of words to produce words accurately.  Education outcome: Verbal understanding given     *This documentation was completed using the Dragon  Dictation system. There may be spelling and/or grammatical errors that were not corrected prior to final submission.

## 2025-03-24 NOTE — CONSULTS
Cardiology Consult Note      Date:   3/24/2025  Patient name:  Canelo Moore  Date of admission:  3/22/2025  1:28 PM  MRN:   [id number]  YOB: 1935  Time of Consult:  9:02 AM    Consulting Cardiologist: Dr. Garo Vaca, APRN, CNP  Primary Cardiologist:  Dr. Yi Montejo    Referring Provider: Dr Caceres      Admission Diagnosis:     Acute CVA (cerebrovascular accident) (Multi)      History of Present Illness:      Canelo Moore is a 89 y.o.  male patient who is being at the request of Dr. Caceres for inpatient consultation of  acute CVA . He was admitted on 3/22/2025.  Previous [facility] and [facility] records have been reviewed in detail.    Patient with a history of PAF, hypertension, dyslipidemia, tobacco abuse, hyperparathyroidism, CAD, lung CA refused biopsy last year  Patient states was at home yesterday was having episodes where he found it difficult to find words he had his son drive him into the emergency department they admitted him to the ninth floor and kindly asked cardiology to get involved with this case.  He does have chronic A-fib where he is currently on Eliquis he was taking Coumadin in the past but he had seen EP and they had switched him because he had episodes of having high INR's to Eliquis.  Patient states that he does take his Eliquis every morning and every evening.  when he was at home he states it came on all of a sudden just having difficulty finding words he was seen by neurology they were concerned for left cerebral CVA.  Patient states he has been doing very well he has not been having any chest pain he has been feeling pretty good this was sudden onset of the slurred speech he states that never really had any weaknesses to her upper or lower extremities this is all just trying to find his words when speaking.  His chest pain, shortness of breath, nausea, vomiting, PND, orthopnea, claudications  Positive for acute speech difficulty with finding words    EKG  is A-fib with PVCs  Troponin 155, 189, 136  Hemoglobin 10.1  Hematocrit 30.0  Sodium 129  Potassium 3.7  Creatinine 0.75    Cardiac history  10/11/2024 echo  CONCLUSIONS:   1. The left ventricular systolic function is normal, with a visually estimated ejection fraction of 60%.   2. Spectral Doppler shows an abnormal pattern of left ventricular diastolic filling.   3. There is normal right ventricular global systolic function.   4. RVSP 37 mm Hg.   5. The left atrium is moderate to severely dilated.   6. Mildly elevated right ventricular systolic pressure.   7. Normal valve structure and function.     4/25/2024 stress test  Summary:   1. Low functional capacity at 3.1 MET, limited by dyspnea, no chest pain.   2. No evidence ischemia by ecg criteria.   3. Markedly hypertensive BP response to exercise.   4. Frequent pvc/pac during recovery.   5. Achieved <85% THR at 82% but excellent HR/BP product, with no ecg changes likelihood of critical CAD low but cannot exclude the presence of CAD.   6. Inadequate level of stress achieved.             Allergies:     Allergies   Allergen Reactions    Ace Inhibitors Cough         Past Medical History:     Past Medical History:   Diagnosis Date    Atrial fibrillation (Multi)     BPH (benign prostatic hyperplasia)     CAD (coronary artery disease)     Controlled atrial fibrillation (Multi)     COPD (chronic obstructive pulmonary disease) (Multi)     External hemorrhoid 09/12/2023    Hyperlipidemia     Hypertension     Right inguinal hernia 09/12/2023    Stage 3a chronic kidney disease (CKD) (Multi)        Past Surgical History:     Past Surgical History:   Procedure Laterality Date    OTHER SURGICAL HISTORY  11/04/2020    Varicose vein ligation    OTHER SURGICAL HISTORY  11/04/2020    [place] tooth extraction    OTHER SURGICAL HISTORY  11/04/2020    Tonsillectomy    OTHER SURGICAL HISTORY  11/04/2020    Colonoscopy complete for polypectomy    OTHER SURGICAL HISTORY  11/06/2021     Varicose vein sclerotherapy    OTHER SURGICAL HISTORY  11/06/2021    Pilonidal cyst removal    OTHER SURGICAL HISTORY  11/06/2021    Hernia repair    OTHER SURGICAL HISTORY  12/14/2021    Umbilical hernia repair    OTHER SURGICAL HISTORY  12/14/2021    Inguinal hernia repair    US ASPIRATION INJECTION INTERMEDIATE JOINT  2/8/2021    US ASPIRATION INJECTION INTERMEDIATE JOINT 2/8/2021 [facility]       Family History:     Family History   Problem Relation Name Age of Onset    Breast cancer Mother      Atrial fibrillation Mother      Colon cancer Brother      Prostate cancer Brother      Breast cancer Daughter      Prostate cancer Son      Colon cancer Son         Social History:     Social History     Tobacco Use    Smoking status: Every Day     Current packs/day: 0.50     Average packs/day: 0.5 packs/day for 60.0 years (30.0 ttl pk-yrs)     Types: Cigarettes    Smokeless tobacco: Never   Vaping Use    Vaping status: Never Used   Substance Use Topics    Alcohol use: Not Currently    Drug use: Not Currently       CURRENT INPATIENT MEDICATIONS    alfuzosin, 10 mg, oral, Daily  apixaban, 5 mg, oral, BID  finasteride, 5 mg, oral, Daily  furosemide, 40 mg, oral, Every Mon/Wed/Fri  ipratropium-albuteroL, 3 mL, nebulization, BID  magnesium oxide, 400 mg, oral, BID  [Held by provider] methIMAzole, 20 mg, oral, Daily  metoprolol succinate XL, 100 mg, oral, Daily  nicotine, 1 patch, transdermal, Daily  predniSONE, 10 mg, oral, Daily  rosuvastatin, 10 mg, oral, Nightly  tiotropium, 1 puff, inhalation, Daily  [Held by provider] valsartan, 40 mg, oral, Daily         Current Outpatient Medications   Medication Instructions    albuterol (Ventolin HFA) 90 mcg/actuation inhaler 2 puffs, inhalation, Every 6 hours PRN    alfuzosin (UROXATRAL) 10 mg, oral, Daily, Do not crush, chew, or split.    apixaban (ELIQUIS) 5 mg, oral, 2 times daily    finasteride (PROSCAR) 5 mg, oral, Daily    furosemide (LASIX) 40 mg, oral, Every  Mon/Wed/Fri    magnesium oxide (MAG-OX) 400 mg, oral, 2 times daily    methIMAzole (TAPAZOLE) 20 mg, oral, Every 12 hours scheduled    metoprolol succinate XL (TOPROL-XL) 100 mg, oral, Daily, Do not crush or chew.    multivitamin (One Daily Multivitamin) tablet 1 tablet, Daily    nitroglycerin (NITROSTAT) 0.4 mg, sublingual, Every 5 min PRN, place 1 tablet under tongue every 5 minutes for up to 3 doses as needed for chest pain. Call 911 if pain persists    rosuvastatin (CRESTOR) 10 mg, oral, Nightly    tiotropium (SPIRIVA WITH HANDIHALER) 18 mcg, inhalation, Daily RT    valsartan-hydrochlorothiazide (Diovan-HCT) 160-12.5 mg tablet 1 tablet, oral, Daily        Review of Systems:      12 point review of systems was obtained in detail and is negative other than that detailed above.    Vital Signs:     Vitals:    03/24/25 0101 03/24/25 0553 03/24/25 0644 03/24/25 0829   BP: 103/62 116/63  114/56   Pulse: 110 106  98   Resp: 20 20  17   Temp: 36.5 °C (97.7 °F) 37.1 °C (98.8 °F)  36.8 °C (98.2 °F)   TempSrc:       SpO2: 93% 93% 93% 93%   Weight:       Height:           Intake/Output Summary (Last 24 hours) at 3/24/2025 0902  Last data filed at 3/23/2025 1906  Gross per 24 hour   Intake --   Output 600 ml   Net -600 ml       Wt Readings from Last 4 Encounters:   03/22/25 79.4 kg (175 lb)   02/18/25 76.8 kg (169 lb 6.4 oz)   01/14/25 77.1 kg (170 lb)   11/27/24 74.8 kg (164 lb 12.8 oz)       Physical Examination:     GENERAL APPEARANCE: Well developed, well nourished, in no acute distress.  CHEST: Symmetric and non-tender.  INTEGUMENT: Skin warm and dry, without gross excoriationis or lesions.  HEENT: No gross abnormalities of conjunctiva, teeth, gums, oral mucosa  NECK: Supple, no JVD, no bruit. Thyroid not palpable. Carotid upstrokes normal.  NEURO/PSHCY: Oriented x 3.  He does have difficulty finding words at times  LUNGS: Clear to auscultation bilaterally; normal respiratory effort.  HEART: S1, S2 irregular with 1 out  of 6 systolic murmur  ABDOMEN: Soft, nontender, no palpable hepatosplenomegaly, no mases, no bruits. Abdominal aorta not noted to be enlarged.  MUSCULOSKELETAL: Ambulatory with normal tandem gait.  EXTREMITIES: Warm with good color, no clubbing or cyanois. There is no edema noted.  PERIPHERAL VASCULAR: Pulses present and equally palpable; 1+ throughout. No femoral bruits.      Lab:     CBC:   Results from last 7 days   Lab Units 03/24/25  0604 03/23/25  0612 03/22/25  1342   WBC AUTO x10*3/uL 8.0 6.8 9.1   RBC AUTO x10*6/uL 3.08* 3.18* 3.43*   HEMOGLOBIN g/dL 10.1* 10.6* 11.1*   HEMATOCRIT % 30.0* 30.7* 33.6*   MCV fL 97 97 98   MCH pg 32.8 33.3 32.4   MCHC g/dL 33.7 34.5 33.0   RDW % 14.9* 15.2* 15.0*   PLATELETS AUTO x10*3/uL 164 172 196     CMP:    Results from last 7 days   Lab Units 03/24/25  0604 03/23/25  0612 03/22/25  1902 03/22/25  1342   SODIUM mmol/L 129* 128* 129* 127*   POTASSIUM mmol/L 3.7 4.0 4.3 3.9   CHLORIDE mmol/L 94* 93* 91* 91*   CO2 mmol/L 30 29 31 28   BUN mg/dL 12 14 14 16   CREATININE mg/dL 0.75 0.87 1.05 1.02   GLUCOSE mg/dL 107* 109* 97 142*   PROTEIN TOTAL g/dL  --  5.7*  --  6.1*   CALCIUM mg/dL 8.6 8.9 9.6 9.2   BILIRUBIN TOTAL mg/dL  --  1.5*  --  1.3*   ALK PHOS U/L  --  61  --  70   AST U/L  --  17  --  18   ALT U/L  --  10  --  11     BMP:    Results from last 7 days   Lab Units 03/24/25  0604 03/23/25  0612 03/22/25  1902   SODIUM mmol/L 129* 128* 129*   POTASSIUM mmol/L 3.7 4.0 4.3   CHLORIDE mmol/L 94* 93* 91*   CO2 mmol/L 30 29 31   BUN mg/dL 12 14 14   CREATININE mg/dL 0.75 0.87 1.05   CALCIUM mg/dL 8.6 8.9 9.6   GLUCOSE mg/dL 107* 109* 97     Magnesium:  Results from last 7 days   Lab Units 03/23/25  0612   MAGNESIUM mg/dL 1.73     Troponin:    Results from last 7 days   Lab Units 03/23/25  0612 03/22/25  1902 03/22/25  1342   TROPHS ng/L 136* 189* 155*     BNP:   Results from last 7 days   Lab Units 03/22/25  1902   BNP pg/mL 414*     Lipid Panel:  Results from last 7 days    Lab Units 03/23/25  0612   HDL mg/dL 41.2   CHOLESTEROL/HDL RATIO  2.3   VLDL mg/dL 10   TRIGLYCERIDES mg/dL 49   NON HDL CHOL. mg/dL 55        Diagnostic Studies:   @No results found for this or any previous visit.    MR angio neck wo IV contrast    Result Date: 3/23/2025  Interpreted By:  Mathieu Canseco, STUDY: MR BRAIN W AND WO IV CONTRAST; MR ANGIO HEAD WO IV CONTRAST; MR ANGIO NECK WO IV CONTRAST;  3/23/2025 2:10 pm   INDICATION: Signs/Symptoms:Possible CVA, but has history of probable lung CA-rule out CVA, metastasis.; Signs/Symptoms:Strokelike symptoms, also history lung CA; Signs/Symptoms:CVA symptoms-has had prior MRIs without problems.   COMPARISON: None.   ACCESSION NUMBER(S): [id number]; [id number]; [id number]   ORDERING CLINICIAN: ALEXIS LEAVITT   TECHNIQUE: The brain was studied in the sagittal axial and coronal planes utilizing diffusion, gradient echo T2 weighted FLAIR, T1 and T2 weighted images   Following intravenous injection of gadolinium contrast, T1 weighted fat suppressed multiplanar images were also performed.   FINDINGS: There is slight prominence of the cortical sulci and sylvian fissures. There is mild ventricular dilatation.  There are scattered and confluence foci of abnormal signal within the periventricular and subcortical white matter bilaterally.  These are compatible with minimal small vessel ischemic changes.  These nonspecific findings could also be produced by a demyelinating or post inflammatory process.  The visualized skull base paranasal sinuses and orbital structures are unremarkable. Diffusion weighted images and associated ADC maps of the brain demonstrate acute/subacute infarction in the distribution of the right posterior cerebral artery.. Acute or subacute patchy infarctions are also noted at the left cerebral vertex also consistent with acute/subacute cortical infarctions. Gradient echo T2 weighted images fail to demonstrate hemosiderin deposition or other  evidence of hemorrhage.   Postcontrast examination is limited due to motion artifact. There are no measurable metastasis or evidence of edema.. There is normal contrast opacification of the dural venous sinuses.   Magnetic resonance angiography Axial 3-D time-of-flight acquisition was performed and multiplanar reconstructions were made. There is narrowing at the origin of the left internal carotid artery with ulceration or intimal outpouching at the left carotid bifurcation. There is less than 50% luminal stenosis. The right carotid bifurcation is normal. There is loss of signal in the middle 3rd of the right internal carotid artery which is likely artifactual.. Magnetic resonance angiography of the intracranial vessels was performed utilizing 3-D time-of-flight imaging with 3-D reconstruction.  The examination fails to reveal the presence of focal narrowing or branch occlusion.  There is no evidence of aneurysm or vascular malformation.   IMPRESSION * Motion limited examination fails to demonstrate metastasis or edema suggestive of underlying neoplasm *Acute/subacute infarction in the distribution of the right posterior cerebral artery *Abnormal diffusion restriction at the left cerebral vertex also consistent with acute/subacute infarction in the distribution of the left middle cerebral artery. *A proximally 50% luminal narrowing at the origin of the left internal carotid artery with intimal outpouching or short segment dissection *Loss of normal flow related signal in the middle 3rd of the right internal carotid artery is likely artifactual.     MACRO: none   Signed by: Mathieu Canseco 3/23/2025 2:29 PM Dictation workstation:   [id number]    MR brain w and wo IV contrast    Result Date: 3/23/2025  Interpreted By:  Mathieu Canseco, STUDY: MR BRAIN W AND WO IV CONTRAST; MR ANGIO HEAD WO IV CONTRAST; MR ANGIO NECK WO IV CONTRAST;  3/23/2025 2:10 pm   INDICATION: Signs/Symptoms:Possible CVA, but has history of  probable lung CA-rule out CVA, metastasis.; Signs/Symptoms:Strokelike symptoms, also history lung CA; Signs/Symptoms:CVA symptoms-has had prior MRIs without problems.   COMPARISON: None.   ACCESSION NUMBER(S): [id number]; [id number]; [id number]   ORDERING CLINICIAN: ALEXIS LEAVITT   TECHNIQUE: The brain was studied in the sagittal axial and coronal planes utilizing diffusion, gradient echo T2 weighted FLAIR, T1 and T2 weighted images   Following intravenous injection of gadolinium contrast, T1 weighted fat suppressed multiplanar images were also performed.   FINDINGS: There is slight prominence of the cortical sulci and sylvian fissures. There is mild ventricular dilatation.  There are scattered and confluence foci of abnormal signal within the periventricular and subcortical white matter bilaterally.  These are compatible with minimal small vessel ischemic changes.  These nonspecific findings could also be produced by a demyelinating or post inflammatory process.  The visualized skull base paranasal sinuses and orbital structures are unremarkable. Diffusion weighted images and associated ADC maps of the brain demonstrate acute/subacute infarction in the distribution of the right posterior cerebral artery.. Acute or subacute patchy infarctions are also noted at the left cerebral vertex also consistent with acute/subacute cortical infarctions. Gradient echo T2 weighted images fail to demonstrate hemosiderin deposition or other evidence of hemorrhage.   Postcontrast examination is limited due to motion artifact. There are no measurable metastasis or evidence of edema.. There is normal contrast opacification of the dural venous sinuses.   Magnetic resonance angiography Axial 3-D time-of-flight acquisition was performed and multiplanar reconstructions were made. There is narrowing at the origin of the left internal carotid artery with ulceration or intimal outpouching at the left carotid bifurcation. There is less  than 50% luminal stenosis. The right carotid bifurcation is normal. There is loss of signal in the middle 3rd of the right internal carotid artery which is likely artifactual.. Magnetic resonance angiography of the intracranial vessels was performed utilizing 3-D time-of-flight imaging with 3-D reconstruction.  The examination fails to reveal the presence of focal narrowing or branch occlusion.  There is no evidence of aneurysm or vascular malformation.   IMPRESSION * Motion limited examination fails to demonstrate metastasis or edema suggestive of underlying neoplasm *Acute/subacute infarction in the distribution of the right posterior cerebral artery *Abnormal diffusion restriction at the left cerebral vertex also consistent with acute/subacute infarction in the distribution of the left middle cerebral artery. *A proximally 50% luminal narrowing at the origin of the left internal carotid artery with intimal outpouching or short segment dissection *Loss of normal flow related signal in the middle 3rd of the right internal carotid artery is likely artifactual.     MACRO: none   Signed by: Mathieu Canseco 3/23/2025 2:29 PM Dictation workstation:   [id number]    MR angio head wo IV contrast    Result Date: 3/23/2025  Interpreted By:  Mathieu Canseco, STUDY: MR BRAIN W AND WO IV CONTRAST; MR ANGIO HEAD WO IV CONTRAST; MR ANGIO NECK WO IV CONTRAST;  3/23/2025 2:10 pm   INDICATION: Signs/Symptoms:Possible CVA, but has history of probable lung CA-rule out CVA, metastasis.; Signs/Symptoms:Strokelike symptoms, also history lung CA; Signs/Symptoms:CVA symptoms-has had prior MRIs without problems.   COMPARISON: None.   ACCESSION NUMBER(S): [id number]; [id number]; [id number]   ORDERING CLINICIAN: ALEXIS LEAVITT   TECHNIQUE: The brain was studied in the sagittal axial and coronal planes utilizing diffusion, gradient echo T2 weighted FLAIR, T1 and T2 weighted images   Following intravenous injection of gadolinium  contrast, T1 weighted fat suppressed multiplanar images were also performed.   FINDINGS: There is slight prominence of the cortical sulci and sylvian fissures. There is mild ventricular dilatation.  There are scattered and confluence foci of abnormal signal within the periventricular and subcortical white matter bilaterally.  These are compatible with minimal small vessel ischemic changes.  These nonspecific findings could also be produced by a demyelinating or post inflammatory process.  The visualized skull base paranasal sinuses and orbital structures are unremarkable. Diffusion weighted images and associated ADC maps of the brain demonstrate acute/subacute infarction in the distribution of the right posterior cerebral artery.. Acute or subacute patchy infarctions are also noted at the left cerebral vertex also consistent with acute/subacute cortical infarctions. Gradient echo T2 weighted images fail to demonstrate hemosiderin deposition or other evidence of hemorrhage.   Postcontrast examination is limited due to motion artifact. There are no measurable metastasis or evidence of edema.. There is normal contrast opacification of the dural venous sinuses.   Magnetic resonance angiography Axial 3-D time-of-flight acquisition was performed and multiplanar reconstructions were made. There is narrowing at the origin of the left internal carotid artery with ulceration or intimal outpouching at the left carotid bifurcation. There is less than 50% luminal stenosis. The right carotid bifurcation is normal. There is loss of signal in the middle 3rd of the right internal carotid artery which is likely artifactual.. Magnetic resonance angiography of the intracranial vessels was performed utilizing 3-D time-of-flight imaging with 3-D reconstruction.  The examination fails to reveal the presence of focal narrowing or branch occlusion.  There is no evidence of aneurysm or vascular malformation.   IMPRESSION * Motion limited  examination fails to demonstrate metastasis or edema suggestive of underlying neoplasm *Acute/subacute infarction in the distribution of the right posterior cerebral artery *Abnormal diffusion restriction at the left cerebral vertex also consistent with acute/subacute infarction in the distribution of the left middle cerebral artery. *A proximally 50% luminal narrowing at the origin of the left internal carotid artery with intimal outpouching or short segment dissection *Loss of normal flow related signal in the middle 3rd of the right internal carotid artery is likely artifactual.     MACRO: none   Signed by: Mathieu Canseco 3/23/2025 2:29 PM Dictation workstation:   [id number]    ECG 12 Lead    Result Date: 3/23/2025  Atrial fibrillation with premature ventricular or aberrantly conducted complexes Cannot rule out Anterior infarct , age undetermined Abnormal ECG When compared with ECG of 22-MAR-2025 14:07, Nonspecific T wave abnormality now evident in Anterior leads Confirmed by Melia Caceres ([id number]) on 3/23/2025 1:55:46 PM    ECG 12 lead    Result Date: 3/23/2025  Atrial fibrillation Nonspecific T wave abnormality Abnormal ECG When compared with ECG of 13-OCT-2024 07:07, No significant change was found See ED provider note for full interpretation and clinical correlation Confirmed by Nevin Durand ([id number]) on 3/23/2025 10:33:41 AM    Carotid duplex bilateral    Result Date: 3/23/2025  Interpreted By:  Rhett Rendon, STUDY: [facility] CAROTID ARTERY DUPLEX BILATERAL;  3/23/2025 8:26 am   INDICATION: Signs/Symptoms:CVA sx. ,R29.90 Unspecified symptoms and signs involving the nervous system,Z86.73 Personal history of transient ischemic attack (TIA), and cerebral infarction without residual deficits   COMPARISON: None.   ACCESSION NUMBER(S): [id number]   ORDERING CLINICIAN: MELIA CACERES   TECHNIQUE: Ultrasound of the bilateral carotid arterial systems was performed from the angle of the mandible down through the base of the  neck, using grey scale imaging, color doppler, and spectral doppler.   FINDINGS: Right Side: Right CCA PSV 40 cm/sec; CCA EDV was 15 cm/sec;  ICA PSV was  43 cm/sec; ICA EDV  9 cm/sec.  Right ICA:CCA PSV Ratio was 1.1. These spectral findings fall within the 0-50 % diameter stenosis category. On grey scale imaging and color doppler,  there was  very mild calcified plaque in the carotid bulb and proximal ICA.. Right vertebral artery flow was antegrade.   Left Side: Left CCA PSV 49 cm/sec; CCA EDV was 18 cm/sec; ICA PSV was 46 cm/sec; ICA EDV 15 cm/sec .  Left ICA:CCA PSV Ratio was 0.9. These spectral findings fall within the 0-50 % diameter stenosis category.  On grey scale imaging and color doppler, there was  very mild calcified plaque in the carotid bulb and proximal ICA.. Left vertebral artery flow was antegrade.       Very mild, hemodynamically insignificant calcified plaque in each carotid bulb and in each proximal ICA as described.   MACRO: None   Signed by: Rhett Rendon 3/23/2025 9:58 AM Dictation workstation:   [id number]    CT brain attack head wo IV contrast    Result Date: 3/22/2025  Interpreted By:  Alfonso Womack, STUDY: CT BRAIN ATTACK HEAD WO IV CONTRAST;  3/22/2025 1:32 pm   INDICATION: Signs/Symptoms:Stroke Evaluation.   COMPARISON: None.   ACCESSION NUMBER(S): [id number]   ORDERING CLINICIAN: SALO UMAÑA   TECHNIQUE: Sequential trans axial images were obtained  .   FINDINGS: INTRACRANIAL:   There is mild prominence of the cortical sulci indicating atrophy.   There is mild ventriculomegaly, again consistent with atrophy.   Mild decreased attenuation of the periventricular and long tracks of the white matter most consistent with gliosis from arterial disease. There is no evidence of definite subacute infarction, intracranial hemorrhage or mass.     EXTRACRANIAL: Visualized paranasal sinuses and mastoids are clear. The calvarium is intact.       Mild age related degenerative change as described  without acute findings.   MACRO: Alfonso Womack discussed the significance and urgency of this critical finding by secure chat with  SALO UMAÑA on 3/22/2025 at 1:38 pm. (**-RCF-**) Findings:  See findings.   Signed by: Alfonso Womack 3/22/2025 1:39 PM Dictation workstation:   [id number]      No echocardiogram results found for the past 14 days    Radiology:     Transthoracic Echo (TTE) Limited         MR angio neck wo IV contrast   Final Result      MR brain w and wo IV contrast   Final Result      MR angio head wo IV contrast   Final Result      Carotid duplex bilateral   Final Result   Very mild, hemodynamically insignificant calcified plaque in each   carotid bulb and in each proximal ICA as described.        MACRO:   None        Signed by: Rhett Rendon 3/23/2025 9:58 AM   Dictation workstation:   [id number]      CT brain attack head wo IV contrast   Final Result   Mild age related degenerative change as described without acute   findings.        MACRO:   Alfonso Womack discussed the significance and urgency of this critical   finding by secure chat with  SALO UMAÑA on 3/22/2025 at 1:38 pm.   (**-RCF-**) Findings:  See findings.        Signed by: Alfonso Womack 3/22/2025 1:39 PM   Dictation workstation:   [id number]          Problem List:     Patient Active Problem List   Diagnosis    CAD (coronary artery disease)    Benign prostatic hyperplasia    Current smoker    Elevated serum free T4 level    Emphysema/COPD (Multi)    Hyperlipidemia    Hypertriglyceridemia    Primary hypertension    Insomnia, idiopathic    Low back pain    Olecranon bursitis    Paroxysmal atrial fibrillation (Multi)    Sacroiliitis (CMS-HCC)    Umbilical hernia    High risk medication use    Stage 3a chronic kidney disease (CKD) (Multi)    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)    Hypertensive heart disease with heart failure    Amiodarone-induced hyperthyroidism    Stroke-like symptoms    Slurred speech     Chronic heart failure with preserved ejection fraction (HFpEF)    Permanent atrial fibrillation (Multi)    Hyponatremia    Elevated troponin    Acute CVA (cerebrovascular accident) (Multi)       Assessment:   Acute CVA  Elevated biomarkers  Chronic A-fib on Eliquis  CAD  Hypertension  Dyslipidemia  Tobacco abuse  Chronic diastolic heart failure  Lungs CA  Hyponatremia    Plan:   Tele monitoring  Serial enzymnes  2d echo limited  Daily EKG's  Continue the Eliquis 5 mg p.o. twice daily  Will discuss with neurology adding aspirin after the MRI  Toprol- mg daily  Crestor 10 mg daily  Diovan 40 mg daily on hold 1 keep blood pressure between 140 and 160      Ezequiel Vaca CNP  [facility]      Of note, this documentation is completed using the Dragon Dictation system (voice recognition software). There may be spelling and/or grammatical errors that were not corrected prior to final submission.      Electronically signed by NICOLASA Huff-CNP, on 3/24/2025 at 9:02 AM    Patient seen and examined in conjunction with NICOLASA Dobson and agree with the evaluation as noted above.  I have personally interviewed and examined the patient.   I have personally and independently reviewed the pertinentlabs and diagnostic testing.  I have personally verified the elements of the history and physical listed above and changes, if any, are noted below.    89-year-old gentleman who was admitted with a recent CVA associated with vascular risk for and was noted to have an incidental finding of mobile mass in the right ventricle for which cardiology was consulted.  Patient has a history of paroxysmal atrial fibrillation for which she said he has been compliant with anticoagulation.  He also has a history of lung cancer and refused biopsy and further treatment.  He denies any chest pain or significant shortness of breath.  He just noticed yesterday sudden difficulty of  finding words and some slurred speech and was brought to the emergency room by the son.  He was previously anticoagulated with INR but due to significant fluctuations, he was switched to Eliquis which he said he has been compliant with.  He has no prior history of coronary artery disease.  EKG shows atrial fibrillation with occasional PVCs.  Labs show mild troponin elevation between 136-189    I personally reviewed his transthoracic echocardiogram and agree with the finding of a large mobile echodensity in the right ventricle.  Differentials include possible mass but cannot rule out possible large thrombus.      Assessment and plan:  1.  CVA: With what appears to be an embolic source in the right ventricle.  Will recommend MOSHE for further evaluation of the mass and also consideration for possible trans-catheter treatment options if he is considered to be a candidate for this.  In the meantime, agree with continued anticoagulation and this will be switched to Pradaxa per neurology to explore other mechanism of action for his long-term anticoagulation.  2.  History of lung mass: The findings on the echocardiogram may represent metastasis from suspected lung cancer which the patient has refused biopsy.  Will defer to primary team for continued management.  3.  Persistent atrial fibrillation: Rate is well-controlled on current medications which we will continue.

## 2025-03-24 NOTE — PROGRESS NOTES
"Canelo Moore is a 89 y.o. male on day 2 of admission presenting with Acute CVA (cerebrovascular accident) (Multi).    Subjective   No new symptoms  He did not notice his left field cut until I pointed it out. He reports full adherence to Eliquis.       Objective     Last Recorded Vitals  Blood pressure 104/66, pulse 87, temperature 36.8 °C (98.2 °F), resp. rate 16, height 1.727 m (5' 8\"), weight 79.4 kg (175 lb), SpO2 96%.    Physical Exam  Neurological Exam  Left dense field cut. Mild dysarthria. EOM full range. 5/5 strength throughout  Relevant Results    NIH Stroke Scale  1A. Level of Consciousness: Alert, Keenly Responsive  1B. Ask Month and Age: Both Questions Right  1C. Blink Eyes & Squeeze Hands: Performs Both Tasks  2. Best Gaze: Normal  3. Visual: Complete Hemianopia  4. Facial Palsy: Normal Symmetrical Movements  5A. Motor - Left Arm: No Drift  5B. Motor - Right Arm: No Drift  6A. Motor - Left Leg: No Drift  6B. Motor - Right Leg: No Drift  7. Limb Ataxia: Absent  8. Sensory Loss: Normal  9. Best Language: No Aphasia  10. Dysarthria: Mild-to-Moderate Dysarthria  11. Extinction and Inattention: No Abnormality  NIH Stroke Scale: 3           [place] Coma Scale  Best Eye Response: Spontaneous  Best Verbal Response: Oriented  Best Motor Response: Follows commands  [place] Coma Scale Score: 15                                  Assessment/Plan      Assessment & Plan  Stroke-like symptoms    CAD (coronary artery disease)    Benign prostatic hyperplasia    Current smoker    Emphysema/COPD (Multi)    Hyperlipidemia    Primary hypertension    Amiodarone-induced hyperthyroidism    Slurred speech    Chronic heart failure with preserved ejection fraction (HFpEF)    Permanent atrial fibrillation (Multi)    Hyponatremia    Elevated troponin      Acute CVA (cerebrovascular accident) (Multi)    Impression:  Cardioembolic infarcts, most notably right PCA    Secondary diagnoses:  Atrial fibrillation, CAD, HTN, HLD, COPD, " presumed lung cancer    Recommend:   Stop Eliquis, switch to Pradaxa  Pending MOSHE to evaluate RV mass  Outpatient driving evaluation, ordered           I spent 35 minutes in the professional and overall care of this patient.      Peter Farrell MD

## 2025-03-25 ENCOUNTER — DOCUMENTATION (OUTPATIENT)
Dept: HOME HEALTH SERVICES | Facility: HOME HEALTH | Age: 89
End: 2025-03-25
Payer: MEDICARE

## 2025-03-25 ENCOUNTER — HOME HEALTH ADMISSION (OUTPATIENT)
Dept: HOME HEALTH SERVICES | Facility: HOME HEALTH | Age: 89
End: 2025-03-25
Payer: MEDICARE

## 2025-03-25 ENCOUNTER — APPOINTMENT (OUTPATIENT)
Dept: CARDIOLOGY | Facility: HOSPITAL | Age: 89
DRG: 065 | End: 2025-03-25
Payer: MEDICARE

## 2025-03-25 VITALS
RESPIRATION RATE: 16 BRPM | WEIGHT: 175 LBS | HEIGHT: 68 IN | DIASTOLIC BLOOD PRESSURE: 68 MMHG | SYSTOLIC BLOOD PRESSURE: 103 MMHG | HEART RATE: 92 BPM | OXYGEN SATURATION: 97 % | BODY MASS INDEX: 26.52 KG/M2 | TEMPERATURE: 97.2 F

## 2025-03-25 LAB
ATRIAL RATE: 267 BPM
Q ONSET: 219 MS
QRS COUNT: 16 BEATS
QRS DURATION: 96 MS
QT INTERVAL: 354 MS
QTC CALCULATION(BAZETT): 456 MS
QTC FREDERICIA: 420 MS
R AXIS: 56 DEGREES
T AXIS: -29 DEGREES
T OFFSET: 396 MS
VENTRICULAR RATE: 100 BPM

## 2025-03-25 PROCEDURE — 93325 DOPPLER ECHO COLOR FLOW MAPG: CPT

## 2025-03-25 PROCEDURE — 99153 MOD SED SAME PHYS/QHP EA: CPT | Performed by: INTERNAL MEDICINE

## 2025-03-25 PROCEDURE — 7100000001 HC RECOVERY ROOM TIME - INITIAL BASE CHARGE

## 2025-03-25 PROCEDURE — S4991 NICOTINE PATCH NONLEGEND: HCPCS | Performed by: INTERNAL MEDICINE

## 2025-03-25 PROCEDURE — 93325 DOPPLER ECHO COLOR FLOW MAPG: CPT | Performed by: INTERNAL MEDICINE

## 2025-03-25 PROCEDURE — 93010 ELECTROCARDIOGRAM REPORT: CPT | Performed by: INTERNAL MEDICINE

## 2025-03-25 PROCEDURE — 2500000002 HC RX 250 W HCPCS SELF ADMINISTERED DRUGS (ALT 637 FOR MEDICARE OP, ALT 636 FOR OP/ED): Performed by: INTERNAL MEDICINE

## 2025-03-25 PROCEDURE — 7100000009 HC PHASE TWO TIME - INITIAL BASE CHARGE

## 2025-03-25 PROCEDURE — 7100000002 HC RECOVERY ROOM TIME - EACH INCREMENTAL 1 MINUTE

## 2025-03-25 PROCEDURE — 7100000010 HC PHASE TWO TIME - EACH INCREMENTAL 1 MINUTE

## 2025-03-25 PROCEDURE — 2500000004 HC RX 250 GENERAL PHARMACY W/ HCPCS (ALT 636 FOR OP/ED): Performed by: INTERNAL MEDICINE

## 2025-03-25 PROCEDURE — 94640 AIRWAY INHALATION TREATMENT: CPT

## 2025-03-25 PROCEDURE — 2500000001 HC RX 250 WO HCPCS SELF ADMINISTERED DRUGS (ALT 637 FOR MEDICARE OP): Performed by: NURSE PRACTITIONER

## 2025-03-25 PROCEDURE — 99238 HOSP IP/OBS DSCHRG MGMT 30/<: CPT | Performed by: INTERNAL MEDICINE

## 2025-03-25 PROCEDURE — 2500000005 HC RX 250 GENERAL PHARMACY W/O HCPCS: Performed by: INTERNAL MEDICINE

## 2025-03-25 PROCEDURE — 99152 MOD SED SAME PHYS/QHP 5/>YRS: CPT

## 2025-03-25 PROCEDURE — 93005 ELECTROCARDIOGRAM TRACING: CPT

## 2025-03-25 PROCEDURE — 2500000001 HC RX 250 WO HCPCS SELF ADMINISTERED DRUGS (ALT 637 FOR MEDICARE OP): Performed by: INTERNAL MEDICINE

## 2025-03-25 PROCEDURE — B24BZZ4 ULTRASONOGRAPHY OF HEART WITH AORTA, TRANSESOPHAGEAL: ICD-10-PCS | Performed by: INTERNAL MEDICINE

## 2025-03-25 PROCEDURE — 99153 MOD SED SAME PHYS/QHP EA: CPT

## 2025-03-25 PROCEDURE — 93312 ECHO TRANSESOPHAGEAL: CPT | Performed by: INTERNAL MEDICINE

## 2025-03-25 PROCEDURE — 99152 MOD SED SAME PHYS/QHP 5/>YRS: CPT | Performed by: INTERNAL MEDICINE

## 2025-03-25 RX ORDER — LIDOCAINE HYDROCHLORIDE 20 MG/ML
JELLY TOPICAL AS NEEDED
Status: DISCONTINUED | OUTPATIENT
Start: 2025-03-25 | End: 2025-03-25 | Stop reason: HOSPADM

## 2025-03-25 RX ORDER — DABIGATRAN ETEXILATE 150 MG/1
150 CAPSULE ORAL 2 TIMES DAILY
Qty: 60 CAPSULE | Refills: 11 | Status: SHIPPED | OUTPATIENT
Start: 2025-03-25 | End: 2025-03-25

## 2025-03-25 RX ORDER — FENTANYL CITRATE 50 UG/ML
INJECTION, SOLUTION INTRAMUSCULAR; INTRAVENOUS AS NEEDED
Status: DISCONTINUED | OUTPATIENT
Start: 2025-03-25 | End: 2025-03-25 | Stop reason: HOSPADM

## 2025-03-25 RX ORDER — MIDAZOLAM HYDROCHLORIDE 1 MG/ML
INJECTION, SOLUTION INTRAMUSCULAR; INTRAVENOUS AS NEEDED
Status: DISCONTINUED | OUTPATIENT
Start: 2025-03-25 | End: 2025-03-25 | Stop reason: HOSPADM

## 2025-03-25 RX ORDER — DABIGATRAN ETEXILATE 150 MG/1
150 CAPSULE ORAL 2 TIMES DAILY
Qty: 60 CAPSULE | Refills: 11 | Status: SHIPPED | OUTPATIENT
Start: 2025-03-25 | End: 2026-03-25

## 2025-03-25 RX ADMIN — FINASTERIDE 5 MG: 5 TABLET, FILM COATED ORAL at 08:31

## 2025-03-25 RX ADMIN — DABIGATRAN ETEXILATE MESYLATE 150 MG: 150 CAPSULE ORAL at 08:31

## 2025-03-25 RX ADMIN — ALFUZOSIN HYDROCHLORIDE 10 MG: 10 TABLET, EXTENDED RELEASE ORAL at 08:31

## 2025-03-25 RX ADMIN — MAGNESIUM OXIDE 400 MG (241.3 MG MAGNESIUM) TABLET 400 MG: TABLET at 08:34

## 2025-03-25 RX ADMIN — MIDAZOLAM 1 MG: 1 INJECTION INTRAMUSCULAR; INTRAVENOUS at 09:32

## 2025-03-25 RX ADMIN — IPRATROPIUM BROMIDE AND ALBUTEROL SULFATE 3 ML: 2.5; .5 SOLUTION RESPIRATORY (INHALATION) at 07:26

## 2025-03-25 RX ADMIN — FENTANYL CITRATE 25 MCG: 50 INJECTION, SOLUTION INTRAMUSCULAR; INTRAVENOUS at 09:32

## 2025-03-25 RX ADMIN — PREDNISONE 10 MG: 10 TABLET ORAL at 08:31

## 2025-03-25 RX ADMIN — BENZOCAINE, BUTAMBEN, AND TETRACAINE HYDROCHLORIDE 2 SPRAY: .028; .004; .004 AEROSOL, SPRAY TOPICAL at 09:09

## 2025-03-25 RX ADMIN — METOPROLOL SUCCINATE 100 MG: 50 TABLET, EXTENDED RELEASE ORAL at 08:31

## 2025-03-25 RX ADMIN — TIOTROPIUM BROMIDE INHALATION SPRAY 1 PUFF: 3.12 SPRAY, METERED RESPIRATORY (INHALATION) at 05:00

## 2025-03-25 RX ADMIN — LIDOCAINE HYDROCHLORIDE 1 APPLICATION: 20 JELLY TOPICAL at 09:10

## 2025-03-25 RX ADMIN — NICOTINE 1 PATCH: 21 PATCH, EXTENDED RELEASE TRANSDERMAL at 08:31

## 2025-03-25 ASSESSMENT — COGNITIVE AND FUNCTIONAL STATUS - GENERAL
CLIMB 3 TO 5 STEPS WITH RAILING: A LITTLE
PERSONAL GROOMING: A LITTLE
STANDING UP FROM CHAIR USING ARMS: A LITTLE
WALKING IN HOSPITAL ROOM: A LITTLE
MOVING FROM LYING ON BACK TO SITTING ON SIDE OF FLAT BED WITH BEDRAILS: A LITTLE
DAILY ACTIVITIY SCORE: 18
TOILETING: A LITTLE
TURNING FROM BACK TO SIDE WHILE IN FLAT BAD: A LITTLE
DRESSING REGULAR LOWER BODY CLOTHING: A LITTLE
EATING MEALS: A LITTLE
MOBILITY SCORE: 18
DRESSING REGULAR UPPER BODY CLOTHING: A LITTLE
MOVING TO AND FROM BED TO CHAIR: A LITTLE
HELP NEEDED FOR BATHING: A LITTLE

## 2025-03-25 ASSESSMENT — PAIN SCALES - GENERAL
PAINLEVEL_OUTOF10: 0 - NO PAIN
PAINLEVEL_OUTOF10: 0 - NO PAIN

## 2025-03-25 ASSESSMENT — PAIN - FUNCTIONAL ASSESSMENT: PAIN_FUNCTIONAL_ASSESSMENT: 0-10

## 2025-03-25 NOTE — SIGNIFICANT EVENT
Checked patient for gag reflex. Gag reflex positive. No complaints at this time. Patient taking sips of water without difficulty.

## 2025-03-25 NOTE — CARE PLAN
The patient's goals for the shift include rest     The clinical goals for the shift include Paitent will not have further neurological symptoms

## 2025-03-25 NOTE — PROGRESS NOTES
[facility] Heart Progress Note           Rounding MARTELL/Cardiologist:  Placido Vaca, NICOLASA-MADIE, Dr. Garo Rodriguez  Primary Cardiologist: Dr. Yi Montejo    Date:  3/25/2025  Patient:  Canelo Moore  YOB: 1935  MRN:  [id number]   Admit Date:  3/22/2025      SUBJECTIVE:    3/25/2025  Patient is awake alert and oriented x 3.  He still has sometimes difficulty finding his words when he is talking.  I spoke to him at length we are planning to do the MOSHE today I answered all of his questions I did tell him that I will call his son later in the day with an update  Telemetry A-fib rate 105    1. The left ventricular systolic function is mildly decreased, with a visually estimated ejection fraction of 45%.   2. There is global hypokinesis of the left ventricle with minor regional variations.   3. There is normal right ventricular global systolic function.   4. There is a large right ventricular mass.   5. The left atrial size is mild to moderately dilated.   6. There is no evidence of mitral valve stenosis.   7. Mild mitral valve regurgitation.   8. Moderate tricuspid regurgitation.   9. Aortic valve stenosis is not present.  10. The pulmonary artery is not well visualized.  11. Current study shows mild reduction of left ventricular function as compared to prior study. The findings in the right ventricle of a masslike lesion was not identified or seen on the prior study. Clinical correlation advised. MOSHE may be a strong consideration for better visualization.  12. Mass appears to be probably attached to the coronary apparatus involving the tricuspid valve.         VITALS:     Vitals:    03/24/25 2350 03/25/25 0450 03/25/25 0800 03/25/25 0901   BP: 117/74 140/79 113/74    BP Location:   Left arm    Patient Position:   Lying    Pulse: 101 (!) 116 (!) 111    Resp: 18 20 18    Temp: 36.7 °C (98.1 °F) 36.4 °C (97.5 °F) 36.2 °C (97.2 °F) (!) 2.5 °C (36.5 °F)   TempSrc:   Temporal    SpO2: 96% 93% 92%     Weight:       Height:         No intake or output data in the 24 hours ending 03/25/25 0902    Wt Readings from Last 4 Encounters:   03/22/25 79.4 kg (175 lb)   02/18/25 76.8 kg (169 lb 6.4 oz)   01/14/25 77.1 kg (170 lb)   11/27/24 74.8 kg (164 lb 12.8 oz)       CURRENT HOSPITAL MEDICATIONS:   alfuzosin, 10 mg, oral, Daily  dabigatran etexilate, 150 mg, oral, BID  finasteride, 5 mg, oral, Daily  furosemide, 40 mg, oral, Every Mon/Wed/Fri  ipratropium-albuteroL, 3 mL, nebulization, BID  magnesium oxide, 400 mg, oral, BID  [Held by provider] methIMAzole, 20 mg, oral, Daily  metoprolol succinate XL, 100 mg, oral, Daily  nicotine, 1 patch, transdermal, Daily  predniSONE, 10 mg, oral, Daily  rosuvastatin, 10 mg, oral, Nightly  tiotropium, 1 puff, inhalation, Daily  [Held by provider] valsartan, 40 mg, oral, Daily         Current Outpatient Medications   Medication Instructions    albuterol (Ventolin HFA) 90 mcg/actuation inhaler 2 puffs, inhalation, Every 6 hours PRN    alfuzosin (UROXATRAL) 10 mg, oral, Daily, Do not crush, chew, or split.    apixaban (ELIQUIS) 5 mg, oral, 2 times daily    finasteride (PROSCAR) 5 mg, oral, Daily    furosemide (LASIX) 40 mg, oral, Every Mon/Wed/Fri    magnesium oxide (MAG-OX) 400 mg, oral, 2 times daily    methIMAzole (TAPAZOLE) 20 mg, oral, Every 12 hours scheduled    metoprolol succinate XL (TOPROL-XL) 100 mg, oral, Daily, Do not crush or chew.    multivitamin (One Daily Multivitamin) tablet 1 tablet, Daily    nitroglycerin (NITROSTAT) 0.4 mg, sublingual, Every 5 min PRN, place 1 tablet under tongue every 5 minutes for up to 3 doses as needed for chest pain. Call 911 if pain persists    rosuvastatin (CRESTOR) 10 mg, oral, Nightly    tiotropium (SPIRIVA WITH HANDIHALER) 18 mcg, inhalation, Daily RT    valsartan-hydrochlorothiazide (Diovan-HCT) 160-12.5 mg tablet 1 tablet, oral, Daily        PHYSICAL EXAMINATION:     GENERAL APPEARANCE: Well developed, well nourished, in no acute  distress.  CHEST: Symmetric and non-tender.  INTEGUMENT: Skin warm and dry, without gross excoriationis or lesions.  HEENT: No gross abnormalities of conjunctiva, teeth, gums, oral mucosa  NECK: Supple, no JVD, no bruit. Thyroid not palpable. Carotid upstrokes normal.  NEURO/PSHCY: Oriented x 3.  He does have difficulty finding words at times  LUNGS: Clear to auscultation bilaterally; normal respiratory effort.  HEART: S1, S2 irregular with 1 out of 6 systolic murmur  ABDOMEN: Soft, nontender, no palpable hepatosplenomegaly, no mases, no bruits. Abdominal aorta not noted to be enlarged.  MUSCULOSKELETAL: Ambulatory with normal tandem gait.  EXTREMITIES: Warm with good color, no clubbing or cyanois. There is no edema noted.  PERIPHERAL VASCULAR: Pulses present and equally palpable; 1+ throughout. No femoral bruits.    LAB DATA:     CBC:   Results from last 7 days   Lab Units 03/24/25  0604 03/23/25  0612 03/22/25  1342   WBC AUTO x10*3/uL 8.0 6.8 9.1   RBC AUTO x10*6/uL 3.08* 3.18* 3.43*   HEMOGLOBIN g/dL 10.1* 10.6* 11.1*   HEMATOCRIT % 30.0* 30.7* 33.6*   MCV fL 97 97 98   MCH pg 32.8 33.3 32.4   MCHC g/dL 33.7 34.5 33.0   RDW % 14.9* 15.2* 15.0*   PLATELETS AUTO x10*3/uL 164 172 196     CMP:    Results from last 7 days   Lab Units 03/24/25  0604 03/23/25  0612 03/22/25  1902 03/22/25  1342   SODIUM mmol/L 129* 128* 129* 127*   POTASSIUM mmol/L 3.7 4.0 4.3 3.9   CHLORIDE mmol/L 94* 93* 91* 91*   CO2 mmol/L 30 29 31 28   BUN mg/dL 12 14 14 16   CREATININE mg/dL 0.75 0.87 1.05 1.02   GLUCOSE mg/dL 107* 109* 97 142*   PROTEIN TOTAL g/dL  --  5.7*  --  6.1*   CALCIUM mg/dL 8.6 8.9 9.6 9.2   BILIRUBIN TOTAL mg/dL  --  1.5*  --  1.3*   ALK PHOS U/L  --  61  --  70   AST U/L  --  17  --  18   ALT U/L  --  10  --  11     BMP:    Results from last 7 days   Lab Units 03/24/25  0604 03/23/25  0612 03/22/25  1902   SODIUM mmol/L 129* 128* 129*   POTASSIUM mmol/L 3.7 4.0 4.3   CHLORIDE mmol/L 94* 93* 91*   CO2 mmol/L 30 29 31    BUN mg/dL 12 14 14   CREATININE mg/dL 0.75 0.87 1.05   CALCIUM mg/dL 8.6 8.9 9.6   GLUCOSE mg/dL 107* 109* 97     Magnesium:  Results from last 7 days   Lab Units 03/23/25  0612   MAGNESIUM mg/dL 1.73     Troponin:    Results from last 7 days   Lab Units 03/23/25  0612 03/22/25  1902 03/22/25  1342   TROPHS ng/L 136* 189* 155*     BNP:   Results from last 7 days   Lab Units 03/22/25  1902   BNP pg/mL 414*     Lipid Panel:  Results from last 7 days   Lab Units 03/23/25  0612   HDL mg/dL 41.2   CHOLESTEROL/HDL RATIO  2.3   VLDL mg/dL 10   TRIGLYCERIDES mg/dL 49   NON HDL CHOL. mg/dL 55        DIAGNOSTIC TESTING:   @No results found for this or any previous visit.    MR angio neck wo IV contrast    Result Date: 3/23/2025  Interpreted By:  Mathieu Canseco, STUDY: MR BRAIN W AND WO IV CONTRAST; MR ANGIO HEAD WO IV CONTRAST; MR ANGIO NECK WO IV CONTRAST;  3/23/2025 2:10 pm   INDICATION: Signs/Symptoms:Possible CVA, but has history of probable lung CA-rule out CVA, metastasis.; Signs/Symptoms:Strokelike symptoms, also history lung CA; Signs/Symptoms:CVA symptoms-has had prior MRIs without problems.   COMPARISON: None.   ACCESSION NUMBER(S): [id number]; [id number]; [id number]   ORDERING CLINICIAN: ALEXIS LEAVITT   TECHNIQUE: The brain was studied in the sagittal axial and coronal planes utilizing diffusion, gradient echo T2 weighted FLAIR, T1 and T2 weighted images   Following intravenous injection of gadolinium contrast, T1 weighted fat suppressed multiplanar images were also performed.   FINDINGS: There is slight prominence of the cortical sulci and sylvian fissures. There is mild ventricular dilatation.  There are scattered and confluence foci of abnormal signal within the periventricular and subcortical white matter bilaterally.  These are compatible with minimal small vessel ischemic changes.  These nonspecific findings could also be produced by a demyelinating or post inflammatory process.  The visualized skull  base paranasal sinuses and orbital structures are unremarkable. Diffusion weighted images and associated ADC maps of the brain demonstrate acute/subacute infarction in the distribution of the right posterior cerebral artery.. Acute or subacute patchy infarctions are also noted at the left cerebral vertex also consistent with acute/subacute cortical infarctions. Gradient echo T2 weighted images fail to demonstrate hemosiderin deposition or other evidence of hemorrhage.   Postcontrast examination is limited due to motion artifact. There are no measurable metastasis or evidence of edema.. There is normal contrast opacification of the dural venous sinuses.   Magnetic resonance angiography Axial 3-D time-of-flight acquisition was performed and multiplanar reconstructions were made. There is narrowing at the origin of the left internal carotid artery with ulceration or intimal outpouching at the left carotid bifurcation. There is less than 50% luminal stenosis. The right carotid bifurcation is normal. There is loss of signal in the middle 3rd of the right internal carotid artery which is likely artifactual.. Magnetic resonance angiography of the intracranial vessels was performed utilizing 3-D time-of-flight imaging with 3-D reconstruction.  The examination fails to reveal the presence of focal narrowing or branch occlusion.  There is no evidence of aneurysm or vascular malformation.   IMPRESSION * Motion limited examination fails to demonstrate metastasis or edema suggestive of underlying neoplasm *Acute/subacute infarction in the distribution of the right posterior cerebral artery *Abnormal diffusion restriction at the left cerebral vertex also consistent with acute/subacute infarction in the distribution of the left middle cerebral artery. *A proximally 50% luminal narrowing at the origin of the left internal carotid artery with intimal outpouching or short segment dissection *Loss of normal flow related signal in the  middle 3rd of the right internal carotid artery is likely artifactual.     MACRO: none   Signed by: Mathieu Canseco 3/23/2025 2:29 PM Dictation workstation:   [id number]    MR brain w and wo IV contrast    Result Date: 3/23/2025  Interpreted By:  Mathieu Canseco, STUDY: MR BRAIN W AND WO IV CONTRAST; MR ANGIO HEAD WO IV CONTRAST; MR ANGIO NECK WO IV CONTRAST;  3/23/2025 2:10 pm   INDICATION: Signs/Symptoms:Possible CVA, but has history of probable lung CA-rule out CVA, metastasis.; Signs/Symptoms:Strokelike symptoms, also history lung CA; Signs/Symptoms:CVA symptoms-has had prior MRIs without problems.   COMPARISON: None.   ACCESSION NUMBER(S): [id number]; [id number]; [id number]   ORDERING CLINICIAN: ALEXIS LEAVITT   TECHNIQUE: The brain was studied in the sagittal axial and coronal planes utilizing diffusion, gradient echo T2 weighted FLAIR, T1 and T2 weighted images   Following intravenous injection of gadolinium contrast, T1 weighted fat suppressed multiplanar images were also performed.   FINDINGS: There is slight prominence of the cortical sulci and sylvian fissures. There is mild ventricular dilatation.  There are scattered and confluence foci of abnormal signal within the periventricular and subcortical white matter bilaterally.  These are compatible with minimal small vessel ischemic changes.  These nonspecific findings could also be produced by a demyelinating or post inflammatory process.  The visualized skull base paranasal sinuses and orbital structures are unremarkable. Diffusion weighted images and associated ADC maps of the brain demonstrate acute/subacute infarction in the distribution of the right posterior cerebral artery.. Acute or subacute patchy infarctions are also noted at the left cerebral vertex also consistent with acute/subacute cortical infarctions. Gradient echo T2 weighted images fail to demonstrate hemosiderin deposition or other evidence of hemorrhage.   Postcontrast  examination is limited due to motion artifact. There are no measurable metastasis or evidence of edema.. There is normal contrast opacification of the dural venous sinuses.   Magnetic resonance angiography Axial 3-D time-of-flight acquisition was performed and multiplanar reconstructions were made. There is narrowing at the origin of the left internal carotid artery with ulceration or intimal outpouching at the left carotid bifurcation. There is less than 50% luminal stenosis. The right carotid bifurcation is normal. There is loss of signal in the middle 3rd of the right internal carotid artery which is likely artifactual.. Magnetic resonance angiography of the intracranial vessels was performed utilizing 3-D time-of-flight imaging with 3-D reconstruction.  The examination fails to reveal the presence of focal narrowing or branch occlusion.  There is no evidence of aneurysm or vascular malformation.   IMPRESSION * Motion limited examination fails to demonstrate metastasis or edema suggestive of underlying neoplasm *Acute/subacute infarction in the distribution of the right posterior cerebral artery *Abnormal diffusion restriction at the left cerebral vertex also consistent with acute/subacute infarction in the distribution of the left middle cerebral artery. *A proximally 50% luminal narrowing at the origin of the left internal carotid artery with intimal outpouching or short segment dissection *Loss of normal flow related signal in the middle 3rd of the right internal carotid artery is likely artifactual.     MACRO: none   Signed by: Mathieu Canseco 3/23/2025 2:29 PM Dictation workstation:   [id number]    MR angio head wo IV contrast    Result Date: 3/23/2025  Interpreted By:  Mathieu Canseco, STUDY: MR BRAIN W AND WO IV CONTRAST; MR ANGIO HEAD WO IV CONTRAST; MR ANGIO NECK WO IV CONTRAST;  3/23/2025 2:10 pm   INDICATION: Signs/Symptoms:Possible CVA, but has history of probable lung CA-rule out CVA,  metastasis.; Signs/Symptoms:Strokelike symptoms, also history lung CA; Signs/Symptoms:CVA symptoms-has had prior MRIs without problems.   COMPARISON: None.   ACCESSION NUMBER(S): [id number]; [id number]; [id number]   ORDERING CLINICIAN: ALEXIS LEAVITT   TECHNIQUE: The brain was studied in the sagittal axial and coronal planes utilizing diffusion, gradient echo T2 weighted FLAIR, T1 and T2 weighted images   Following intravenous injection of gadolinium contrast, T1 weighted fat suppressed multiplanar images were also performed.   FINDINGS: There is slight prominence of the cortical sulci and sylvian fissures. There is mild ventricular dilatation.  There are scattered and confluence foci of abnormal signal within the periventricular and subcortical white matter bilaterally.  These are compatible with minimal small vessel ischemic changes.  These nonspecific findings could also be produced by a demyelinating or post inflammatory process.  The visualized skull base paranasal sinuses and orbital structures are unremarkable. Diffusion weighted images and associated ADC maps of the brain demonstrate acute/subacute infarction in the distribution of the right posterior cerebral artery.. Acute or subacute patchy infarctions are also noted at the left cerebral vertex also consistent with acute/subacute cortical infarctions. Gradient echo T2 weighted images fail to demonstrate hemosiderin deposition or other evidence of hemorrhage.   Postcontrast examination is limited due to motion artifact. There are no measurable metastasis or evidence of edema.. There is normal contrast opacification of the dural venous sinuses.   Magnetic resonance angiography Axial 3-D time-of-flight acquisition was performed and multiplanar reconstructions were made. There is narrowing at the origin of the left internal carotid artery with ulceration or intimal outpouching at the left carotid bifurcation. There is less than 50% luminal stenosis. The  right carotid bifurcation is normal. There is loss of signal in the middle 3rd of the right internal carotid artery which is likely artifactual.. Magnetic resonance angiography of the intracranial vessels was performed utilizing 3-D time-of-flight imaging with 3-D reconstruction.  The examination fails to reveal the presence of focal narrowing or branch occlusion.  There is no evidence of aneurysm or vascular malformation.   IMPRESSION * Motion limited examination fails to demonstrate metastasis or edema suggestive of underlying neoplasm *Acute/subacute infarction in the distribution of the right posterior cerebral artery *Abnormal diffusion restriction at the left cerebral vertex also consistent with acute/subacute infarction in the distribution of the left middle cerebral artery. *A proximally 50% luminal narrowing at the origin of the left internal carotid artery with intimal outpouching or short segment dissection *Loss of normal flow related signal in the middle 3rd of the right internal carotid artery is likely artifactual.     MACRO: none   Signed by: Mathieu Canseco 3/23/2025 2:29 PM Dictation workstation:   [id number]    ECG 12 Lead    Result Date: 3/23/2025  Atrial fibrillation with premature ventricular or aberrantly conducted complexes Cannot rule out Anterior infarct , age undetermined Abnormal ECG When compared with ECG of 22-MAR-2025 14:07, Nonspecific T wave abnormality now evident in Anterior leads Confirmed by Melia Caceres ([id number]) on 3/23/2025 1:55:46 PM    ECG 12 lead    Result Date: 3/23/2025  Atrial fibrillation Nonspecific T wave abnormality Abnormal ECG When compared with ECG of 13-OCT-2024 07:07, No significant change was found See ED provider note for full interpretation and clinical correlation Confirmed by Nevin Durand ([id number]) on 3/23/2025 10:33:41 AM    Carotid duplex bilateral    Result Date: 3/23/2025  Interpreted By:  Rhett Rendon, STUDY: [facility] CAROTID ARTERY DUPLEX BILATERAL;   3/23/2025 8:26 am   INDICATION: Signs/Symptoms:CVA sx. ,R29.90 Unspecified symptoms and signs involving the nervous system,Z86.73 Personal history of transient ischemic attack (TIA), and cerebral infarction without residual deficits   COMPARISON: None.   ACCESSION NUMBER(S): [id number]   ORDERING CLINICIAN: ALEXIS LEAVITT   TECHNIQUE: Ultrasound of the bilateral carotid arterial systems was performed from the angle of the mandible down through the base of the neck, using grey scale imaging, color doppler, and spectral doppler.   FINDINGS: Right Side: Right CCA PSV 40 cm/sec; CCA EDV was 15 cm/sec;  ICA PSV was  43 cm/sec; ICA EDV  9 cm/sec.  Right ICA:CCA PSV Ratio was 1.1. These spectral findings fall within the 0-50 % diameter stenosis category. On grey scale imaging and color doppler,  there was  very mild calcified plaque in the carotid bulb and proximal ICA.. Right vertebral artery flow was antegrade.   Left Side: Left CCA PSV 49 cm/sec; CCA EDV was 18 cm/sec; ICA PSV was 46 cm/sec; ICA EDV 15 cm/sec .  Left ICA:CCA PSV Ratio was 0.9. These spectral findings fall within the 0-50 % diameter stenosis category.  On grey scale imaging and color doppler, there was  very mild calcified plaque in the carotid bulb and proximal ICA.. Left vertebral artery flow was antegrade.       Very mild, hemodynamically insignificant calcified plaque in each carotid bulb and in each proximal ICA as described.   MACRO: None   Signed by: Rhett Rendon 3/23/2025 9:58 AM Dictation workstation:   [id number]    CT brain attack head wo IV contrast    Result Date: 3/22/2025  Interpreted By:  Alfonso Womack, STUDY: CT BRAIN ATTACK HEAD WO IV CONTRAST;  3/22/2025 1:32 pm   INDICATION: Signs/Symptoms:Stroke Evaluation.   COMPARISON: None.   ACCESSION NUMBER(S): [id number]   ORDERING CLINICIAN: SALO UMAÑA   TECHNIQUE: Sequential trans axial images were obtained  .   FINDINGS: INTRACRANIAL:   There is mild prominence of the cortical sulci  indicating atrophy.   There is mild ventriculomegaly, again consistent with atrophy.   Mild decreased attenuation of the periventricular and long tracks of the white matter most consistent with gliosis from arterial disease. There is no evidence of definite subacute infarction, intracranial hemorrhage or mass.     EXTRACRANIAL: Visualized paranasal sinuses and mastoids are clear. The calvarium is intact.       Mild age related degenerative change as described without acute findings.   MACRO: Alfonso Womack discussed the significance and urgency of this critical finding by secure chat with  SALO UMAÑA on 3/22/2025 at 1:38 pm. (**-RCF-**) Findings:  See findings.   Signed by: Alfonso Womack 3/22/2025 1:39 PM Dictation workstation:   [id number]       Transthoracic Echo (TTE) Limited   Final Result      MR angio neck wo IV contrast   Final Result      MR brain w and wo IV contrast   Final Result      MR angio head wo IV contrast   Final Result      Carotid duplex bilateral   Final Result   Very mild, hemodynamically insignificant calcified plaque in each   carotid bulb and in each proximal ICA as described.        MACRO:   None        Signed by: Rhett Rendon 3/23/2025 9:58 AM   Dictation workstation:   [id number]      CT brain attack head wo IV contrast   Final Result   Mild age related degenerative change as described without acute   findings.        MACRO:   Alfonso Womack discussed the significance and urgency of this critical   finding by secure chat with  SALO UMAÑA on 3/22/2025 at 1:38 pm.   (**-RCF-**) Findings:  See findings.        Signed by: Alfonso Womack 3/22/2025 1:39 PM   Dictation workstation:   [id number]      Transesophageal Echo (MOSHE)    (Results Pending)         RADIOLOGY:     Transthoracic Echo (TTE) Limited   Final Result      MR angio neck wo IV contrast   Final Result      MR brain w and wo IV contrast   Final Result      MR angio head wo IV contrast   Final Result      Carotid duplex bilateral    Final Result   Very mild, hemodynamically insignificant calcified plaque in each   carotid bulb and in each proximal ICA as described.        MACRO:   None        Signed by: Rhett Rendon 3/23/2025 9:58 AM   Dictation workstation:   [id number]      CT brain attack head wo IV contrast   Final Result   Mild age related degenerative change as described without acute   findings.        MACRO:   Alfonso Womack discussed the significance and urgency of this critical   finding by secure chat with  SALO UMAÑA on 3/22/2025 at 1:38 pm.   (**-RCF-**) Findings:  See findings.        Signed by: Alfonso Womack 3/22/2025 1:39 PM   Dictation workstation:   [id number]      Transesophageal Echo (MOSHE)    (Results Pending)       PROBLEM LIST     Patient Active Problem List   Diagnosis    CAD (coronary artery disease)    Benign prostatic hyperplasia    Current smoker    Elevated serum free T4 level    Emphysema/COPD (Multi)    Hyperlipidemia    Hypertriglyceridemia    Primary hypertension    Insomnia, idiopathic    Low back pain    Olecranon bursitis    Paroxysmal atrial fibrillation (Multi)    Sacroiliitis (CMS-HCC)    Umbilical hernia    High risk medication use    Stage 3a chronic kidney disease (CKD) (Multi)    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)    Hypertensive heart disease with heart failure    Amiodarone-induced hyperthyroidism    Stroke-like symptoms    Slurred speech    Chronic heart failure with preserved ejection fraction (HFpEF)    Permanent atrial fibrillation (Multi)    Hyponatremia    Elevated troponin    Acute CVA (cerebrovascular accident) (Multi)       ASSESSMENT:   Acute CVA  Elevated biomarkers  Chronic A-fib on Eliquis  CAD  Hypertension  Dyslipidemia  Tobacco abuse  Chronic diastolic heart failure  Lungs CA  Hyponatremia    PLAN:     Patient seen and examined in conjunction with NICOLAAS Dobson and agree with the evaluation as noted above.  I have personally interviewed and  examined the patient.   I have personally and independently reviewed the pertinentlabs and diagnostic testing.  I have personally verified the elements of the history and physical listed above and changes, if any, are noted below.     89-year-old gentleman who was admitted with a recent CVA associated with vascular risk for and was noted to have an incidental finding of mobile mass in the right ventricle for which cardiology was consulted.  Patient has a history of paroxysmal atrial fibrillation for which she said he has been compliant with anticoagulation.  He also has a history of lung cancer and refused biopsy and further treatment.  He denies any chest pain or significant shortness of breath.  He just noticed yesterday sudden difficulty of finding words and some slurred speech and was brought to the emergency room by the son.  He was previously anticoagulated with INR but due to significant fluctuations, he was switched to Eliquis which he said he has been compliant with.  He has no prior history of coronary artery disease.  EKG shows atrial fibrillation with occasional PVCs.  Labs show mild troponin elevation between 136-189     I personally reviewed his transthoracic echocardiogram and agree with the finding of a large mobile echodensity in the right ventricle.  Differentials include possible mass but cannot rule out possible large thrombus.       Assessment and plan:  1.  CVA: With what appears to be an embolic source in the right ventricle.  Will recommend MOSHE for further evaluation of the mass and also consideration for possible trans-catheter treatment options if he is considered to be a candidate for this.  In the meantime, agree with continued anticoagulation and this will be switched to Pradaxa per neurology to explore other mechanism of action for his long-term anticoagulation.  2.  History of lung mass: The findings on the echocardiogram may represent metastasis from suspected lung cancer which the  patient has refused biopsy.  Will defer to primary team for continued management.  3.  Persistent atrial fibrillation: Rate is well-controlled on current medications which we will continue.    3/25/25  Tele monitoring  With neurology continue Pradaxa 150 mg p.o. twice daily  Lasix 40 mg Monday Wednesday Friday  Toprol- mg daily  Crestor 10 mg daily  MOSHE today discussed  risk vs benefits verbalized understanding would like to proceed with procedure    Ezequiel Vaca CNP  [facility]      Of note, this documentation is completed using the Dragon Dictation system (voice recognition software). There may be spelling and/or grammatical errors that were not corrected prior to final submission.    Please do not hesitate to call with questions.  Electronically signed by NICOLASA Huff-CNP, on 3/25/2025 at 9:02 AM--    Patient seen and examined in conjunction with NICOLASA Dobson and agree with the evaluation as noted above.  Patient is doing well, status post MOSHE which showed which showed

## 2025-03-25 NOTE — HH CARE COORDINATION
Home Care received a Referral for Nursing, Physical Therapy, Occupational Therapy, and Speech Language Pathology. We have processed the referral for a Start of Care on 24-48 HOURS .     If you have any questions or concerns, please feel free to contact us at [phone]. Follow the prompts, enter your five digit zip code, and you will be directed to your care team on WEST 2.

## 2025-03-25 NOTE — POST-PROCEDURE NOTE
Physician Transition of Care Summary  Invasive Cardiovascular Lab    Procedure Date: 03/25/25     Pre Procedure Indications:   Evaluate right ventricular mass    Post Procedure Diagnosis:   Medium sized mobile echodensity was seen on the tricuspid valve, differential include possible old vegetation, calcified thrombus, but cannot rule out metastatic mass.  There is a separate small echodensity on the noncoronary cusp of the aortic valve which is also mobile, suggestive of fibroblastoma.    Procedure(s):   Transesophageal echocardiogram    Procedure Findings:   Low normal LV systolic function with EF of about 50 to 55%.  Moderate biatrial enlargement with no obvious intra-atrial thrombus or masses visualized.  Saline contrast study was negative.  Normal-sized left atrial appendage with no obvious thrombus or masses visualized.  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  There is mild aortic insufficiency.  Moderately thickened mitral valve with moderate mitral regurgitation.  Moderately thickened tricuspid valve with a prominent tricuspid annulus, and mobile echodensity as described above.  There is moderate tricuspid rotation.  Pulmonic valve is not well-visualized, but there is no pulmonic stenosis or regurgitation.  There is moderate plaque in the descending thoracic aorta.    Description of the Procedure:   Transesophageal echocardiogram    Complications:   None    Estimated Blood Loss:   None    Anesthesia: Conscious sedation     any Specimen(s) Removed: None      Electronically signed by: Garo Rodriguez MD, 3/25/2025

## 2025-03-25 NOTE — SIGNIFICANT EVENT
Focused assessment complete. Patient post MOSHE. No complaint of pain. No hemoptysis  noted at this time. Patient care ongoing.

## 2025-03-25 NOTE — DISCHARGE SUMMARY
Discharge Diagnosis:   Acute CVA (cerebrovascular accident) (Multi)     Admission Date: 3/22/2025   Discharge Date: 03/25/25       Hospital Course:   Canelo Moore is a pleasant 89 y.o. male with PMHx of HTN, HLD, CAD, HFpEF, permanent AFIB on Eliquis, COPD with ongoing tobacco use, hx of basal cell carcinoma, and bilateral pulmonary nodules (RLL and LLL, PET avid, pt refused biopsy) who presented to [facility] ED 3/22 c/o slurred speech and expressive aphasia. Stroke alert called. CT head showed atrophy but no acute intracranial pathology. Case was discussed with on call neurology who advised NOT giving TNK but admitting for further work up. MRI revealed bilateral acute to subacute infarcts (R posterior cerebral artery and L middle cerebral artery). Echo revealed mildly reduced LV function with EF 45%, global hypokinesis of LV, NEGATIVE bubble study, no hemodynamically significant valvular abnormalities, and a large right ventricular mass. Pt subsequently underwent MOSHE today under the care of Dr. 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  Patient was meant to have an outpatient to schedule to see cardiologist Dr. Gigi Srivastava.  Patient was initially started on Pradaxa since his stroke while on Eliquis.  However on discharge it was found that patient does not have insurance coverage for Pradaxa.  Later on he was switched to Xarelto.  Patient will have outpatient follow-up with neurology.    Discharge Physical Exam:    HEENT:  Atraumatic, PERRL. Conjunctivae clear.  Moist nasal mucous membranes.   Neck:  Supple without thyromegaly or lymphadenopathy.  Lungs:  Clear to auscultation without rales, rhonchi, or rub.  Heart:  RRR, S1, S2, without M.  Abdomen:  Soft, non tender, no organ enlargement.  Bowel sounds present . No CVA tenderness.  Extremities:  No edema. No  calf swelling or tenderness.    Skin:  No rash, ecchymosis or erythema.    On the day of discharge patient was ambulating well, eating and drinking well. Physical exam was essentially benign and was at baseline. Blood chemistry and other lab testing results were at acceptable for discharge. Patient remained hemodynamically stable and with no further acute concern. Patient verbalized understanding of discharge medications and the expected adverse effects, if any.       The detail of the hospital course  including admission H&P, consult recommendations, biochemical lab results, imaging studies can be found in EHR of [facility]. Total 29 minutes time was spent on discharge exam, medicine reconciliation, discharge instructions and preparing discharge summary.     Home Medications After Discharge    Scheduled   alfuzosin (Uroxatral) 10 mg 24 hr tablet, Take 1 tablet (10 mg) by mouth once daily. Do not crush, chew, or split.   dabigatran etexilate (Pradaxa) 150 mg capsule, Take 1 capsule (150 mg) by mouth 2 times a day. Do not crush or chew.   finasteride (Proscar) 5 mg tablet, Take 1 tablet (5 mg) by mouth once daily.   furosemide (Lasix) 40 mg tablet, Take 1 tablet (40 mg) by mouth once a day on Monday, Wednesday, and Friday.   magnesium oxide (Mag-Ox) 400 mg (241.3 mg magnesium) tablet, Take 1 tablet (400 mg) by mouth 2 times a day.   methIMAzole (Tapazole) 10 mg tablet, Take 2 tablets (20 mg) by mouth every 12 hours.   metoprolol succinate XL (Toprol-XL) 100 mg 24 hr tablet, Take 1 tablet (100 mg) by mouth once daily. Do not crush or chew.   multivitamin (One Daily Multivitamin) tablet, Take 1 tablet by mouth once daily.   rosuvastatin (Crestor) 10 mg tablet, Take 1 tablet (10 mg) by mouth once daily at bedtime.   tiotropium (Spiriva with HandiHaler) 18 mcg inhalation capsule, Place 1 capsule (18 mcg) into inhaler and inhale once daily.    PRN   albuterol (Ventolin HFA) 90 mcg/actuation inhaler, Inhale  2 puffs every 6 hours if needed for shortness of breath.   nitroglycerin (Nitrostat) 0.4 mg SL tablet, Place 1 tablet (0.4 mg) under the tongue every 5 minutes if needed for chest pain. place 1 tablet under tongue every 5 minutes for up to 3 doses as needed for chest pain. Call 911 if pain persists         Outpatient Follow up:   Future Appointments   Date Time Provider Department Center   4/8/2025  2:45 PM Rosy Gallegos MD [id number] [place]   8/6/2025  2:00 PM Gordy Cueva MD [id number] [place]   11/18/2025  1:45 PM Yi Montejo MD [id number] [place]     PCP: Gordy Cueva MD   The transitional care management team of [facility] will contact patient.    Patient was also advised to call PCP's office for hospital discharge follow-up in 7-10 days from today.          PS: This note was completed using Dragon voice recognition technology and may include unintended errors with respect to translation of words, typographical or grammar errors which may not have been identified while finalizing the chart.

## 2025-03-25 NOTE — SIGNIFICANT EVENT
Mr. Moore is an 89 year old male with PMHx of HTN, HLD, CAD, HFpEF, permanent AFIB on Eliquis, COPD with ongoing tobacco use, hx of basal cell carcinoma, and bilateral pulmonary nodules (RLL and LLL, PET avid, pt refused biopsy) who presented to [facility] ED 3/22 c/o slurred speech and expressive aphasia. Stroke alert called. CT head showed atrophy but no acute intracranial pathology. Case was discussed with on call neurology who advised NOT giving TNK but admitting for further work up. MRI revealed bilateral acute to subacute infarcts (R posterior cerebral artery and L middle cerebral artery). Echo revealed mildly reduced LV function with EF 45%, global hypokinesis of LV, NEGATIVE bubble study, no hemodynamically significant valvular abnormalities, and a large right ventricular mass. Pt subsequently underwent MOSHE today under the care of Dr. 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 Findings were discussed with cardiac surgery and imaging reviewed by Dr. Gigi Srivastava who is recommending outpatient follow up in 1-2 weeks allowing time for pt to recover from acute event. OV scheduled 4/8 @ 2:45PM with Dr. Romo.

## 2025-03-26 ENCOUNTER — PATIENT OUTREACH (OUTPATIENT)
Dept: PRIMARY CARE | Facility: CLINIC | Age: 89
End: 2025-03-26
Payer: MEDICARE

## 2025-03-26 ENCOUNTER — DOCUMENTATION (OUTPATIENT)
Dept: HOME HEALTH SERVICES | Facility: HOME HEALTH | Age: 89
End: 2025-03-26
Payer: MEDICARE

## 2025-03-26 ENCOUNTER — TELEPHONE (OUTPATIENT)
Dept: PRIMARY CARE | Facility: CLINIC | Age: 89
End: 2025-03-26
Payer: MEDICARE

## 2025-03-26 DIAGNOSIS — I63.9 ACUTE CVA (CEREBROVASCULAR ACCIDENT) (MULTI): ICD-10-CM

## 2025-03-26 DIAGNOSIS — R47.81 SLURRED SPEECH: Primary | ICD-10-CM

## 2025-03-26 LAB
EJECTION FRACTION: 58 %
RIGHT VENTRICLE PEAK SYSTOLIC PRESSURE: 19.6 MMHG

## 2025-03-26 NOTE — PROGRESS NOTES
Discharge Facility:  [facility]    Discharge Diagnosis:  Acute CVA (cerebrovascular accident)     Admission Date:  3/22/25  Discharge Date:   3/25/25    PCP Appointment Date:  Appointment with Gordy Cueva MD (04/07/2025)   Appointment with Gordy Cueva MD (08/06/2025)   Specialist Appointment Date:   follow-up with [id number] handling scheduling    Appointment with Rosy Gallegos MD (04/08/2025)   Appointment with Yi Montejo MD (11/18/2025)   Hospital Encounter and Summary Linked: Yes  ED to Hosp-Admission (Discharged) with Gordy Cueva MD; Dominga Lazar MD; Melia Caceres MD (03/22/2025)     Discharge Summary by Gordy Cueva MD (03/25/2025 09:55)   See discharge assessment below for further details  Wrap Up  Wrap Up Additional Comments: (S) Successful transition of care outreach within 2 business days of discharge. CM re-introduced myself and the TCM program.   CM gave my contact information and encouraged to call if needing assistance or has any further non-emergent questions prior to my next outreach.   Reviewed hospital stay and answered any questions. Spent time to make sure Canelo understood medication change to start Xarelto and not Pradaxa. He wrote this down and repeated to me. He also repeated my phone # so he can reach me if needed,. Patient denies any further discharge questions/needs at this time. (3/26/2025 10:31 AM)    Medications  Medications reviewed with patient/caregiver?: Yes (3/26/2025 10:31 AM)  Is the patient having any side effects they believe may be caused by any medication additions or changes?: No (3/26/2025 10:31 AM)  Does the patient have all medications ordered at discharge?: No (Son picking up today) (3/26/2025 10:31 AM)  Prescription Comments: Listed on Discharge Summary--START taking: dabigatran etexilate (Pradaxa)  STOP taking: apixaban 5 mg tablet (Eliquis) valsartan-hydrochlorothiazide 160-12.5 mg tablet  (Diovan-HCT) *CM noted in discharge summary that Dr Cueva had written -Patient was initially started on Pradaxa since his stroke while on Eliquis. However on discharge it was found that patient does not have insurance coverage for Pradaxa. Later on he was switched to Xarelto (3/26/2025 10:31 AM)  Is the patient taking all medications as directed (includes completed medication regime)?: Yes (3/26/2025 10:31 AM)  Care Management Interventions: (S) Provided patient education; Advised patient to call provider (3/26/2025 10:31 AM)  Medication Comments: (S) This CM noted that meds had been changed at discharge and not updated on patients summary given at discharge. Went over in detail mulitiple times and had patient update his discharge summary, he Crossed out Padaxa then spelled out Xarelto and had him write it to start. Patient reported that he had been told to take Eliquis with asprin until he got new medication then stop Eliqus. I explained importance to not take Pradaxa and Xarelto together and if has any questions to please call Dr Cueva or myself . He will also share information with Son who is picking up meds, (3/26/2025 10:31 AM)    Appointments  Does the patient have a primary care provider?: Yes (3/26/2025 10:31 AM)  Care Management Interventions: Verified appointment date/time/provider (Made appt with Dr Cueva and pt wrote down is aware it will be on MyChart) (3/26/2025 10:31 AM)  Has the patient kept scheduled appointments due by today?: Yes (3/26/2025 10:31 AM)    Self Management  What is the home health agency?: [facility] [phone] Nursing, Physical Therapy, Occupational Therapy, and Speech Language Pathology. (3/26/2025 10:31 AM)  Has home health visited the patient within 72 hours of discharge?: Unsure (son is handling for him) (3/26/2025 10:31 AM)  What Durable Medical Equipment (DME) was ordered?: na (3/26/2025 10:31 AM)    Patient Teaching  Does the patient have  access to their discharge instructions?: Yes (3/26/2025 10:31 AM)  Care Management Interventions: Reviewed instructions with patient; Educated on MyChart (Active MyChart) (3/26/2025 10:31 AM)  What is the patient's perception of their health status since discharge?: Same (3/26/2025 10:31 AM)  Is the patient/caregiver able to teach back the hierarchy of who to call/visit for symptoms/problems? PCP, Specialist, Home Health nurse, Urgent Care, ED, 911: Yes (3/26/2025 10:31 AM)

## 2025-03-26 NOTE — PROGRESS NOTES
ADMISSION DATE: 3/22/2025  HOSPITAL DAY: 2    SUBJECTIVE:  Patient was seen at bedside.  Uneventful night.    OBJECTIVE:  Vitals:    03/25/25 0912 03/25/25 0956 03/25/25 1153 03/25/25 1448   BP: 144/79 120/60 101/59 103/68   BP Location:       Patient Position:   Sitting Sitting   Pulse: 92 92     Resp: 22 22 16 16   Temp:   36.1 °C (97 °F) 36.2 °C (97.2 °F)   TempSrc:       SpO2: 100% 100% 100% 97%   Weight:       Height:            Intake/Output Summary (Last 24 hours) at 3/26/2025 0005  Last data filed at 3/25/2025 1153  Gross per 24 hour   Intake 120 ml   Output 0 ml   Net 120 ml      Wt Readings from Last 10 Encounters:   03/22/25 79.4 kg (175 lb)   02/18/25 76.8 kg (169 lb 6.4 oz)   01/14/25 77.1 kg (170 lb)   11/27/24 74.8 kg (164 lb 12.8 oz)   11/26/24 74.2 kg (163 lb 9.6 oz)   10/28/24 77.3 kg (170 lb 6.4 oz)   10/17/24 77.4 kg (170 lb 9.6 oz)   10/10/24 79.4 kg (175 lb 0.7 oz)   10/08/24 79.4 kg (175 lb)   08/27/24 82.1 kg (181 lb)       PHYSICAL EXAM:  Gen: Alert, awake, Oriented X 3. Not in any acute distress   HEENT:  Atraumatic, PERRL.  Conjunctivae clear.   Moist nasal mucous membranes. oropharynx non erythematous,   Neck:  Supple without thyromegaly or lymphadenopathy.  Lungs:  Clear to auscultation without rales, rhonchi, or rub.  Heart:  RRR, S1, S2, without M.  Abdomen:  Soft, non tender, no organ enlargement, bruit. Bowel sounds present . No CVA tenderness.  Extremities:  No edema. No calf swelling or tenderness.    Skin:  No rash, ecchymosis or erythema.    LAB RESULTS:   CBC:   Results from last 7 days   Lab Units 03/24/25  0604 03/23/25  0612 03/22/25  1342   WBC AUTO x10*3/uL 8.0 6.8 9.1   RBC AUTO x10*6/uL 3.08* 3.18* 3.43*   HEMOGLOBIN g/dL 10.1* 10.6* 11.1*   HEMATOCRIT % 30.0* 30.7* 33.6*   MCV fL 97 97 98   MCH pg 32.8 33.3 32.4   MCHC g/dL 33.7 34.5 33.0   RDW % 14.9* 15.2* 15.0*   PLATELETS AUTO x10*3/uL 164 172 196     CMP:    Results from last 7 days   Lab Units 03/24/25  0604  03/23/25  0612 03/22/25  1902 03/22/25  1342   SODIUM mmol/L 129* 128* 129* 127*   POTASSIUM mmol/L 3.7 4.0 4.3 3.9   CHLORIDE mmol/L 94* 93* 91* 91*   CO2 mmol/L 30 29 31 28   BUN mg/dL 12 14 14 16   CREATININE mg/dL 0.75 0.87 1.05 1.02   GLUCOSE mg/dL 107* 109* 97 142*   PROTEIN TOTAL g/dL  --  5.7*  --  6.1*   CALCIUM mg/dL 8.6 8.9 9.6 9.2   BILIRUBIN TOTAL mg/dL  --  1.5*  --  1.3*   ALK PHOS U/L  --  61  --  70   AST U/L  --  17  --  18   ALT U/L  --  10  --  11     BMP:    Results from last 7 days   Lab Units 03/24/25  0604 03/23/25  0612 03/22/25  1902   SODIUM mmol/L 129* 128* 129*   POTASSIUM mmol/L 3.7 4.0 4.3   CHLORIDE mmol/L 94* 93* 91*   CO2 mmol/L 30 29 31   BUN mg/dL 12 14 14   CREATININE mg/dL 0.75 0.87 1.05   CALCIUM mg/dL 8.6 8.9 9.6   GLUCOSE mg/dL 107* 109* 97     Magnesium:  Results from last 7 days   Lab Units 03/23/25  0612   MAGNESIUM mg/dL 1.73     Troponin:    Results from last 7 days   Lab Units 03/23/25  0612 03/22/25  1902 03/22/25  1342   TROPHS ng/L 136* 189* 155*     BNP:   Results from last 7 days   Lab Units 03/22/25  1902   BNP pg/mL 414*     Lipid Panel:  Results from last 7 days   Lab Units 03/23/25  0612   HDL mg/dL 41.2   CHOLESTEROL/HDL RATIO  2.3   VLDL mg/dL 10   TRIGLYCERIDES mg/dL 49   NON HDL CHOL. mg/dL 55      Lab Results   Component Value Date    LDLCALC 45 03/23/2025     Lab Results   Component Value Date    LDLCALC 45 03/23/2025    CREATININE 0.75 03/24/2025       Lab Results   Component Value Date    TSH 1.69 03/23/2025    X8BZXWB 84 10/11/2022    THYROIDPAB <28 05/10/2023      Lab Results   Component Value Date    INR 1.6 (H) 03/22/2025    INR 2.2 (H) 10/21/2024    INR 1.9 (H) 10/15/2024    PROTIME 17.3 (H) 03/22/2025    PROTIME 24.8 (H) 10/21/2024    PROTIME 21.6 (H) 10/15/2024     CULTURES & SUSCEPTIBILITIES:   No results found for the last 90 days.      IMAGING STUDIES:  I have reviewed following imaging studies.  I agree with the impression and incorporated  those results into my [facility]     === 02/10/25 ===  XR HIP RIGHT WITH PELVIS WHEN PERFORMED 2 OR 3 VIEWS  Progression of arthritis right hip when compared to prior examination    === 03/22/25 ===  CT BRAIN ATTACK HEAD WO CONTRAST  Mild age related degenerative change as described without acute  findings.    === 03/22/25 ===  MR HEAD ANGIO WO IV CONTRAST  Motion limited examination fails to demonstrate metastasis or edema  suggestive of underlying neoplasm. Acute/subacute infarction in the  distribution of the right posterior cerebral artery. Abnormal  diffusion restriction at the left cerebral vertex also consistent  with acute/subacute infarction in the distribution of the left middle  cerebral artery. A proximally 50% luminal narrowing at the origin of  the left internal carotid artery with intimal outpouching or short  segment dissection *Loss of normal flow related signal in the middle  3rd of the right internal carotid artery is likely artifactual.    LAST EKG  Encounter Date: 03/22/25   ECG 12 Lead   Result Value    Ventricular Rate 100    Atrial Rate 267    QRS Duration 96    QT Interval 354    QTC Calculation(Bazett) 456    R Axis 56    T Axis -29    QRS Count 16    Q Onset 219    T Offset 396    QTC Fredericia 420     Transthoracic Echo (TTE) Limited : 3/22/2025     1. The left ventricular systolic function is mildly decreased, with a visually estimated ejection fraction of 45%.  2. There is global hypokinesis of the left ventricle with minor regional variations.  3. There is normal right ventricular global systolic function.  4. There is a large right ventricular mass.  5. The left atrial size is mild to moderately dilated.  6. There is no evidence of mitral valve stenosis.  7. Mild mitral valve regurgitation.  8. Moderate tricuspid regurgitation.  9. Aortic valve stenosis is not present.  10. The pulmonary artery is not well visualized.  11. Current study shows mild reduction of left ventricular function as  compared to prior study. The findings in the right ventricle of a masslike lesion was not identified or seen on the prior study. Clinical correlation advised. MOSHE may be a strong consideration for better visualization.  12. Mass appears to be probably attached to the coronary apparatus involving the tricuspid valve.    RECOMMENDATIONS:  Based on the results of this study, it is recommended that the patient is considered for a transesophageal echocardiogram. Technically suboptimal and limited study, therefore accuracy of above interpretation could be substantially diminished. Clinical correlation is advised. Consider additional imaging modalities if clinically indicated. Repeat full study if clinically indicated.      PROBLEMS ON ADMISSION:  Slurred speech [R47.81]  Hyponatremia [E87.1]  Elevated troponin [R79.89]  Expressive aphasia [R47.01]  Stroke-like symptoms [R29.90]  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 [Z86.73]  Recent cerebrovascular accident (CVA) [Z86.73]    HOSPITAL PROBLEM LIST     CAD (coronary artery disease)    Benign prostatic hyperplasia    Current smoker    Emphysema/COPD (Multi)    Hyperlipidemia    Primary hypertension    Amiodarone-induced hyperthyroidism    Stroke-like symptoms    Slurred speech    Chronic heart failure with preserved ejection fraction (HFpEF)    Permanent atrial fibrillation (Multi)    Hyponatremia    Elevated troponin    ASSESSMENT AND PLAN FOR 3/24/2025  Patient has abnormal finding is transthoracic echocardiogram.  He will be going for transesophageal echocardiogram.  There is a suspicious mass in the right ventricle.  Meanwhile patient has been started on Pradaxa because he is stroked on Eliquis.  He was seen by the neurologist.  Patient is also being evaluated by speech therapist.  He will need speech therapy as he continues to have dysarthria.  Rest of the medical therapy will be continued as per admission orders.          P.S: This note was completed  using Dragon voice recognition technology and may include unintended errors with respect to translation of words, typographical errors or grammar errors which may not have been identified while finalizing the chart.

## 2025-03-26 NOTE — HH CARE COORDINATION
Home Care received a referral for Nursing, Physical Therapy, Occupational Therapy, and Speech Language Pathology. Unfortunately, we are unable to accept and process the referral at this time.    Reason:  PER XU SCOTT, THIS PATIENT IS SCHEDULED FOR OUTPATIENT ST     Patients, please reach out to the referring provider or your PCP to assist in obtaining an alternative home care agency and/or guidance to meet your needs.    Providers, please reach out to  Home Care at [phone] with any questions regarding the declined referral.

## 2025-03-26 NOTE — PROGRESS NOTES
Spoke with Son to confirm medications. Son explained situation that Pradaxa was not covered by insurance so then Xarelto was called in but still very expensive. Patient was given a choice by PCP and Cardio that he could take either of those or can stay on Eliquis and add a baby Asprin daily.   Patient already has a lot of Eliquis so would like to stay on Eliquis and add 81mg Asprin .     I noted that a message is in chart from [facility] that Speech therapy was being declined due to outpatient appt. Son did not know about this and thought that they are planning to come out tomorrow. I gave son phone number [phone] to call [facility] and make sure that he is still getting all ordered services.     I asked son if he was scheduling appt with Neuro but he was not sure where to call and if he is to see Dr Farrell or another doctor. I do not see referral in chart so will send message to Dr Cueva to place referral and suggest provider.

## 2025-03-26 NOTE — PROGRESS NOTES
Canelo Moore (Child) [phone]      [facility] Dr Cueva,  I spoke with Patient's son for TCM call. He was not sure where to call to get a neurology hospital follow up appt for his father. Are you able to place order and suggest if he should see Dr Farrell who was treating him in the hospital or another provider.     Son requested if staff can return his call to let him know who to reach out to he will be happy to set up appt for patient.     Thank you ,  Dominga Dennis LPN CTS

## 2025-03-27 ENCOUNTER — HOME CARE VISIT (OUTPATIENT)
Dept: HOME HEALTH SERVICES | Facility: HOME HEALTH | Age: 89
End: 2025-03-27
Payer: MEDICARE

## 2025-03-27 VITALS
OXYGEN SATURATION: 99 % | TEMPERATURE: 96.4 F | HEART RATE: 62 BPM | BODY MASS INDEX: 26.52 KG/M2 | HEIGHT: 68 IN | DIASTOLIC BLOOD PRESSURE: 62 MMHG | SYSTOLIC BLOOD PRESSURE: 112 MMHG | WEIGHT: 175 LBS | RESPIRATION RATE: 18 BRPM

## 2025-03-27 PROCEDURE — 169592 NO-PAY CLAIM PROCEDURE

## 2025-03-27 PROCEDURE — G0299 HHS/HOSPICE OF RN EA 15 MIN: HCPCS | Mod: HHH

## 2025-03-27 ASSESSMENT — PAIN SCALES - PAIN ASSESSMENT IN ADVANCED DEMENTIA (PAINAD)
CONSOLABILITY: 0
TOTALSCORE: 0
CONSOLABILITY: 0 - NO NEED TO CONSOLE.
FACIALEXPRESSION: 0 - SMILING OR INEXPRESSIVE.
BREATHING: 0
BODYLANGUAGE: 0 - RELAXED.
FACIALEXPRESSION: 0
NEGVOCALIZATION: 0
NEGVOCALIZATION: 0 - NONE.
BODYLANGUAGE: 0

## 2025-03-27 ASSESSMENT — ENCOUNTER SYMPTOMS
STOOL FREQUENCY: MORE THAN TWICE DAILY
BLURRED VISION: 1
DENIES PAIN: 1
APPETITE LEVEL: GOOD
PERSON REPORTING PAIN: PATIENT
OCCASIONAL FEELINGS OF UNSTEADINESS: 1
CHANGE IN APPETITE: UNCHANGED
FATIGUES EASILY: 1
BOWEL PATTERN NORMAL: 1
ARTHRALGIAS: 1
DEPRESSION: 0
LOSS OF SENSATION IN FEET: 0
LAST BOWEL MOVEMENT: 67291

## 2025-03-27 ASSESSMENT — ACTIVITIES OF DAILY LIVING (ADL)
CURRENT_FUNCTION: STAND BY ASSIST
PHYSICAL_TRANSFERS_DEVICES: WALKER
AMBULATION ASSISTANCE: STAND BY ASSIST
PHYSICAL TRANSFERS ASSESSED: 1
AMBULATION ASSISTANCE: 1
OASIS_M1830: 03
ENTERING_EXITING_HOME: NEEDS ASSISTANCE

## 2025-03-27 ASSESSMENT — LIFESTYLE VARIABLES: SMOKING_STATUS: 1

## 2025-03-28 ENCOUNTER — HOME CARE VISIT (OUTPATIENT)
Dept: HOME HEALTH SERVICES | Facility: HOME HEALTH | Age: 89
End: 2025-03-28
Payer: MEDICARE

## 2025-03-28 VITALS
DIASTOLIC BLOOD PRESSURE: 58 MMHG | OXYGEN SATURATION: 97 % | SYSTOLIC BLOOD PRESSURE: 108 MMHG | RESPIRATION RATE: 14 BRPM | TEMPERATURE: 97.3 F | HEART RATE: 96 BPM

## 2025-03-28 VITALS
HEART RATE: 66 BPM | DIASTOLIC BLOOD PRESSURE: 54 MMHG | TEMPERATURE: 98 F | SYSTOLIC BLOOD PRESSURE: 102 MMHG | OXYGEN SATURATION: 93 % | RESPIRATION RATE: 18 BRPM

## 2025-03-28 PROCEDURE — G0152 HHCP-SERV OF OT,EA 15 MIN: HCPCS | Mod: HHH

## 2025-03-28 PROCEDURE — G0151 HHCP-SERV OF PT,EA 15 MIN: HCPCS | Mod: HHH

## 2025-03-28 SDOH — HEALTH STABILITY: PHYSICAL HEALTH: EXERCISE TYPE: LE EXERCISES

## 2025-03-28 SDOH — HEALTH STABILITY: PHYSICAL HEALTH: EXERCISE ACTIVITIES SETS: 1

## 2025-03-28 SDOH — HEALTH STABILITY: PHYSICAL HEALTH: PHYSICAL EXERCISE: SEATED

## 2025-03-28 SDOH — HEALTH STABILITY: PHYSICAL HEALTH: PHYSICAL EXERCISE: 15

## 2025-03-28 SDOH — HEALTH STABILITY: PHYSICAL HEALTH: EXERCISE ACTIVITY: ANKLE DF/PF

## 2025-03-28 SDOH — HEALTH STABILITY: PHYSICAL HEALTH: EXERCISE ACTIVITY: MARCHING

## 2025-03-28 SDOH — ECONOMIC STABILITY: HOUSING INSECURITY
HOME SAFETY: ER CHAIR FOR BATHING AND EXHIBITS INCREASED WEAKNESS FOLLOWING HOSPITALIZATION. THERAPIST ADJUSTED SHOWER BENCH TO APPROPRIATE HEIGHT FOR USE. PATIENT STATES HE HAS BEEN SHOWERING AND DRESSING SELF WITHOUT DIFFICULTY.   EDUCATED PATIENT IN BENEFITS O

## 2025-03-28 SDOH — ECONOMIC STABILITY: HOUSING INSECURITY
HOME SAFETY: F UE HEP FOR CONTINUED STRENGTH FOR CARRYOVER WITH ALL FUNCTIONAL TASKS. WILL BENEFIT FROM FOLLOW UP OT TO ENSURE INDEPENEDENCE WITH UE HEP AND SAFETY IN THE HOME TO DECREASE RISK OF FALLS AND ACHIEVE PLOF. OT TO TX 1W3

## 2025-03-28 SDOH — ECONOMIC STABILITY: HOUSING INSECURITY

## 2025-03-28 SDOH — ECONOMIC STABILITY: HOUSING INSECURITY
HOME SAFETY: LAUNDRY. PATIENT STATES HE ONLY USED OF ROLLATOR AT NIGHT WHEN GETTING UP FROM BED TO USE BATHROOM. PATIENT LIVES IN THE IN-LAW SUITE OF HIS SONS HOME, INDEP DRIVING, SHOPPING.   PATIENT IS NOW USING ROLLATOR FOR TRANSFERS AND MOBILITY, HAS NEW SHOW

## 2025-03-28 SDOH — HEALTH STABILITY: PHYSICAL HEALTH: EXERCISE ACTIVITY: LAQ

## 2025-03-28 ASSESSMENT — ACTIVITIES OF DAILY LIVING (ADL)
AMBULATION ASSISTANCE ON FLAT SURFACES: 1
DRESSING_UB_CURRENT_FUNCTION: SUPERVISION
BATHING ASSESSED: 1
CURRENT_FUNCTION: SUPERVISION
TOILETING: SUPERVISION
AMBULATION ASSISTANCE: STAND BY ASSIST
TOILETING: 1
AMBULATION ASSISTANCE: SUPERVISION
AMBULATION ASSISTANCE: 1
DRESSING_LB_CURRENT_FUNCTION: SUPERVISION
PHYSICAL TRANSFERS ASSESSED: 1
AMBULATION_DISTANCE/DURATION_TOLERATED: 50 FT
CURRENT_FUNCTION: STAND BY ASSIST
BATHING_CURRENT_FUNCTION: SUPERVISION

## 2025-03-28 ASSESSMENT — GAIT ASSESSMENTS
PATH: 0 - MARKED DEVIATION
STEP SYMMETRY: 1 - RIGHT AND LEFT STEP LENGTH APPEAR EQUAL
BALANCE AND GAIT SCORE: 17
TRUNK: 0 - MARKED SWAY OR USES WALKING AID
WALKING STANCE: 1 - HEELS ALMOST TOUCHING WHILE WALKING
PATH SCORE: 0
INITIATION OF GAIT IMMEDIATELY AFTER GO: 1 - NO HESITANCY
TRUNK SCORE: 0
STEP CONTINUITY: 1 - STEPS APPEAR CONTINUOUS
GAIT SCORE: 8

## 2025-03-28 ASSESSMENT — BALANCE ASSESSMENTS
EYES CLOSED AT MAXIMUM POSITION NUDGED: 0 - UNSTEADY
TURNING 360 DEGREES STEPS: 1 - CONTINUOUS STEPS
ARISES: 1 - ABLE, USES ARMS TO HELP
ARISING SCORE: 1
SITTING BALANCE: 1 - STEADY, SAFE
NUDGED: 0 - BEGINS TO FALL
IMMEDIATE STANDING BALANCE FIRST 5 SECONDS: 2 - STEADY WITHOUT WALKER OR OTHER SUPPORT
BALANCE SCORE: 9
ATTEMPTS TO ARISE: 1 - ABLE, REQUIRES MORE THAN ONE ATTEMPT
NUDGED SCORE: 0
SITTING DOWN: 1 - USES ARMS OR NOT SMOOTH MOTION
STANDING BALANCE: 1 - STEADY BUT WIDE STANCE AND USES CANE OR OTHER SUPPORT

## 2025-03-28 ASSESSMENT — ENCOUNTER SYMPTOMS
OCCASIONAL FEELINGS OF UNSTEADINESS: 1
DENIES PAIN: 1
MUSCLE WEAKNESS: 1
PERSON REPORTING PAIN: PATIENT
DENIES PAIN: 1

## 2025-03-31 ENCOUNTER — HOME CARE VISIT (OUTPATIENT)
Dept: HOME HEALTH SERVICES | Facility: HOME HEALTH | Age: 89
End: 2025-03-31
Payer: MEDICARE

## 2025-03-31 VITALS — HEART RATE: 100 BPM | OXYGEN SATURATION: 97 %

## 2025-03-31 LAB
ATRIAL RATE: 267 BPM
ATRIAL RATE: 375 BPM
Q ONSET: 219 MS
Q ONSET: 224 MS
QRS COUNT: 15 BEATS
QRS COUNT: 16 BEATS
QRS DURATION: 88 MS
QRS DURATION: 96 MS
QT INTERVAL: 340 MS
QT INTERVAL: 354 MS
QTC CALCULATION(BAZETT): 418 MS
QTC CALCULATION(BAZETT): 456 MS
QTC FREDERICIA: 391 MS
QTC FREDERICIA: 420 MS
R AXIS: 14 DEGREES
R AXIS: 56 DEGREES
T AXIS: -28 DEGREES
T AXIS: -29 DEGREES
T OFFSET: 394 MS
T OFFSET: 396 MS
VENTRICULAR RATE: 100 BPM
VENTRICULAR RATE: 91 BPM

## 2025-03-31 PROCEDURE — G0153 HHCP-SVS OF S/L PATH,EA 15MN: HCPCS | Mod: HHH

## 2025-03-31 ASSESSMENT — PAIN SCALES - PAIN ASSESSMENT IN ADVANCED DEMENTIA (PAINAD)
BREATHING: 0
NEGVOCALIZATION: 0
FACIALEXPRESSION: 0 - SMILING OR INEXPRESSIVE.
CONSOLABILITY: 0
NEGVOCALIZATION: 0 - NONE.
CONSOLABILITY: 0 - NO NEED TO CONSOLE.
TOTALSCORE: 0
BODYLANGUAGE: 0 - RELAXED.
FACIALEXPRESSION: 0
BODYLANGUAGE: 0

## 2025-03-31 ASSESSMENT — ENCOUNTER SYMPTOMS
ANGER WITHIN DEFINED LIMITS: 1
DENIES PAIN: 1
PERSON REPORTING PAIN: PATIENT
AGGRESSION WITHIN DEFINED LIMITS: 1

## 2025-04-01 ENCOUNTER — HOME CARE VISIT (OUTPATIENT)
Dept: HOME HEALTH SERVICES | Facility: HOME HEALTH | Age: 89
End: 2025-04-01
Payer: MEDICARE

## 2025-04-01 VITALS
DIASTOLIC BLOOD PRESSURE: 62 MMHG | OXYGEN SATURATION: 98 % | SYSTOLIC BLOOD PRESSURE: 118 MMHG | TEMPERATURE: 97.4 F | HEART RATE: 81 BPM | RESPIRATION RATE: 14 BRPM

## 2025-04-01 VITALS
SYSTOLIC BLOOD PRESSURE: 110 MMHG | RESPIRATION RATE: 17 BRPM | HEART RATE: 72 BPM | TEMPERATURE: 97.3 F | OXYGEN SATURATION: 100 % | DIASTOLIC BLOOD PRESSURE: 62 MMHG

## 2025-04-01 PROCEDURE — G0151 HHCP-SERV OF PT,EA 15 MIN: HCPCS | Mod: HHH

## 2025-04-01 PROCEDURE — G0152 HHCP-SERV OF OT,EA 15 MIN: HCPCS | Mod: HHH

## 2025-04-01 SDOH — HEALTH STABILITY: PHYSICAL HEALTH: PHYSICAL EXERCISE: 15

## 2025-04-01 SDOH — HEALTH STABILITY: PHYSICAL HEALTH

## 2025-04-01 SDOH — HEALTH STABILITY: PHYSICAL HEALTH: EXERCISE ACTIVITIES SETS: 1

## 2025-04-01 SDOH — HEALTH STABILITY: PHYSICAL HEALTH: EXERCISE ACTIVITY: MARCHING

## 2025-04-01 SDOH — HEALTH STABILITY: PHYSICAL HEALTH: RESISTANCE: GREEN THERABAND

## 2025-04-01 SDOH — HEALTH STABILITY: PHYSICAL HEALTH: EXERCISE ACTIVITY: LAQ

## 2025-04-01 SDOH — HEALTH STABILITY: PHYSICAL HEALTH: EXERCISE ACTIVITY: HIP EXTENSION

## 2025-04-01 SDOH — HEALTH STABILITY: PHYSICAL HEALTH: EXERCISE ACTIVITY: ANKLE DF/PF

## 2025-04-01 SDOH — HEALTH STABILITY: PHYSICAL HEALTH: EXERCISE ACTIVITY: HIP ABDUCTION

## 2025-04-01 SDOH — HEALTH STABILITY: PHYSICAL HEALTH: PHYSICAL EXERCISE: SEATED

## 2025-04-01 SDOH — HEALTH STABILITY: PHYSICAL HEALTH: EXERCISE TYPE: LE EXERCISES

## 2025-04-01 SDOH — HEALTH STABILITY: PHYSICAL HEALTH: EXERCISE ACTIVITY: HS CURLS

## 2025-04-01 ASSESSMENT — ENCOUNTER SYMPTOMS
PERSON REPORTING PAIN: PATIENT
DENIES PAIN: 1
MUSCLE WEAKNESS: 1
DENIES PAIN: 1

## 2025-04-01 ASSESSMENT — ACTIVITIES OF DAILY LIVING (ADL)
AMBULATION ASSISTANCE ON FLAT SURFACES: 1
AMBULATION_DISTANCE/DURATION_TOLERATED: 100 FT X 2
CURRENT_FUNCTION: STAND BY ASSIST
AMBULATION ASSISTANCE: STAND BY ASSIST

## 2025-04-02 ENCOUNTER — HOME CARE VISIT (OUTPATIENT)
Dept: HOME HEALTH SERVICES | Facility: HOME HEALTH | Age: 89
End: 2025-04-02
Payer: MEDICARE

## 2025-04-02 VITALS
DIASTOLIC BLOOD PRESSURE: 68 MMHG | RESPIRATION RATE: 128 BRPM | OXYGEN SATURATION: 93 % | SYSTOLIC BLOOD PRESSURE: 112 MMHG | TEMPERATURE: 96.4 F | HEART RATE: 86 BPM

## 2025-04-02 DIAGNOSIS — I63.9 ACUTE CVA (CEREBROVASCULAR ACCIDENT) (MULTI): Primary | ICD-10-CM

## 2025-04-02 DIAGNOSIS — I50.32 CHRONIC HEART FAILURE WITH PRESERVED EJECTION FRACTION (HFPEF): ICD-10-CM

## 2025-04-02 PROCEDURE — G0299 HHS/HOSPICE OF RN EA 15 MIN: HCPCS | Mod: HHH

## 2025-04-02 SDOH — ECONOMIC STABILITY: GENERAL

## 2025-04-02 ASSESSMENT — ENCOUNTER SYMPTOMS
LAST BOWEL MOVEMENT: 67297
STOOL FREQUENCY: DAILY
MUSCLE WEAKNESS: 1
HYPERTENSION: 1
APPETITE LEVEL: GOOD
PERSON REPORTING PAIN: PATIENT
LOSS OF SENSATION IN FEET: 0
FATIGUES EASILY: 1
OCCASIONAL FEELINGS OF UNSTEADINESS: 1
DEPRESSION: 0
DENIES PAIN: 1
BOWEL PATTERN NORMAL: 1
CHANGE IN APPETITE: UNCHANGED

## 2025-04-02 ASSESSMENT — ACTIVITIES OF DAILY LIVING (ADL)
PHYSICAL_TRANSFERS_DEVICES: WALKER
MONEY MANAGEMENT (EXPENSES/BILLS): INDEPENDENT
AMBULATION ASSISTANCE: STAND BY ASSIST
CURRENT_FUNCTION: STAND BY ASSIST
AMBULATION ASSISTANCE: 1
PHYSICAL TRANSFERS ASSESSED: 1

## 2025-04-02 ASSESSMENT — PAIN SCALES - PAIN ASSESSMENT IN ADVANCED DEMENTIA (PAINAD)
BODYLANGUAGE: 0 - RELAXED.
FACIALEXPRESSION: 0
CONSOLABILITY: 0
CONSOLABILITY: 0 - NO NEED TO CONSOLE.
NEGVOCALIZATION: 0 - NONE.
BREATHING: 0
BODYLANGUAGE: 0
TOTALSCORE: 0
NEGVOCALIZATION: 0
FACIALEXPRESSION: 0 - SMILING OR INEXPRESSIVE.

## 2025-04-03 ENCOUNTER — HOME CARE VISIT (OUTPATIENT)
Dept: HOME HEALTH SERVICES | Facility: HOME HEALTH | Age: 89
End: 2025-04-03
Payer: MEDICARE

## 2025-04-03 VITALS
DIASTOLIC BLOOD PRESSURE: 56 MMHG | OXYGEN SATURATION: 94 % | TEMPERATURE: 97.7 F | RESPIRATION RATE: 14 BRPM | SYSTOLIC BLOOD PRESSURE: 92 MMHG | HEART RATE: 62 BPM

## 2025-04-03 PROCEDURE — G0151 HHCP-SERV OF PT,EA 15 MIN: HCPCS | Mod: HHH

## 2025-04-03 SDOH — HEALTH STABILITY: PHYSICAL HEALTH: EXERCISE ACTIVITY: ANKLE DF/PF

## 2025-04-03 SDOH — HEALTH STABILITY: PHYSICAL HEALTH: EXERCISE ACTIVITY: HIP ABDUCTION

## 2025-04-03 SDOH — HEALTH STABILITY: PHYSICAL HEALTH: PHYSICAL EXERCISE: 15

## 2025-04-03 SDOH — HEALTH STABILITY: PHYSICAL HEALTH: EXERCISE ACTIVITY: MARCHING

## 2025-04-03 SDOH — HEALTH STABILITY: PHYSICAL HEALTH: PHYSICAL EXERCISE: SEATED

## 2025-04-03 SDOH — HEALTH STABILITY: PHYSICAL HEALTH: EXERCISE ACTIVITIES SETS: 1

## 2025-04-03 SDOH — HEALTH STABILITY: PHYSICAL HEALTH

## 2025-04-03 SDOH — HEALTH STABILITY: PHYSICAL HEALTH: RESISTANCE: GREEN THERABAND

## 2025-04-03 SDOH — HEALTH STABILITY: PHYSICAL HEALTH: EXERCISE ACTIVITY: LAQ

## 2025-04-03 SDOH — HEALTH STABILITY: PHYSICAL HEALTH: RESISTANCE: GREEN TEHRABAND

## 2025-04-03 SDOH — HEALTH STABILITY: PHYSICAL HEALTH: EXERCISE TYPE: LE EXERCISES

## 2025-04-03 SDOH — HEALTH STABILITY: PHYSICAL HEALTH: EXERCISE ACTIVITY: HS CURLS

## 2025-04-03 SDOH — HEALTH STABILITY: PHYSICAL HEALTH: EXERCISE ACTIVITY: HIP EXTENSION

## 2025-04-03 ASSESSMENT — ENCOUNTER SYMPTOMS
DENIES PAIN: 1
PERSON REPORTING PAIN: PATIENT
MUSCLE WEAKNESS: 1

## 2025-04-03 ASSESSMENT — ACTIVITIES OF DAILY LIVING (ADL)
AMBULATION_DISTANCE/DURATION_TOLERATED: 50 FT X 1
CURRENT_FUNCTION: STAND BY ASSIST
AMBULATION ASSISTANCE ON FLAT SURFACES: 1
AMBULATION ASSISTANCE: STAND BY ASSIST

## 2025-04-07 ENCOUNTER — APPOINTMENT (OUTPATIENT)
Dept: PRIMARY CARE | Facility: CLINIC | Age: 89
End: 2025-04-07
Payer: MEDICARE

## 2025-04-07 VITALS
TEMPERATURE: 98.3 F | HEART RATE: 100 BPM | DIASTOLIC BLOOD PRESSURE: 64 MMHG | OXYGEN SATURATION: 99 % | SYSTOLIC BLOOD PRESSURE: 116 MMHG

## 2025-04-07 DIAGNOSIS — F51.02 ADJUSTMENT INSOMNIA: ICD-10-CM

## 2025-04-07 DIAGNOSIS — I50.33 ACUTE ON CHRONIC DIASTOLIC (CONGESTIVE) HEART FAILURE: ICD-10-CM

## 2025-04-07 DIAGNOSIS — Z09 HOSPITAL DISCHARGE FOLLOW-UP: Primary | ICD-10-CM

## 2025-04-07 DIAGNOSIS — J43.9 PULMONARY EMPHYSEMA, UNSPECIFIED EMPHYSEMA TYPE (MULTI): ICD-10-CM

## 2025-04-07 DIAGNOSIS — J43.2 CENTRILOBULAR EMPHYSEMA (MULTI): ICD-10-CM

## 2025-04-07 PROCEDURE — 1157F ADVNC CARE PLAN IN RCRD: CPT | Performed by: INTERNAL MEDICINE

## 2025-04-07 PROCEDURE — 3074F SYST BP LT 130 MM HG: CPT | Performed by: INTERNAL MEDICINE

## 2025-04-07 PROCEDURE — 1159F MED LIST DOCD IN RCRD: CPT | Performed by: INTERNAL MEDICINE

## 2025-04-07 PROCEDURE — 1123F ACP DISCUSS/DSCN MKR DOCD: CPT | Performed by: INTERNAL MEDICINE

## 2025-04-07 PROCEDURE — 1111F DSCHRG MED/CURRENT MED MERGE: CPT | Performed by: INTERNAL MEDICINE

## 2025-04-07 PROCEDURE — 3078F DIAST BP <80 MM HG: CPT | Performed by: INTERNAL MEDICINE

## 2025-04-07 PROCEDURE — 99495 TRANSJ CARE MGMT MOD F2F 14D: CPT | Performed by: INTERNAL MEDICINE

## 2025-04-07 PROCEDURE — 1158F ADVNC CARE PLAN TLK DOCD: CPT | Performed by: INTERNAL MEDICINE

## 2025-04-07 RX ORDER — FUROSEMIDE 40 MG/1
40 TABLET ORAL DAILY
Qty: 90 TABLET | Refills: 3 | Status: SHIPPED | OUTPATIENT
Start: 2025-04-07 | End: 2026-04-07

## 2025-04-07 RX ORDER — TRAZODONE HYDROCHLORIDE 50 MG/1
50 TABLET ORAL NIGHTLY PRN
Qty: 30 TABLET | Refills: 0 | Status: SHIPPED | OUTPATIENT
Start: 2025-04-07 | End: 2026-04-07

## 2025-04-07 RX ORDER — TIOTROPIUM BROMIDE 18 UG/1
1 CAPSULE ORAL; RESPIRATORY (INHALATION)
Qty: 30 CAPSULE | Refills: 11 | Status: SHIPPED | OUTPATIENT
Start: 2025-04-07 | End: 2026-04-07

## 2025-04-07 ASSESSMENT — PATIENT HEALTH QUESTIONNAIRE - PHQ9
1. LITTLE INTEREST OR PLEASURE IN DOING THINGS: NOT AT ALL
2. FEELING DOWN, DEPRESSED OR HOPELESS: NOT AT ALL
SUM OF ALL RESPONSES TO PHQ9 QUESTIONS 1 AND 2: 0

## 2025-04-08 ENCOUNTER — OFFICE VISIT (OUTPATIENT)
Dept: CARDIAC SURGERY | Facility: CLINIC | Age: 89
End: 2025-04-08
Payer: MEDICARE

## 2025-04-08 VITALS
DIASTOLIC BLOOD PRESSURE: 81 MMHG | BODY MASS INDEX: 28.34 KG/M2 | WEIGHT: 187 LBS | HEIGHT: 68 IN | TEMPERATURE: 96.9 F | HEART RATE: 90 BPM | SYSTOLIC BLOOD PRESSURE: 132 MMHG

## 2025-04-08 DIAGNOSIS — I35.8 AORTIC VALVE MASS: Primary | ICD-10-CM

## 2025-04-08 PROCEDURE — 99215 OFFICE O/P EST HI 40 MIN: CPT | Performed by: THORACIC SURGERY (CARDIOTHORACIC VASCULAR SURGERY)

## 2025-04-08 PROCEDURE — 99205 OFFICE O/P NEW HI 60 MIN: CPT | Performed by: THORACIC SURGERY (CARDIOTHORACIC VASCULAR SURGERY)

## 2025-04-08 PROCEDURE — 3079F DIAST BP 80-89 MM HG: CPT | Performed by: THORACIC SURGERY (CARDIOTHORACIC VASCULAR SURGERY)

## 2025-04-08 PROCEDURE — 1157F ADVNC CARE PLAN IN RCRD: CPT | Performed by: THORACIC SURGERY (CARDIOTHORACIC VASCULAR SURGERY)

## 2025-04-08 PROCEDURE — 1111F DSCHRG MED/CURRENT MED MERGE: CPT | Performed by: THORACIC SURGERY (CARDIOTHORACIC VASCULAR SURGERY)

## 2025-04-08 PROCEDURE — 1123F ACP DISCUSS/DSCN MKR DOCD: CPT | Performed by: THORACIC SURGERY (CARDIOTHORACIC VASCULAR SURGERY)

## 2025-04-08 PROCEDURE — 1159F MED LIST DOCD IN RCRD: CPT | Performed by: THORACIC SURGERY (CARDIOTHORACIC VASCULAR SURGERY)

## 2025-04-08 PROCEDURE — 3075F SYST BP GE 130 - 139MM HG: CPT | Performed by: THORACIC SURGERY (CARDIOTHORACIC VASCULAR SURGERY)

## 2025-04-08 NOTE — PROGRESS NOTES
Subjective     Patient ID: Canelo Moore is a 89 y.o. male who presents for Hospital Follow-up (TCM- patient is here today for a hospital discharge follow up).  History of Present Illness  Canelo Moore is a 89 year old male with heart failure and atrial fibrillation who presents with shortness of breath and insomnia following hospital discharge. He is accompanied by his caregiver.    He experiences significant shortness of breath, particularly at night, which prevents him from sleeping. He is unable to lay flat due to the sensation of needing oxygen and finds some relief when sitting in a recliner. During the day, he manages to sleep for a couple of hours in the recliner but experiences increased fatigue by evening. Shortness of breath occurs with exertion, such as getting dressed or going to the bathroom, but his pulse oximetry remains at 98-99%.    He has significant difficulty sleeping, attributed to both shortness of breath and possibly anxiety. He has tried melatonin without success, as it caused redness in his legs. He has also tried sleep gummies containing cannabis, but they did not help with his sleep issues.    He is currently taking furosemide 40 mg on Monday, Wednesday, and Friday. He is also on Eliquis and aspirin for atrial fibrillation, having previously been on Pradaxa, which was discontinued due to cost concerns. He has a history of a mass found on his heart valve and a PET scan positive lung mass, which could be malignant. He has been using two pillows at night for quite some time to help with breathing, but this has not alleviated his symptoms.    He has a history of atrial fibrillation and is currently on Eliquis and aspirin for anticoagulation. There was a previous consideration to switch to Pradaxa due to an Eliquis failure, but he continues with Eliquis due to its lower risk of gastrointestinal bleeding compared to Pradaxa.    He has a known PET scan positive lung mass, which may be  malignant and potentially affecting cardiac function. The mass's impact on the heart is under investigation, and he is scheduled to see a cardiologist for further evaluation.    His caregiver reports that he uses a walker at home and a cane when outside. He has not been driving recently due to his condition.    Objective   Vitals:    04/07/25 1413   BP: 116/64   BP Location: Left arm   Patient Position: Sitting   BP Cuff Size: Adult   Pulse: 100   Temp: 36.8 °C (98.3 °F)   TempSrc: Temporal   SpO2: 99%     Wt Readings from Last 10 Encounters:   03/27/25 79.4 kg (175 lb)   03/22/25 79.4 kg (175 lb)   02/18/25 76.8 kg (169 lb 6.4 oz)   01/14/25 77.1 kg (170 lb)   11/27/24 74.8 kg (164 lb 12.8 oz)   11/26/24 74.2 kg (163 lb 9.6 oz)   10/28/24 77.3 kg (170 lb 6.4 oz)   10/17/24 77.4 kg (170 lb 9.6 oz)   10/10/24 79.4 kg (175 lb 0.7 oz)   10/08/24 79.4 kg (175 lb)       Medication  Current Outpatient Medications   Medication Instructions    acetaminophen (TYLENOL) 650 mg, Every 6 hours PRN    albuterol (Ventolin HFA) 90 mcg/actuation inhaler 2 puffs, inhalation, Every 6 hours PRN    alfuzosin (UROXATRAL) 10 mg, oral, Daily, Do not crush, chew, or split.    apixaban (ELIQUIS) 5 mg, 2 times daily    aspirin 81 mg, Daily    finasteride (PROSCAR) 5 mg, oral, Daily    furosemide (LASIX) 40 mg, oral, Daily    magnesium oxide (MAG-OX) 400 mg, oral, 2 times daily    methIMAzole (TAPAZOLE) 20 mg, oral, Every 12 hours scheduled    metoprolol succinate XL (TOPROL-XL) 100 mg, oral, Daily, Do not crush or chew.    multivitamin (One Daily Multivitamin) tablet 1 tablet, Daily    nitroglycerin (NITROSTAT) 0.4 mg, sublingual, Every 5 min PRN, place 1 tablet under tongue every 5 minutes for up to 3 doses as needed for chest pain. Call 911 if pain persists    rivaroxaban (XARELTO) 15 mg, oral, 2 times daily (morning and late afternoon), Take with food.    rosuvastatin (CRESTOR) 10 mg, oral, Nightly    tiotropium (SPIRIVA WITH HANDIHALER)  18 mcg, inhalation, Daily RT    traZODone (DESYREL) 50 mg, oral, Nightly PRN         Physical Exam  Gen: Alert, awake, Oriented X 3. Not in any acute distress   HEENT:  Atraumatic, PERRL.  Conjunctivae clear.   Moist nasal mucous membranes. oropharynx non erythematous,   Neck:  Supple without thyromegaly or lymphadenopathy.  Lungs:  Clear to auscultation without rales, rhonchi, or rub.  Heart:  RRR, S1, S2, without M.  Abdomen:  Soft, non tender, no organ enlargement, bruit. Bowel sounds present . No CVA tenderness.  Extremities:  No edema. No calf swelling or tenderness.    Skin:  No rash, ecchymosis or erythema.      Review of Systems   Constitutional:  No activity change or fever   HENT:  Denies ringing ears or nose bleed   Respiratory:  Denies stridor. No blood in sputum   Cardiovascular:  Denies chest pain, no sudden excessive sweating   Gastrointestinal:  No sour burping, no blood in stool    Genitourinary:  Denies blood in urine    Musculoskeletal:  No joint redness or swelling    Skin:  No new spot changing color or shape or border    Neurological:  No speech difficulty, facial droop    Psychiatric/Behavioral:  No agitation, denies Hallucination     Recent Labs   Lab Results   Component Value Date    WBC 8.0 03/24/2025    HGB 10.1 (L) 03/24/2025    HCT 30.0 (L) 03/24/2025     03/24/2025    CHOL 96 03/23/2025    TRIG 49 03/23/2025    HDL 41.2 03/23/2025    ALT 10 03/23/2025    AST 17 03/23/2025     (L) 03/24/2025    K 3.7 03/24/2025    CL 94 (L) 03/24/2025    CREATININE 0.75 03/24/2025    BUN 12 03/24/2025    CO2 30 03/24/2025    TSH 1.69 03/23/2025    PSA 1.64 01/28/2025    INR 1.6 (H) 03/22/2025     Lab Results   Component Value Date    GLUCOSE 107 (H) 03/24/2025    CALCIUM 8.6 03/24/2025     (L) 03/24/2025    K 3.7 03/24/2025    CO2 30 03/24/2025    CL 94 (L) 03/24/2025    BUN 12 03/24/2025    CREATININE 0.75 03/24/2025      Lab Results   Component Value Date    LDLCALC 45 03/23/2025  "    No results found for: \"HGBA1C\"  Lab Results   Component Value Date    LDLCALC 45 03/23/2025    CREATININE 0.75 03/24/2025     Lab Results   Component Value Date    PSA 1.64 01/28/2025    PSA 2.86 07/31/2024    PSA 3.48 12/20/2023         Assessment/Plan     1. Hospital discharge follow-up  Post discharge F/U; all concerns and questions were answered. Patient is now back to normal self and at baseline.      2. Adjustment insomnia  traZODone (Desyrel) 50 mg tablet      3. Acute on chronic diastolic (congestive) heart failure  furosemide (Lasix) 40 mg tablet      4. Pulmonary emphysema, unspecified emphysema type (Multi)  We had a 6-minute walk test in the office.  He is SpO2 did not go below 88%.  Patient did not qualify for home O2.        Assessment & Plan  Heart Failure with Preserved Ejection Fraction (HFpEF)  Worsening orthopnea and dyspnea on exertion due to fluid overload and pulmonary congestion. Current furosemide regimen is insufficient.  - Increase furosemide to 40 mg daily.  - Advise sleeping in a recliner or using a wedge to elevate the head during sleep.  - Consult cardiology for potential additional heart failure medications.  - Monitor renal function due to increased diuretic use.  - Perform a six-minute walk test to assess for home oxygen qualification.  - Coordinate with durable medical equipment company for home oxygen if qualified.    Atrial Fibrillation  Experienced a thromboembolic event on Eliquis, but Pradaxa poses a higher GI bleeding risk. Decision to continue Eliquis with close monitoring.  - Continue Eliquis and aspirin for anticoagulation.  - Consult cardiology regarding anticoagulation strategy.    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  - Follow up with cardiology for evaluation of the lung mass and its impact on cardiac " function.    Insomnia  Significant insomnia likely secondary to dyspnea and discomfort from heart failure. Improving fluid status and heart failure management expected to aid sleep.  - Prescribe trazodone 50 mg at bedtime for sleep.  - Reassess sleep quality after optimizing heart failure management.    General Health Maintenance  Mobility limitations with use of walker at home and cane outside. Emphasize fall risk monitoring, especially with increased diuretic use and potential orthostatic changes.  - Ensure use of walker or cane as needed to prevent falls.  - Monitor for fall risk, especially with increased diuretic use.    Follow-up  Scheduled cardiology appointment for further evaluation of heart and lung conditions.  - Attend cardiology appointment for further evaluation and management.          VISIT SUMMARY:  You visited today due to shortness of breath and difficulty sleeping following your recent hospital discharge. We discussed your heart failure, atrial fibrillation, and a lung mass that may be affecting your heart. Your caregiver was also present to provide additional information.    YOUR PLAN:  -HEART FAILURE WITH PRESERVED EJECTION FRACTION (HFPEF): Heart failure with preserved ejection fraction means your heart is not pumping as efficiently as it should, leading to fluid buildup and difficulty breathing. We will increase your furosemide dose to 40 mg daily to help reduce fluid overload. You should sleep in a recliner or use a wedge to elevate your head. We will consult with cardiology for potential additional medications and monitor your kidney function. A six-minute walk test will be performed to see if you qualify for home oxygen, and we will coordinate with a medical equipment company if needed.    -ATRIAL FIBRILLATION: Atrial fibrillation is an irregular heart rhythm that can increase the risk of stroke. You will continue taking Eliquis and aspirin for anticoagulation. We will consult with  cardiology to review your anticoagulation strategy.    -LUNG MASS WITH POSSIBLE METASTASIS: A lung mass that may be cancerous has been found and could be affecting your heart. You will follow up with cardiology to evaluate the impact of the lung mass on your heart function.    -INSOMNIA: Insomnia is difficulty sleeping. It is likely due to your heart failure symptoms. We will prescribe trazodone 50 mg at bedtime to help you sleep and reassess your sleep quality after optimizing your heart failure management.    -GENERAL HEALTH MAINTENANCE: You have mobility limitations and use a walker at home and a cane outside. It is important to monitor for fall risks, especially with increased diuretic use. Ensure you use your walker or cane as needed to prevent falls.    INSTRUCTIONS:  Please attend your scheduled cardiology appointment for further evaluation and management of your heart and lung conditions.        This medical note was created with the assistance of artificial intelligence (AI) for documentation purposes. The content has been reviewed and confirmed by the healthcare provider for accuracy and completeness. Patient consented to the use of audio recording and use of AI during their visit.

## 2025-04-09 ENCOUNTER — HOME CARE VISIT (OUTPATIENT)
Dept: HOME HEALTH SERVICES | Facility: HOME HEALTH | Age: 89
End: 2025-04-09
Payer: MEDICARE

## 2025-04-09 VITALS — TEMPERATURE: 97.3 F | DIASTOLIC BLOOD PRESSURE: 54 MMHG | SYSTOLIC BLOOD PRESSURE: 98 MMHG | RESPIRATION RATE: 17 BRPM

## 2025-04-09 VITALS
OXYGEN SATURATION: 91 % | TEMPERATURE: 97.4 F | SYSTOLIC BLOOD PRESSURE: 102 MMHG | DIASTOLIC BLOOD PRESSURE: 56 MMHG | RESPIRATION RATE: 14 BRPM | HEART RATE: 82 BPM

## 2025-04-09 PROCEDURE — G0152 HHCP-SERV OF OT,EA 15 MIN: HCPCS | Mod: HHH

## 2025-04-09 PROCEDURE — G0151 HHCP-SERV OF PT,EA 15 MIN: HCPCS | Mod: HHH

## 2025-04-09 SDOH — HEALTH STABILITY: PHYSICAL HEALTH

## 2025-04-09 SDOH — HEALTH STABILITY: PHYSICAL HEALTH: PHYSICAL EXERCISE: 15

## 2025-04-09 SDOH — HEALTH STABILITY: PHYSICAL HEALTH: EXERCISE ACTIVITIES SETS: 1

## 2025-04-09 SDOH — HEALTH STABILITY: PHYSICAL HEALTH: EXERCISE ACTIVITY: LAQ

## 2025-04-09 SDOH — HEALTH STABILITY: PHYSICAL HEALTH: PHYSICAL EXERCISE: SEATED

## 2025-04-09 SDOH — HEALTH STABILITY: PHYSICAL HEALTH: EXERCISE ACTIVITY: MARCHING

## 2025-04-09 SDOH — HEALTH STABILITY: PHYSICAL HEALTH: RESISTANCE: GREEN THERABAND

## 2025-04-09 SDOH — HEALTH STABILITY: PHYSICAL HEALTH: EXERCISE ACTIVITY: ANKLE DF/PF

## 2025-04-09 SDOH — HEALTH STABILITY: PHYSICAL HEALTH: EXERCISE ACTIVITY: HS CURLS

## 2025-04-09 SDOH — HEALTH STABILITY: PHYSICAL HEALTH: EXERCISE ACTIVITY: HIP EXTENSION

## 2025-04-09 SDOH — HEALTH STABILITY: PHYSICAL HEALTH: EXERCISE ACTIVITY: HIP ABDUCTION

## 2025-04-09 SDOH — HEALTH STABILITY: PHYSICAL HEALTH: EXERCISE TYPE: LE EXERCISES

## 2025-04-09 ASSESSMENT — ACTIVITIES OF DAILY LIVING (ADL)
AMBULATION ASSISTANCE ON FLAT SURFACES: 1
AMBULATION ASSISTANCE: STAND BY ASSIST
CURRENT_FUNCTION: STAND BY ASSIST
AMBULATION_DISTANCE/DURATION_TOLERATED: 100 FT X 2

## 2025-04-09 ASSESSMENT — ENCOUNTER SYMPTOMS
DENIES PAIN: 1
MUSCLE WEAKNESS: 1
PERSON REPORTING PAIN: PATIENT
DENIES PAIN: 1

## 2025-04-10 ENCOUNTER — HOME CARE VISIT (OUTPATIENT)
Dept: HOME HEALTH SERVICES | Facility: HOME HEALTH | Age: 89
End: 2025-04-10
Payer: MEDICARE

## 2025-04-10 VITALS
RESPIRATION RATE: 14 BRPM | TEMPERATURE: 97.7 F | OXYGEN SATURATION: 98 % | HEART RATE: 84 BPM | SYSTOLIC BLOOD PRESSURE: 102 MMHG | DIASTOLIC BLOOD PRESSURE: 52 MMHG

## 2025-04-10 PROCEDURE — G0151 HHCP-SERV OF PT,EA 15 MIN: HCPCS | Mod: HHH

## 2025-04-10 SDOH — HEALTH STABILITY: PHYSICAL HEALTH: PHYSICAL EXERCISE: SEATED

## 2025-04-10 SDOH — HEALTH STABILITY: PHYSICAL HEALTH: PHYSICAL EXERCISE: 15

## 2025-04-10 SDOH — ECONOMIC STABILITY: HOUSING INSECURITY: HOME SAFETY: FALLS.

## 2025-04-10 SDOH — HEALTH STABILITY: PHYSICAL HEALTH: EXERCISE ACTIVITIES SETS: 1

## 2025-04-10 SDOH — HEALTH STABILITY: PHYSICAL HEALTH: EXERCISE ACTIVITY: HS CURLS

## 2025-04-10 SDOH — HEALTH STABILITY: PHYSICAL HEALTH: EXERCISE ACTIVITY: LAQ

## 2025-04-10 SDOH — HEALTH STABILITY: PHYSICAL HEALTH: EXERCISE TYPE: LE EXERCISES

## 2025-04-10 SDOH — HEALTH STABILITY: PHYSICAL HEALTH: RESISTANCE: GREEN THERABAND

## 2025-04-10 SDOH — HEALTH STABILITY: PHYSICAL HEALTH

## 2025-04-10 SDOH — ECONOMIC STABILITY: HOUSING INSECURITY
HOME SAFETY: PATIENT EXHIBITING GOOD PROGRESS TOWARD ADL GOALS. PATIENT HAS BEEN SHOWERING AND DRESSING SELF WITH INCREASED TIME AND EFFORT. PATIENT EXHIBITS INCREASED DIFFICULTY WITH HEP FOR UE STRENGTHENING FOR CARRYOVER WITH ADLS, TRANSFERS AND FUNCTIONAL MOBI

## 2025-04-10 SDOH — HEALTH STABILITY: PHYSICAL HEALTH: EXERCISE ACTIVITY: HIP ABDUCTION

## 2025-04-10 SDOH — ECONOMIC STABILITY: HOUSING INSECURITY

## 2025-04-10 SDOH — HEALTH STABILITY: PHYSICAL HEALTH: EXERCISE ACTIVITY: HIP EXTENSION

## 2025-04-10 SDOH — HEALTH STABILITY: PHYSICAL HEALTH: EXERCISE ACTIVITY: MARCHING

## 2025-04-10 SDOH — HEALTH STABILITY: PHYSICAL HEALTH: EXERCISE ACTIVITY: ANKLE DF/PF

## 2025-04-10 ASSESSMENT — ACTIVITIES OF DAILY LIVING (ADL)
AMBULATION ASSISTANCE ON FLAT SURFACES: 1
AMBULATION ASSISTANCE: STAND BY ASSIST
GROOMING_CURRENT_FUNCTION: INDEPENDENT
GROOMING ASSESSED: 1
DRESSING_UB_CURRENT_FUNCTION: INDEPENDENT
TOILETING: 1
TOILETING: INDEPENDENT
DRESSING_LB_CURRENT_FUNCTION: INDEPENDENT
BATHING_CURRENT_FUNCTION: INDEPENDENT
BATHING ASSESSED: 1
AMBULATION_DISTANCE/DURATION_TOLERATED: 100 FT X 2

## 2025-04-10 ASSESSMENT — ENCOUNTER SYMPTOMS
MUSCLE WEAKNESS: 1
PERSON REPORTING PAIN: PATIENT
DENIES PAIN: 1

## 2025-04-11 ENCOUNTER — TELEPHONE (OUTPATIENT)
Dept: PRIMARY CARE | Facility: CLINIC | Age: 89
End: 2025-04-11
Payer: MEDICARE

## 2025-04-11 DIAGNOSIS — I63.9 ACUTE CVA (CEREBROVASCULAR ACCIDENT) (MULTI): Primary | ICD-10-CM

## 2025-04-11 NOTE — TELEPHONE ENCOUNTER
Patient son called the office today stating that patient wants to start driving again. Son advises that patient just recently had a stroke and was advised by  to not drive. Son was advised by OT that there is an assessment that can be done to determine if patient can return to driving or not. Son is requesting to have a referral for the assessment. Please advise

## 2025-04-14 NOTE — PROGRESS NOTES
Referred by Dr. Walker      History Of Present Illness:    Canelo Moore is a 89 y.o. 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      Past Medical History:  He has a past medical history of Atrial fibrillation (Multi), BPH (benign prostatic hyperplasia), CAD (coronary artery disease), Controlled atrial fibrillation (Multi), COPD (chronic obstructive pulmonary disease) (Multi), External hemorrhoid (09/12/2023), Hyperlipidemia, Hypertension, Right inguinal hernia (09/12/2023), and Stage 3a chronic kidney disease (CKD) (Multi).  Lung tumor that he denied any further investigations  Possible CVA    Past Surgical History:  He has a past surgical history that includes Other surgical history (11/04/2020); Other surgical history (11/04/2020); Other surgical history (11/04/2020); Other surgical history (11/04/2020); Other surgical history (11/06/2021); Other surgical history (11/06/2021); Other surgical history (11/06/2021); Other surgical history (12/14/2021); Other surgical history (12/14/2021); and US aspiration injection intermediate joint (2/8/2021).      Social History:  He reports that he has been smoking cigarettes. He has a 30 pack-year smoking history. He has never used smokeless tobacco. He reports that he does not currently use alcohol. He reports that he does not currently use drugs.    Family History:  Family History   Problem Relation Name Age of Onset    Breast cancer Mother      Atrial fibrillation Mother      Colon cancer Brother      Prostate cancer Brother      Breast cancer Daughter      Prostate cancer Son      Colon cancer Son          Allergies:  Ace inhibitors    Outpatient Medications:  Current Outpatient Medications   Medication Instructions    acetaminophen (TYLENOL) 650 mg, Every 6 hours PRN    albuterol (Ventolin HFA) 90 mcg/actuation inhaler 2 puffs, inhalation, Every 6 hours PRN     "alfuzosin (UROXATRAL) 10 mg, oral, Daily, Do not crush, chew, or split.    apixaban (ELIQUIS) 5 mg, 2 times daily    aspirin 81 mg, Daily    finasteride (PROSCAR) 5 mg, oral, Daily    furosemide (LASIX) 40 mg, oral, Daily    magnesium oxide (MAG-OX) 400 mg, oral, 2 times daily    methIMAzole (TAPAZOLE) 20 mg, oral, Every 12 hours scheduled    metoprolol succinate XL (TOPROL-XL) 100 mg, oral, Daily, Do not crush or chew.    multivitamin (One Daily Multivitamin) tablet 1 tablet, Daily    nitroglycerin (NITROSTAT) 0.4 mg, sublingual, Every 5 min PRN, place 1 tablet under tongue every 5 minutes for up to 3 doses as needed for chest pain. Call 911 if pain persists    rivaroxaban (XARELTO) 15 mg, oral, 2 times daily (morning and late afternoon), Take with food.    rosuvastatin (CRESTOR) 10 mg, oral, Nightly    tiotropium (SPIRIVA WITH HANDIHALER) 18 mcg, inhalation, Daily RT    traZODone (DESYREL) 50 mg, oral, Nightly PRN        Last Recorded Vitals:  Vitals:    04/08/25 1435   BP: 132/81   Pulse: 90   Temp: 36.1 °C (96.9 °F)   Weight: 84.8 kg (187 lb)   Height: 1.727 m (5' 8\")       Physical Exam:  Constitutional:  Negative for activity change.   HENT:  Negative for congestion and sore throat.    Respiratory:  Negative for cough.    Cardiovascular:  Negative for chest pain.   Gastrointestinal:  Negative for abdominal pain.   Endocrine: Negative for polyuria.   Genitourinary:  Negative for dysuria.   Musculoskeletal:  Negative for myalgias.   Skin:  Negative for rash.   Neurological:  Negative for light-headedness.   Psychiatric/Behavioral:  Negative for behavioral problems.     Last Labs:  CBC -  Lab Results   Component Value Date    WBC 8.0 03/24/2025    HGB 10.1 (L) 03/24/2025    HCT 30.0 (L) 03/24/2025    MCV 97 03/24/2025     03/24/2025       CMP -  Lab Results   Component Value Date    CALCIUM 8.6 03/24/2025    PHOS 2.6 09/05/2019    PROT 5.7 (L) 03/23/2025    ALBUMIN 3.1 (L) 03/23/2025    AST 17 03/23/2025 "    ALT 10 03/23/2025    ALKPHOS 61 03/23/2025    BILITOT 1.5 (H) 03/23/2025       LIPID PANEL -   Lab Results   Component Value Date    CHOL 96 03/23/2025    TRIG 49 03/23/2025    HDL 41.2 03/23/2025    CHHDL 2.3 03/23/2025    LDLF 48 08/03/2023    VLDL 10 03/23/2025    NHDL 55 03/23/2025       RENAL FUNCTION PANEL -   Lab Results   Component Value Date    GLUCOSE 107 (H) 03/24/2025     (L) 03/24/2025    K 3.7 03/24/2025    CL 94 (L) 03/24/2025    CO2 30 03/24/2025    ANIONGAP 9 (L) 03/24/2025    BUN 12 03/24/2025    CREATININE 0.75 03/24/2025    GFRMALE 62 08/22/2023    CALCIUM 8.6 03/24/2025    PHOS 2.6 09/05/2019    ALBUMIN 3.1 (L) 03/23/2025        Lab Results   Component Value Date     (H) 03/22/2025       Last Cardiology Tests:  ECG:  ECG 12 Lead 03/25/2025      Echo:  Transesophageal Echo (MOSHE) 03/25/2025  CONCLUSIONS:   1. The left ventricular systolic function is normal, with a visually estimated ejection fraction of 55-60%.   2. Spectral Doppler shows a Grade I (impaired relaxation pattern) of left ventricular diastolic filling with normal left atrial filling pressure.   3. Moderate-sized mobile echodensity measuring 1 X 0.7 cm was seen on the tricuspid valve, suggestive of possible old vegatation or calcified thrombus. However, cannot r/o other masses based on patient's clinical historo of lumg mass. Clinical correlation is recommended.   4. The left atrial size is moderately dilated.   5. The right atrial size is moderately dilated.   6. Moderate mitral valve regurgitation.   7. 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   8. Mild aortic valve regurgitation.   9. No left atrial thrombus.  10. There is plaque visualized in the descending aorta.    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 *A proximally 50% luminal narrowing at the origin of  the left internal carotid artery with intimal outpouching or short  segment dissection *Loss of normal flow related signal in the middle  3rd of the right internal carotid artery is likely artifactual.        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  He has also Hx of Afib and a lung tumor that he did not want to investigate any further.    I had a detailed discussion with him regarding his echo finding in the presence of his son and possible surgical treatment but due to his age and co-morbidity they decided not to proceed with surgery and to call my office if there is any change in decision.    Rosy Gallegos MD

## 2025-04-15 ENCOUNTER — PATIENT OUTREACH (OUTPATIENT)
Dept: PRIMARY CARE | Facility: CLINIC | Age: 89
End: 2025-04-15
Payer: MEDICARE

## 2025-04-15 ENCOUNTER — HOME CARE VISIT (OUTPATIENT)
Dept: HOME HEALTH SERVICES | Facility: HOME HEALTH | Age: 89
End: 2025-04-15
Payer: MEDICARE

## 2025-04-15 VITALS
DIASTOLIC BLOOD PRESSURE: 60 MMHG | TEMPERATURE: 97.8 F | SYSTOLIC BLOOD PRESSURE: 98 MMHG | RESPIRATION RATE: 17 BRPM | OXYGEN SATURATION: 100 % | HEART RATE: 68 BPM

## 2025-04-15 PROCEDURE — G0152 HHCP-SERV OF OT,EA 15 MIN: HCPCS | Mod: HHH

## 2025-04-15 ASSESSMENT — ENCOUNTER SYMPTOMS: DENIES PAIN: 1

## 2025-04-15 NOTE — PROGRESS NOTES
Call regarding appt. with PCP on 4/7/25 after hospitalization. Spoke with Son ,Reviewed the PCP appointment and addressed any questions or concerns.   At time of outreach call the patient feels as if their condition has slowly improved since last visit. Therapy has been great . Still using walker.  Dr Cueva's office is working on scheduling Functional Capacity test . Has been recommended by providers that Canelo should not drive yet but patient feels he is ready. Appts were booked out until June so office is trying to find another facility  that offers testing.     Patient is active [employer] User. Son aware next outreach will be through [employer] but can reach out to me directly at [phone] if any non emergent needs arise.

## 2025-04-16 ENCOUNTER — TELEPHONE (OUTPATIENT)
Dept: PRIMARY CARE | Facility: CLINIC | Age: 89
End: 2025-04-16
Payer: MEDICARE

## 2025-04-16 ENCOUNTER — HOME CARE VISIT (OUTPATIENT)
Dept: HOME HEALTH SERVICES | Facility: HOME HEALTH | Age: 89
End: 2025-04-16
Payer: MEDICARE

## 2025-04-16 VITALS
HEART RATE: 68 BPM | DIASTOLIC BLOOD PRESSURE: 64 MMHG | TEMPERATURE: 96.8 F | SYSTOLIC BLOOD PRESSURE: 112 MMHG | OXYGEN SATURATION: 97 % | RESPIRATION RATE: 16 BRPM

## 2025-04-16 DIAGNOSIS — I63.9 ACUTE CVA (CEREBROVASCULAR ACCIDENT) (MULTI): ICD-10-CM

## 2025-04-16 PROCEDURE — G0299 HHS/HOSPICE OF RN EA 15 MIN: HCPCS | Mod: HHH

## 2025-04-16 PROCEDURE — G0157 HHC PT ASSISTANT EA 15: HCPCS | Mod: CQ,HHH

## 2025-04-16 SDOH — ECONOMIC STABILITY: GENERAL

## 2025-04-16 ASSESSMENT — ENCOUNTER SYMPTOMS
OCCASIONAL FEELINGS OF UNSTEADINESS: 1
PERSON REPORTING PAIN: PATIENT
LAST BOWEL MOVEMENT: 67311
MUSCLE WEAKNESS: 1
HYPERTENSION: 1
STOOL FREQUENCY: DAILY
APPETITE LEVEL: GOOD
CHANGE IN APPETITE: UNCHANGED
LOSS OF SENSATION IN FEET: 0
DEPRESSION: 0
DENIES PAIN: 1
BOWEL PATTERN NORMAL: 1

## 2025-04-16 ASSESSMENT — ACTIVITIES OF DAILY LIVING (ADL)
CURRENT_FUNCTION: INDEPENDENT
AMBULATION ASSISTANCE: 1
PHYSICAL_TRANSFERS_DEVICES: WALKER
MONEY MANAGEMENT (EXPENSES/BILLS): INDEPENDENT
PHYSICAL TRANSFERS ASSESSED: 1
AMBULATION ASSISTANCE: INDEPENDENT

## 2025-04-16 ASSESSMENT — PAIN SCALES - PAIN ASSESSMENT IN ADVANCED DEMENTIA (PAINAD)
NEGVOCALIZATION: 0
TOTALSCORE: 0
BODYLANGUAGE: 0 - RELAXED.
CONSOLABILITY: 0
BREATHING: 0
NEGVOCALIZATION: 0 - NONE.
FACIALEXPRESSION: 0 - SMILING OR INEXPRESSIVE.
CONSOLABILITY: 0 - NO NEED TO CONSOLE.
FACIALEXPRESSION: 0
BODYLANGUAGE: 0

## 2025-04-16 NOTE — TELEPHONE ENCOUNTER
----- Message from Dominga RICHARDS sent at 4/14/2025  3:41 PM EDT -----  Regarding: order needs changed  I spoke to  rehab location to schedule order for driving eval.  They can't accept order as written. It needs changed to driving evaluation. Please let me know when new order is ready to schedule. Thanks

## 2025-04-18 ENCOUNTER — HOME CARE VISIT (OUTPATIENT)
Dept: HOME HEALTH SERVICES | Facility: HOME HEALTH | Age: 89
End: 2025-04-18
Payer: MEDICARE

## 2025-04-18 PROCEDURE — G0157 HHC PT ASSISTANT EA 15: HCPCS | Mod: CQ,HHH

## 2025-04-21 ENCOUNTER — TELEPHONE (OUTPATIENT)
Dept: PRIMARY CARE | Facility: CLINIC | Age: 89
End: 2025-04-21
Payer: MEDICARE

## 2025-04-21 ENCOUNTER — HOME CARE VISIT (OUTPATIENT)
Dept: HOME HEALTH SERVICES | Facility: HOME HEALTH | Age: 89
End: 2025-04-21
Payer: MEDICARE

## 2025-04-21 VITALS
HEART RATE: 72 BPM | OXYGEN SATURATION: 96 % | RESPIRATION RATE: 18 BRPM | TEMPERATURE: 98.3 F | SYSTOLIC BLOOD PRESSURE: 120 MMHG | DIASTOLIC BLOOD PRESSURE: 58 MMHG

## 2025-04-21 PROCEDURE — G0152 HHCP-SERV OF OT,EA 15 MIN: HCPCS | Mod: HHH

## 2025-04-21 ASSESSMENT — ENCOUNTER SYMPTOMS: DENIES PAIN: 1

## 2025-04-21 NOTE — TELEPHONE ENCOUNTER
Patients daughter in law called the office today (not on friends and family). Stating that patient was rx trazodone for sleep. Daughter in law advises that it work for the first three days, but patient is now back to being wide awake at night. Daughter in law states that she is an RN, states that she advised patient that he is probably not getting enough activity during the day and that his body is just not tired. She states that patient is trying to increase his activity during the day. Daughter in law states that patient is wanting to know if there is anything that he can do. Please advise

## 2025-04-22 ENCOUNTER — HOME CARE VISIT (OUTPATIENT)
Dept: HOME HEALTH SERVICES | Facility: HOME HEALTH | Age: 89
End: 2025-04-22
Payer: MEDICARE

## 2025-04-22 VITALS
RESPIRATION RATE: 14 BRPM | OXYGEN SATURATION: 97 % | DIASTOLIC BLOOD PRESSURE: 60 MMHG | HEART RATE: 56 BPM | SYSTOLIC BLOOD PRESSURE: 114 MMHG | TEMPERATURE: 97.4 F

## 2025-04-22 PROCEDURE — G0151 HHCP-SERV OF PT,EA 15 MIN: HCPCS | Mod: HHH

## 2025-04-22 SDOH — HEALTH STABILITY: PHYSICAL HEALTH: EXERCISE ACTIVITY: HS CURLS

## 2025-04-22 SDOH — HEALTH STABILITY: PHYSICAL HEALTH: RESISTANCE: GREEN THERABAND

## 2025-04-22 SDOH — HEALTH STABILITY: PHYSICAL HEALTH

## 2025-04-22 SDOH — HEALTH STABILITY: PHYSICAL HEALTH: PHYSICAL EXERCISE: SEATED

## 2025-04-22 SDOH — HEALTH STABILITY: PHYSICAL HEALTH: PHYSICAL EXERCISE: 15

## 2025-04-22 SDOH — HEALTH STABILITY: PHYSICAL HEALTH: EXERCISE ACTIVITIES SETS: 1

## 2025-04-22 SDOH — HEALTH STABILITY: PHYSICAL HEALTH: EXERCISE ACTIVITY: LAQ

## 2025-04-22 SDOH — HEALTH STABILITY: PHYSICAL HEALTH: EXERCISE ACTIVITY: ANKLE DF/PF

## 2025-04-22 SDOH — HEALTH STABILITY: PHYSICAL HEALTH: EXERCISE ACTIVITY: MARCHING

## 2025-04-22 SDOH — HEALTH STABILITY: PHYSICAL HEALTH: EXERCISE TYPE: LE EXERCISES

## 2025-04-22 SDOH — HEALTH STABILITY: PHYSICAL HEALTH: EXERCISE ACTIVITY: HIP EXTENSION

## 2025-04-22 SDOH — HEALTH STABILITY: PHYSICAL HEALTH: EXERCISE ACTIVITY: HIP ABDUCTION

## 2025-04-22 ASSESSMENT — ENCOUNTER SYMPTOMS
MUSCLE WEAKNESS: 1
DENIES PAIN: 1
PERSON REPORTING PAIN: PATIENT

## 2025-04-22 ASSESSMENT — ACTIVITIES OF DAILY LIVING (ADL)
AMBULATION_DISTANCE/DURATION_TOLERATED: 100 FT X 2
AMBULATION ASSISTANCE ON FLAT SURFACES: 1

## 2025-04-24 ENCOUNTER — HOME CARE VISIT (OUTPATIENT)
Dept: HOME HEALTH SERVICES | Facility: HOME HEALTH | Age: 89
End: 2025-04-24
Payer: MEDICARE

## 2025-04-24 VITALS
RESPIRATION RATE: 14 BRPM | SYSTOLIC BLOOD PRESSURE: 115 MMHG | TEMPERATURE: 97.4 F | OXYGEN SATURATION: 94 % | DIASTOLIC BLOOD PRESSURE: 62 MMHG | HEART RATE: 78 BPM

## 2025-04-24 PROCEDURE — G0151 HHCP-SERV OF PT,EA 15 MIN: HCPCS | Mod: HHH

## 2025-04-24 SDOH — HEALTH STABILITY: PHYSICAL HEALTH: EXERCISE ACTIVITIES SETS: 1

## 2025-04-24 SDOH — HEALTH STABILITY: PHYSICAL HEALTH

## 2025-04-24 SDOH — HEALTH STABILITY: PHYSICAL HEALTH: EXERCISE TYPE: LE EXERCISES

## 2025-04-24 SDOH — HEALTH STABILITY: PHYSICAL HEALTH: PHYSICAL EXERCISE: SEATED

## 2025-04-24 SDOH — HEALTH STABILITY: PHYSICAL HEALTH: RESISTANCE: GREEN THERABAND

## 2025-04-24 SDOH — HEALTH STABILITY: PHYSICAL HEALTH: PHYSICAL EXERCISE: 15

## 2025-04-24 SDOH — HEALTH STABILITY: PHYSICAL HEALTH: EXERCISE ACTIVITY: HIP ABDUCTION

## 2025-04-24 SDOH — HEALTH STABILITY: PHYSICAL HEALTH: EXERCISE ACTIVITY: ANKLE DF/PF

## 2025-04-24 SDOH — HEALTH STABILITY: PHYSICAL HEALTH: EXERCISE ACTIVITY: HS CURLS

## 2025-04-24 SDOH — HEALTH STABILITY: PHYSICAL HEALTH: EXERCISE ACTIVITY: HIP EXTENSION

## 2025-04-24 SDOH — HEALTH STABILITY: PHYSICAL HEALTH: EXERCISE ACTIVITY: LAQ

## 2025-04-24 SDOH — HEALTH STABILITY: PHYSICAL HEALTH: EXERCISE ACTIVITY: MARCHING

## 2025-04-24 ASSESSMENT — ACTIVITIES OF DAILY LIVING (ADL)
AMBULATION ASSISTANCE ON FLAT SURFACES: 1
OASIS_M1830: 00
AMBULATION_DISTANCE/DURATION_TOLERATED: 150 FT
HOME_HEALTH_OASIS: 00

## 2025-04-24 ASSESSMENT — BALANCE ASSESSMENTS
IMMEDIATE STANDING BALANCE FIRST 5 SECONDS: 2 - STEADY WITHOUT WALKER OR OTHER SUPPORT
STANDING BALANCE: 1 - STEADY BUT WIDE STANCE AND USES CANE OR OTHER SUPPORT
ATTEMPTS TO ARISE: 2 - ABLE TO RISE, ONE ATTEMPT
ARISING SCORE: 1
BALANCE SCORE: 13
EYES CLOSED AT MAXIMUM POSITION NUDGED: 1 - STEADY
NUDGED: 2 - STEADY
ARISES: 1 - ABLE, USES ARMS TO HELP
SITTING BALANCE: 1 - STEADY, SAFE
TURNING 360 DEGREES STEPS: 1 - CONTINUOUS STEPS
SITTING DOWN: 1 - USES ARMS OR NOT SMOOTH MOTION
NUDGED SCORE: 2

## 2025-04-24 ASSESSMENT — GAIT ASSESSMENTS
INITIATION OF GAIT IMMEDIATELY AFTER GO: 1 - NO HESITANCY
GAIT SCORE: 10
TRUNK: 1 - NO SWAY BUT FLEXION OF KNEES OR BACK OR SPREADS ARMS WHILE WALKING
BALANCE AND GAIT SCORE: 23
WALKING STANCE: 1 - HEELS ALMOST TOUCHING WHILE WALKING
STEP CONTINUITY: 1 - STEPS APPEAR CONTINUOUS
TRUNK SCORE: 1
STEP SYMMETRY: 1 - RIGHT AND LEFT STEP LENGTH APPEAR EQUAL
PATH: 1 - MILD/MODERATE DEVIATION OR USES WALKING AID
PATH SCORE: 1

## 2025-04-24 ASSESSMENT — ENCOUNTER SYMPTOMS
DENIES PAIN: 1
OCCASIONAL FEELINGS OF UNSTEADINESS: 1
PERSON REPORTING PAIN: PATIENT

## 2025-04-28 DIAGNOSIS — F51.02 ADJUSTMENT INSOMNIA: ICD-10-CM

## 2025-04-29 ENCOUNTER — APPOINTMENT (OUTPATIENT)
Dept: PRIMARY CARE | Facility: CLINIC | Age: 89
End: 2025-04-29
Payer: MEDICARE

## 2025-04-29 VITALS
HEIGHT: 68 IN | HEART RATE: 55 BPM | BODY MASS INDEX: 24.19 KG/M2 | DIASTOLIC BLOOD PRESSURE: 70 MMHG | SYSTOLIC BLOOD PRESSURE: 126 MMHG | OXYGEN SATURATION: 95 % | TEMPERATURE: 98.7 F | WEIGHT: 159.6 LBS

## 2025-04-29 DIAGNOSIS — I63.321 CEREBROVASCULAR ACCIDENT (CVA) DUE TO THROMBOSIS OF RIGHT ANTERIOR CEREBRAL ARTERY (MULTI): Primary | ICD-10-CM

## 2025-04-29 DIAGNOSIS — E05.90 HYPERTHYROIDISM: ICD-10-CM

## 2025-04-29 PROCEDURE — G2211 COMPLEX E/M VISIT ADD ON: HCPCS | Performed by: INTERNAL MEDICINE

## 2025-04-29 PROCEDURE — 3078F DIAST BP <80 MM HG: CPT | Performed by: INTERNAL MEDICINE

## 2025-04-29 PROCEDURE — 1123F ACP DISCUSS/DSCN MKR DOCD: CPT | Performed by: INTERNAL MEDICINE

## 2025-04-29 PROCEDURE — 1158F ADVNC CARE PLAN TLK DOCD: CPT | Performed by: INTERNAL MEDICINE

## 2025-04-29 PROCEDURE — 3074F SYST BP LT 130 MM HG: CPT | Performed by: INTERNAL MEDICINE

## 2025-04-29 PROCEDURE — 1159F MED LIST DOCD IN RCRD: CPT | Performed by: INTERNAL MEDICINE

## 2025-04-29 PROCEDURE — 99214 OFFICE O/P EST MOD 30 MIN: CPT | Performed by: INTERNAL MEDICINE

## 2025-04-29 PROCEDURE — 1157F ADVNC CARE PLAN IN RCRD: CPT | Performed by: INTERNAL MEDICINE

## 2025-04-29 RX ORDER — METHIMAZOLE 5 MG/1
1 TABLET ORAL
COMMUNITY
Start: 2025-04-12

## 2025-04-29 RX ORDER — PREDNISONE 10 MG/1
2 TABLET ORAL
COMMUNITY
Start: 2025-02-13 | End: 2025-04-29 | Stop reason: WASHOUT

## 2025-04-29 RX ORDER — CHLORHEXIDINE GLUCONATE ORAL RINSE 1.2 MG/ML
15 SOLUTION DENTAL AS NEEDED
COMMUNITY
Start: 2025-04-18

## 2025-04-29 ASSESSMENT — PATIENT HEALTH QUESTIONNAIRE - PHQ9
2. FEELING DOWN, DEPRESSED OR HOPELESS: NOT AT ALL
1. LITTLE INTEREST OR PLEASURE IN DOING THINGS: NOT AT ALL
SUM OF ALL RESPONSES TO PHQ9 QUESTIONS 1 AND 2: 0

## 2025-04-30 NOTE — PROGRESS NOTES
Subjective     Patient ID: Canelo Moore is a 89 y.o. male who presents for 3 month Follow-up (Questions about meds./Stroke 3-2025).  History of Present Illness  Canelo Moore is an 89 year old male who presents for a three-month follow-up.    He experiences shortness of breath during activities. His speech has improved significantly since the last visit, though he still experiences some 'bumbling.'    He continues to experience insomnia, which is a new issue for him. He describes his previous sleep pattern as 'go to bed, sleep, wake up next morning,' but now he struggles with sleeping through the night. He sometimes naps during the day, which may contribute to his nighttime sleep difficulties. He finds sleeping flat uncomfortable, particularly in his legs, and often moves to a recliner. No current cough, congestion, or shortness of breath when lying flat.    He has a history of mini strokes and is currently not driving due to a protocol that requires a six-month wait and a driving assessment scheduled for June 30th. He feels frustrated with the loss of independence and the impact on his social life, as he used to drive to meet friends for dinner.    He is currently taking Trazodone 50 mg, Eliquis, and a baby aspirin. He was previously on methimazole and prednisone, but these are currently on hold as his thyroid function has returned to normal. He continues to take a water pill (Lasix) to manage fluid retention.    He has quit smoking, which is a significant lifestyle change. He has a history of lung issues, but currently reports no crackles and dry lungs.    He recently completed occupational therapy, physical therapy, and nursing care at home, which he found beneficial. His speech and physical strength have improved, though he still experiences some delayed responses.    Objective   Vitals:    04/29/25 1402   BP: 126/70   BP Location: Left arm   Patient Position: Standing   BP Cuff Size: Adult   Pulse: 55  "  Temp: 37.1 °C (98.7 °F)   SpO2: 95%   Weight: 72.4 kg (159 lb 9.6 oz)   Height: 1.727 m (5' 8\")     Wt Readings from Last 10 Encounters:   04/29/25 72.4 kg (159 lb 9.6 oz)   04/08/25 84.8 kg (187 lb)   03/27/25 79.4 kg (175 lb)   03/22/25 79.4 kg (175 lb)   02/18/25 76.8 kg (169 lb 6.4 oz)   01/14/25 77.1 kg (170 lb)   11/27/24 74.8 kg (164 lb 12.8 oz)   11/26/24 74.2 kg (163 lb 9.6 oz)   10/28/24 77.3 kg (170 lb 6.4 oz)   10/17/24 77.4 kg (170 lb 9.6 oz)       Medication:  Current Outpatient Medications   Medication Instructions    acetaminophen (TYLENOL) 650 mg, Every 6 hours PRN    albuterol (Ventolin HFA) 90 mcg/actuation inhaler 2 puffs, inhalation, Every 6 hours PRN    alfuzosin (UROXATRAL) 10 mg, oral, Daily, Do not crush, chew, or split.    apixaban (ELIQUIS) 5 mg, 2 times daily    aspirin 81 mg, Daily    chlorhexidine (Peridex) 0.12 % solution 15 mL, As needed    finasteride (PROSCAR) 5 mg, oral, Daily    furosemide (LASIX) 40 mg, oral, Daily    magnesium oxide (MAG-OX) 400 mg, oral, 2 times daily    methIMAzole (Tapazole) 5 mg tablet 1 tablet, Daily (0630)    methIMAzole (TAPAZOLE) 20 mg, oral, Every 12 hours scheduled    metoprolol succinate XL (TOPROL-XL) 100 mg, oral, Daily, Do not crush or chew.    multivitamin (One Daily Multivitamin) tablet 1 tablet, Daily    nitroglycerin (NITROSTAT) 0.4 mg, sublingual, Every 5 min PRN, place 1 tablet under tongue every 5 minutes for up to 3 doses as needed for chest pain. Call 911 if pain persists    rosuvastatin (CRESTOR) 10 mg, oral, Nightly    tiotropium (SPIRIVA WITH HANDIHALER) 18 mcg, inhalation, Daily RT    traZODone (DESYREL) 50 mg, oral, Nightly PRN     Physical Exam  Gen: Alert, awake, Oriented X 3. Not in any acute distress   HEENT:  Atraumatic, PERRL.  Conjunctivae clear.   Moist nasal mucous membranes. oropharynx non erythematous,   Neck:  Supple without thyromegaly or lymphadenopathy.  Lungs:  Clear to auscultation without rales, rhonchi, or " rub.  Heart:  RRR, S1, S2, without M.  Abdomen:  Soft, non tender, no organ enlargement, bruit. Bowel sounds present . No CVA tenderness.  Extremities:  No edema. No calf swelling or tenderness.    Skin:  No rash, ecchymosis or erythema.      Review of Systems:  Constitutional:  No activity change or fever   HENT:  Denies ringing ears or nose bleed   Respiratory:  Denies stridor. No blood in sputum   Cardiovascular:  Denies chest pain, no sudden excessive sweating   Gastrointestinal:  No sour burping, no blood in stool    Genitourinary:  Denies blood in urine    Musculoskeletal:  No joint redness or swelling    Skin:  No new spot changing color or shape or border    Neurological:  No speech difficulty, facial droop    Psychiatric/Behavioral:  No agitation, denies Hallucination     Recent Labs:   Lab Results   Component Value Date    WBC 8.0 03/24/2025    HGB 10.1 (L) 03/24/2025    HCT 30.0 (L) 03/24/2025     03/24/2025    CHOL 96 03/23/2025    TRIG 49 03/23/2025    HDL 41.2 03/23/2025    ALT 10 03/23/2025    AST 17 03/23/2025     (L) 03/24/2025    K 3.7 03/24/2025    CL 94 (L) 03/24/2025    CREATININE 0.75 03/24/2025    BUN 12 03/24/2025    CO2 30 03/24/2025    TSH 1.69 03/23/2025    PSA 1.64 01/28/2025    INR 1.6 (H) 03/22/2025     Lab Results   Component Value Date    GLUCOSE 107 (H) 03/24/2025    CALCIUM 8.6 03/24/2025     (L) 03/24/2025    K 3.7 03/24/2025    CO2 30 03/24/2025    CL 94 (L) 03/24/2025    BUN 12 03/24/2025    CREATININE 0.75 03/24/2025      Lab Results   Component Value Date    LDLCALC 45 03/23/2025     Lab Results   Component Value Date    LDLCALC 45 03/23/2025    CREATININE 0.75 03/24/2025     Lab Results   Component Value Date    PSA 1.64 01/28/2025    PSA 2.86 07/31/2024    PSA 3.48 12/20/2023       Diagnosis and Orders:   Cerebrovascular accident (CVA) due to thrombosis of right anterior cerebral artery (Multi)  -     Referral to Neurology; Future  Hyperthyroidism  -      TSH with reflex to Free T4 if abnormal; Future  -     Triiodothyronine, Free; Future       Assessment & Plan  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 Stable thyroid levels without methimazole. Awaiting TSH results to confirm stability.  - Order TSH test to confirm thyroid function stability.  - Hold methimazole and prednisone pending TSH results.  - If TSH is normal, discontinue methimazole and prednisone permanently.    Stroke  Residual effects include delayed response and slurred speech, improving. Driving restrictions in place. Special driving assessment scheduled.  - Refer to Dr. Peter Farrell, neurologist, for stroke follow-up and assessment.  - Schedule a special driving assessment for June 30th, with potential for an earlier appointment.    Insomnia  Intermittent insomnia possibly due to daytime napping and inactivity. No new medications added.  - Encourage increased daytime activity to reduce napping.  - Continue current trazodone regimen.      VISIT SUMMARY:  Today, you came in for your three-month follow-up appointment. We discussed your shortness of breath during activities, improvements in your speech, and your ongoing issues with insomnia. We also reviewed your history of mini strokes and your current medications. You have made significant progress in your recovery, including quitting smoking and completing therapy at home.    YOUR PLAN:  -THYROID DYSFUNCTION DUE TO AMIODARONE: Your thyroid function has returned to normal after stopping amiodarone. We will check your thyroid-stimulating hormone (TSH) levels to confirm stability. If your TSH levels are normal, you can permanently stop taking methimazole and prednisone.    -STROKE: You are experiencing some residual effects from your mini strokes, such as delayed responses and slurred speech, but these are improving. You have a driving assessment scheduled for June 30th. We will refer you to   Peter Farrell, a neurologist, for further follow-up and assessment.    -INSOMNIA: You are having trouble sleeping through the night, possibly due to daytime napping and inactivity. We recommend increasing your daytime activity to help reduce napping and continuing your current trazodone medication.    INSTRUCTIONS:  Please get your TSH test done to confirm your thyroid function stability. Continue with your current medications and follow the recommendations for increasing daytime activity to help with your insomnia. Your special driving assessment is scheduled for June 30th, but we will try to get an earlier appointment if possible. Follow up with Dr. Peter Farrell, the neurologist, for your stroke assessment.        This medical note was created with the assistance of artificial intelligence (AI) for documentation purposes. The content has been reviewed and confirmed by the healthcare provider for accuracy and completeness. Patient consented to the use of audio recording and use of AI during their visit.

## 2025-05-02 RX ORDER — TRAZODONE HYDROCHLORIDE 50 MG/1
50 TABLET ORAL NIGHTLY PRN
Qty: 90 TABLET | Refills: 0 | Status: SHIPPED | OUTPATIENT
Start: 2025-05-02 | End: 2026-05-02

## 2025-05-05 ENCOUNTER — TELEPHONE (OUTPATIENT)
Dept: PRIMARY CARE | Facility: CLINIC | Age: 89
End: 2025-05-05
Payer: MEDICARE

## 2025-05-05 NOTE — TELEPHONE ENCOUNTER
Patients son called the office today requesting a referral for palliative or hospice care for patient, please advise

## 2025-05-06 ENCOUNTER — OFFICE VISIT (OUTPATIENT)
Dept: PRIMARY CARE | Facility: CLINIC | Age: 89
End: 2025-05-06
Payer: MEDICARE

## 2025-05-06 VITALS
WEIGHT: 157.4 LBS | HEIGHT: 68 IN | RESPIRATION RATE: 18 BRPM | HEART RATE: 67 BPM | BODY MASS INDEX: 23.86 KG/M2 | TEMPERATURE: 97.8 F | SYSTOLIC BLOOD PRESSURE: 128 MMHG | DIASTOLIC BLOOD PRESSURE: 70 MMHG | OXYGEN SATURATION: 87 %

## 2025-05-06 DIAGNOSIS — L89.303 PRESSURE INJURY OF BUTTOCK, STAGE 3, UNSPECIFIED LATERALITY (MULTI): Primary | ICD-10-CM

## 2025-05-06 DIAGNOSIS — I48.0 PAROXYSMAL ATRIAL FIBRILLATION (MULTI): ICD-10-CM

## 2025-05-06 DIAGNOSIS — J43.1 PANLOBULAR EMPHYSEMA (MULTI): ICD-10-CM

## 2025-05-06 DIAGNOSIS — I50.32 CHRONIC HEART FAILURE WITH PRESERVED EJECTION FRACTION (HFPEF): ICD-10-CM

## 2025-05-06 PROCEDURE — 1159F MED LIST DOCD IN RCRD: CPT | Performed by: INTERNAL MEDICINE

## 2025-05-06 PROCEDURE — 3078F DIAST BP <80 MM HG: CPT | Performed by: INTERNAL MEDICINE

## 2025-05-06 PROCEDURE — 3074F SYST BP LT 130 MM HG: CPT | Performed by: INTERNAL MEDICINE

## 2025-05-06 PROCEDURE — 1160F RVW MEDS BY RX/DR IN RCRD: CPT | Performed by: INTERNAL MEDICINE

## 2025-05-06 PROCEDURE — G2211 COMPLEX E/M VISIT ADD ON: HCPCS | Performed by: INTERNAL MEDICINE

## 2025-05-06 PROCEDURE — 99214 OFFICE O/P EST MOD 30 MIN: CPT | Performed by: INTERNAL MEDICINE

## 2025-05-06 RX ORDER — MUPIROCIN 20 MG/G
OINTMENT TOPICAL 3 TIMES DAILY
Qty: 22 G | Refills: 2 | Status: ON HOLD | OUTPATIENT
Start: 2025-05-06 | End: 2025-05-16

## 2025-05-06 ASSESSMENT — PATIENT HEALTH QUESTIONNAIRE - PHQ9
SUM OF ALL RESPONSES TO PHQ9 QUESTIONS 1 AND 2: 0
1. LITTLE INTEREST OR PLEASURE IN DOING THINGS: NOT AT ALL
2. FEELING DOWN, DEPRESSED OR HOPELESS: NOT AT ALL

## 2025-05-06 NOTE — PROGRESS NOTES
Subjective     Patient ID: Canelo Moore is a 89 y.o. male who presents for Wound Check (Open wound near rectum noticed a week ago./HX of pilonidal cyst).  History of Present Illness  Canelo Moore is an 89 year old male with lung cancer who presents with a cyst on his rear end and increased care needs. He is accompanied by his caregiver, who is also his family member.    He has a cyst on his rear end that recently ruptured, which he has been managing with sitz baths. He is concerned about the need for antibiotics. Additionally, he has hemorrhoids, one of which may have become inflamed.    He experiences significant shortness of breath with minimal exertion, such as moving or standing up, and humorously notes being 'short of breath completely' with any movement. He uses [employer] and has an oxygen saturation of 86% at rest. No breathing difficulties while sleeping.    His history of lung cancer is impacting his breathing and overall energy levels. He reports a lack of interest in activities and feels ready to 'cross the finish line.'    He has experienced weight loss since his last hospital visit, decreasing from 159 pounds to 157 pounds, and is not weighing himself regularly.    He is experiencing increased difficulty with daily activities, such as getting up from the toilet, and requires assistance from his caregiver. He uses handles for support and has noticed a general decline in his ability to care for himself.    He reports a pressure ulcer on his rear end, likely due to prolonged sitting and lack of cushioning. He is concerned about maintaining cleanliness and preventing further skin breakdown.    He recently used a lawnmower for over an hour, which was challenging due to difficulty getting on and off the mower.    Objective   Vitals:    05/06/25 1551   BP: 128/70   BP Location: Left arm   Patient Position: Sitting   BP Cuff Size: Large adult   Pulse: 67   Resp: 18   Temp: 36.6 °C (97.8 °F)   TempSrc:  "Temporal   SpO2: (!) 87%   Weight: 71.4 kg (157 lb 6.4 oz)   Height: 1.727 m (5' 8\")     Wt Readings from Last 10 Encounters:   05/06/25 71.4 kg (157 lb 6.4 oz)   04/29/25 72.4 kg (159 lb 9.6 oz)   04/08/25 84.8 kg (187 lb)   03/27/25 79.4 kg (175 lb)   03/22/25 79.4 kg (175 lb)   02/18/25 76.8 kg (169 lb 6.4 oz)   01/14/25 77.1 kg (170 lb)   11/27/24 74.8 kg (164 lb 12.8 oz)   11/26/24 74.2 kg (163 lb 9.6 oz)   10/28/24 77.3 kg (170 lb 6.4 oz)       Medication:  Current Outpatient Medications   Medication Instructions    acetaminophen (TYLENOL) 650 mg, Every 6 hours PRN    albuterol (Ventolin HFA) 90 mcg/actuation inhaler 2 puffs, inhalation, Every 6 hours PRN    alfuzosin (UROXATRAL) 10 mg, oral, Daily, Do not crush, chew, or split.    apixaban (ELIQUIS) 5 mg, 2 times daily    aspirin 81 mg, Daily    chlorhexidine (Peridex) 0.12 % solution 15 mL, As needed    finasteride (PROSCAR) 5 mg, oral, Daily    furosemide (LASIX) 40 mg, oral, Daily    magnesium oxide (MAG-OX) 400 mg, oral, 2 times daily    methIMAzole (Tapazole) 5 mg tablet 1 tablet, Daily (0630)    methIMAzole (TAPAZOLE) 20 mg, oral, Every 12 hours scheduled    metoprolol succinate XL (TOPROL-XL) 100 mg, oral, Daily, Do not crush or chew.    multivitamin (One Daily Multivitamin) tablet 1 tablet, Daily    mupirocin (Bactroban) 2 % ointment Topical, 3 times daily, apply to affected area    nitroglycerin (NITROSTAT) 0.4 mg, sublingual, Every 5 min PRN, place 1 tablet under tongue every 5 minutes for up to 3 doses as needed for chest pain. Call 911 if pain persists    oxygen (O2) 2 L/min (2 L/min), inhalation, Daily PRN    rosuvastatin (CRESTOR) 10 mg, oral, Nightly    tiotropium (SPIRIVA WITH HANDIHALER) 18 mcg, inhalation, Daily RT    traZODone (DESYREL) 50 mg, oral, Nightly PRN       Physical Exam  VITALS: SaO2- 86%  MEASUREMENTS: Weight- 157 pounds.  CHEST: Lungs dry on auscultation.  RECTAL: Pressure ulcer on anus.  Gen: Alert, awake, Oriented X 3. Not in " any acute distress   HEENT:  Atraumatic, PERRL.  Conjunctivae clear.   Moist nasal mucous membranes. oropharynx non erythematous,   Neck:  Supple without thyromegaly or lymphadenopathy.  Heart:  RRR, S1, S2, without M.  Extremities:  No edema. No calf swelling or tenderness.    Skin:  No rash, ecchymosis or erythema.       Recent Labs:   Lab Results   Component Value Date    WBC 8.0 03/24/2025    HGB 10.1 (L) 03/24/2025    HCT 30.0 (L) 03/24/2025     03/24/2025    CHOL 96 03/23/2025    TRIG 49 03/23/2025    HDL 41.2 03/23/2025    ALT 10 03/23/2025    AST 17 03/23/2025     (L) 03/24/2025    K 3.7 03/24/2025    CL 94 (L) 03/24/2025    CREATININE 0.75 03/24/2025    BUN 12 03/24/2025    CO2 30 03/24/2025    TSH 1.69 03/23/2025    PSA 1.64 01/28/2025    INR 1.6 (H) 03/22/2025     Lab Results   Component Value Date    GLUCOSE 107 (H) 03/24/2025    CALCIUM 8.6 03/24/2025     (L) 03/24/2025    K 3.7 03/24/2025    CO2 30 03/24/2025    CL 94 (L) 03/24/2025    BUN 12 03/24/2025    CREATININE 0.75 03/24/2025      Lab Results   Component Value Date    LDLCALC 45 03/23/2025     Lab Results   Component Value Date    LDLCALC 45 03/23/2025    CREATININE 0.75 03/24/2025     Lab Results   Component Value Date    PSA 1.64 01/28/2025    PSA 2.86 07/31/2024    PSA 3.48 12/20/2023       Diagnosis and Orders:     Diagnoses and all orders for this visit:  Panlobular emphysema (Multi)  -     Referral to Palliative Care; Future  -  Patient is hypoxic even in the room air with SpO2 of 86%.  He is getting extremely dyspneic even to remove his close for showing me his rectal ulcer.  We did not even make him 6-minute walk because he is definitely having extreme work of breathing with hypoxia with minimal exertion.  We will talk to his [employer] company to supply home oxygen at 2 L via nasal cannula at rest 24/7 mostly for comfort   Paroxysmal atrial fibrillation (Multi)  -     Referral to Palliative Care; Future  -     Referral to  Home Health; Future  Chronic heart failure with preserved ejection fraction (HFpEF)  -     Referral to Palliative Care; Future  -     Referral to Home Health; Future  Pressure injury of buttock, stage 3, unspecified laterality (Multi)  -     mupirocin (Bactroban) 2 % ointment; Apply topically 3 times a day for 10 days. apply to affected area  -     Referral to Home Health; Future     Assessment & Plan  Lung cancer  Lung cancer causing chronic respiratory failure and significant dyspnea, indicating advanced disease. Focus on comfort and palliative care.  - Refer to palliative care for symptom management and comfort measures.  - Coordinate with home health care to integrate palliative care.    Chronic respiratory failure  Chronic respiratory failure with exertional dyspnea. Oxygen saturation drops below 88% with activity, requiring home oxygen therapy.  - Arrange home oxygen therapy.  - Document oxygen saturation levels for Medicare criteria.    Pressure ulcer  Pressure ulcer on buttocks due to prolonged sitting. Not infected, requires pressure relief and wound care.  - Apply local antibiotic cream.  - Arrange wound care nurse for specialized care and pressure relief guidance.  - Use ABD pads to protect ulcer and prevent pressure damage.    Goals of Care  He prefers comfort-focused care, avoiding aggressive treatments and frequent hospital visits. Ready to transition to palliative care, with potential for hospice care.  - Limit movement and reduce doctor's appointments and hospital visits.  - Facilitate home-based care with palliative support.  - Consider hospice care if condition progresses.      VISIT SUMMARY:  Today, you were seen for a follow-up visit to address several health concerns, including a cyst on your rear end, shortness of breath, and overall increased care needs. We discussed your lung cancer, chronic respiratory failure, and a pressure ulcer on your buttocks. Your caregiver was present to provide  additional information and support.    YOUR PLAN:  -LUNG CANCER: Lung cancer is a type of cancer that begins in the lungs and can cause significant breathing difficulties and fatigue. We will focus on comfort and palliative care to manage your symptoms. You will be referred to palliative care for symptom management and comfort measures, and we will coordinate with home health care to integrate these services.    -CHRONIC RESPIRATORY FAILURE: Chronic respiratory failure occurs when your lungs cannot get enough oxygen into your blood. This condition causes significant shortness of breath with minimal activity. We will arrange for home oxygen therapy to help manage your symptoms and document your oxygen saturation levels for Medicare criteria.    -PRESSURE ULCER: A pressure ulcer is a sore that develops on the skin due to prolonged pressure, often from sitting or lying in one position for too long. Your pressure ulcer is not infected but requires proper care to prevent further damage. We will apply a local antibiotic cream, arrange for a wound care nurse to provide specialized care and guidance, and use ABD pads to protect the ulcer and prevent pressure damage.    -GOALS OF CARE: You have expressed a preference for comfort-focused care, avoiding aggressive treatments and frequent hospital visits. We will limit your movement and reduce doctor's appointments and hospital visits, facilitate home-based care with palliative support, and consider hospice care if your condition progresses.    INSTRUCTIONS:  We will arrange for home oxygen therapy and coordinate with palliative care and home health care services. A wound care nurse will be scheduled to visit you for specialized care and guidance on managing your pressure ulcer. Please continue to monitor your symptoms and keep in touch with your healthcare team for any changes or concerns.        This medical note was created with the assistance of artificial intelligence (AI)  for documentation purposes. The content has been reviewed and confirmed by the healthcare provider for accuracy and completeness. Patient consented to the use of audio recording and use of AI during their visit. STACIA

## 2025-05-07 ENCOUNTER — TELEPHONE (OUTPATIENT)
Dept: HOME HEALTH SERVICES | Facility: HOME HEALTH | Age: 89
End: 2025-05-07
Payer: MEDICARE

## 2025-05-07 NOTE — TELEPHONE ENCOUNTER
Thank you for the referral for home care for this patient.  We will continue to review with the completed note from the 5/6/2025 visit.  Additionally we need to have added the wound care orders you would like us to initiate care with.  The would need to include the following     Location of wound to be treated  Frequency of treatment  Cleansing agent  Topical medication if applicable  Covering for the wound   Trainable caregiver for wound care.     We will follow this referral for 72 hours.    Thank you ,  [facility] Intake

## 2025-05-08 ENCOUNTER — TELEPHONE (OUTPATIENT)
Dept: PRIMARY CARE | Facility: CLINIC | Age: 89
End: 2025-05-08
Payer: MEDICARE

## 2025-05-08 DIAGNOSIS — J43.9 PULMONARY EMPHYSEMA, UNSPECIFIED EMPHYSEMA TYPE (MULTI): ICD-10-CM

## 2025-05-08 DIAGNOSIS — L89.326 PRESSURE INJURY OF DEEP TISSUE OF LEFT BUTTOCK: ICD-10-CM

## 2025-05-08 DIAGNOSIS — R09.02 HYPOXIA: ICD-10-CM

## 2025-05-08 NOTE — TELEPHONE ENCOUNTER
"Patients son called the office today states that when he was in office with patient. They discussed about palliative care and wound care. Son is wanting to know when those will take effect. In regards to the wound care, they need more clarification in regards to the wound so that way the appropriate nurse can be sent out. Clarification that is needed is \"Location of wound to be treated,frequency of treatment, cleansing agent,topical medication if applicable,covering for the wound ,trainable caregiver for wound care\". In regards to oxygen delivery, son is wanting to know when they should expect to get that in. No order for oxygen in patients chart. Son states he is going to continue calling the office till he gets a response. Son was advised that  has 72 business hours to respond to the request. Please advise  "

## 2025-05-08 NOTE — TELEPHONE ENCOUNTER
Call placed to patient/ patient son Dr. Cueva message wound care clinic referral. Wound care clinic to evaluate needed treatment/ supplies for HHC. Patient son asking for order for oxygen. Wound care referral pended.

## 2025-05-09 ENCOUNTER — HOME HEALTH ADMISSION (OUTPATIENT)
Dept: HOME HEALTH SERVICES | Facility: HOME HEALTH | Age: 89
End: 2025-05-09
Payer: MEDICARE

## 2025-05-09 ENCOUNTER — HOSPITAL ENCOUNTER (OUTPATIENT)
Facility: HOSPITAL | Age: 89
Setting detail: OBSERVATION
Discharge: HOME | End: 2025-05-09
Attending: STUDENT IN AN ORGANIZED HEALTH CARE EDUCATION/TRAINING PROGRAM | Admitting: INTERNAL MEDICINE
Payer: MEDICARE

## 2025-05-09 ENCOUNTER — PATIENT OUTREACH (OUTPATIENT)
Dept: PRIMARY CARE | Facility: CLINIC | Age: 89
End: 2025-05-09
Payer: MEDICARE

## 2025-05-09 ENCOUNTER — APPOINTMENT (OUTPATIENT)
Dept: RADIOLOGY | Facility: HOSPITAL | Age: 89
End: 2025-05-09
Payer: MEDICARE

## 2025-05-09 ENCOUNTER — DOCUMENTATION (OUTPATIENT)
Dept: HOME HEALTH SERVICES | Facility: HOME HEALTH | Age: 89
End: 2025-05-09
Payer: MEDICARE

## 2025-05-09 DIAGNOSIS — R06.02 SHORTNESS OF BREATH: Primary | ICD-10-CM

## 2025-05-09 DIAGNOSIS — M46.1 SACROILIITIS: ICD-10-CM

## 2025-05-09 DIAGNOSIS — R79.89 ELEVATED TROPONIN: ICD-10-CM

## 2025-05-09 DIAGNOSIS — I48.0 PAROXYSMAL ATRIAL FIBRILLATION (MULTI): ICD-10-CM

## 2025-05-09 DIAGNOSIS — I50.32 CHRONIC HEART FAILURE WITH PRESERVED EJECTION FRACTION (HFPEF): ICD-10-CM

## 2025-05-09 DIAGNOSIS — I11.0 HYPERTENSIVE HEART DISEASE WITH HEART FAILURE: ICD-10-CM

## 2025-05-09 DIAGNOSIS — E87.1 HYPONATREMIA: ICD-10-CM

## 2025-05-09 DIAGNOSIS — I63.9 ACUTE CVA (CEREBROVASCULAR ACCIDENT) (MULTI): ICD-10-CM

## 2025-05-09 DIAGNOSIS — F17.200 CURRENT SMOKER: ICD-10-CM

## 2025-05-09 DIAGNOSIS — R05.8 COUGH SECONDARY TO ANGIOTENSIN CONVERTING ENZYME INHIBITOR (ACE-I): ICD-10-CM

## 2025-05-09 DIAGNOSIS — F51.01 INSOMNIA, IDIOPATHIC: ICD-10-CM

## 2025-05-09 DIAGNOSIS — N18.31 STAGE 3A CHRONIC KIDNEY DISEASE (CKD) (MULTI): ICD-10-CM

## 2025-05-09 DIAGNOSIS — Z79.899 HIGH RISK MEDICATION USE: ICD-10-CM

## 2025-05-09 DIAGNOSIS — I48.21 PERMANENT ATRIAL FIBRILLATION (MULTI): ICD-10-CM

## 2025-05-09 DIAGNOSIS — C34.90: ICD-10-CM

## 2025-05-09 DIAGNOSIS — I50.33 ACUTE ON CHRONIC DIASTOLIC (CONGESTIVE) HEART FAILURE: ICD-10-CM

## 2025-05-09 DIAGNOSIS — J43.9 PULMONARY EMPHYSEMA, UNSPECIFIED EMPHYSEMA TYPE (MULTI): ICD-10-CM

## 2025-05-09 DIAGNOSIS — L89.323: ICD-10-CM

## 2025-05-09 DIAGNOSIS — T46.2X5A AMIODARONE-INDUCED HYPERTHYROIDISM: ICD-10-CM

## 2025-05-09 DIAGNOSIS — E78.1 HYPERTRIGLYCERIDEMIA: ICD-10-CM

## 2025-05-09 DIAGNOSIS — T46.4X5A COUGH SECONDARY TO ANGIOTENSIN CONVERTING ENZYME INHIBITOR (ACE-I): ICD-10-CM

## 2025-05-09 DIAGNOSIS — R29.90 STROKE-LIKE SYMPTOMS: ICD-10-CM

## 2025-05-09 DIAGNOSIS — R09.02 HYPOXIA: ICD-10-CM

## 2025-05-09 DIAGNOSIS — R79.89 ELEVATED SERUM FREE T4 LEVEL: ICD-10-CM

## 2025-05-09 DIAGNOSIS — I10 PRIMARY HYPERTENSION: ICD-10-CM

## 2025-05-09 DIAGNOSIS — E05.80 AMIODARONE-INDUCED HYPERTHYROIDISM: ICD-10-CM

## 2025-05-09 DIAGNOSIS — R47.81 SLURRED SPEECH: ICD-10-CM

## 2025-05-09 LAB
ALBUMIN SERPL BCP-MCNC: 3.4 G/DL (ref 3.4–5)
ALP SERPL-CCNC: 78 U/L (ref 33–136)
ALT SERPL W P-5'-P-CCNC: 13 U/L (ref 10–52)
ANION GAP SERPL CALC-SCNC: 7 MMOL/L (ref 10–20)
AST SERPL W P-5'-P-CCNC: 22 U/L (ref 9–39)
BASOPHILS # BLD AUTO: 0.03 X10*3/UL (ref 0–0.1)
BASOPHILS NFR BLD AUTO: 0.4 %
BILIRUB SERPL-MCNC: 1.7 MG/DL (ref 0–1.2)
BUN SERPL-MCNC: 17 MG/DL (ref 6–23)
CALCIUM SERPL-MCNC: 11.7 MG/DL (ref 8.6–10.3)
CHLORIDE SERPL-SCNC: 89 MMOL/L (ref 98–107)
CO2 SERPL-SCNC: 37 MMOL/L (ref 21–32)
CREAT SERPL-MCNC: 0.99 MG/DL (ref 0.5–1.3)
EGFRCR SERPLBLD CKD-EPI 2021: 74 ML/MIN/1.73M*2
EOSINOPHIL # BLD AUTO: 0.01 X10*3/UL (ref 0–0.4)
EOSINOPHIL NFR BLD AUTO: 0.1 %
ERYTHROCYTE [DISTWIDTH] IN BLOOD BY AUTOMATED COUNT: 14.6 % (ref 11.5–14.5)
GLUCOSE SERPL-MCNC: 117 MG/DL (ref 74–99)
HCT VFR BLD AUTO: 32.5 % (ref 41–52)
HGB BLD-MCNC: 10.6 G/DL (ref 13.5–17.5)
IMM GRANULOCYTES # BLD AUTO: 0.03 X10*3/UL (ref 0–0.5)
IMM GRANULOCYTES NFR BLD AUTO: 0.4 % (ref 0–0.9)
LYMPHOCYTES # BLD AUTO: 0.41 X10*3/UL (ref 0.8–3)
LYMPHOCYTES NFR BLD AUTO: 5.2 %
MCH RBC QN AUTO: 32.1 PG (ref 26–34)
MCHC RBC AUTO-ENTMCNC: 32.6 G/DL (ref 32–36)
MCV RBC AUTO: 99 FL (ref 80–100)
MONOCYTES # BLD AUTO: 0.54 X10*3/UL (ref 0.05–0.8)
MONOCYTES NFR BLD AUTO: 6.8 %
NEUTROPHILS # BLD AUTO: 6.94 X10*3/UL (ref 1.6–5.5)
NEUTROPHILS NFR BLD AUTO: 87.1 %
NRBC BLD-RTO: 0 /100 WBCS (ref 0–0)
PLATELET # BLD AUTO: 265 X10*3/UL (ref 150–450)
POTASSIUM SERPL-SCNC: 3.6 MMOL/L (ref 3.5–5.3)
PROT SERPL-MCNC: 5.9 G/DL (ref 6.4–8.2)
RBC # BLD AUTO: 3.3 X10*6/UL (ref 4.5–5.9)
SODIUM SERPL-SCNC: 129 MMOL/L (ref 136–145)
WBC # BLD AUTO: 8 X10*3/UL (ref 4.4–11.3)

## 2025-05-09 PROCEDURE — 94640 AIRWAY INHALATION TREATMENT: CPT

## 2025-05-09 PROCEDURE — 2500000004 HC RX 250 GENERAL PHARMACY W/ HCPCS (ALT 636 FOR OP/ED): Mod: JZ | Performed by: STUDENT IN AN ORGANIZED HEALTH CARE EDUCATION/TRAINING PROGRAM

## 2025-05-09 PROCEDURE — 36415 COLL VENOUS BLD VENIPUNCTURE: CPT | Performed by: STUDENT IN AN ORGANIZED HEALTH CARE EDUCATION/TRAINING PROGRAM

## 2025-05-09 PROCEDURE — 99223 1ST HOSP IP/OBS HIGH 75: CPT | Performed by: INTERNAL MEDICINE

## 2025-05-09 PROCEDURE — G0378 HOSPITAL OBSERVATION PER HR: HCPCS

## 2025-05-09 PROCEDURE — 2500000005 HC RX 250 GENERAL PHARMACY W/O HCPCS: Performed by: INTERNAL MEDICINE

## 2025-05-09 PROCEDURE — 96374 THER/PROPH/DIAG INJ IV PUSH: CPT

## 2025-05-09 PROCEDURE — 84075 ASSAY ALKALINE PHOSPHATASE: CPT | Performed by: STUDENT IN AN ORGANIZED HEALTH CARE EDUCATION/TRAINING PROGRAM

## 2025-05-09 PROCEDURE — 71045 X-RAY EXAM CHEST 1 VIEW: CPT

## 2025-05-09 PROCEDURE — 85025 COMPLETE CBC W/AUTO DIFF WBC: CPT | Performed by: STUDENT IN AN ORGANIZED HEALTH CARE EDUCATION/TRAINING PROGRAM

## 2025-05-09 PROCEDURE — 71045 X-RAY EXAM CHEST 1 VIEW: CPT | Mod: FOREIGN READ | Performed by: RADIOLOGY

## 2025-05-09 PROCEDURE — 2500000002 HC RX 250 W HCPCS SELF ADMINISTERED DRUGS (ALT 637 FOR MEDICARE OP, ALT 636 FOR OP/ED): Mod: JZ | Performed by: INTERNAL MEDICINE

## 2025-05-09 PROCEDURE — 99285 EMERGENCY DEPT VISIT HI MDM: CPT | Mod: 25 | Performed by: STUDENT IN AN ORGANIZED HEALTH CARE EDUCATION/TRAINING PROGRAM

## 2025-05-09 RX ORDER — IPRATROPIUM BROMIDE AND ALBUTEROL SULFATE 2.5; .5 MG/3ML; MG/3ML
3 SOLUTION RESPIRATORY (INHALATION)
Status: DISCONTINUED | OUTPATIENT
Start: 2025-05-09 | End: 2025-05-13 | Stop reason: HOSPADM

## 2025-05-09 RX ORDER — LABETALOL HYDROCHLORIDE 5 MG/ML
10 INJECTION, SOLUTION INTRAVENOUS EVERY 6 HOURS PRN
Status: DISCONTINUED | OUTPATIENT
Start: 2025-05-09 | End: 2025-05-13 | Stop reason: HOSPADM

## 2025-05-09 RX ORDER — MENTHOL AND ZINC OXIDE .44; 20.625 G/100G; G/100G
1 OINTMENT TOPICAL 2 TIMES DAILY
Status: DISCONTINUED | OUTPATIENT
Start: 2025-05-09 | End: 2025-05-13 | Stop reason: HOSPADM

## 2025-05-09 RX ORDER — LORAZEPAM 2 MG/ML
1 INJECTION INTRAMUSCULAR EVERY 4 HOURS PRN
Status: DISCONTINUED | OUTPATIENT
Start: 2025-05-09 | End: 2025-05-13 | Stop reason: HOSPADM

## 2025-05-09 RX ORDER — MORPHINE SULFATE 4 MG/ML
4 INJECTION, SOLUTION INTRAMUSCULAR; INTRAVENOUS EVERY 6 HOURS PRN
Status: DISCONTINUED | OUTPATIENT
Start: 2025-05-09 | End: 2025-05-13 | Stop reason: HOSPADM

## 2025-05-09 RX ORDER — HYDRALAZINE HYDROCHLORIDE 20 MG/ML
10 INJECTION INTRAMUSCULAR; INTRAVENOUS EVERY 6 HOURS PRN
Status: DISCONTINUED | OUTPATIENT
Start: 2025-05-09 | End: 2025-05-13 | Stop reason: HOSPADM

## 2025-05-09 RX ORDER — FUROSEMIDE 10 MG/ML
40 INJECTION INTRAMUSCULAR; INTRAVENOUS ONCE
Status: COMPLETED | OUTPATIENT
Start: 2025-05-09 | End: 2025-05-09

## 2025-05-09 RX ADMIN — IPRATROPIUM BROMIDE AND ALBUTEROL SULFATE 3 ML: 2.5; .5 SOLUTION RESPIRATORY (INHALATION) at 19:53

## 2025-05-09 RX ADMIN — FUROSEMIDE 40 MG: 10 INJECTION, SOLUTION INTRAMUSCULAR; INTRAVENOUS at 17:08

## 2025-05-09 RX ADMIN — Medication 5 L/MIN: at 19:53

## 2025-05-09 SDOH — SOCIAL STABILITY: SOCIAL INSECURITY: HAVE YOU HAD ANY THOUGHTS OF HARMING ANYONE ELSE?: NO

## 2025-05-09 SDOH — HEALTH STABILITY: PHYSICAL HEALTH: ON AVERAGE, HOW MANY DAYS PER WEEK DO YOU ENGAGE IN MODERATE TO STRENUOUS EXERCISE (LIKE A BRISK WALK)?: 0 DAYS

## 2025-05-09 SDOH — SOCIAL STABILITY: SOCIAL NETWORK
DO YOU BELONG TO ANY CLUBS OR ORGANIZATIONS SUCH AS CHURCH GROUPS, UNIONS, FRATERNAL OR ATHLETIC GROUPS, OR SCHOOL GROUPS?: NO

## 2025-05-09 SDOH — ECONOMIC STABILITY: HOUSING INSECURITY: IN THE LAST 12 MONTHS, WAS THERE A TIME WHEN YOU WERE NOT ABLE TO PAY THE MORTGAGE OR RENT ON TIME?: NO

## 2025-05-09 SDOH — SOCIAL STABILITY: SOCIAL INSECURITY: WITHIN THE LAST YEAR, HAVE YOU BEEN AFRAID OF YOUR PARTNER OR EX-PARTNER?: NO

## 2025-05-09 SDOH — SOCIAL STABILITY: SOCIAL NETWORK: IN A TYPICAL WEEK, HOW MANY TIMES DO YOU TALK ON THE PHONE WITH FAMILY, FRIENDS, OR NEIGHBORS?: NEVER

## 2025-05-09 SDOH — SOCIAL STABILITY: SOCIAL INSECURITY: HAS ANYONE EVER THREATENED TO HURT YOUR FAMILY OR YOUR PETS?: NO

## 2025-05-09 SDOH — SOCIAL STABILITY: SOCIAL INSECURITY: WITHIN THE LAST YEAR, HAVE YOU BEEN HUMILIATED OR EMOTIONALLY ABUSED IN OTHER WAYS BY YOUR PARTNER OR EX-PARTNER?: NO

## 2025-05-09 SDOH — ECONOMIC STABILITY: TRANSPORTATION INSECURITY: IN THE PAST 12 MONTHS, HAS LACK OF TRANSPORTATION KEPT YOU FROM MEDICAL APPOINTMENTS OR FROM GETTING MEDICATIONS?: NO

## 2025-05-09 SDOH — ECONOMIC STABILITY: HOUSING INSECURITY: IN THE PAST 12 MONTHS, HOW MANY TIMES HAVE YOU MOVED WHERE YOU WERE LIVING?: 0

## 2025-05-09 SDOH — ECONOMIC STABILITY: FOOD INSECURITY: WITHIN THE PAST 12 MONTHS, THE FOOD YOU BOUGHT JUST DIDN'T LAST AND YOU DIDN'T HAVE MONEY TO GET MORE.: NEVER TRUE

## 2025-05-09 SDOH — ECONOMIC STABILITY: HOUSING INSECURITY: AT ANY TIME IN THE PAST 12 MONTHS, WERE YOU HOMELESS OR LIVING IN A SHELTER (INCLUDING NOW)?: NO

## 2025-05-09 SDOH — SOCIAL STABILITY: SOCIAL INSECURITY: ARE THERE ANY APPARENT SIGNS OF INJURIES/BEHAVIORS THAT COULD BE RELATED TO ABUSE/NEGLECT?: NO

## 2025-05-09 SDOH — ECONOMIC STABILITY: INCOME INSECURITY: IN THE PAST 12 MONTHS HAS THE ELECTRIC, GAS, OIL, OR WATER COMPANY THREATENED TO SHUT OFF SERVICES IN YOUR HOME?: NO

## 2025-05-09 SDOH — SOCIAL STABILITY: SOCIAL NETWORK: HOW OFTEN DO YOU ATTEND MEETINGS OF THE CLUBS OR ORGANIZATIONS YOU BELONG TO?: NEVER

## 2025-05-09 SDOH — HEALTH STABILITY: MENTAL HEALTH
DO YOU FEEL STRESS - TENSE, RESTLESS, NERVOUS, OR ANXIOUS, OR UNABLE TO SLEEP AT NIGHT BECAUSE YOUR MIND IS TROUBLED ALL THE TIME - THESE DAYS?: NOT AT ALL

## 2025-05-09 SDOH — SOCIAL STABILITY: SOCIAL INSECURITY: WERE YOU ABLE TO COMPLETE ALL THE BEHAVIORAL HEALTH SCREENINGS?: YES

## 2025-05-09 SDOH — SOCIAL STABILITY: SOCIAL NETWORK: HOW OFTEN DO YOU ATTEND CHURCH OR RELIGIOUS SERVICES?: NEVER

## 2025-05-09 SDOH — SOCIAL STABILITY: SOCIAL INSECURITY: DO YOU FEEL ANYONE HAS EXPLOITED OR TAKEN ADVANTAGE OF YOU FINANCIALLY OR OF YOUR PERSONAL PROPERTY?: NO

## 2025-05-09 SDOH — SOCIAL STABILITY: SOCIAL INSECURITY: DO YOU FEEL UNSAFE GOING BACK TO THE PLACE WHERE YOU ARE LIVING?: NO

## 2025-05-09 SDOH — SOCIAL STABILITY: SOCIAL NETWORK: HOW OFTEN DO YOU GET TOGETHER WITH FRIENDS OR RELATIVES?: NEVER

## 2025-05-09 SDOH — HEALTH STABILITY: PHYSICAL HEALTH: ON AVERAGE, HOW MANY MINUTES DO YOU ENGAGE IN EXERCISE AT THIS LEVEL?: 0 MIN

## 2025-05-09 SDOH — ECONOMIC STABILITY: FOOD INSECURITY: WITHIN THE PAST 12 MONTHS, YOU WORRIED THAT YOUR FOOD WOULD RUN OUT BEFORE YOU GOT THE MONEY TO BUY MORE.: NEVER TRUE

## 2025-05-09 SDOH — SOCIAL STABILITY: SOCIAL INSECURITY: DOES ANYONE TRY TO KEEP YOU FROM HAVING/CONTACTING OTHER FRIENDS OR DOING THINGS OUTSIDE YOUR HOME?: NO

## 2025-05-09 SDOH — SOCIAL STABILITY: SOCIAL INSECURITY: ABUSE: ADULT

## 2025-05-09 SDOH — SOCIAL STABILITY: SOCIAL INSECURITY: ARE YOU OR HAVE YOU BEEN THREATENED OR ABUSED PHYSICALLY, EMOTIONALLY, OR SEXUALLY BY ANYONE?: NO

## 2025-05-09 SDOH — SOCIAL STABILITY: SOCIAL INSECURITY: HAVE YOU HAD THOUGHTS OF HARMING ANYONE ELSE?: NO

## 2025-05-09 ASSESSMENT — LIFESTYLE VARIABLES
PRESCIPTION_ABUSE_PAST_12_MONTHS: NO
EVER HAD A DRINK FIRST THING IN THE MORNING TO STEADY YOUR NERVES TO GET RID OF A HANGOVER: NO
HAVE YOU EVER FELT YOU SHOULD CUT DOWN ON YOUR DRINKING: NO
HOW OFTEN DO YOU HAVE A DRINK CONTAINING ALCOHOL: NEVER
AUDIT-C TOTAL SCORE: 0
TOTAL SCORE: 0
HOW OFTEN DO YOU HAVE 6 OR MORE DRINKS ON ONE OCCASION: NEVER
SUBSTANCE_ABUSE_PAST_12_MONTHS: NO
HOW MANY STANDARD DRINKS CONTAINING ALCOHOL DO YOU HAVE ON A TYPICAL DAY: PATIENT DOES NOT DRINK
HAVE PEOPLE ANNOYED YOU BY CRITICIZING YOUR DRINKING: NO
SKIP TO QUESTIONS 9-10: 1
AUDIT-C TOTAL SCORE: 0
EVER FELT BAD OR GUILTY ABOUT YOUR DRINKING: NO

## 2025-05-09 ASSESSMENT — COGNITIVE AND FUNCTIONAL STATUS - GENERAL
TURNING FROM BACK TO SIDE WHILE IN FLAT BAD: A LITTLE
MOVING FROM LYING ON BACK TO SITTING ON SIDE OF FLAT BED WITH BEDRAILS: A LITTLE
WALKING IN HOSPITAL ROOM: A LOT
MOBILITY SCORE: 17
MOVING FROM LYING ON BACK TO SITTING ON SIDE OF FLAT BED WITH BEDRAILS: A LITTLE
MOBILITY SCORE: 17
DAILY ACTIVITIY SCORE: 24
MOVING TO AND FROM BED TO CHAIR: A LITTLE
STANDING UP FROM CHAIR USING ARMS: A LITTLE
MOVING TO AND FROM BED TO CHAIR: A LITTLE
PATIENT BASELINE BEDBOUND: NO
WALKING IN HOSPITAL ROOM: A LOT
CLIMB 3 TO 5 STEPS WITH RAILING: A LITTLE
CLIMB 3 TO 5 STEPS WITH RAILING: A LITTLE
STANDING UP FROM CHAIR USING ARMS: A LITTLE
TURNING FROM BACK TO SIDE WHILE IN FLAT BAD: A LITTLE
DAILY ACTIVITIY SCORE: 24

## 2025-05-09 ASSESSMENT — ACTIVITIES OF DAILY LIVING (ADL)
DRESSING YOURSELF: INDEPENDENT
HEARING - LEFT EAR: HEARING AID
WALKS IN HOME: INDEPENDENT
LACK_OF_TRANSPORTATION: NO
HEARING - RIGHT EAR: HEARING AID
FEEDING YOURSELF: INDEPENDENT
JUDGMENT_ADEQUATE_SAFELY_COMPLETE_DAILY_ACTIVITIES: YES
GROOMING: INDEPENDENT
PATIENT'S MEMORY ADEQUATE TO SAFELY COMPLETE DAILY ACTIVITIES?: YES
LACK_OF_TRANSPORTATION: NO
BATHING: INDEPENDENT
ADEQUATE_TO_COMPLETE_ADL: YES
ASSISTIVE_DEVICE: CANE;WALKER
TOILETING: INDEPENDENT

## 2025-05-09 ASSESSMENT — PAIN SCALES - GENERAL
PAINLEVEL_OUTOF10: 0 - NO PAIN

## 2025-05-09 ASSESSMENT — PATIENT HEALTH QUESTIONNAIRE - PHQ9
1. LITTLE INTEREST OR PLEASURE IN DOING THINGS: NOT AT ALL
2. FEELING DOWN, DEPRESSED OR HOPELESS: NOT AT ALL
SUM OF ALL RESPONSES TO PHQ9 QUESTIONS 1 & 2: 0

## 2025-05-09 ASSESSMENT — PAIN - FUNCTIONAL ASSESSMENT
PAIN_FUNCTIONAL_ASSESSMENT: 0-10
PAIN_FUNCTIONAL_ASSESSMENT: 0-10

## 2025-05-09 NOTE — ED PROVIDER NOTES
HPI   Chief Complaint   Patient presents with    Shortness of Breath     Increasing sob, hx of lung cancer and supposed to be on oxygen but order hasn't been approved yet        Patient is 89-year-old male with history of lung cancer not currently receiving any treatment was [facility] who presents the emergency department for complaints of worsening shortness of breath.  Family states that the patient supposed to be having home oxygen set up but this has not been provided yet and he is having worsening shortness of breath primarily with exertion which is why they came to the emergency department for evaluation.  No reported, fevers, chills, productive cough.      History provided by:  Patient          Patient History   Medical History[1]  Surgical History[2]  Family History[3]  Social History[4]    Physical Exam   ED Triage Vitals [05/09/25 1434]   Temperature Heart Rate Respirations BP   36.8 °C (98.2 °F) 92 20 119/61      Pulse Ox Temp Source Heart Rate Source Patient Position   97 % Temporal Monitor --      BP Location FiO2 (%)     -- --       Physical Exam  Vitals and nursing note reviewed.   Constitutional:       General: He is not in acute distress.     Appearance: Normal appearance. He is not ill-appearing, toxic-appearing or diaphoretic.   HENT:      Head: Normocephalic and atraumatic.      Nose: Nose normal.      Mouth/Throat:      Mouth: Mucous membranes are moist.      Pharynx: No oropharyngeal exudate or posterior oropharyngeal erythema.   Eyes:      General: No scleral icterus.     Extraocular Movements: Extraocular movements intact.      Pupils: Pupils are equal, round, and reactive to light.   Cardiovascular:      Rate and Rhythm: Normal rate and regular rhythm.      Pulses: Normal pulses.      Heart sounds: Normal heart sounds. No murmur heard.     No friction rub. No gallop.   Pulmonary:      Effort: Pulmonary effort is normal. No respiratory distress.      Breath sounds: No stridor. Rales present. No  wheezing or rhonchi.   Chest:      Chest wall: No tenderness.   Abdominal:      General: Abdomen is flat. There is no distension.      Palpations: Abdomen is soft. There is no mass.      Tenderness: There is no abdominal tenderness. There is no guarding.      Hernia: No hernia is present.   Musculoskeletal:         General: No swelling, tenderness, deformity or signs of injury. Normal range of motion.      Cervical back: Normal range of motion and neck supple. No rigidity.   Skin:     General: Skin is warm and dry.      Capillary Refill: Capillary refill takes less than 2 seconds.      Coloration: Skin is not jaundiced or pale.      Findings: No bruising, erythema, lesion or rash.   Neurological:      General: No focal deficit present.      Mental Status: He is alert and oriented to person, place, and time. Mental status is at baseline.   Psychiatric:         Mood and Affect: Mood normal.         Behavior: Behavior normal.           ED Course & MDM   Diagnoses as of 05/09/25 1644   Shortness of breath                 No data recorded     Lizeth Coma Scale Score: 15 (05/09/25 1437 : Abbi Luis RN)                           Medical Decision Making  Patient is an 89-year-old male in no respiratory distress presented to the emergency department with complaints of shortness of breath primarily with exertion.  Patient states he supposed to receive oxygen but this not been delivered yet he is having worsening shortness of breath with movement.  On exam patient does have some diffuse crackles.  He states that he is DNR CC and they do not want any aggressive treatment or care.  Labs and chest x-ray ordered.    Chest x-ray consistent with vascular congestion and Lasix was ordered.  Attempted to ambulate the patient but he became extremely dyspneic to standing up on the side of the bed.  Given the patient's symptoms and having pulmonary vascular congestion I believe the patient went to benefit from hospitalization and  diuresis as well as getting social work and palliative care involved to help arrange his care and treatment outpatient.  Patient's primary physician Dr. Cueva was contacted and case discussed and he agreed to accept the patient for admission.    Amount and/or Complexity of Data Reviewed  Labs: ordered. Decision-making details documented in ED Course.  Radiology: ordered. Decision-making details documented in ED Course.  ECG/medicine tests: independent interpretation performed.     Details: Atrial fibrillation with a ventricular rate of 92 bpm.  QRS interval 100 ms.  QTc 482 ms.  Rightward axis.  PVCs present.  No acute ischemic injury pattern noted.      Labs Reviewed   CBC WITH AUTO DIFFERENTIAL - Abnormal       Result Value    WBC 8.0      nRBC 0.0      RBC 3.30 (*)     Hemoglobin 10.6 (*)     Hematocrit 32.5 (*)     MCV 99      MCH 32.1      MCHC 32.6      RDW 14.6 (*)     Platelets 265      Neutrophils % 87.1      Immature Granulocytes %, Automated 0.4      Lymphocytes % 5.2      Monocytes % 6.8      Eosinophils % 0.1      Basophils % 0.4      Neutrophils Absolute 6.94 (*)     Immature Granulocytes Absolute, Automated 0.03      Lymphocytes Absolute 0.41 (*)     Monocytes Absolute 0.54      Eosinophils Absolute 0.01      Basophils Absolute 0.03     COMPREHENSIVE METABOLIC PANEL - Abnormal    Glucose 117 (*)     Sodium 129 (*)     Potassium 3.6      Chloride 89 (*)     Bicarbonate 37 (*)     Anion Gap 7 (*)     Urea Nitrogen 17      Creatinine 0.99      eGFR 74      Calcium 11.7 (*)     Albumin 3.4      Alkaline Phosphatase 78      Total Protein 5.9 (*)     AST 22      Bilirubin, Total 1.7 (*)     ALT 13       XR chest 1 view   Final Result   Findings most consistent with pulmonary vascular congestion with   interstitial edema and bilateral pleural effusions.   Signed by Ankur Mckeon MD            Procedure  Procedures       [1]   Past Medical History:  Diagnosis Date    Atrial fibrillation (Multi)     BPH  (benign prostatic hyperplasia)     CAD (coronary artery disease)     Controlled atrial fibrillation (Multi)     COPD (chronic obstructive pulmonary disease) (Multi)     External hemorrhoid 09/12/2023    Hyperlipidemia     Hypertension     Right inguinal hernia 09/12/2023    Stage 3a chronic kidney disease (CKD) (Multi)    [2]   Past Surgical History:  Procedure Laterality Date    OTHER SURGICAL HISTORY  11/04/2020    Varicose vein ligation    OTHER SURGICAL HISTORY  11/04/2020    [place] tooth extraction    OTHER SURGICAL HISTORY  11/04/2020    Tonsillectomy    OTHER SURGICAL HISTORY  11/04/2020    Colonoscopy complete for polypectomy    OTHER SURGICAL HISTORY  11/06/2021    Varicose vein sclerotherapy    OTHER SURGICAL HISTORY  11/06/2021    Pilonidal cyst removal    OTHER SURGICAL HISTORY  11/06/2021    Hernia repair    OTHER SURGICAL HISTORY  12/14/2021    Umbilical hernia repair    OTHER SURGICAL HISTORY  12/14/2021    Inguinal hernia repair    US ASPIRATION INJECTION INTERMEDIATE JOINT  2/8/2021    US ASPIRATION INJECTION INTERMEDIATE JOINT 2/8/2021 [facility]   [3]   Family History  Problem Relation Name Age of Onset    Breast cancer Mother      Atrial fibrillation Mother      No Known Problems Father      Colon cancer Brother      Prostate cancer Brother      Breast cancer Daughter      Prostate cancer Son      Colon cancer Son     [4]   Social History  Tobacco Use    Smoking status: Every Day     Current packs/day: 0.50     Average packs/day: 0.5 packs/day for 69.4 years (34.7 ttl pk-yrs)     Types: Cigarettes     Start date: 1956     Passive exposure: Current    Smokeless tobacco: Never   Vaping Use    Vaping status: Never Used   Substance Use Topics    Alcohol use: Not Currently    Drug use: Never        Buck Sandoval DO  05/09/25 2165

## 2025-05-09 NOTE — H&P
ADMISSION DATE: 5/9/2025     CHIEF COMPLAINT:  Shortness of breath         HISTORY OF PRESENT ILLNESS.:   Canelo Moore is a 89 y.o. 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  Prior to the ER visit he was seen by me in the office where we discussed about home oxygen and comfort measures.  He was with his son and we had extensive discussion that patient does not want to prolong his discomfort he does not want to go for any further investigations or treatment but rather to stay comfortable.  Accordingly his CODE STATUS was changed to DNR comfort care.  The home oxygen could not be delivered in right time so he had to come to the hospital for help.  In the emergency department he was hypoxic at 85% in room air.  He had a chest x-ray done which did show pulmonary vascular congestion and received IV Lasix.  Patient was admitted for comfort with supplemental oxygen via nasal cannula and possible discussion of palliative hospice care and preferably going home with home O2.    PCP: Gordy Cueva MD    REVIEW OF SYSTEMS:  Denies fever or chills.  No dizziness, syncope, or seizures.  No headaches. No chest pain, chest tightness. No nausea, vomiting, or diarrhea.  No rash or abnormal bleeding.  Denies ankle swelling, muscle aches.  No depression or anxiety symptoms.  Remainder of review of systems is negative and also as per HPI.       Medical History[1]   Surgical History[2]   Social History[3]     CURRENT MEDICATIONS:  Scheduled Medications[4]       VITALS:  Visit Vitals  /61   Pulse 82   Temp 36.8 °C (98.2 °F) (Temporal)   Resp 20   Ht  "1.727 m (5' 8\")   Wt 71.2 kg (157 lb)   SpO2 100%   BMI 23.87 kg/m²   Smoking Status Every Day   BSA 1.85 m²        PHYSICAL EXAM:  HEENT:  Atraumatic, PERRL.  Conjunctivae clear.   Moist nasal mucous membranes. oropharynx non erythematous,   Neck:  Supple without thyromegaly or lymphadenopathy.  Lungs: Decreased air entry, coarse breath sounds, prolonged expiration, scattered rhonchi.    Heart:  RRR, S1, S2, without M.  Abdomen:  Soft, non tender, no organ enlargement, bruit. Bowel sounds present . No CVA tenderness.  Extremities:  No edema. No calf swelling or tenderness.    Skin:  No rash, ecchymosis or erythema.    LAB RESULTS:  CBC:   Results from last 7 days   Lab Units 05/09/25  1511   WBC AUTO x10*3/uL 8.0   RBC AUTO x10*6/uL 3.30*   HEMOGLOBIN g/dL 10.6*   HEMATOCRIT % 32.5*   MCV fL 99   MCH pg 32.1   MCHC g/dL 32.6   RDW % 14.6*   PLATELETS AUTO x10*3/uL 265     CMP:    Results from last 7 days   Lab Units 05/09/25  1511   SODIUM mmol/L 129*   POTASSIUM mmol/L 3.6   CHLORIDE mmol/L 89*   CO2 mmol/L 37*   BUN mg/dL 17   CREATININE mg/dL 0.99   GLUCOSE mg/dL 117*   PROTEIN TOTAL g/dL 5.9*   CALCIUM mg/dL 11.7*   BILIRUBIN TOTAL mg/dL 1.7*   ALK PHOS U/L 78   AST U/L 22   ALT U/L 13     BMP:    Results from last 7 days   Lab Units 05/09/25  1511   SODIUM mmol/L 129*   POTASSIUM mmol/L 3.6   CHLORIDE mmol/L 89*   CO2 mmol/L 37*   BUN mg/dL 17   CREATININE mg/dL 0.99   CALCIUM mg/dL 11.7*   GLUCOSE mg/dL 117*     Lab Results   Component Value Date    LDLCALC 45 03/23/2025        IMAGING:   Following imaging studies were reviewed, agreed with the radiologists' impression and incorporated them into MDM.    XR chest 1 view  Result Date: 5/9/2025    Findings most consistent with pulmonary vascular congestion with interstitial edema and bilateral pleural effusions. === 03/22/25 ===    MR HEAD ANGIO WO IV CONTRAST  === 03/22/25 ===  CT BRAIN ATTACK HEAD WO CONTRAST  Mild age related degenerative change as described " without acute  findings.      Transesophageal Echo ([facility]HE) : 3/22/2025     1. The left ventricular systolic function is normal, with a visually estimated ejection fraction of 55-60%.  2. Spectral Doppler shows a Grade I (impaired relaxation pattern) of left ventricular diastolic filling with normal left atrial filling pressure.  3. Moderate-sized mobile echodensity measuring 1 X 0.7 cm was seen on the tricuspid valve, suggestive of possible old vegatation or calcified thrombus. However, cannot r/o other masses based on patient's clinical historo of lumg mass. Clinical correlation is recommended.  4. The left atrial size is moderately dilated.  5. The right atrial size is moderately dilated.  6. Moderate mitral valve regurgitation.  7. 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  8. Mild aortic valve regurgitation.  9. No left atrial thrombus.  10. There is plaque visualized in the descending aorta.        PROBLEM LIST ON ADMISSION:  Neoplasm of lung, primary tumor staging category pT4 per American Joint Committee on Cancer Staging System, 6th edition (Multi) [C34.90]     CHRONIC MEDICAL CONDITIONS:     CAD (coronary artery disease)    Emphysema/COPD (Multi)    Primary hypertension    Insomnia, idiopathic    Paroxysmal atrial fibrillation (Multi)    Amiodarone-induced hyperthyroidism    Chronic heart failure with preserved ejection fraction (HFpEF)    Acute CVA (cerebrovascular accident) (Multi)    Shortness of breath     PLAN ON ADMISSION:  Patient is at end-stage COPD, CHF and atrial fibrillation.  Is progressively getting weaker and short of breath making his day-to-day life very uncomfortable.  At this point we had a prolonged discussion with family members and patient himself that he does not want to pursue any further investigations or heroic measures.  He already has known lung nodule which is positive for PET scan and refused to go for biopsy.   Patient will be kept comfortable by adding supplemental oxygen.  He will be given comfort measure medications like morphine and Ativan for anxiety.  He will be evaluated for home-going oxygen.  Family members agreed to consult palliative hospice care during this admission so that he can go home with home hospice.        DVT prophylaxis will be done with heparin. GI prophylaxis be done with Protonix.  PT OT will be consulted for evaluation and treatment.  CODE STATUS full. Patient agrees with the plan of care an hospital admission.  All Imaging and labs were reviewed per medical records; pertinent labs were addressed. Time spent before, during and after care today, including coordination of care approximately 75 minutes.           Some of this note was completed using Dragon voice recognition technology and may include unintended errors with respect to translation of words, typographical errors or grammar errors which may not have been identified prior to finalization of the chart note.          [1]   Past Medical History:  Diagnosis Date    Atrial fibrillation (Multi)     BPH (benign prostatic hyperplasia)     CAD (coronary artery disease)     Controlled atrial fibrillation (Multi)     COPD (chronic obstructive pulmonary disease) (Multi)     External hemorrhoid 09/12/2023    Hyperlipidemia     Hypertension     Right inguinal hernia 09/12/2023    Stage 3a chronic kidney disease (CKD) (Multi)    [2]   Past Surgical History:  Procedure Laterality Date    OTHER SURGICAL HISTORY  11/04/2020    Varicose vein ligation    OTHER SURGICAL HISTORY  11/04/2020    [place] tooth extraction    OTHER SURGICAL HISTORY  11/04/2020    Tonsillectomy    OTHER SURGICAL HISTORY  11/04/2020    Colonoscopy complete for polypectomy    OTHER SURGICAL HISTORY  11/06/2021    Varicose vein sclerotherapy    OTHER SURGICAL HISTORY  11/06/2021    Pilonidal cyst removal    OTHER SURGICAL HISTORY  11/06/2021    Hernia repair    OTHER SURGICAL HISTORY   12/14/2021    Umbilical hernia repair    OTHER SURGICAL HISTORY  12/14/2021    Inguinal hernia repair    US ASPIRATION INJECTION INTERMEDIATE JOINT  2/8/2021    US ASPIRATION INJECTION INTERMEDIATE JOINT 2/8/2021 [facility]   [3]   Social History  Tobacco Use    Smoking status: Every Day     Current packs/day: 0.50     Average packs/day: 0.5 packs/day for 69.4 years (34.7 ttl pk-yrs)     Types: Cigarettes     Start date: 1956     Passive exposure: Current    Smokeless tobacco: Never   Vaping Use    Vaping status: Never Used   Substance Use Topics    Alcohol use: Not Currently    Drug use: Never   [4] furosemide, 40 mg, intravenous, Once

## 2025-05-09 NOTE — PROGRESS NOTES
TCM outreach due -noted when ready to call that patient is currently in ED. Will set outreach for later date.

## 2025-05-09 NOTE — TELEPHONE ENCOUNTER
Karlos from [employer] stopped into the office to discuss the rest of the documents needed for the patients oxygen order.     Completed the form he gave me and placed on Dr. Cueva's desk to be signed. Pended new order as well just in case. Faxed the rest of the forms needed to Karlos at [phone]

## 2025-05-09 NOTE — HH CARE COORDINATION
Home Care received a Referral for Nursing, Physical Therapy, Occupational Therapy, and Home Health Aide. We have processed the referral for a Start of Care on 24-48 HOURS .     If you have any questions or concerns, please feel free to contact us at [phone]. Follow the prompts, enter your five digit zip code, and you will be directed to your care team on [facility].

## 2025-05-10 ENCOUNTER — HOME CARE VISIT (OUTPATIENT)
Dept: HOME HEALTH SERVICES | Facility: HOME HEALTH | Age: 89
End: 2025-05-10

## 2025-05-10 PROCEDURE — 2500000001 HC RX 250 WO HCPCS SELF ADMINISTERED DRUGS (ALT 637 FOR MEDICARE OP): Performed by: INTERNAL MEDICINE

## 2025-05-10 PROCEDURE — 94760 N-INVAS EAR/PLS OXIMETRY 1: CPT

## 2025-05-10 PROCEDURE — 2500000005 HC RX 250 GENERAL PHARMACY W/O HCPCS: Performed by: INTERNAL MEDICINE

## 2025-05-10 PROCEDURE — 2500000002 HC RX 250 W HCPCS SELF ADMINISTERED DRUGS (ALT 637 FOR MEDICARE OP, ALT 636 FOR OP/ED): Mod: JZ | Performed by: INTERNAL MEDICINE

## 2025-05-10 PROCEDURE — G0378 HOSPITAL OBSERVATION PER HR: HCPCS

## 2025-05-10 PROCEDURE — 94640 AIRWAY INHALATION TREATMENT: CPT

## 2025-05-10 PROCEDURE — 99233 SBSQ HOSP IP/OBS HIGH 50: CPT | Performed by: INTERNAL MEDICINE

## 2025-05-10 RX ADMIN — Medication 1 APPLICATION: at 21:48

## 2025-05-10 RX ADMIN — Medication 5 L/MIN: at 00:46

## 2025-05-10 RX ADMIN — Medication 2 L/MIN: at 19:43

## 2025-05-10 RX ADMIN — IPRATROPIUM BROMIDE AND ALBUTEROL SULFATE 3 ML: 2.5; .5 SOLUTION RESPIRATORY (INHALATION) at 12:45

## 2025-05-10 RX ADMIN — IPRATROPIUM BROMIDE AND ALBUTEROL SULFATE 3 ML: 2.5; .5 SOLUTION RESPIRATORY (INHALATION) at 00:46

## 2025-05-10 RX ADMIN — IPRATROPIUM BROMIDE AND ALBUTEROL SULFATE 3 ML: 2.5; .5 SOLUTION RESPIRATORY (INHALATION) at 19:41

## 2025-05-10 RX ADMIN — Medication 1 APPLICATION: at 08:21

## 2025-05-10 RX ADMIN — IPRATROPIUM BROMIDE AND ALBUTEROL SULFATE 3 ML: 2.5; .5 SOLUTION RESPIRATORY (INHALATION) at 07:05

## 2025-05-10 ASSESSMENT — COGNITIVE AND FUNCTIONAL STATUS - GENERAL
MOBILITY SCORE: 17
TURNING FROM BACK TO SIDE WHILE IN FLAT BAD: A LITTLE
CLIMB 3 TO 5 STEPS WITH RAILING: A LITTLE
WALKING IN HOSPITAL ROOM: A LOT
MOVING FROM LYING ON BACK TO SITTING ON SIDE OF FLAT BED WITH BEDRAILS: A LITTLE
STANDING UP FROM CHAIR USING ARMS: A LITTLE
DAILY ACTIVITIY SCORE: 24
MOVING TO AND FROM BED TO CHAIR: A LITTLE

## 2025-05-10 ASSESSMENT — PAIN - FUNCTIONAL ASSESSMENT
PAIN_FUNCTIONAL_ASSESSMENT: 0-10

## 2025-05-10 ASSESSMENT — PAIN SCALES - GENERAL
PAINLEVEL_OUTOF10: 0 - NO PAIN

## 2025-05-10 NOTE — PROGRESS NOTES
Date/Time SpO2 Medical Gas Therapy Medical Gas Delivery Method Oxygen L/min Patient is on During the Study Patient Activity During Study    05/10/25 0917 92 %  None (Room air)  --  --  At rest     05/10/25 0919 90 %  None (Room air)  --  --  At rest     05/10/25 0924 88 %  None (Room air)  --  --  At rest     05/10/25 0927 97 %  Supplemental oxygen  Nasal cannula  2 L/min  At rest     05/10/25 0929 --  --  --  --  --     05/10/25 1015 83 %  Supplemental oxygen  Nasal cannula  2 L/min  Ambulating     05/10/25 1016 83 %  Supplemental oxygen  Nasal cannula  3 L/min  Ambulating     05/10/25 1018 88 %  Supplemental oxygen  Nasal cannula  4 L/min  Ambulating     05/10/25 1019 93 %  Supplemental oxygen  Nasal cannula  5 L/min  Ambulating     05/10/25 1022 95 %  Supplemental oxygen  Nasal cannula  5 L/min  Ambulating             Was a Home Oxygen Evaluation Performed? Yes  Based on the Home Oxygen Evaluation, Does the Patient Qualify for Home Oxygen Therapy? Yes            Recommendations:    Recommended Oxygen Dose at Rest is 2 L/min   Recommended Oxygen Dose with Activity is 5 L/min

## 2025-05-10 NOTE — PROGRESS NOTES
05/10/25 1158   Discharge Planning   Home or Post Acute Services In home services   Type of Home Care Services Hospice  (Agency of choice is [facility])     SW notified of consult for referral to Hospice services. SW met with pt and son/KEVIN Lemos at bedside. Pt and son confirm plan to pursue Hospice care. SW provided freedom of choice in choosing agency. Agency of choice is [facility], [facility] [place], care team notified.     Update- Notified by hospice that consult meeting is scheduled for Monday 5/12 between 9:30 and 10am. Care team notified.

## 2025-05-10 NOTE — PROGRESS NOTES
Date/Time SpO2 Medical Gas Therapy Medical Gas Delivery Method Oxygen L/min Patient is on During the Study Patient Activity During Study    05/10/25 0917 92 %  None (Room air)  --  --  At rest     05/10/25 0919 90 %  None (Room air)  --  --  At rest     05/10/25 0924 88 %  None (Room air)  --  --  At rest     05/10/25 0927 97 %  Supplemental oxygen  Nasal cannula  2 L/min  At rest     05/10/25 0929 --  --  --  --  --     05/10/25 1015 83 %  Supplemental oxygen  Nasal cannula  2 L/min  Ambulating     05/10/25 1016 83 %  Supplemental oxygen  Nasal cannula  3 L/min  Ambulating     05/10/25 1018 88 %  Supplemental oxygen  Nasal cannula  4 L/min  Ambulating     05/10/25 1019 93 %  Supplemental oxygen  Nasal cannula  5 L/min  Ambulating     05/10/25 1022 95 %  Supplemental oxygen  Nasal cannula  5 L/min  Ambulating             Was a Home Oxygen Evaluation Performed? Yes  Based on the Home Oxygen Evaluation, Does the Patient Qualify for Home Oxygen Therapy? Yes            Recommendations:    Recommended Oxygen Dose at Rest is 2 L/min   Recommended Oxygen Dose with Activity is 5 L/min      2lpm at rest and HS. 5lpm with ambulation

## 2025-05-10 NOTE — CARE PLAN
The patient's goals for the shift include rest     The clinical goals for the shift include safety

## 2025-05-11 ENCOUNTER — APPOINTMENT (OUTPATIENT)
Dept: CARDIOLOGY | Facility: HOSPITAL | Age: 89
End: 2025-05-11
Payer: MEDICARE

## 2025-05-11 VITALS
TEMPERATURE: 97.5 F | SYSTOLIC BLOOD PRESSURE: 139 MMHG | HEART RATE: 113 BPM | RESPIRATION RATE: 19 BRPM | WEIGHT: 157 LBS | BODY MASS INDEX: 23.79 KG/M2 | OXYGEN SATURATION: 99 % | DIASTOLIC BLOOD PRESSURE: 88 MMHG | HEIGHT: 68 IN

## 2025-05-11 LAB
ATRIAL RATE: 300 BPM
Q ONSET: 221 MS
QRS COUNT: 15 BEATS
QRS DURATION: 100 MS
QT INTERVAL: 390 MS
QTC CALCULATION(BAZETT): 482 MS
QTC FREDERICIA: 449 MS
R AXIS: 101 DEGREES
T AXIS: -15 DEGREES
T OFFSET: 416 MS
VENTRICULAR RATE: 92 BPM

## 2025-05-11 PROCEDURE — 2500000005 HC RX 250 GENERAL PHARMACY W/O HCPCS: Performed by: INTERNAL MEDICINE

## 2025-05-11 PROCEDURE — 99233 SBSQ HOSP IP/OBS HIGH 50: CPT | Performed by: INTERNAL MEDICINE

## 2025-05-11 PROCEDURE — 93005 ELECTROCARDIOGRAM TRACING: CPT

## 2025-05-11 PROCEDURE — 2500000001 HC RX 250 WO HCPCS SELF ADMINISTERED DRUGS (ALT 637 FOR MEDICARE OP): Performed by: INTERNAL MEDICINE

## 2025-05-11 PROCEDURE — 2500000002 HC RX 250 W HCPCS SELF ADMINISTERED DRUGS (ALT 637 FOR MEDICARE OP, ALT 636 FOR OP/ED): Mod: JZ | Performed by: INTERNAL MEDICINE

## 2025-05-11 PROCEDURE — 94640 AIRWAY INHALATION TREATMENT: CPT

## 2025-05-11 PROCEDURE — G0378 HOSPITAL OBSERVATION PER HR: HCPCS

## 2025-05-11 RX ADMIN — IPRATROPIUM BROMIDE AND ALBUTEROL SULFATE 3 ML: 2.5; .5 SOLUTION RESPIRATORY (INHALATION) at 00:08

## 2025-05-11 RX ADMIN — IPRATROPIUM BROMIDE AND ALBUTEROL SULFATE 3 ML: 2.5; .5 SOLUTION RESPIRATORY (INHALATION) at 06:45

## 2025-05-11 RX ADMIN — Medication 1 APPLICATION: at 10:49

## 2025-05-11 RX ADMIN — IPRATROPIUM BROMIDE AND ALBUTEROL SULFATE 3 ML: 2.5; .5 SOLUTION RESPIRATORY (INHALATION) at 19:04

## 2025-05-11 RX ADMIN — IPRATROPIUM BROMIDE AND ALBUTEROL SULFATE 3 ML: 2.5; .5 SOLUTION RESPIRATORY (INHALATION) at 12:32

## 2025-05-11 RX ADMIN — Medication 2 L/MIN: at 19:07

## 2025-05-11 RX ADMIN — Medication 1 APPLICATION: at 06:00

## 2025-05-11 ASSESSMENT — COGNITIVE AND FUNCTIONAL STATUS - GENERAL
MOVING FROM LYING ON BACK TO SITTING ON SIDE OF FLAT BED WITH BEDRAILS: A LITTLE
DAILY ACTIVITIY SCORE: 24
MOVING TO AND FROM BED TO CHAIR: A LITTLE
TURNING FROM BACK TO SIDE WHILE IN FLAT BAD: A LITTLE
WALKING IN HOSPITAL ROOM: A LOT
STANDING UP FROM CHAIR USING ARMS: A LITTLE
CLIMB 3 TO 5 STEPS WITH RAILING: A LOT
MOBILITY SCORE: 16

## 2025-05-11 ASSESSMENT — PAIN SCALES - GENERAL
PAINLEVEL_OUTOF10: 0 - NO PAIN
PAINLEVEL_OUTOF10: 0 - NO PAIN

## 2025-05-11 NOTE — PROGRESS NOTES
ADMISSION DATE: 5/9/2025  HOSPITAL DAY: 0    SUBJECTIVE:  Patient was seen at bedside.  He was having breakfast although he did not like the taste.  He wanted to drink coffee.  Patient was somewhat short of breath as he was talking but comfortable.  Not anxious.      OBJECTIVE:  Vitals:    05/10/25 2336 05/11/25 0008 05/11/25 0645 05/11/25 0802   BP: 159/88   144/73   BP Location: Right arm   Right arm   Patient Position: Lying   Lying   Pulse: 98   (!) 111   Resp: 18   16   Temp: 36.6 °C (97.9 °F)   36.6 °C (97.9 °F)   TempSrc: Temporal   Temporal   SpO2: 98% 95% 94% 93%   Weight:       Height:            Intake/Output Summary (Last 24 hours) at 5/11/2025 1057  Last data filed at 5/10/2025 1400  Gross per 24 hour   Intake 400 ml   Output 200 ml   Net 200 ml      Wt Readings from Last 10 Encounters:   05/09/25 71.2 kg (157 lb)   05/06/25 71.4 kg (157 lb 6.4 oz)   04/29/25 72.4 kg (159 lb 9.6 oz)   04/08/25 84.8 kg (187 lb)   03/27/25 79.4 kg (175 lb)   03/22/25 79.4 kg (175 lb)   02/18/25 76.8 kg (169 lb 6.4 oz)   01/14/25 77.1 kg (170 lb)   11/27/24 74.8 kg (164 lb 12.8 oz)   11/26/24 74.2 kg (163 lb 9.6 oz)       PHYSICAL EXAM:  Gen: Alert, awake, Oriented X 3. Not in any acute distress   HEENT:  Atraumatic, PERRL.  Conjunctivae clear.   Moist nasal mucous membranes. oropharynx non erythematous,   Neck:  Supple without thyromegaly or lymphadenopathy.  Lungs:  Clear to auscultation without rales, rhonchi, or rub.  Heart:  RRR, S1, S2, without M.  Abdomen:  Soft, non tender, no organ enlargement, bruit. Bowel sounds present . No CVA tenderness.  Extremities:  No edema. No calf swelling or tenderness.    Skin:  No rash, ecchymosis or erythema.    CURRENT ACTIVE MEDS:  Scheduled Medications[1]      LAB RESULTS:   CBC:   Results from last 7 days   Lab Units 05/09/25  1511   WBC AUTO x10*3/uL 8.0   RBC AUTO x10*6/uL 3.30*   HEMOGLOBIN g/dL 10.6*   HEMATOCRIT % 32.5*   MCV fL 99   MCH pg 32.1   MCHC g/dL 32.6   RDW %  "14.6*   PLATELETS AUTO x10*3/uL 265     CMP:    Results from last 7 days   Lab Units 05/09/25  1511   SODIUM mmol/L 129*   POTASSIUM mmol/L 3.6   CHLORIDE mmol/L 89*   CO2 mmol/L 37*   BUN mg/dL 17   CREATININE mg/dL 0.99   GLUCOSE mg/dL 117*   PROTEIN TOTAL g/dL 5.9*   CALCIUM mg/dL 11.7*   BILIRUBIN TOTAL mg/dL 1.7*   ALK PHOS U/L 78   AST U/L 22   ALT U/L 13     BMP:    Results from last 7 days   Lab Units 05/09/25  1511   SODIUM mmol/L 129*   POTASSIUM mmol/L 3.6   CHLORIDE mmol/L 89*   CO2 mmol/L 37*   BUN mg/dL 17   CREATININE mg/dL 0.99   CALCIUM mg/dL 11.7*   GLUCOSE mg/dL 117*        Lab Results   Component Value Date    LDLCALC 45 03/23/2025     No results found for: \"HGBA1C\"  Lab Results   Component Value Date    LDLCALC 45 03/23/2025    CREATININE 0.99 05/09/2025       Lab Results   Component Value Date    TSH 1.69 03/23/2025    [id number] 84 10/11/2022    THYROIDPAB <28 05/10/2023      Lab Results   Component Value Date    INR 1.6 (H) 03/22/2025    INR 2.2 (H) 10/21/2024    INR 1.9 (H) 10/15/2024    PROTIME 17.3 (H) 03/22/2025    PROTIME 24.8 (H) 10/21/2024    PROTIME 21.6 (H) 10/15/2024     CULTURES & SUSCEPTIBILITIES:   No results found for the last 90 days.       IMAGING:   Following imaging studies were reviewed, agreed with the radiologists' impression and incorporated them into MDM.     XR chest 1 view  Result Date: 5/9/2025     Findings most consistent with pulmonary vascular congestion with interstitial edema and bilateral pleural effusions. === 03/22/25 ===     MR HEAD ANGIO WO IV CONTRAST  === 03/22/25 ===  CT BRAIN ATTACK HEAD WO CONTRAST  Mild age related degenerative change as described without acute  findings.        Transesophageal Echo (MOSHE) : 3/22/2025      1. The left ventricular systolic function is normal, with a visually estimated ejection fraction of 55-60%.  2. Spectral Doppler shows a Grade I (impaired relaxation pattern) of left ventricular diastolic filling with normal left atrial " filling pressure.  3. Moderate-sized mobile echodensity measuring 1 X 0.7 cm was seen on the tricuspid valve, suggestive of possible old vegatation or calcified thrombus. However, cannot r/o other masses based on patient's clinical historo of lumg mass. Clinical correlation is recommended.  4. The left atrial size is moderately dilated.  5. The right atrial size is moderately dilated.  6. Moderate mitral valve regurgitation.  7. 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  8. Mild aortic valve regurgitation.  9. No left atrial thrombus.  10. There is plaque visualized in the descending aorta.    LAST EKG  Encounter Date: 03/22/25   ECG 12 Lead   Result Value    Ventricular Rate 100    Atrial Rate 267    QRS Duration 96    QT Interval 354    QTC Calculation(Bazett) 456    R Axis 56    T Axis -29    QRS Count 16    Q Onset 219    T Offset 396    QTC Fredericia 420        PROBLEMS ON ADMISSION:  Shortness of breath [R06.02]  Neoplasm of lung, primary tumor staging category pT4 per American Joint Committee on Cancer Staging System, 6th edition (Multi) [C34.90]    CHRONIC MEDICAL CONDITIONS:    CAD (coronary artery disease)    Emphysema/COPD (Multi)    Primary hypertension    Insomnia, idiopathic    Paroxysmal atrial fibrillation (Multi)    Amiodarone-induced hyperthyroidism    Chronic heart failure with preserved ejection fraction (HFpEF)    Acute CVA (cerebrovascular accident) (Multi)    Shortness of breath      ASSESSMENT AND PLAN FOR 5/11/2025  Patient is waiting to have a hospice meeting.  He agreed to go just comfort care only.  Patient has multiple end-stage diseases that he does not want to pursue any further.  He is mentally very competent.  We are not doing any routine blood work or investigation for him.  Currently he is not even taking any his routine home medications.  We kept him on comfort measures medications.  Patient's vital signs are  stable.  The hospice meeting will be tomorrow at 9:30 AM.  His prescription for home-going oxygen has been given to the bedside nurse.  Once he gets discharged to home with home hospice he would be able to use supplemental oxygen for comfort and respiratory difficulty.  We arranged a single room for him so that he can have restful night        P.S: This note was completed using Dragon voice recognition technology and may include unintended errors with respect to translation of words, typographical errors or grammar errors which may not have been identified while finalizing the chart.         [1] ipratropium-albuteroL, 3 mL, nebulization, q6h  menthol-zinc oxide, 1 Application, Topical, BID

## 2025-05-11 NOTE — CARE PLAN
Over the shift, the patient did make progress toward the following goals.     The clinical goals for the shift include Patient will be safe from fall or injury and will report pain at a tolerable level.

## 2025-05-11 NOTE — CARE PLAN
The patient's goals for the shift include      The clinical goals for the shift include Patient will remain comfortable and pain will decrease.

## 2025-05-11 NOTE — PROGRESS NOTES
ADMISSION DATE: 5/9/2025  HOSPITAL DAY: 0    SUBJECTIVE:  Patient was seen at bedside.  Uneventful night.  Patient remained comfortable.    OBJECTIVE:  Vitals:    05/10/25 2336 05/11/25 0008 05/11/25 0645 05/11/25 0802   BP: 159/88   144/73   BP Location: Right arm   Right arm   Patient Position: Lying   Lying   Pulse: 98   (!) 111   Resp: 18   16   Temp: 36.6 °C (97.9 °F)   36.6 °C (97.9 °F)   TempSrc: Temporal   Temporal   SpO2: 98% 95% 94% 93%   Weight:       Height:            Intake/Output Summary (Last 24 hours) at 5/11/2025 1042  Last data filed at 5/10/2025 1400  Gross per 24 hour   Intake 400 ml   Output 200 ml   Net 200 ml      Wt Readings from Last 10 Encounters:   05/09/25 71.2 kg (157 lb)   05/06/25 71.4 kg (157 lb 6.4 oz)   04/29/25 72.4 kg (159 lb 9.6 oz)   04/08/25 84.8 kg (187 lb)   03/27/25 79.4 kg (175 lb)   03/22/25 79.4 kg (175 lb)   02/18/25 76.8 kg (169 lb 6.4 oz)   01/14/25 77.1 kg (170 lb)   11/27/24 74.8 kg (164 lb 12.8 oz)   11/26/24 74.2 kg (163 lb 9.6 oz)       PHYSICAL EXAM:  Gen: Alert, awake, Oriented X 3. Not in any acute distress   HEENT:  Atraumatic, PERRL.  Conjunctivae clear.   Moist nasal mucous membranes. oropharynx non erythematous,   Neck:  Supple without thyromegaly or lymphadenopathy.  Lungs:  Clear to auscultation without rales, rhonchi, or rub.  Heart:  RRR, S1, S2, without M.  Abdomen:  Soft, non tender, no organ enlargement, bruit. Bowel sounds present . No CVA tenderness.  Extremities:  No edema. No calf swelling or tenderness.    Skin:  No rash, ecchymosis or erythema.    CURRENT ACTIVE MEDS:  Scheduled Medications[1]      LAB RESULTS:   CBC:   Results from last 7 days   Lab Units 05/09/25  1511   WBC AUTO x10*3/uL 8.0   RBC AUTO x10*6/uL 3.30*   HEMOGLOBIN g/dL 10.6*   HEMATOCRIT % 32.5*   MCV fL 99   MCH pg 32.1   MCHC g/dL 32.6   RDW % 14.6*   PLATELETS AUTO x10*3/uL 265     CMP:    Results from last 7 days   Lab Units 05/09/25  1511   SODIUM mmol/L 129*  "  POTASSIUM mmol/L 3.6   CHLORIDE mmol/L 89*   CO2 mmol/L 37*   BUN mg/dL 17   CREATININE mg/dL 0.99   GLUCOSE mg/dL 117*   PROTEIN TOTAL g/dL 5.9*   CALCIUM mg/dL 11.7*   BILIRUBIN TOTAL mg/dL 1.7*   ALK PHOS U/L 78   AST U/L 22   ALT U/L 13     BMP:    Results from last 7 days   Lab Units 05/09/25  1511   SODIUM mmol/L 129*   POTASSIUM mmol/L 3.6   CHLORIDE mmol/L 89*   CO2 mmol/L 37*   BUN mg/dL 17   CREATININE mg/dL 0.99   CALCIUM mg/dL 11.7*   GLUCOSE mg/dL 117*        Lab Results   Component Value Date    LDLCALC 45 03/23/2025     No results found for: \"HGBA1C\"  Lab Results   Component Value Date    LDLCALC 45 03/23/2025    CREATININE 0.99 05/09/2025       Lab Results   Component Value Date    TSH 1.69 03/23/2025    [id number] 84 10/11/2022    THYROIDPAB <28 05/10/2023      Lab Results   Component Value Date    INR 1.6 (H) 03/22/2025    INR 2.2 (H) 10/21/2024    INR 1.9 (H) 10/15/2024    PROTIME 17.3 (H) 03/22/2025    PROTIME 24.8 (H) 10/21/2024    PROTIME 21.6 (H) 10/15/2024     CULTURES & SUSCEPTIBILITIES:   No results found for the last 90 days.         IMAGING:   Following imaging studies were reviewed, agreed with the radiologists' impression and incorporated them into MDM.     XR chest 1 view  Result Date: 5/9/2025     Findings most consistent with pulmonary vascular congestion with interstitial edema and bilateral pleural effusions. === 03/22/25 ===     MR HEAD ANGIO WO IV CONTRAST  === 03/22/25 ===  CT BRAIN ATTACK HEAD WO CONTRAST  Mild age related degenerative change as described without acute  findings.        Transesophageal Echo (MOSHE) : 3/22/2025      1. The left ventricular systolic function is normal, with a visually estimated ejection fraction of 55-60%.  2. Spectral Doppler shows a Grade I (impaired relaxation pattern) of left ventricular diastolic filling with normal left atrial filling pressure.  3. Moderate-sized mobile echodensity measuring 1 X 0.7 cm was seen on the tricuspid valve, " suggestive of possible old vegatation or calcified thrombus. However, cannot r/o other masses based on patient's clinical historo of lumg mass. Clinical correlation is recommended.  4. The left atrial size is moderately dilated.  5. The right atrial size is moderately dilated.  6. Moderate mitral valve regurgitation.  7. 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  8. Mild aortic valve regurgitation.  9. No left atrial thrombus.  10. There is plaque visualized in the descending aorta.    LAST EKG  Encounter Date: 03/22/25   ECG 12 Lead   Result Value    Ventricular Rate 100    Atrial Rate 267    QRS Duration 96    QT Interval 354    QTC Calculation(Bazett) 456    R Axis 56    T Axis -29    QRS Count 16    Q Onset 219    T Offset 396    QTC Fredericia 420        PROBLEMS ON ADMISSION:  Shortness of breath [R06.02]  Neoplasm of lung, primary tumor staging category pT4 per American Joint Committee on Cancer Staging System, 6th edition (Multi) [C34.90]    CHRONIC MEDICAL CONDITIONS:    CAD (coronary artery disease)    Emphysema/COPD (Multi)    Primary hypertension    Insomnia, idiopathic    Paroxysmal atrial fibrillation (Multi)    Amiodarone-induced hyperthyroidism    Chronic heart failure with preserved ejection fraction (HFpEF)    Acute CVA (cerebrovascular accident) (Multi)    Shortness of breath        ASSESSMENT AND PLAN FOR 5/10/2025  Patient was to be at comfort care level.  He is actually comfortable.  He is using supplemental oxygen even at rest.  Patient does not have much movement at this time.  He is not anxious.  He is not using Ativan or morphine for pain.  We have consulted hospice and they will have a meeting perhaps tomorrow to decide on home with home hospice.  Meanwhile patient has been evaluated for home-going oxygen.  Will write a prescription to have home oxygen at 2 L/min 24 hours.        P.S: This note was completed using Dragon  voice recognition technology and may include unintended errors with respect to translation of words, typographical errors or grammar errors which may not have been identified while finalizing the chart.         [1] ipratropium-albuteroL, 3 mL, nebulization, q6h  menthol-zinc oxide, 1 Application, Topical, BID

## 2025-05-12 ENCOUNTER — TELEPHONE (OUTPATIENT)
Dept: PRIMARY CARE | Facility: CLINIC | Age: 89
End: 2025-05-12
Payer: MEDICARE

## 2025-05-12 VITALS
TEMPERATURE: 97.3 F | RESPIRATION RATE: 23 BRPM | WEIGHT: 157 LBS | OXYGEN SATURATION: 95 % | HEIGHT: 68 IN | SYSTOLIC BLOOD PRESSURE: 136 MMHG | HEART RATE: 114 BPM | BODY MASS INDEX: 23.79 KG/M2 | DIASTOLIC BLOOD PRESSURE: 85 MMHG

## 2025-05-12 PROCEDURE — G0378 HOSPITAL OBSERVATION PER HR: HCPCS

## 2025-05-12 PROCEDURE — 2500000002 HC RX 250 W HCPCS SELF ADMINISTERED DRUGS (ALT 637 FOR MEDICARE OP, ALT 636 FOR OP/ED): Mod: JZ | Performed by: INTERNAL MEDICINE

## 2025-05-12 PROCEDURE — 99239 HOSP IP/OBS DSCHRG MGMT >30: CPT | Performed by: INTERNAL MEDICINE

## 2025-05-12 PROCEDURE — 94640 AIRWAY INHALATION TREATMENT: CPT

## 2025-05-12 PROCEDURE — 2500000005 HC RX 250 GENERAL PHARMACY W/O HCPCS: Performed by: INTERNAL MEDICINE

## 2025-05-12 RX ORDER — IPRATROPIUM BROMIDE AND ALBUTEROL SULFATE 2.5; .5 MG/3ML; MG/3ML
3 SOLUTION RESPIRATORY (INHALATION)
Start: 2025-05-12

## 2025-05-12 RX ORDER — AMOXICILLIN 250 MG
2 CAPSULE ORAL EVERY 6 HOURS PRN
Status: DISCONTINUED | OUTPATIENT
Start: 2025-05-12 | End: 2025-05-13 | Stop reason: HOSPADM

## 2025-05-12 RX ORDER — MENTHOL AND ZINC OXIDE .44; 20.625 G/100G; G/100G
1 OINTMENT TOPICAL 2 TIMES DAILY
Start: 2025-05-12

## 2025-05-12 RX ORDER — MORPHINE SULFATE 20 MG/ML
10 SOLUTION ORAL EVERY 4 HOURS PRN
Start: 2025-05-12

## 2025-05-12 RX ORDER — METOPROLOL SUCCINATE 100 MG/1
50 TABLET, EXTENDED RELEASE ORAL DAILY
Start: 2025-05-12

## 2025-05-12 RX ORDER — LORAZEPAM 0.5 MG/1
0.5 TABLET ORAL EVERY 4 HOURS PRN
Start: 2025-05-12

## 2025-05-12 RX ORDER — IPRATROPIUM BROMIDE AND ALBUTEROL SULFATE 2.5; .5 MG/3ML; MG/3ML
3 SOLUTION RESPIRATORY (INHALATION) EVERY 4 HOURS PRN
Start: 2025-05-12

## 2025-05-12 RX ADMIN — Medication 2 L/MIN: at 20:06

## 2025-05-12 RX ADMIN — IPRATROPIUM BROMIDE AND ALBUTEROL SULFATE 3 ML: 2.5; .5 SOLUTION RESPIRATORY (INHALATION) at 07:12

## 2025-05-12 RX ADMIN — IPRATROPIUM BROMIDE AND ALBUTEROL SULFATE 3 ML: 2.5; .5 SOLUTION RESPIRATORY (INHALATION) at 20:06

## 2025-05-12 RX ADMIN — IPRATROPIUM BROMIDE AND ALBUTEROL SULFATE 3 ML: 2.5; .5 SOLUTION RESPIRATORY (INHALATION) at 00:54

## 2025-05-12 RX ADMIN — Medication 1 APPLICATION: at 09:15

## 2025-05-12 RX ADMIN — IPRATROPIUM BROMIDE AND ALBUTEROL SULFATE 3 ML: 2.5; .5 SOLUTION RESPIRATORY (INHALATION) at 12:27

## 2025-05-12 NOTE — SIGNIFICANT EVENT
05/12/25 1227   Inhalation Therapy   Breath Sounds Pre-Treatment Right Diminished   Breath Sounds Pre-Treatment Left Diminished   Pre-Treatment Pulse 105   Pre-Treatment Respirations 18   Breath Sounds Post-Treatment Right Diminished   Breath Sounds Post-Treatment Left Diminished   Post-Treatment Pulse 96   Post-Treatment Respirations 18   Delivery Source Oxygen   Position Sitting   Treatment Tolerance Tolerated well   $ Aerosol/MDI Treatment Charge Aerosol/inhalation treatment     Upon assessment, I observed patient on 2LNC with humidification, Treatment administered per order

## 2025-05-12 NOTE — TELEPHONE ENCOUNTER
Kellee has received a message stating the patient is entering hospice.    Any questions feel free to call.

## 2025-05-12 NOTE — SIGNIFICANT EVENT
05/12/25 0712   Inhalation Therapy   Breath Sounds Pre-Treatment Right Diminished   Breath Sounds Pre-Treatment Left Diminished   Pre-Treatment Pulse 109   Pre-Treatment Respirations 20   Breath Sounds Post-Treatment Right Diminished   Breath Sounds Post-Treatment Left Diminished   Post-Treatment Pulse 68   Post-Treatment Respirations 20   Delivery Source Oxygen   Position Sitting   Treatment Tolerance Tolerated well   $ Aerosol/MDI Treatment Charge Aerosol/inhalation treatment     Upon assessment, I observed patient on 2LNC with humidification, Treatment administered per order

## 2025-05-12 NOTE — PROGRESS NOTES
"  Canelo Moore is a 89 y.o. male on day 0 of admission presenting with Neoplasm of lung, primary tumor staging category pT4 per American Joint Committee on Cancer Staging System, 6th edition (Multi).      ASSESSMENT/PLAN   - Shortness of breath with hypoxia-in patient with known COPD and lung CA.  Discharging home with hospice  - Lung CA, declined further workup after + PET scan.  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  He and his family just met with hospice, will be going home with hospice this afternoon.  - We discussed the upcoming discharge, his feelings about hospice and the good life he has had.  - He is afebrile, heart rate in the low 110s.  Respiratory rate 23, blood pressure stable.  Satting 92% on O2 at 2L/NC.  - Chest with decreased breath sounds at the bases  - Cardiac exam irregularly irregular with a rate in the low 110s.  -He has a 1.5 cm stage III decubitus of his right buttock-examined with wound care who is in the room.  - Discussed with hospice nurse-will discharge home on comfort meds only, I did recommend they continue his beta-blocker for his heart rate.  - Will discharge this afternoon with hospice.    Chart review:  PCP is  Dr. 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  He was just discharged on 3/25 after presenting with bilateral acute to subacute infarcts (right posterior cerebral artery and left middle cerebral artery).  He had a negative bubble study but MOSHE showed a medium sized mobile echodensity on the tricuspid valve and a separate small mobile echodensity on the noncoronary cusp suggestive of fibroblastoma.  During that admission his anticoagulation was changed to Xarelto (as CVAs occurred while on Eliquis), and he was discharged home to have outpatient follow-up with Dr. Gigi Srivastava.  At that follow-up he declined any surgery.    He presented to the ER on 5/9 complaining of increased dyspnea on exertion.  His PCP had recently ordered home oxygen, but it had yet to be delivered.  CXR showed vascular congestion and he was treated with IV furosemide.  Chemistry panel with a blood sugar of 117, hyponatremic at 129 (stable from prior admission), potassium 3.6, chloride 89, bicarb 37.  BUN 17 with a creatinine of 0.99.  Calcium was elevated at 11.7.  LFTs normal other than a total bilirubin of 1.7.  CBC with a WBC of 8.0, H/H was 10.6/32.5.  Platelet count 200 65K.  In the ER he was extremely dyspneic on just standing up to the side of the bed, and he was admitted for further treatment.    After admission patient was started on home O2 and comfort measures, he was made DNR CC for the patient and his family request and is scheduled to be seen by hospice today.    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  -Cardiac catheterization 4/25/2001-chronic total occlusion proximal RCA, fills by bridging collaterals.  EF = 45-50% with inferior basilar akinesis.  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    CURRENT MEDICATIONS     Scheduled Medications:  Current Medications[1]     PRN Medications:  PRN Medications[2]      IVs:  Continuous Medications[3]     I&Os     Intake/Output last 3 Shifts:  I/O last 3 completed shifts:  In: - (0 mL/kg)   Out: 975 (13.7 mL/kg) [Urine:975 (0.4 mL/kg/hr)]  Weight: 71.2 kg     VITAL SIGNS     Vitals:    05/12/25 0054 05/12/25 0121 05/12/25 0712 05/12/25 0859   BP:  141/82  125/60   BP Location:       Patient Position:  Lying  Sitting   Pulse:  (!) 113  107   Resp:  18  23   Temp:  36.3 °C (97.3 °F)  36.6 °C (97.9 °F)   TempSrc:       SpO2: 95% 96% 95% 92%   Weight:       Height:           PHYSICAL EXAM   Physical exam:  -General appearance: Pleasant elderly male, sitting up in a chair, alert and in NAD.  He is mildly dyspneic.  -Vital signs:  As above  -HEENT: No icterus  -Neck: No JVD  -Chest: Decreased breath sounds, no rales or wheezes  -Cardiac:  Irregularly irregular rhythm with a rate in the 100-110 range  -Abdomen: Bowel sounds active  -Extremities: No edema  -Skin: Stage III 1.5 pressure ulcer left ischial tuberosity  -Neurologic:   Patient is alert and oriented x3.   -Behavior/Emotional:  Appropriate, cooperative-affect appropriate for present status.    LABS   Relevant Results:  Results from last 7 days   Lab Units 05/09/25  1511   GLUCOSE mg/dL 117*     CBC:   Lab Results   Component Value Date    WBC 8.0 05/09/2025    HGB 10.6 (L) 05/09/2025    HCT 32.5 (L) 05/09/2025    MCV 99 05/09/2025     05/09/2025       Results from last 7 days   Lab Units 05/09/25  1511   WBC AUTO x10*3/uL 8.0   HEMOGLOBIN g/dL 10.6*   HEMATOCRIT % 32.5*   PLATELETS AUTO x10*3/uL 265   NEUTROS PCT AUTO % 87.1   LYMPHS PCT AUTO % 5.2   MONOS PCT AUTO % 6.8   EOS PCT AUTO % 0.1     CMP:    Results from last 7 days   Lab Units 05/09/25  1511   SODIUM mmol/L 129*   POTASSIUM mmol/L 3.6   CHLORIDE mmol/L 89*   CO2 mmol/L 37*   BUN mg/dL 17   CREATININE mg/dL 0.99   CALCIUM mg/dL 11.7*   PROTEIN TOTAL g/dL 5.9*   BILIRUBIN TOTAL mg/dL 1.7*   ALK PHOS U/L 78   ALT U/L 13   AST U/L 22   GLUCOSE mg/dL 117*     INR:    Lab Results   Component Value Date    INR 1.6 (H) 03/22/2025    INR 2.2 (H) 10/21/2024    INR 1.9 (H) 10/15/2024    PROTIME 17.3 (H) 03/22/2025    PROTIME 24.8 (H) 10/21/2024    PROTIME 21.6 (H) 10/15/2024     BNP:     Uric acid:    Lab Results   Component Value Date    URICACID 5.0 03/23/2025     Lipid Panel:    Lab Results   Component Value Date    CHOL 96 03/23/2025    CHOL 108 10/30/2024     Lab Results   Component Value Date    HDL 41.2 03/23/2025    HDL 46.2 10/30/2024     Lab Results   Component Value Date    LDLCALC 45 03/23/2025    LDLCALC 43 10/30/2024     Lab Results   Component Value Date    TRIG 49 03/23/2025    TRIG 93 10/30/2024     Urinalysis:    Lab Results   Component Value Date    COLORU Light-Yellow 07/31/2024    APPEARANCEU Clear 07/31/2024     SPECGRAVU 1.010 07/31/2024    RADHA 7.5 07/31/2024    PROTUR NEGATIVE 07/31/2024    GLUCOSEU Normal 07/31/2024    BLOODU NEGATIVE 07/31/2024    KETONESU NEGATIVE 07/31/2024    BILIRUBINU NEGATIVE 07/31/2024    UROBILINOGEN Normal 07/31/2024    NITRITEU NEGATIVE 07/31/2024    LEUKOCYTESU NEGATIVE 07/31/2024    WBCU 1 11/01/2020    RBCU 2 11/01/2020    SQUAMEPIU <1 11/01/2020       Results for orders placed or performed during the hospital encounter of 05/09/25 (from the past 24 hours)   ECG 12 lead   Result Value Ref Range    Ventricular Rate 92 BPM    Atrial Rate 300 BPM    QRS Duration 100 ms    QT Interval 390 ms    QTC Calculation(Bazett) 482 ms    R Axis 101 degrees    T Axis -15 degrees    QRS Count 15 beats    Q Onset 221 ms    T Offset 416 ms    QTC Fredericia 449 ms     *Note: Due to a large number of results and/or encounters for the requested time period, some results have not been displayed. A complete set of results can be found in Results Review.     Results for orders placed or performed during the hospital encounter of 05/09/25 (from the past 24 hours)   ECG 12 lead   Result Value Ref Range    Ventricular Rate 92 BPM    Atrial Rate 300 BPM    QRS Duration 100 ms    QT Interval 390 ms    QTC Calculation(Bazett) 482 ms    R Axis 101 degrees    T Axis -15 degrees    QRS Count 15 beats    Q Onset 221 ms    T Offset 416 ms    QTC Fredericia 449 ms       IMAGING     XR chest 1 view   Final Result   Findings most consistent with pulmonary vascular congestion with   interstitial edema and bilateral pleural effusions.   Signed by Ankur Mckeon MD         I spent 65 minutes in the professional and overall care of this patient.    Melia Caceres MD         [1]   Current Facility-Administered Medications:     hydrALAZINE (Apresoline) injection 10 mg, 10 mg, intravenous, q6h PRN, Gordy Cueva MD    ipratropium-albuteroL (Duo-Neb) 0.5-2.5 mg/3 mL nebulizer solution 3 mL, 3 mL, nebulization, q6h, Gordy Cueva MD, 3  mL at 05/12/25 0712    labetaloL (Normodyne,Trandate) injection 10 mg, 10 mg, intravenous, q6h PRN, Gordy Cueva MD    LORazepam (Ativan) injection 1 mg, 1 mg, intravenous, q4h PRN, Gordy Cueva MD    menthol-zinc oxide (Calmoseptine - Risamine) 0.44-20.6 % ointment 1 Application, 1 Application, Topical, BID, Gordy Cueva MD, 1 Application at 05/11/25 1049    morphine injection 4 mg, 4 mg, intravenous, q6h PRN, Gordy Cueva MD    oxygen (O2) therapy, , inhalation, Continuous PRN - O2/gases, Gordy Cueva MD, 2 L/min at 05/11/25 1907  [2]   PRN medications   Medication    hydrALAZINE    labetaloL    LORazepam    morphine    oxygen   [3]

## 2025-05-12 NOTE — PROGRESS NOTES
Per [facility] plan is home w/ Hospice/ [facility] Nurse.  DEVENDRA vu and Dr mullen/karli were notified by [facility].

## 2025-05-12 NOTE — CARE PLAN
The patient's goals for the shift include      The clinical goals for the shift include comfort ans safety until the end of the shift    Over the shift, the patient did progress toward the following goals.     Problem: Pain - Adult  Goal: Verbalizes/displays adequate comfort level or baseline comfort level  Outcome: Progressing     Problem: Safety - Adult  Goal: Free from fall injury  Outcome: Progressing     Problem: Fall/Injury  Goal: Not fall by end of shift  Outcome: Progressing

## 2025-05-12 NOTE — CONSULTS
Wound Care Consult There is stage 2 PI left buttock with surrounding tissue having friction shear. Measurements are 0.5cm x 0.5cm x 0.2cm. Wound bed is red and there is no drainage. Recommend Calmoseptine as directed and Mepilex sacral dressing.      Visit Date: 5/12/2025      Patient Name: Canelo Moore         MRN: [id number]             Reason for Consult: PI Buttocks        Wound History: 1 week     Pertinent Labs:       Assessment:                         Wound Plan: Continue with current dressing changes.      Phi Dudley LPN  5/12/2025  12:48 PM

## 2025-05-12 NOTE — DISCHARGE SUMMARY
DISCHARGE SUMMARY      Canelo Moore  Age:  89 y.o. male   :  1935  MRN:  [id number]    25    Date of admission:  2025     Date of discharge:  25     Attending physician at discharge:  Melia Caceres MD     Discharge Diagnoses:  - Shortness of breath with hypoxia-in patient with known COPD and lung CA.    - Lung CA, declined further workup after + PET scan.  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  He was just discharged on 3/25 after presenting with bilateral acute to subacute infarcts (right posterior cerebral artery and left middle cerebral artery).  He had a negative bubble study but MOSHE showed a medium sized mobile echodensity on the tricuspid valve and a separate small mobile echodensity on the noncoronary cusp suggestive of fibroblastoma.  During that admission his anticoagulation was changed to Xarelto (as CVAs occurred while on Eliquis), and he was discharged home to have outpatient follow-up with Dr. Gigi Srivastava.  At that follow-up he declined any surgery.    He presented to the ER on  complaining of increased dyspnea on exertion.  His PCP had recently ordered home oxygen, but it had yet to be delivered.  CXR showed vascular congestion  and he was treated with IV furosemide.  Chemistry panel with a blood sugar of 117, hyponatremic at 129 (stable from prior admission), potassium 3.6, chloride 89, bicarb 37.  BUN 17 with a creatinine of 0.99.  Calcium was elevated at 11.7.  LFTs normal other than a total bilirubin of 1.7.  CBC with a WBC of 8.0, H/H was 10.6/32.5.  Platelet count 200 65K.  In the ER he was extremely dyspneic on just standing up to the side of the bed, so he was admitted for further treatment.  After admission patient was started on home O2 and comfort measures, he was made DNR CC and discharged with home hospice.    Procedures:  None    Problem list:  Problem List Items Addressed This Visit          Advance Directives and General Issues    High risk medication use       Cardiac and Vasculature    Hypertriglyceridemia    Primary hypertension    Paroxysmal atrial fibrillation (Multi)    Relevant Medications    labetaloL (Normodyne,Trandate) injection 10 mg    metoprolol succinate XL (Toprol-XL) 100 mg 24 hr tablet    Hypertensive heart disease with heart failure    Relevant Medications    labetaloL (Normodyne,Trandate) injection 10 mg    metoprolol succinate XL (Toprol-XL) 100 mg 24 hr tablet    Chronic heart failure with preserved ejection fraction (HFpEF)    Relevant Medications    labetaloL (Normodyne,Trandate) injection 10 mg    metoprolol succinate XL (Toprol-XL) 100 mg 24 hr tablet    Permanent atrial fibrillation (Multi)    Relevant Medications    labetaloL (Normodyne,Trandate) injection 10 mg    metoprolol succinate XL (Toprol-XL) 100 mg 24 hr tablet    Elevated troponin       Endocrine/Metabolic    Elevated serum free T4 level    Amiodarone-induced hyperthyroidism    Hyponatremia       Genitourinary and Reproductive    Stage 3a chronic kidney disease (CKD) (Multi)       Hematology and Neoplasia    * (Principal) Neoplasm of lung, primary tumor staging category pT4 per American Joint Committee on Cancer Staging System, 6th  edition (Multi)       Musculoskeletal and Injuries    Sacroiliitis (CMS-HCC)       Neuro    Stroke-like symptoms    Slurred speech    Acute CVA (cerebrovascular accident) (Multi)       Pulmonary and Pneumonias    Emphysema/COPD (Multi)    Relevant Medications    ipratropium-albuteroL (Duo-Neb) 0.5-2.5 mg/3 mL nebulizer solution    ipratropium-albuteroL (Duo-Neb) 0.5-2.5 mg/3 mL nebulizer solution    morphine 20 mg/mL concentrated oral solution    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)    Shortness of breath - Primary    Relevant Medications    ipratropium-albuteroL (Duo-Neb) 0.5-2.5 mg/3 mL nebulizer solution    ipratropium-albuteroL (Duo-Neb) 0.5-2.5 mg/3 mL nebulizer solution    morphine 20 mg/mL concentrated oral solution    LORazepam (Ativan) 0.5 mg tablet       Sleep    Insomnia, idiopathic       Tobacco    Current smoker     Other Visit Diagnoses         Hypoxia        Relevant Medications    oxygen (O2) gas therapy    ipratropium-albuteroL (Duo-Neb) 0.5-2.5 mg/3 mL nebulizer solution    ipratropium-albuteroL (Duo-Neb) 0.5-2.5 mg/3 mL nebulizer solution      Acute on chronic diastolic (congestive) heart failure        Relevant Medications    labetaloL (Normodyne,Trandate) injection 10 mg    metoprolol succinate XL (Toprol-XL) 100 mg 24 hr tablet      Decubitus ulcer of ischial area, left, stage III (Multi)        Relevant Medications    menthol-zinc oxide (Calmoseptine - Risamine) 0.44-20.6 % ointment             Disposition:   Home with Hospice    Issues Requiring Follow-Up:  None    Condition on Discharge:  Satisfactory    Discharge medications:     Medication List      START taking these medications     * ipratropium-albuteroL 0.5-2.5 mg/3 mL nebulizer solution; Commonly   known as: Duo-Neb; Take 3 mL by nebulization every 6 hours.   * ipratropium-albuteroL 0.5-2.5 mg/3 mL nebulizer solution; Commonly   known as: Duo-Neb; Take 3 mL by nebulization every 4 hours if needed for    wheezing or shortness of breath.   LORazepam 0.5 mg tablet; Commonly known as: Ativan; Take 1 tablet (0.5   mg) by mouth every 4 hours if needed for anxiety.   menthol-zinc oxide 0.44-20.6 % ointment; Commonly known as: Calmoseptine   - Risamine; Apply 1 Application topically 2 times a day.   morphine 20 mg/mL concentrated oral solution; Take 0.5 mL (10 mg) by   mouth every 4 hours if needed for shortness of breath.   oxygen gas therapy; Commonly known as: O2; Inhale 2 L/min at 120,000   mL/hr continuously. POC 2 Portable  * This list has 2 medication(s) that are the same as other medications   prescribed for you. Read the directions carefully, and ask your doctor or   other care provider to review them with you.     CHANGE how you take these medications     metoprolol succinate  mg 24 hr tablet; Commonly known as:   Toprol-XL; Take 0.5 tablets (50 mg) by mouth once daily. Do not crush or   chew.; What changed: how much to take     CONTINUE taking these medications     acetaminophen 325 mg tablet; Commonly known as: Tylenol   albuterol 90 mcg/actuation inhaler; Commonly known as: Ventolin HFA;   Inhale 2 puffs every 6 hours if needed for shortness of breath.   furosemide 40 mg tablet; Commonly known as: Lasix; Take 1 tablet (40 mg)   by mouth once daily.   nitroglycerin 0.4 mg SL tablet; Commonly known as: Nitrostat; Place 1   tablet (0.4 mg) under the tongue every 5 minutes if needed for chest pain.   place 1 tablet under tongue every 5 minutes for up to 3 doses as needed   for chest pain. Call 911 if pain persists   oxygen gas therapy; Commonly known as: O2; Inhale 2 L/min at 120,000   mL/hr once daily as needed (As needed).   tiotropium 18 mcg inhalation capsule; Commonly known as: Spiriva with   HandiHaler; Place 1 capsule (18 mcg) into inhaler and inhale once daily.   traZODone 50 mg tablet; Commonly known as: Desyrel; Take 1 tablet (50   mg) by mouth as needed at bedtime for sleep.     STOP taking  "these medications     alfuzosin 10 mg 24 hr tablet; Commonly known as: Uroxatral   aspirin 81 mg EC tablet   chlorhexidine 0.12 % solution; Commonly known as: Peridex   Eliquis 5 mg tablet; Generic drug: apixaban   finasteride 5 mg tablet; Commonly known as: Proscar   magnesium oxide 400 mg (241.3 mg elemental) tablet; Commonly known as:   Mag-Ox   methIMAzole 10 mg tablet; Commonly known as: Tapazole   methIMAzole 5 mg tablet; Commonly known as: Tapazole   mupirocin 2 % ointment; Commonly known as: Bactroban   One Daily Multivitamin 400 mcg tablet; Generic drug: multivitamin   rosuvastatin 10 mg tablet; Commonly known as: Crestor                                                                      Additional patient instructions on AVS:     Discharge physical exam:  Vital signs:  Blood pressure 125/60, pulse 107, temperature 36.6 °C (97.9 °F), temperature source Temporal, resp. rate 23, height 1.727 m (5' 8\"), weight 71.2 kg (157 lb), SpO2 92%.   At the time of discharge patient was alert, mildly tachypneic but otherwise in no acute distress.  Affect is appropriate for the present situation.  His chest has decreased breath sounds at the bases but no wheezes or rales.  Please refer to progress note this date for full details.    Test Results Pending At Discharge:  None      Code Status:  DNR Comfort Measures Only     Outpatient Follow-Up:  Future Appointments   Date Time Provider Department Center   6/17/2025  1:45 PM Grace Bills DPM [id number] [place]   6/30/2025 10:45 AM Debra Scott OT [id number] [place]   8/6/2025  2:00 PM Gordy Cueva MD [id number] [place]   11/18/2025  1:45 PM Yi Montejo MD [id number] [place]       Melia Caceres MD  05/12/25        *Some of this note was completed using Dragon voice recognition technology and may include unintended errors with respect to translation of words, typographical errors or grammar errors which may not have been identified prior to finalization of the chart " note.  >31 minutes patient care/medication reconciliation/discharge coordination and discussion of discharge instructions & follow-up with the patient.

## 2025-05-12 NOTE — CONSULTS
Nutrition Progress Note    RD consulted for MST = 3. Code status currently comfort measures, plans for hospice meeting this morning at 0930. Full assessment deferred at this time. Recommend comfort feedings as tolerated. Please re-consult for changes in plan of care/code status or need for RD.

## 2025-06-17 ENCOUNTER — APPOINTMENT (OUTPATIENT)
Dept: PODIATRY | Facility: CLINIC | Age: 89
End: 2025-06-17
Payer: MEDICARE

## 2025-06-30 ENCOUNTER — APPOINTMENT (OUTPATIENT)
Dept: OCCUPATIONAL THERAPY | Facility: CLINIC | Age: 89
End: 2025-06-30
Payer: MEDICARE

## 2025-07-08 ENCOUNTER — APPOINTMENT (OUTPATIENT)
Dept: PRIMARY CARE | Facility: CLINIC | Age: 89
End: 2025-07-08
Payer: MEDICARE

## 2025-08-06 ENCOUNTER — APPOINTMENT (OUTPATIENT)
Dept: PRIMARY CARE | Facility: CLINIC | Age: 89
End: 2025-08-06
Payer: MEDICARE

## 2025-11-18 ENCOUNTER — APPOINTMENT (OUTPATIENT)
Dept: CARDIOLOGY | Facility: CLINIC | Age: 89
End: 2025-11-18
Payer: MEDICARE